# Patient Record
Sex: FEMALE | Race: BLACK OR AFRICAN AMERICAN | Employment: OTHER | URBAN - METROPOLITAN AREA
[De-identification: names, ages, dates, MRNs, and addresses within clinical notes are randomized per-mention and may not be internally consistent; named-entity substitution may affect disease eponyms.]

---

## 2017-10-19 ENCOUNTER — HOSPITAL ENCOUNTER (OUTPATIENT)
Dept: GENERAL RADIOLOGY | Age: 72
Discharge: HOME | End: 2017-10-19
Attending: FAMILY MEDICINE
Payer: COMMERCIAL

## 2017-10-19 DIAGNOSIS — J18.9 PNEUMONIA, ORGANISM UNSPECIFIED(486): ICD-10-CM

## 2017-10-19 PROCEDURE — 71020 XR CHEST AP LAT: CPT

## 2018-05-12 ENCOUNTER — HOSPITAL ENCOUNTER (INPATIENT)
Age: 73
LOS: 7 days | Discharge: HOME HEALTH | DRG: 190 | End: 2018-05-19
Attending: EMERGENCY MEDICINE | Admitting: FAMILY MEDICINE
Payer: MEDICARE

## 2018-05-12 ENCOUNTER — APPOINTMENT (OUTPATIENT)
Dept: GENERAL RADIOLOGY | Age: 73
DRG: 190 | End: 2018-05-12
Attending: EMERGENCY MEDICINE
Payer: MEDICARE

## 2018-05-12 DIAGNOSIS — J44.1 COPD EXACERBATION (HCC): Primary | ICD-10-CM

## 2018-05-12 DIAGNOSIS — R07.89 CHEST PAIN, ATYPICAL: ICD-10-CM

## 2018-05-12 PROBLEM — F41.9 ANXIETY: Status: ACTIVE | Noted: 2018-05-12

## 2018-05-12 PROBLEM — B20 HIV (HUMAN IMMUNODEFICIENCY VIRUS INFECTION) (HCC): Chronic | Status: ACTIVE | Noted: 2018-05-12

## 2018-05-12 LAB
ALBUMIN SERPL-MCNC: 2.8 G/DL (ref 3.4–5)
ALBUMIN/GLOB SERPL: 0.7 {RATIO} (ref 0.8–1.7)
ALP SERPL-CCNC: 108 U/L (ref 45–117)
ALT SERPL-CCNC: 33 U/L (ref 13–56)
ANION GAP SERPL CALC-SCNC: 3 MMOL/L (ref 3–18)
APPEARANCE UR: CLEAR
ARTERIAL PATENCY WRIST A: YES
AST SERPL-CCNC: 21 U/L (ref 15–37)
ATRIAL RATE: 96 BPM
BACTERIA URNS QL MICRO: ABNORMAL /HPF
BASE EXCESS BLD CALC-SCNC: 6 MMOL/L
BASOPHILS # BLD: 0 K/UL (ref 0–0.06)
BASOPHILS NFR BLD: 0 % (ref 0–2)
BDY SITE: ABNORMAL
BILIRUB SERPL-MCNC: 0.3 MG/DL (ref 0.2–1)
BILIRUB UR QL: NEGATIVE
BNP SERPL-MCNC: 663 PG/ML (ref 0–900)
BODY TEMPERATURE: 98.7
BUN SERPL-MCNC: 26 MG/DL (ref 7–18)
BUN/CREAT SERPL: 17 (ref 12–20)
CALCIUM SERPL-MCNC: 8.5 MG/DL (ref 8.5–10.1)
CALCULATED P AXIS, ECG09: 58 DEGREES
CALCULATED R AXIS, ECG10: 10 DEGREES
CALCULATED T AXIS, ECG11: 53 DEGREES
CHLORIDE SERPL-SCNC: 108 MMOL/L (ref 100–108)
CK MB CFR SERPL CALC: 2.6 % (ref 0–4)
CK MB CFR SERPL CALC: 2.8 % (ref 0–4)
CK MB CFR SERPL CALC: 2.9 % (ref 0–4)
CK MB SERPL-MCNC: 4 NG/ML (ref 5–25)
CK MB SERPL-MCNC: 4.3 NG/ML (ref 5–25)
CK MB SERPL-MCNC: 4.6 NG/ML (ref 5–25)
CK SERPL-CCNC: 140 U/L (ref 26–192)
CK SERPL-CCNC: 154 U/L (ref 26–192)
CK SERPL-CCNC: 179 U/L (ref 26–192)
CO2 SERPL-SCNC: 30 MMOL/L (ref 21–32)
COLOR UR: YELLOW
CREAT SERPL-MCNC: 1.51 MG/DL (ref 0.6–1.3)
DIAGNOSIS, 93000: NORMAL
DIFFERENTIAL METHOD BLD: ABNORMAL
EOSINOPHIL # BLD: 0 K/UL (ref 0–0.4)
EOSINOPHIL NFR BLD: 0 % (ref 0–5)
EPITH CASTS URNS QL MICRO: ABNORMAL /LPF (ref 0–5)
ERYTHROCYTE [DISTWIDTH] IN BLOOD BY AUTOMATED COUNT: 13.9 % (ref 11.6–14.5)
GAS FLOW.O2 O2 DELIVERY SYS: ABNORMAL L/MIN
GLOBULIN SER CALC-MCNC: 4.2 G/DL (ref 2–4)
GLUCOSE SERPL-MCNC: 96 MG/DL (ref 74–99)
GLUCOSE UR STRIP.AUTO-MCNC: 250 MG/DL
HCO3 BLD-SCNC: 30.3 MMOL/L (ref 22–26)
HCT VFR BLD AUTO: 39.9 % (ref 35–45)
HGB BLD-MCNC: 12.7 G/DL (ref 12–16)
HGB UR QL STRIP: NEGATIVE
KETONES UR QL STRIP.AUTO: NEGATIVE MG/DL
LEUKOCYTE ESTERASE UR QL STRIP.AUTO: NEGATIVE
LYMPHOCYTES # BLD: 1.9 K/UL (ref 0.9–3.6)
LYMPHOCYTES NFR BLD: 20 % (ref 21–52)
MAGNESIUM SERPL-MCNC: 2.4 MG/DL (ref 1.6–2.6)
MCH RBC QN AUTO: 34.1 PG (ref 24–34)
MCHC RBC AUTO-ENTMCNC: 31.8 G/DL (ref 31–37)
MCV RBC AUTO: 107.3 FL (ref 74–97)
MONOCYTES # BLD: 1 K/UL (ref 0.05–1.2)
MONOCYTES NFR BLD: 10 % (ref 3–10)
MUCOUS THREADS URNS QL MICRO: ABNORMAL /LPF
NEUTS SEG # BLD: 6.5 K/UL (ref 1.8–8)
NEUTS SEG NFR BLD: 70 % (ref 40–73)
NITRITE UR QL STRIP.AUTO: NEGATIVE
O2/TOTAL GAS SETTING VFR VENT: 94 %
P-R INTERVAL, ECG05: 122 MS
PCO2 BLD: 42.4 MMHG (ref 35–45)
PH BLD: 7.46 [PH] (ref 7.35–7.45)
PH UR STRIP: 6 [PH] (ref 5–8)
PLATELET # BLD AUTO: 316 K/UL (ref 135–420)
PMV BLD AUTO: 8.6 FL (ref 9.2–11.8)
PO2 BLD: 53 MMHG (ref 80–100)
POTASSIUM SERPL-SCNC: 3.5 MMOL/L (ref 3.5–5.5)
PROT SERPL-MCNC: 7 G/DL (ref 6.4–8.2)
PROT UR STRIP-MCNC: 30 MG/DL
Q-T INTERVAL, ECG07: 374 MS
QRS DURATION, ECG06: 78 MS
QTC CALCULATION (BEZET), ECG08: 472 MS
RBC # BLD AUTO: 3.72 M/UL (ref 4.2–5.3)
RBC #/AREA URNS HPF: ABNORMAL /HPF (ref 0–5)
SAO2 % BLD: 89 % (ref 92–97)
SERVICE CMNT-IMP: ABNORMAL
SODIUM SERPL-SCNC: 141 MMOL/L (ref 136–145)
SP GR UR REFRACTOMETRY: 1.02 (ref 1–1.03)
SPECIMEN TYPE: ABNORMAL
TOTAL RESP. RATE, ITRR: 22
TROPONIN I SERPL-MCNC: <0.02 NG/ML (ref 0–0.04)
TROPONIN I SERPL-MCNC: <0.02 NG/ML (ref 0–0.04)
TROPONIN I SERPL-MCNC: <0.02 NG/ML (ref 0–0.06)
UROBILINOGEN UR QL STRIP.AUTO: 0.2 EU/DL (ref 0.2–1)
VENTRICULAR RATE, ECG03: 96 BPM
WBC # BLD AUTO: 9.4 K/UL (ref 4.6–13.2)
WBC URNS QL MICRO: ABNORMAL /HPF (ref 0–4)
YEAST URNS QL MICRO: ABNORMAL

## 2018-05-12 PROCEDURE — 74011250636 HC RX REV CODE- 250/636: Performed by: FAMILY MEDICINE

## 2018-05-12 PROCEDURE — 85025 COMPLETE CBC W/AUTO DIFF WBC: CPT | Performed by: EMERGENCY MEDICINE

## 2018-05-12 PROCEDURE — 81001 URINALYSIS AUTO W/SCOPE: CPT | Performed by: FAMILY MEDICINE

## 2018-05-12 PROCEDURE — 93005 ELECTROCARDIOGRAM TRACING: CPT

## 2018-05-12 PROCEDURE — 94640 AIRWAY INHALATION TREATMENT: CPT

## 2018-05-12 PROCEDURE — 74011250637 HC RX REV CODE- 250/637: Performed by: INTERNAL MEDICINE

## 2018-05-12 PROCEDURE — 96375 TX/PRO/DX INJ NEW DRUG ADDON: CPT

## 2018-05-12 PROCEDURE — 86361 T CELL ABSOLUTE COUNT: CPT | Performed by: FAMILY MEDICINE

## 2018-05-12 PROCEDURE — 93970 EXTREMITY STUDY: CPT

## 2018-05-12 PROCEDURE — 83880 ASSAY OF NATRIURETIC PEPTIDE: CPT | Performed by: EMERGENCY MEDICINE

## 2018-05-12 PROCEDURE — 74011000250 HC RX REV CODE- 250: Performed by: FAMILY MEDICINE

## 2018-05-12 PROCEDURE — 71045 X-RAY EXAM CHEST 1 VIEW: CPT

## 2018-05-12 PROCEDURE — 36415 COLL VENOUS BLD VENIPUNCTURE: CPT | Performed by: FAMILY MEDICINE

## 2018-05-12 PROCEDURE — 87086 URINE CULTURE/COLONY COUNT: CPT | Performed by: FAMILY MEDICINE

## 2018-05-12 PROCEDURE — 83735 ASSAY OF MAGNESIUM: CPT | Performed by: EMERGENCY MEDICINE

## 2018-05-12 PROCEDURE — 74011250637 HC RX REV CODE- 250/637: Performed by: FAMILY MEDICINE

## 2018-05-12 PROCEDURE — 74011000250 HC RX REV CODE- 250

## 2018-05-12 PROCEDURE — 99285 EMERGENCY DEPT VISIT HI MDM: CPT

## 2018-05-12 PROCEDURE — 74011000250 HC RX REV CODE- 250: Performed by: EMERGENCY MEDICINE

## 2018-05-12 PROCEDURE — 87040 BLOOD CULTURE FOR BACTERIA: CPT | Performed by: FAMILY MEDICINE

## 2018-05-12 PROCEDURE — 82803 BLOOD GASES ANY COMBINATION: CPT

## 2018-05-12 PROCEDURE — 96365 THER/PROPH/DIAG IV INF INIT: CPT

## 2018-05-12 PROCEDURE — 65660000000 HC RM CCU STEPDOWN

## 2018-05-12 PROCEDURE — 87536 HIV-1 QUANT&REVRSE TRNSCRPJ: CPT | Performed by: FAMILY MEDICINE

## 2018-05-12 PROCEDURE — 80053 COMPREHEN METABOLIC PANEL: CPT | Performed by: EMERGENCY MEDICINE

## 2018-05-12 PROCEDURE — 96367 TX/PROPH/DG ADDL SEQ IV INF: CPT

## 2018-05-12 PROCEDURE — 74011250636 HC RX REV CODE- 250/636: Performed by: EMERGENCY MEDICINE

## 2018-05-12 PROCEDURE — 74011250636 HC RX REV CODE- 250/636: Performed by: INTERNAL MEDICINE

## 2018-05-12 PROCEDURE — 82550 ASSAY OF CK (CPK): CPT | Performed by: EMERGENCY MEDICINE

## 2018-05-12 PROCEDURE — 36600 WITHDRAWAL OF ARTERIAL BLOOD: CPT

## 2018-05-12 RX ORDER — SODIUM CHLORIDE 9 MG/ML
50 INJECTION, SOLUTION INTRAVENOUS CONTINUOUS
Status: DISCONTINUED | OUTPATIENT
Start: 2018-05-12 | End: 2018-05-12

## 2018-05-12 RX ORDER — SERTRALINE HYDROCHLORIDE 25 MG/1
TABLET, FILM COATED ORAL DAILY
COMMUNITY

## 2018-05-12 RX ORDER — DILTIAZEM HYDROCHLORIDE 30 MG/1
30 TABLET, FILM COATED ORAL
Status: DISCONTINUED | OUTPATIENT
Start: 2018-05-12 | End: 2018-05-15

## 2018-05-12 RX ORDER — ALBUTEROL SULFATE 2.5 MG/.5ML
5 SOLUTION RESPIRATORY (INHALATION)
Status: DISCONTINUED | OUTPATIENT
Start: 2018-05-12 | End: 2018-05-12

## 2018-05-12 RX ORDER — FLUTICASONE PROPIONATE 50 MCG
2 SPRAY, SUSPENSION (ML) NASAL DAILY
COMMUNITY
End: 2019-05-21

## 2018-05-12 RX ORDER — IPRATROPIUM BROMIDE AND ALBUTEROL SULFATE 2.5; .5 MG/3ML; MG/3ML
9 SOLUTION RESPIRATORY (INHALATION)
Status: COMPLETED | OUTPATIENT
Start: 2018-05-12 | End: 2018-05-12

## 2018-05-12 RX ORDER — FLUTICASONE PROPIONATE 50 MCG
2 SPRAY, SUSPENSION (ML) NASAL DAILY
Status: DISCONTINUED | OUTPATIENT
Start: 2018-05-12 | End: 2018-05-19 | Stop reason: HOSPADM

## 2018-05-12 RX ORDER — LEVOFLOXACIN 5 MG/ML
500 INJECTION, SOLUTION INTRAVENOUS EVERY 24 HOURS
Status: DISCONTINUED | OUTPATIENT
Start: 2018-05-12 | End: 2018-05-12 | Stop reason: DRUGHIGH

## 2018-05-12 RX ORDER — IPRATROPIUM BROMIDE 0.5 MG/2.5ML
0.5 SOLUTION RESPIRATORY (INHALATION)
Status: DISCONTINUED | OUTPATIENT
Start: 2018-05-12 | End: 2018-05-12

## 2018-05-12 RX ORDER — MAGNESIUM SULFATE HEPTAHYDRATE 40 MG/ML
2 INJECTION, SOLUTION INTRAVENOUS
Status: COMPLETED | OUTPATIENT
Start: 2018-05-12 | End: 2018-05-12

## 2018-05-12 RX ORDER — IPRATROPIUM BROMIDE AND ALBUTEROL SULFATE 2.5; .5 MG/3ML; MG/3ML
3 SOLUTION RESPIRATORY (INHALATION)
Status: COMPLETED | OUTPATIENT
Start: 2018-05-12 | End: 2018-05-12

## 2018-05-12 RX ORDER — FUROSEMIDE 10 MG/ML
40 INJECTION INTRAMUSCULAR; INTRAVENOUS 2 TIMES DAILY
Status: DISCONTINUED | OUTPATIENT
Start: 2018-05-12 | End: 2018-05-14

## 2018-05-12 RX ORDER — FAMOTIDINE 20 MG/1
20 TABLET, FILM COATED ORAL 2 TIMES DAILY
Status: DISCONTINUED | OUTPATIENT
Start: 2018-05-12 | End: 2018-05-13

## 2018-05-12 RX ORDER — MIRTAZAPINE 30 MG/1
TABLET, FILM COATED ORAL
COMMUNITY
End: 2018-05-19

## 2018-05-12 RX ORDER — HEPARIN SODIUM 5000 [USP'U]/ML
5000 INJECTION, SOLUTION INTRAVENOUS; SUBCUTANEOUS EVERY 8 HOURS
Status: DISCONTINUED | OUTPATIENT
Start: 2018-05-12 | End: 2018-05-19 | Stop reason: HOSPADM

## 2018-05-12 RX ORDER — IPRATROPIUM BROMIDE AND ALBUTEROL SULFATE 2.5; .5 MG/3ML; MG/3ML
SOLUTION RESPIRATORY (INHALATION)
Status: COMPLETED
Start: 2018-05-12 | End: 2018-05-12

## 2018-05-12 RX ORDER — SERTRALINE HYDROCHLORIDE 50 MG/1
25 TABLET, FILM COATED ORAL DAILY
Status: DISCONTINUED | OUTPATIENT
Start: 2018-05-12 | End: 2018-05-19 | Stop reason: HOSPADM

## 2018-05-12 RX ORDER — SODIUM CHLORIDE 9 MG/ML
75 INJECTION, SOLUTION INTRAVENOUS CONTINUOUS
Status: DISCONTINUED | OUTPATIENT
Start: 2018-05-12 | End: 2018-05-12

## 2018-05-12 RX ORDER — LEVOFLOXACIN 5 MG/ML
250 INJECTION, SOLUTION INTRAVENOUS EVERY 24 HOURS
Status: COMPLETED | OUTPATIENT
Start: 2018-05-13 | End: 2018-05-16

## 2018-05-12 RX ORDER — HYDRALAZINE HYDROCHLORIDE 20 MG/ML
20 INJECTION INTRAMUSCULAR; INTRAVENOUS
Status: DISCONTINUED | OUTPATIENT
Start: 2018-05-12 | End: 2018-05-19 | Stop reason: HOSPADM

## 2018-05-12 RX ORDER — LEVOFLOXACIN 5 MG/ML
750 INJECTION, SOLUTION INTRAVENOUS
Status: COMPLETED | OUTPATIENT
Start: 2018-05-12 | End: 2018-05-12

## 2018-05-12 RX ADMIN — IPRATROPIUM BROMIDE AND ALBUTEROL SULFATE 3 ML: .5; 3 SOLUTION RESPIRATORY (INHALATION) at 08:52

## 2018-05-12 RX ADMIN — DILTIAZEM HYDROCHLORIDE 30 MG: 30 TABLET, FILM COATED ORAL at 21:30

## 2018-05-12 RX ADMIN — FUROSEMIDE 40 MG: 10 INJECTION, SOLUTION INTRAMUSCULAR; INTRAVENOUS at 15:00

## 2018-05-12 RX ADMIN — DILTIAZEM HYDROCHLORIDE 30 MG: 30 TABLET, FILM COATED ORAL at 16:30

## 2018-05-12 RX ADMIN — IPRATROPIUM BROMIDE AND ALBUTEROL SULFATE 3 ML: .5; 3 SOLUTION RESPIRATORY (INHALATION) at 04:46

## 2018-05-12 RX ADMIN — METHYLPREDNISOLONE SODIUM SUCCINATE 40 MG: 40 INJECTION, POWDER, FOR SOLUTION INTRAMUSCULAR; INTRAVENOUS at 21:31

## 2018-05-12 RX ADMIN — METHYLPREDNISOLONE SODIUM SUCCINATE 40 MG: 40 INJECTION, POWDER, FOR SOLUTION INTRAMUSCULAR; INTRAVENOUS at 14:54

## 2018-05-12 RX ADMIN — HEPARIN SODIUM 5000 UNITS: 5000 INJECTION, SOLUTION INTRAVENOUS; SUBCUTANEOUS at 14:53

## 2018-05-12 RX ADMIN — FAMOTIDINE 20 MG: 20 TABLET ORAL at 17:18

## 2018-05-12 RX ADMIN — METHYLPREDNISOLONE SODIUM SUCCINATE 40 MG: 40 INJECTION, POWDER, FOR SOLUTION INTRAMUSCULAR; INTRAVENOUS at 09:12

## 2018-05-12 RX ADMIN — SODIUM CHLORIDE 50 ML/HR: 900 INJECTION, SOLUTION INTRAVENOUS at 13:17

## 2018-05-12 RX ADMIN — FAMOTIDINE 20 MG: 20 TABLET ORAL at 09:12

## 2018-05-12 RX ADMIN — MAGNESIUM SULFATE HEPTAHYDRATE 2 G: 40 INJECTION, SOLUTION INTRAVENOUS at 03:08

## 2018-05-12 RX ADMIN — LEVOFLOXACIN 750 MG: 5 INJECTION, SOLUTION INTRAVENOUS at 04:00

## 2018-05-12 RX ADMIN — IPRATROPIUM BROMIDE AND ALBUTEROL SULFATE 9 ML: .5; 3 SOLUTION RESPIRATORY (INHALATION) at 03:57

## 2018-05-12 RX ADMIN — METHYLPREDNISOLONE SODIUM SUCCINATE 125 MG: 125 INJECTION, POWDER, FOR SOLUTION INTRAMUSCULAR; INTRAVENOUS at 03:08

## 2018-05-12 RX ADMIN — SERTRALINE HYDROCHLORIDE 25 MG: 50 TABLET ORAL at 09:12

## 2018-05-12 RX ADMIN — IPRATROPIUM BROMIDE AND ALBUTEROL SULFATE 3 ML: .5; 3 SOLUTION RESPIRATORY (INHALATION) at 03:00

## 2018-05-12 RX ADMIN — FLUTICASONE PROPIONATE 2 SPRAY: 50 SPRAY, METERED NASAL at 09:00

## 2018-05-12 RX ADMIN — HEPARIN SODIUM 5000 UNITS: 5000 INJECTION, SOLUTION INTRAVENOUS; SUBCUTANEOUS at 21:31

## 2018-05-12 RX ADMIN — ABACAVIR SULFATE, DOLUTEGRAVIR SODIUM, LAMIVUDINE 1 TABLET: 600; 50; 300 TABLET, FILM COATED ORAL at 09:00

## 2018-05-12 RX ADMIN — SODIUM CHLORIDE 75 ML/HR: 900 INJECTION, SOLUTION INTRAVENOUS at 09:15

## 2018-05-12 NOTE — ED TRIAGE NOTES
Patient arrived via EMS. Patient states that she has been feeling SOB x 1 day with productive cough. Patient had 2 albuterol neb treatments in route by EMS.

## 2018-05-12 NOTE — H&P
History & Physical    Patient: Cheryl To MRN: [id number]  CSN: [id number]    YOB: 1945  Age: 71 y.o. Sex: female      DOA: 5/12/2018    Chief Complaint:   Chief Complaint   Patient presents with    Shortness of Breath          HPI:     Cheryl To is a 71 y.o. female with a history of HIV, who presents to the ED via EMS with complaint of shortness of breath and wheezing which began yesterday. Patient states that her shortness of breath has been gradually worsening. She notes that she was prescribed Advair, but has been unable to take it due to throat irritation . She reports associated productive cough, chest pain, and bilateral feet swelling over the past few days. BNP on admission within normal range Patient denies history of CHF. Per EMS, patient was given 2 albuterol treatments en route to the ED. She currently smokes 0.5ppd. Patient denies recent fever, chills, nausea, vomiting, abdominal pain, or back pain. She makes no further complaints. Pt has been f/u ID at [address]  Dr. Charly Augustine [phone] she has recent change in her med she has been on current med for 3 month due to f/u with her in july    Past Medical History:   Diagnosis Date    Coronary atherosclerosis of native coronary artery     HIV (human immunodeficiency virus infection) ([facility] [address]     Ill-defined condition     Pneumonia    Nonspecific abnormal electrocardiogram (ECG) (EKG)     Pre-operative cardiovascular examination        Past Surgical History:   Procedure Laterality Date    HX HIP REPLACEMENT      HX HYSTERECTOMY         History reviewed. No pertinent family history.     Social History     Social History    Marital status:      Spouse name: N/A    Number of children: N/A    Years of education: N/A     Social History Main Topics    Smoking status: Current Every Day Smoker     Packs/day: 0.50    Smokeless tobacco: None    Alcohol use No    Drug use: None    Sexual activity: Not Asked Other Topics Concern    None     Social History Narrative       Prior to Admission medications    Medication Sig Start Date End Date Taking? Authorizing Provider   fluticasone (FLONASE) 50 mcg/actuation nasal spray 2 Sprays by Both Nostrils route daily. Yes Zack Smith MD   darunavir-cobicistat (PREZCOBIX) 800-150 mg-mg per tablet Take 1 Tab by mouth daily. Yes Zack Smith MD   mirtazapine (REMERON) 30 mg tablet Take  by mouth nightly. Yes Zack Smith MD   abacavir-dolutegravir-lamiVUDine (TRIUMEQ) tablet Take  by mouth daily. Yes Zack Smith MD   sertraline (ZOLOFT) 25 mg tablet Take  by mouth daily. Yes Zack Smith MD       No Known Allergies    Review of Systems  GENERAL: Patient alert, awake and oriented times 3, able to communicate full sentences and not in distress. HEENT: No change in vision, no earache, tinnitus, sore throat or sinus congestion. NECK: No pain or stiffness. CARDIOVASCULAR: positive  chest pain and pressure. Positive  palpitations. PULMONARY: positive  shortness of breath,  Positive cough and  wheeze. GASTROINTESTINAL: No abdominal pain, nausea, vomiting or diarrhea, melena or       bright red blood per rectum. GENITOURINARY: No urinary frequency, urgency, hesitancy or dysuria. MUSCULOSKELETAL: No joint or muscle pain, no back pain, no recent trauma. Swelling lower extremities   DERMATOLOGIC: No rash, no itching, no lesions. ENDOCRINE: No polyuria, polydipsia, no heat . Positive intolerance to cold intolerance. No recent change in    weight. HEMATOLOGICAL: No anemia or easy bruising or bleeding. NEUROLOGIC: No headache, seizures, numbness  Feet and toes, generalized weakness    PSYCHIATRIC: H/O  Depression, and  anxiety,  No other mood disorder, no loss of interest in normal       activity or change in sleep pattern.         Physical Exam:     Physical Exam:  Visit Vitals    BP (!) 162/92    Pulse 90    Temp 97.6 °F (36.4 °C)    Resp 25    Wt 72.6 kg (160 lb)    SpO2 96%    BMI 28.34 kg/m2      O2 Device: Room air    Temp (24hrs), Av.6 °F (36.4 °C), Min:97.6 °F (36.4 °C), Max:97.6 °F (36.4 °C)             General:  Alert, cooperative, mild  Distress with SOB   Head:  Normocephalic, without obvious abnormality, atraumatic. Eyes:  Conjunctivae/corneas clear. PERRL, EOMs intact. Nose: Nares normal. No drainage or sinus tenderness. Throat: Lips, mucosa, and tongue dry   Neck: Supple, symmetrical, trachea midline, no adenopathy, thyroid: no enlargement/tenderness/nodules, no carotid bruit and no JVD. Back:   ROM normal. No CVA tenderness. Lungs:     Expiratory wheezing bilaterally. Chest wall:  Positive tenderness mid and lt chest wall   Heart:  Regular rate and rhythm, S1, S2 normal, no murmur, click, rub or gallop. Abdomen: Soft, non-tender. Bowel sounds normal. No masses,  No organomegaly. Extremities: Extremities normal, atraumatic, no cyanosis or edema. Pulses: 2+ and symmetric all extremities. Skin: Skin color, texture,  Dry skin   Neurologic: CNII-XII intact. No focal motor or sensory deficit. Labs Reviewed: All lab results for the last 24 hours reviewed.   CXR and EKG    Procedures/imaging: see electronic medical records for all procedures/Xrays and details which were not copied into this note but were reviewed prior to creation of Plan        Assessment/Plan     Principal Problem:    COPD exacerbation ([facility] (2018)    Active Problems:    HIV (human immunodeficiency virus infection) ([facility] (2018)      Anxiety (2018)         Plan    COPD exacerbation  Continue Levaquin  Solumedrol  40 mg q6h duo nebulizer treatment  Pulmonary consult called Dr Claire Potts  Iv hydration NS 75 cc/h  Recheck cardiac enzymes/ cardiology consult  Echo   Swallow evaluation   pt might need Ct scan chest will wait for pulmonary evaluation     Acute renal insufficiency  Iv hydration monitor kidney function  Monitor bp hydralizne prn SBp above 170    HIV infection  Continue current treatment  Check viral load  And CD4 count  F/u ID as outaptinet    Anxiety/ insomnia  Continue zoloft 25 mg daily    Seasonal allergies  flonase nasal spray      Atypical Chest pain/Swelling lower extremities   BNP normal  Venous duplex U/S B/L leg  Cardiology consult  Discussed with Dr Thomas Lundberg in Am check TSH  DVT/GI Prophylaxis:Heparin  H2B/PPI and pepcid      Cristofer Johnson MD  5/12/2018  6:56 AM

## 2018-05-12 NOTE — PROCEDURES
[facility]  *** FINAL REPORT ***    Name: Author Gerardo  MRN: [id number]    Inpatient  : 10 Nov 1945  HIS Order #: [id number]  [address] [facility] Visit #: [id number]  Date: 12 May 2018    TYPE OF TEST: Peripheral Venous Testing    REASON FOR TEST  Chest pain acute, pulmonary origin, Leg swelling, pain    Right Leg:-  Deep venous thrombosis:           No  Superficial venous thrombosis:    No  Deep venous insufficiency:        Not examined  Superficial venous insufficiency: Not examined    Left Leg:-  Deep venous thrombosis:           No  Superficial venous thrombosis:    No  Deep venous insufficiency:        Not examined  Superficial venous insufficiency: Not examined      INTERPRETATION/FINDINGS  Duplex images were obtained using 2-D gray scale, color flow, and  spectral Doppler analysis. Right leg :  1. No evidence of deep venous thrombosis detected in the veins  visualized. 2. Deep vein(s) visualized include the common femoral, femoral,  popliteal, posterior tibial, and peroneal veins. 3. No evidence of superficial thrombosis detected. 4. Superficial vein(s) visualized include the great saphenous vein at  the sapheno-femoral junction. Left leg :  1. No evidence of deep venous thrombosis detected in the veins  visualized. 2. Deep vein(s) visualized include the common femoral, femoral,  popliteal, posterior tibial, and peroneal veins. 3. No evidence of superficial thrombosis detected. 4. Superficial vein(s) visualized include the great saphenous vein at  the sapheno-femoral junction. ADDITIONAL COMMENTS    I have personally reviewed the data relevant to the interpretation of  this  study. TECHNOLOGIST: Andreia Duckworth RVCANDI  Signed: 2018 02:45 PM    PHYSICIAN: Mendoza Johansen MD  Signed: 2018 08:48 AM

## 2018-05-12 NOTE — CONSULTS
[facility] - Consult Note    Date of  Admission: 5/12/2018  2:44 AM   Primary Care Physician:  Jumana Singh,   Consult requested by : Dr Adriana Reyes:     Congestive heart failure, unknown ejection fraction: Recommend to stop IV fluids. Start IV Lasix 40 mg twice daily. Follow-up electrolytes. Check echo as already ordered. Further plans depending on the echo findings. Hypertension: Unknown in the past but high in the hospital.  May be due to steroids. Start low-dose p.o. Cardizem. Avoiding beta blockers due to wheezing. Check lipids. Wheezing shortness of breath fever and productive cough: Likely COPD exacerbation. Workup and treatment per medicine. Chest pain: Atypical, constant for 3-4 months. Unlikely to be ischemic. MI is already ruled out. Likely it is secondary to dyspnea and respiratory distress and is musculoskeletal in origin. We will follow-up from cardiac standpoint. Thank you for your kind referral.       Assessment:     Hospital Problems  Date Reviewed: 5/12/2018          Codes Class Noted POA    * (Principal)COPD exacerbation ([facility] ICD-10-CM: J44.1  ICD-9-CM: 491.21  5/12/2018 Yes        HIV (human immunodeficiency virus infection) ([facility] (Chronic) ICD-10-CM: B20  ICD-9-CM: V08  5/12/2018 Yes        Anxiety ICD-10-CM: F41.9  ICD-9-CM: 300.00  5/12/2018 Yes                   History of Present Illness: This is a 71 y.o. female admitted for COPD exacerbation ([facility] 80-year-old -American female who was admitted with acute worsening of chronic dyspnea along with edema. Patient has had shortness of breath for 3-4 months but is worse for a week or so. She thinks she had fever. She has cough which is productive. Edema has been noted for last 2-3 months intermittently but is persistent for last 1 week. She has chest discomfort which is also persistent for last 3-4 months.   It is present in the lower sternal area, worsens with movement and coughing. No radiation. No associated nausea vomiting diaphoresis noted. She felt palpitations since yesterday along with increasing shortness of breath. Had an episode of dizziness about a month ago which has not recurred. No history of loss of consciousness. Mention of CAD in the past history per chart but patient says that she is unaware of any definite CAD.      Past Medical History:     Past Medical History:   Diagnosis Date    Coronary atherosclerosis of native coronary artery     HIV (human immunodeficiency virus infection) ([facility] [address]     Ill-defined condition     Pneumonia    Nonspecific abnormal electrocardiogram (ECG) (EKG)     Pre-operative cardiovascular examination       Past Surgical History:   Procedure Laterality Date    HX HIP REPLACEMENT      HX HYSTERECTOMY          Social History:     Social History     Social History    Marital status:      Spouse name: N/A    Number of children: N/A    Years of education: N/A     Social History Main Topics    Smoking status: Current Every Day Smoker     Packs/day: 0.50    Smokeless tobacco: None    Alcohol use No    Drug use: None    Sexual activity: Not Asked     Other Topics Concern    None     Social History Narrative        Family History:     Family History   Problem Relation Age of Onset    Heart Attack Mother 70    Stroke Father 54    Heart Attack Sister 59        Medications:   No Known Allergies     Current Facility-Administered Medications   Medication Dose Route Frequency    methylPREDNISolone (PF) (SOLU-MEDROL) injection 40 mg  40 mg IntraVENous Q6H    sertraline (ZOLOFT) tablet 25 mg  25 mg Oral DAILY    fluticasone (FLONASE) 50 mcg/actuation nasal spray 2 Spray  2 Spray Both Nostrils DAILY    heparin (porcine) injection 5,000 Units  5,000 Units SubCUTAneous Q8H    famotidine (PEPCID) tablet 20 mg  20 mg Oral BID    abacavir-dolutegravir-lamiVUDine (TRIUMEQ) 600- mg tablet 1 Tab  1 Tab Oral DAILY  darunavir-cobicistat (PREZCOBIX) 800-150 mg-mg per tablet 1 Tab (Patient Supplied)  1 Tab Oral DAILY    hydrALAZINE (APRESOLINE) 20 mg/mL injection 20 mg  20 mg IntraVENous Q6H PRN    .enter correct height for dosing purposes  1 Each Other Rx Dosing/Monitoring    0.9% sodium chloride infusion  50 mL/hr IntraVENous CONTINUOUS    [START ON 5/13/2018] levoFLOXacin (LEVAQUIN) 250 mg in D5W IVPB  250 mg IntraVENous Q24H        Review Of Systems:   No history of stroke, seizures, bleeding disorders known to the patient. No history of previous hypertension diabetes or hyperlipidemia known. No recent vomiting diarrhea hematuria dysuria skin rashes or severe headaches. Thinks that she has chronic kidney issues but not clear what. Creatinine elevated at 1.5 and this admission. No chronic liver disease known. Physical Exam:     Visit Vitals    /84 (BP 1 Location: Right arm, BP Patient Position: At rest)    Pulse 99    Temp 98 °F (36.7 °C)    Resp 25    Ht 5' 2.99\" (1.6 m)    Wt 160 lb (72.6 kg)    SpO2 96%    BMI 28.35 kg/m2     BP Readings from Last 3 Encounters:   05/12/18 146/84   07/10/09 118/82     Pulse Readings from Last 3 Encounters:   05/12/18 99   07/10/09 74     Wt Readings from Last 3 Encounters:   05/12/18 160 lb (72.6 kg)   07/10/09 156 lb (70.8 kg)       TELE: Sinus rhythm    General: alert and in mild distress  HENT: Normocephalic, atraumatic. Normal external eye. Neck :  no bruit, no JVD  Cardiac:  regular rate and rhythm, S1, S2 normal, no murmur, click, rub or gallop  Chest/Lungs:moderate expiratory wheezing heard diffusely throughout both lungs  Abdomen: Soft, nontender, no masses  Extremities:  No c/c/e, peripheral pulses present    Neurological: grossly intact. No focal abnormalities, moves all extremities well. Phychiatric : The patient is awake, alert and oriented x3. Shakila Villalta is interactive and appropriate.    Data Review:     Recent Results (from the past 50 hour(s))   EKG, 12 LEAD, INITIAL    Collection Time: 05/12/18  2:49 AM   Result Value Ref Range    Ventricular Rate 96 BPM    Atrial Rate 96 BPM    P-R Interval 122 ms    QRS Duration 78 ms    Q-T Interval 374 ms    QTC Calculation (Bezet) 472 ms    Calculated P Axis 58 degrees    Calculated R Axis 10 degrees    Calculated T Axis 53 degrees    Diagnosis       Normal sinus rhythm  Normal ECG  No previous ECGs available  Confirmed by Desiree Gramajo ([id number]) on 5/12/2018 8:51:38 AM     CARDIAC PANEL,(CK, CKMB & TROPONIN)    Collection Time: 05/12/18  3:00 AM   Result Value Ref Range     26 - 192 U/L    CK - MB 4.6 (H) <3.6 ng/ml    CK-MB Index 2.6 0.0 - 4.0 %    Troponin-I, Qt. <0.02 0.00 - 0.06 NG/ML   CBC WITH AUTOMATED DIFF    Collection Time: 05/12/18  3:00 AM   Result Value Ref Range    WBC 9.4 4.6 - 13.2 K/uL    RBC 3.72 (L) 4.20 - 5.30 M/uL    HGB 12.7 12.0 - 16.0 g/dL    HCT 39.9 35.0 - 45.0 %    .3 (H) 74.0 - 97.0 FL    MCH 34.1 (H) 24.0 - 34.0 PG    MCHC 31.8 31.0 - 37.0 g/dL    RDW 13.9 11.6 - 14.5 %    PLATELET 199 516 - 388 K/uL    MPV 8.6 (L) 9.2 - 11.8 FL    NEUTROPHILS 70 40 - 73 %    LYMPHOCYTES 20 (L) 21 - 52 %    MONOCYTES 10 3 - 10 %    EOSINOPHILS 0 0 - 5 %    BASOPHILS 0 0 - 2 %    ABS. NEUTROPHILS 6.5 1.8 - 8.0 K/UL    ABS. LYMPHOCYTES 1.9 0.9 - 3.6 K/UL    ABS. MONOCYTES 1.0 0.05 - 1.2 K/UL    ABS. EOSINOPHILS 0.0 0.0 - 0.4 K/UL    ABS.  BASOPHILS 0.0 0.0 - 0.06 K/UL    DF AUTOMATED     MAGNESIUM    Collection Time: 05/12/18  3:00 AM   Result Value Ref Range    Magnesium 2.4 1.6 - 2.6 mg/dL   METABOLIC PANEL, COMPREHENSIVE    Collection Time: 05/12/18  3:00 AM   Result Value Ref Range    Sodium 141 136 - 145 mmol/L    Potassium 3.5 3.5 - 5.5 mmol/L    Chloride 108 100 - 108 mmol/L    CO2 30 21 - 32 mmol/L    Anion gap 3 3.0 - 18 mmol/L    Glucose 96 74 - 99 mg/dL    BUN 26 (H) 7.0 - 18 MG/DL    Creatinine 1.51 (H) 0.6 - 1.3 MG/DL    BUN/Creatinine ratio 17 12 - 20      GFR est AA 41 (L) >60 ml/min/1.73m2    GFR est non-AA 34 (L) >60 ml/min/1.73m2    Calcium 8.5 8.5 - 10.1 MG/DL    Bilirubin, total 0.3 0.2 - 1.0 MG/DL    ALT (SGPT) 33 13 - 56 U/L    AST (SGOT) 21 15 - 37 U/L    Alk. phosphatase 108 45 - 117 U/L    Protein, total 7.0 6.4 - 8.2 g/dL    Albumin 2.8 (L) 3.4 - 5.0 g/dL    Globulin 4.2 (H) 2.0 - 4.0 g/dL    A-G Ratio 0.7 (L) 0.8 - 1.7     NT-PRO BNP    Collection Time: 05/12/18  3:00 AM   Result Value Ref Range    NT pro- 0 - 900 PG/ML   POC G3    Collection Time: 05/12/18  5:02 AM   Result Value Ref Range    Device: ROOM AIR      FIO2 (POC) 94 %    pH (POC) 7.462 (H) 7.35 - 7.45      pCO2 (POC) 42.4 35.0 - 45.0 MMHG    pO2 (POC) 53 (L) 80 - 100 MMHG    HCO3 (POC) 30.3 (H) 22 - 26 MMOL/L    sO2 (POC) 89 (L) 92 - 97 %    Base excess (POC) 6 mmol/L    Allens test (POC) YES      Total resp. rate 22      Site RIGHT RADIAL      Patient temp. 98.7      Specimen type (POC) ARTERIAL      Performed by [facility] PANEL,(CK, CKMB & TROPONIN)    Collection Time: 05/12/18 10:02 AM   Result Value Ref Range     26 - 192 U/L    CK - MB 4.3 (H) <3.6 ng/ml    CK-MB Index 2.8 0.0 - 4.0 %    Troponin-I, Qt. <0.02 0.0 - 0.045 NG/ML   CARDIAC PANEL,(CK, CKMB & TROPONIN)    Collection Time: 05/12/18 12:29 PM   Result Value Ref Range     26 - 192 U/L    CK - MB 4.0 (H) <3.6 ng/ml    CK-MB Index 2.9 0.0 - 4.0 %    Troponin-I, Qt. <0.02 0.0 - 0.045 NG/ML       No intake or output data in the 24 hours ending 05/12/18 1422    Cardiographics:     ECG: normal EKG, normal sinus rhythm    No flowsheet data found.       Signed By: Kenan Rodriguez MD     May 12, 2018

## 2018-05-12 NOTE — IP AVS SNAPSHOT
[address] [facility] 
 
 
 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  
  
Follow-up Information Follow up With Details Comments Contact Info Daniel Campos MD In 1 week office closed,  will call monday AM with follow-up appt. [address] [facility] [address] 
[phone] Anderson Lee MD On 2018 at [facility] [address] 
[phone] Pat Morales MD In 2 weeks office closed,  will call monday AM with follow-up appt. [address] [facility] [address] [facility] [address] 
[phone] Your Scheduled Appointments 2018 10:00 AM EDT Office Visit with Anderson Lee MD  
[facility] [place] ([facility]) [facility] [address] [facility]  
[phone] Discharge Orders None A check brigette indicates which time of day the medication should be taken. My Medications START taking these medications Instructions Each Dose to Equal  
 Morning Noon Evening Bedtime  
 albuterol 2.5 mg /3 mL (0.083 %) nebulizer solution Commonly known as:  PROVENTIL VENTOLIN Your last dose was: Your next dose is:    
   
   
 3 mL by Nebulization route every four (4) hours as needed for Wheezing. 2.5 mg  
    
   
   
   
  
 aspirin 81 mg chewable tablet Your last dose was: Your next dose is: Take 1 Tab by mouth daily. 81 mg  
    
   
   
   
  
 budesonide-formoterol 160-4.5 mcg/actuation Hfaa Commonly known as:  SYMBICORT Your last dose was: Your next dose is: Take 2 Puffs by inhalation two (2) times a day. 2 Puff  
    
   
   
   
  
 dilTIAZem  mg ER capsule Commonly known as:  CARDIZEM CD Your last dose was: Your next dose is: Take 1 Cap by mouth daily. 180 mg  
    
   
   
   
  
 famotidine 20 mg tablet Commonly known as:  PEPCID Your last dose was: Your next dose is: Take 1 Tab by mouth daily. 20 mg  
    
   
   
   
  
 furosemide 40 mg tablet Commonly known as:  LASIX Your last dose was: Your next dose is: Take 1 Tab by mouth daily. 40 mg  
    
   
   
   
  
 hydrALAZINE 10 mg tablet Commonly known as:  APRESOLINE Your last dose was: Your next dose is: Take 1 Tab by mouth three (3) times daily. Hold for SBP less than 110  
 10 mg  
    
   
   
   
  
 levoFLOXacin 250 mg tablet Commonly known as:  Tiana Godinez Start taking on:  5/20/2018 Your last dose was: Your next dose is: Take 1 Tab by mouth every fourty-eight (48) hours for 1 day. 250 mg  
    
   
   
   
  
 predniSONE 10 mg tablet Commonly known as:  Familia Spearing Your last dose was: Your next dose is:    
   
   
 3 tab po daily for 3 days then 2 tab po daily for 3 days then  1 tab po daily for 3 days  
     
   
   
   
  
 tiotropium 18 mcg inhalation capsule Commonly known as:  Catherine Fowler Your last dose was: Your next dose is: Take 1 Cap by inhalation daily. 1 Cap  
    
   
   
   
  
 traMADol 50 mg tablet Commonly known as:  ULTRAM  
   
Your last dose was: Your next dose is: Take 1 Tab by mouth every six (6) hours as needed. Max Daily Amount: 200 mg.  
 50 mg CONTINUE taking these medications Instructions Each Dose to Equal  
 Morning Noon Evening Bedtime  
 fluticasone 50 mcg/actuation nasal spray Commonly known as:  Bambi Acevedo Your last dose was: Your next dose is: 2 Sprays by Both Nostrils route daily. 2 [place] PREZCOBIX 800-150 mg-mg per tablet Generic drug:  darunavir-cobicistat Your last dose was: Your next dose is: Take 1 Tab by mouth daily. 1 Tab  
    
   
   
   
  
 sertraline 25 mg tablet Commonly known as:  ZOLOFT Your last dose was: Your next dose is: Take  by mouth daily. TRIUMEQ tablet Generic drug:  abacavir-dolutegravir-lamiVUDine Your last dose was: Your next dose is: Take  by mouth daily. STOP taking these medications   
 mirtazapine 30 mg tablet Commonly known as:  Phyllistine Jacobo Where to Get Your Medications Information on where to get these meds will be given to you by the nurse or doctor. ! Ask your nurse or doctor about these medications  
  albuterol 2.5 mg /3 mL (0.083 %) nebulizer solution  
 aspirin 81 mg chewable tablet  
 budesonide-formoterol 160-4.5 mcg/actuation Hfaa  
 dilTIAZem  mg ER capsule  
 famotidine 20 mg tablet  
 furosemide 40 mg tablet  
 hydrALAZINE 10 mg tablet  
 levoFLOXacin 250 mg tablet  
 predniSONE 10 mg tablet  
 tiotropium 18 mcg inhalation capsule  
 traMADol 50 mg tablet Opioid Education Prescription Opioids: What You Need to Know: 
 
Prescription opioids can be used to help relieve moderate-to-severe pain and are often prescribed following a surgery or injury, or for certain health conditions.   These medications can be an important part of treatment but also come with serious risks. Opioids are strong pain medicines. Examples include hydrocodone, oxycodone, fentanyl, and morphine. Heroin is an example of an illegal opioid. It is important to work with your health care provider to make sure you are getting the safest, most effective care. WHAT ARE THE RISKS AND SIDE EFFECTS OF OPIOID USE? Prescription opioids carry serious risks of addiction and overdose, especially with prolonged use. An opioid overdose, often marked by slow breathing, can cause sudden death. The use of prescription opioids can have a number of side effects as well, even when taken as directed. · Tolerance-meaning you might need to take more of a medication for the same pain relief · Physical dependence-meaning you have symptoms of withdrawal when the medication is stopped. Withdrawal symptoms can include nausea, sweating, chills, diarrhea, stomach cramps, and muscle aches. Withdrawal can last up to several weeks, depending on which drug you took and how long you took it. · Increased sensitivity to pain · Constipation · Nausea, vomiting, and dry mouth · Sleepiness and dizziness · Confusion · Depression · Low levels of testosterone that can result in lower sex drive, energy, and strength · Itching and sweating RISKS ARE GREATER WITH:      
· History of drug misuse, substance use disorder, or overdose · Mental health conditions (such as depression or anxiety) · Sleep apnea · Older age (72 years or older) · Pregnancy Avoid alcohol while taking prescription opioids. Also, unless specifically advised by your health care provider, medications to avoid include: · Benzodiazepines (such as Xanax or Valium) · Muscle relaxants (such as Soma or Flexeril) · Hypnotics (such as Ambien or Lunesta) · Other prescription opioids KNOW YOUR OPTIONS Talk to your health care provider about ways to manage your pain that don't involve prescription opioids. Some of these options may actually work better and have fewer risks and side effects. Options may include: 
· Pain relievers such as acetaminophen, ibuprofen, and naproxen · Some medications that are also used for depression or seizures · Physical therapy and exercise · Counseling to help patients learn how to cope better with triggers of pain and stress. · Application of heat or cold compress · Massage therapy · Relaxation techniques Be Informed Make sure you know the name of your medication, how much and how often to take it, and its potential risks & side effects. IF YOU ARE PRESCRIBED OPIOIDS FOR PAIN: 
· Never take opioids in greater amounts or more often than prescribed. Remember the goal is not to be pain-free but to manage your pain at a tolerable level. · Follow up with your primary care provider to: · Work together to create a plan on how to manage your pain. · Talk about ways to help manage your pain that don't involve prescription opioids. · Talk about any and all concerns and side effects. · Help prevent misuse and abuse. · Never sell or share prescription opioids · Help prevent misuse and abuse. · Store prescription opioids in a secure place and out of reach of others (this may include visitors, children, friends, and family). · Safely dispose of unused/unwanted prescription opioids: Find your community drug take-back program or your pharmacy mail-back program, or flush them down the toilet, following guidance from the Food and Drug Administration (www.fda.gov/Drugs/ResourcesForYou). · Visit www.cdc.gov/drugoverdose to learn about the risks of opioid abuse and overdose. · If you believe you may be struggling with addiction, tell your health care provider and ask for guidance or call [address] [place], [place] [address]io at [phone]. Discharge Instructions Anxiety Disorder: Care Instructions Your Care Instructions Anxiety is a normal reaction to stress. Difficult situations can cause you to have symptoms such as sweaty palms and a nervous feeling. In an anxiety disorder, the symptoms are far more severe. Constant worry, muscle tension, trouble sleeping, nausea and diarrhea, and other symptoms can make normal daily activities difficult or impossible. These symptoms may occur for no reason, and they can affect your work, school, or social life. Medicines, counseling, and self-care can all help. Follow-up care is a key part of your treatment and safety. Be sure to make and go to all appointments, and call your doctor if you are having problems. It's also a good idea to know your test results and keep a list of the medicines you take. How can you care for yourself at home? · Take medicines exactly as directed. Call your doctor if you think you are having a problem with your medicine. · Go to your counseling sessions and follow-up appointments. · Recognize and accept your anxiety. Then, when you are in a situation that makes you anxious, say to yourself, \"This is not an emergency. I feel uncomfortable, but I am not in danger. I can keep going even if I feel anxious. \" · Be kind to your body: ¨ Relieve tension with exercise or a massage. ¨ Get enough rest. 
¨ Avoid alcohol, caffeine, nicotine, and illegal drugs. They can increase your anxiety level and cause sleep problems. ¨ Learn and do relaxation techniques. See below for more about these techniques. · Engage your mind. Get out and do something you enjoy. Go to a funny movie, or take a walk or hike. Plan your day. Having too much or too little to do can make you anxious. · Keep a record of your symptoms. Discuss your fears with a good friend or family member, or join a support group for people with similar problems. Talking to others sometimes relieves stress. · Get involved in social groups, or volunteer to help others. Being alone sometimes makes things seem worse than they are. · Get at least 30 minutes of exercise on most days of the week to relieve stress. Walking is a good choice. You also may want to do other activities, such as running, swimming, cycling, or playing tennis or team sports. Relaxation techniques Do relaxation exercises 10 to 20 minutes a day. You can play soothing, relaxing music while you do them, if you wish. · Tell others in your house that you are going to do your relaxation exercises. Ask them not to disturb you. · Find a comfortable place, away from all distractions and noise. · Lie down on your back, or sit with your back straight. · Focus on your breathing. Make it slow and steady. · Breathe in through your nose. Breathe out through either your nose or mouth. · Breathe deeply, filling up the area between your navel and your rib cage. Breathe so that your belly goes up and down. · Do not hold your breath. · Breathe like this for 5 to 10 minutes. Notice the feeling of calmness throughout your whole body. As you continue to breathe slowly and deeply, relax by doing the following for another 5 to 10 minutes: · Tighten and relax each muscle group in your body. You can begin at your toes and work your way up to your head. · Imagine your muscle groups relaxing and becoming heavy. · Empty your mind of all thoughts. · Let yourself relax more and more deeply. · Become aware of the state of calmness that surrounds you. · When your relaxation time is over, you can bring yourself back to alertness by moving your fingers and toes and then your hands and feet and then stretching and moving your entire body. Sometimes people fall asleep during relaxation, but they usually wake up shortly afterward.  
· Always give yourself time to return to full alertness before you drive a car or do anything that might cause an accident if you are not fully alert. Never play a relaxation tape while you drive a car. When should you call for help? Call 911 anytime you think you may need emergency care. For example, call if: 
? · You feel you cannot stop from hurting yourself or someone else. ? Keep the numbers for these national suicide hotlines: 1-459-531-TALK (0-628.195.8272) and 0-323-HXASTEF (1-941.108.7924). If you or someone you know talks about suicide or feeling hopeless, get help right away. ? Watch closely for changes in your health, and be sure to contact your doctor if: 
? · You have anxiety or fear that affects your life. ? · You have symptoms of anxiety that are new or different from those you had before. Where can you learn more? Go to http://svetlanaRaise Your Flagana maria.info/. Enter P754 in the search box to learn more about \"Anxiety Disorder: Care Instructions. \" Current as of: May 12, 2017 Content Version: 11.4 © 1989-9941 [employer]. Care instructions adapted under license by [employer] (which disclaims liability or warranty for this information). If you have questions about a medical condition or this instruction, always ask your healthcare professional. [address] any warranty or liability for your use of this information. Chest Pain: Care Instructions Your Care Instructions There are many things that can cause chest pain. Some are not serious and will get better on their own in a few days. But some kinds of chest pain need more testing and treatment. Your doctor may have recommended a follow-up visit in the next 8 to 12 hours. If you are not getting better, you may need more tests or treatment. Even though your doctor has released you, you still need to watch for any problems. The doctor carefully checked you, but sometimes problems can develop later.  If you have new symptoms or if your symptoms do not get better, get medical care right away. If you have worse or different chest pain or pressure that lasts more than 5 minutes or you passed out (lost consciousness), call 911 or seek other emergency help right away. A medical visit is only one step in your treatment. Even if you feel better, you still need to do what your doctor recommends, such as going to all suggested follow-up appointments and taking medicines exactly as directed. This will help you recover and help prevent future problems. How can you care for yourself at home? · Rest until you feel better. · Take your medicine exactly as prescribed. Call your doctor if you think you are having a problem with your medicine. · Do not drive after taking a prescription pain medicine. When should you call for help? Call 911 if: 
? · You passed out (lost consciousness). ? · You have severe difficulty breathing. ? · You have symptoms of a heart attack. These may include: ¨ Chest pain or pressure, or a strange feeling in your chest. 
¨ Sweating. ¨ Shortness of breath. ¨ Nausea or vomiting. ¨ Pain, pressure, or a strange feeling in your back, neck, jaw, or upper belly or in one or both shoulders or arms. ¨ Lightheadedness or sudden weakness. ¨ A fast or irregular heartbeat. After you call 911, the  may tell you to chew 1 adult-strength or 2 to 4 low-dose aspirin. Wait for an ambulance. Do not try to drive yourself. ?Call your doctor today if: 
? · You have any trouble breathing. ? · Your chest pain gets worse. ? · You are dizzy or lightheaded, or you feel like you may faint. ? · You are not getting better as expected. ? · You are having new or different chest pain. Where can you learn more? Go to http://svetlana-ana maria.info/. Enter A120 in the search box to learn more about \"Chest Pain: Care Instructions. \" Current as of: March 20, 2017 Content Version: 11.4 © 6313-7995 [employer] (Service[employer]). Care instructions adapted under license by [employer] (which disclaims liability or warranty for this information). If you have questions about a medical condition or this instruction, always ask your healthcare professional. [address] any warranty or liability for your use of this information. Chronic Obstructive Pulmonary Disease (COPD): Care Instructions Your Care Instructions Chronic obstructive pulmonary disease (COPD) is a general term for a group of lung diseases, including emphysema and chronic bronchitis. People with COPD have decreased airflow in and out of the lungs, which makes it hard to breathe. The airways also can get clogged with thick mucus. Cigarette smoking is a major cause of COPD. Although there is no cure for COPD, you can slow its progress. Following your treatment plan and taking care of yourself can help you feel better and live longer. Follow-up care is a key part of your treatment and safety. Be sure to make and go to all appointments, and call your doctor if you are having problems. It's also a good idea to know your test results and keep a list of the medicines you take. How can you care for yourself at home? ?Staying healthy ? · Do not smoke. This is the most important step you can take to prevent more damage to your lungs. If you need help quitting, talk to your doctor about stop-smoking programs and medicines. These can increase your chances of quitting for good. ? · Avoid colds and flu. Get a pneumococcal vaccine shot. If you have had one before, ask your doctor whether you need a second dose. Get the flu vaccine every fall. If you must be around people with colds or the flu, wash your hands often. ? · Avoid secondhand smoke, air pollution, and high altitudes. Also avoid cold, dry air and hot, humid air. Stay at home with your windows closed when air pollution is bad. ?Medicines and oxygen therapy ? · Take your medicines exactly as prescribed. Call your doctor if you think you are having a problem with your medicine. ? · You may be taking medicines such as: ¨ Bronchodilators. These help open your airways and make breathing easier. Bronchodilators are either short-acting (work for 6 to 9 hours) or long-acting (work for 24 hours). You inhale most bronchodilators, so they start to act quickly. Always carry your quick-relief inhaler with you in case you need it while you are away from home. ¨ Corticosteroids (prednisone, budesonide). These reduce airway inflammation. They come in pill or inhaled form. You must take these medicines every day for them to work well. ? · A spacer may help you get more inhaled medicine to your lungs. Ask your doctor or pharmacist if a spacer is right for you. If it is, ask how to use it properly. ? · Do not take any vitamins, over-the-counter medicine, or herbal products without talking to your doctor first.  
? · If your doctor prescribed antibiotics, take them as directed. Do not stop taking them just because you feel better. You need to take the full course of antibiotics. ? · Oxygen therapy boosts the amount of oxygen in your blood and helps you breathe easier. Use the flow rate your doctor has recommended, and do not change it without talking to your doctor first.  
Activity ? · Get regular exercise. Walking is an easy way to get exercise. Start out slowly, and walk a little more each day. ? · Pay attention to your breathing. You are exercising too hard if you cannot talk while you are exercising. ? · Take short rest breaks when doing household chores and other activities. ? · Learn breathing methods-such as breathing through pursed lips-to help you become less short of breath. ? · If your doctor has not set you up with a pulmonary rehabilitation program, talk to him or her about whether rehab is right for you.  Rehab includes exercise programs, education about your disease and how to manage it, help with diet and other changes, and emotional support. Diet ? · Eat regular, healthy meals. Use bronchodilators about 1 hour before you eat to make it easier to eat. Eat several small meals instead of three large ones. Drink beverages at the end of the meal. Avoid foods that are hard to chew. ? · Eat foods that contain protein so that you do not lose muscle mass. ? · Talk with your doctor if you gain too much weight or if you lose weight without trying. ?Mental health ? · Talk to your family, friends, or a therapist about your feelings. It is normal to feel frightened, angry, hopeless, helpless, and even guilty. Talking openly about bad feelings can help you cope. If these feelings last, talk to your doctor. When should you call for help? Call 911 anytime you think you may need emergency care. For example, call if: 
? · You have severe trouble breathing. ?Call your doctor now or seek immediate medical care if: 
? · You have new or worse trouble breathing. ? · You cough up blood. ? · You have a fever. ? Watch closely for changes in your health, and be sure to contact your doctor if: 
? · You cough more deeply or more often, especially if you notice more mucus or a change in the color of your mucus. ? · You have new or worse swelling in your legs or belly. ? · You are not getting better as expected. Where can you learn more? Go to http://svetlana-ana maria.info/. Deloris Ward in the search box to learn more about \"Chronic Obstructive Pulmonary Disease (COPD): Care Instructions. \" Current as of: May 12, 2017 Content Version: 11.4 © 2055-2332 [employer]. Care instructions adapted under license by [employer] (which disclaims liability or warranty for this information).  If you have questions about a medical condition or this instruction, always ask your healthcare professional. [place] [address] any warranty or liability for your use of this information. Heart Failure: Care Instructions Your Care Instructions Heart failure occurs when your heart does not pump as much blood as the body needs. Failure does not mean that the heart has stopped pumping but rather that it is not pumping as well as it should. Over time, this causes fluid buildup in your lungs and other parts of your body. Fluid buildup can cause shortness of breath, fatigue, swollen ankles, and other problems. By taking medicines regularly, reducing sodium (salt) in your diet, checking your weight every day, and making lifestyle changes, you can feel better and live longer. Follow-up care is a key part of your treatment and safety. Be sure to make and go to all appointments, and call your doctor if you are having problems. It's also a good idea to know your test results and keep a list of the medicines you take. How can you care for yourself at home? Medicines ? · Be safe with medicines. Take your medicines exactly as prescribed. Call your doctor if you think you are having a problem with your medicine. ? · Do not take any vitamins, over-the-counter medicine, or herbal products without talking to your doctor first. Joshua Lebron not take ibuprofen (Advil or Motrin) and naproxen (Aleve) without talking to your doctor first. They could make your heart failure worse. ? · You may be taking some of the following medicine. ¨ Beta-blockers can slow heart rate, decrease blood pressure, and improve your condition. Taking a beta-blocker may lower your chance of needing to be hospitalized. ¨ Angiotensin-converting enzyme inhibitors (ACEIs) reduce the heart's workload, lower blood pressure, and reduce swelling. Taking an ACEI may lower your chance of needing to be hospitalized again. ¨ Angiotensin II receptor blockers (ARBs) work like ACEIs. Your doctor may prescribe them instead of ACEIs. ¨ Diuretics, also called water pills, reduce swelling. ¨ Potassium supplements replace this important mineral, which is sometimes lost with diuretics. ¨ Aspirin and other blood thinners prevent blood clots, which can cause a stroke or heart attack. ? You will get more details on the specific medicines your doctor prescribes. Diet ? · Your doctor may suggest that you limit sodium to 2,000 milligrams (mg) a day or less. That is less than 1 teaspoon of salt a day, including all the salt you eat in cooking or in packaged foods. People get most of their sodium from processed foods. Fast food and restaurant meals also tend to be very high in sodium. ? · Ask your doctor how much liquid you can drink each day. You may have to limit liquids. ?Weight ? · Weigh yourself without clothing at the same time each day. Record your weight. Call your doctor if you have a sudden weight gain, such as more than 2 to 3 pounds in a day or 5 pounds in a week. (Your doctor may suggest a different range of weight gain.) A sudden weight gain may mean that your heart failure is getting worse. ? Activity level ? · Start light exercise (if your doctor says it is okay). Even if you can only do a small amount, exercise will help you get stronger, have more energy, and manage your weight and your stress. Walking is an easy way to get exercise. Start out by walking a little more than you did before. Bit by bit, increase the amount you walk. ? · When you exercise, watch for signs that your heart is working too hard. You are pushing yourself too hard if you cannot talk while you are exercising. If you become short of breath or dizzy or have chest pain, stop, sit down, and rest.  
? · If you feel \"wiped out\" the day after you exercise, walk slower or for a shorter distance until you can work up to a better pace. ? · Get enough rest at night. Sleeping with 1 or 2 pillows under your upper body and head may help you breathe easier. ? Lifestyle changes ? · Do not smoke. Smoking can make a heart condition worse. If you need help quitting, talk to your doctor about stop-smoking programs and medicines. These can increase your chances of quitting for good. Quitting smoking may be the most important step you can take to protect your heart. ? · Limit alcohol to 2 drinks a day for men and 1 drink a day for women. Too much alcohol can cause health problems. ? · Avoid getting sick from colds and the flu. Get a pneumococcal vaccine shot. If you have had one before, ask your doctor whether you need another dose. Get a flu shot each year. If you must be around people with colds or the flu, wash your hands often. When should you call for help? Call 911 if you have symptoms of sudden heart failure such as: 
? · You have severe trouble breathing. ? · You cough up pink, foamy mucus. ? · You have a new irregular or rapid heartbeat. ?Call your doctor now or seek immediate medical care if: 
? · You have new or increased shortness of breath. ? · You are dizzy or lightheaded, or you feel like you may faint. ? · You have sudden weight gain, such as more than 2 to 3 pounds in a day or 5 pounds in a week. (Your doctor may suggest a different range of weight gain.) ? · You have increased swelling in your legs, ankles, or feet. ? · You are suddenly so tired or weak that you cannot do your usual activities. ? Watch closely for changes in your health, and be sure to contact your doctor if you develop new symptoms. Where can you learn more? Go to http://svetlana-ana maria.info/. Enter N385 in the search box to learn more about \"Heart Failure: Care Instructions. \" Current as of: September 21, 2016 Content Version: 11.4 © 3941-7327 Healthwise, Incorporated.  Care instructions adapted under license by [facility] [address] (which disclaims liability or warranty for this information). If you have questions about a medical condition or this instruction, always ask your healthcare professional. [address] any warranty or liability for your use of this information. Low Sodium Diet (2,000 Milligram): Care Instructions Your Care Instructions Too much sodium causes your body to hold on to extra water. This can raise your blood pressure and force your heart and kidneys to work harder. In very serious cases, this could cause you to be put in the hospital. It might even be life-threatening. By limiting sodium, you will feel better and lower your risk of serious problems. The most common source of sodium is salt. People get most of the salt in their diet from canned, prepared, and packaged foods. Fast food and restaurant meals also are very high in sodium. Your doctor will probably limit your sodium to less than 2,000 milligrams (mg) a day. This limit counts all the sodium in prepared and packaged foods and any salt you add to your food. Follow-up care is a key part of your treatment and safety. Be sure to make and go to all appointments, and call your doctor if you are having problems. It's also a good idea to know your test results and keep a list of the medicines you take. How can you care for yourself at home? Read food labels · Read labels on cans and food packages. The labels tell you how much sodium is in each serving. Make sure that you look at the serving size. If you eat more than the serving size, you have eaten more sodium. · Food labels also tell you the Percent Daily Value for sodium. Choose products with low Percent Daily Values for sodium. · Be aware that sodium can come in forms other than salt, including monosodium glutamate (MSG), sodium citrate, and sodium bicarbonate (baking soda). MSG is often added to Asian food.  When you eat out, you can sometimes ask for food without MSG or added salt. Buy low-sodium foods · Buy foods that are labeled \"unsalted\" (no salt added), \"sodium-free\" (less than 5 mg of sodium per serving), or \"low-sodium\" (less than 140 mg of sodium per serving). Foods labeled \"reduced-sodium\" and \"light sodium\" may still have too much sodium. Be sure to read the label to see how much sodium you are getting. · Buy fresh vegetables, or frozen vegetables without added sauces. Buy low-sodium versions of canned vegetables, soups, and other canned goods. Prepare low-sodium meals · Cut back on the amount of salt you use in cooking. This will help you adjust to the taste. Do not add salt after cooking. One teaspoon of salt has about 2,300 mg of sodium. · Take the salt shaker off the table. · Flavor your food with garlic, lemon juice, onion, vinegar, herbs, and spices. Do not use soy sauce, lite soy sauce, steak sauce, onion salt, garlic salt, celery salt, mustard, or ketchup on your food. · Use low-sodium salad dressings, sauces, and ketchup. Or make your own salad dressings and sauces without adding salt. · Use less salt (or none) when recipes call for it. You can often use half the salt a recipe calls for without losing flavor. Other foods such as rice, pasta, and grains do not need added salt. · Rinse canned vegetables, and cook them in fresh water. This removes some-but not all-of the salt. · Avoid water that is naturally high in sodium or that has been treated with water softeners, which add sodium. Call your local water company to find out the sodium content of your water supply. If you buy bottled water, read the label and choose a sodium-free brand. Avoid high-sodium foods · Avoid eating: ¨ Smoked, cured, salted, and canned meat, fish, and poultry. ¨ Ham, bruce, hot dogs, and luncheon meats. ¨ Regular, hard, and processed cheese and regular peanut butter.  
¨ Crackers with salted tops, and other salted snack foods such as pretzels, chips, and salted popcorn. ¨ Frozen prepared meals, unless labeled low-sodium. ¨ Canned and dried soups, broths, and bouillon, unless labeled sodium-free or low-sodium. ¨ Canned vegetables, unless labeled sodium-free or low-sodium. ¨ Western [place] fries, pizza, tacos, and other fast foods. ¨ Pickles, olives, ketchup, and other condiments, especially soy sauce, unless labeled sodium-free or low-sodium. Where can you learn more? Go to http://[email or web address].comana maria.info/. Enter F405 in the search box to learn more about \"Low Sodium Diet (2,000 Milligram): Care Instructions. \" Current as of: May 12, 2017 Content Version: 11.4 © 6766-2543 CloudPhysics. Care instructions adapted under license by [employer] (which disclaims liability or warranty for this information). If you have questions about a medical condition or this instruction, always ask your healthcare professional. [place] [address] any warranty or liability for your use of this information. Learning About Fluid Overload What is fluid overload? Fluid overload means that your body has too much water. The extra fluid in your body can raise your blood pressure and force your heart to work harder. It can also make it hard for you to breathe. Most of your body is made up of water. The body uses minerals like sodium and potassium to help organs such as your heart, kidneys, and liver balance how much water you need. For example, the heart pumps blood to move water around the body. And the kidneys work to get rid of the water that the body doesn't need. Health conditions like kidney disease, heart failure, and cirrhosis can cause fluid overload. Other things can cause extra fluid to build up. IV fluids, some medicines, too much salt (sodium) from food, and certain medical treatments can sometimes cause this fluid increase. What are the symptoms? Some of the most common symptoms are: · Gaining weight over a short period of time. · Swelling in the ankles or legs. · Shortness of breath. How is it treated? The goal of treatment is to remove the extra fluid in your body. Your treatment will depend on the cause. Your doctor may: · Give you medicines, such as diuretics (also called \"water pills\"). They help your body get rid of the extra fluid. · Restrict your fluid or salt intake. Follow-up care is a key part of your treatment and safety. Be sure to make and go to all appointments, and call your doctor if you are having problems. It's also a good idea to know your test results and keep a list of the medicines you take. Where can you learn more? Go to http://svetlana-ana maria.info/. Enter O110 in the search box to learn more about \"Learning About Fluid Overload. \" Current as of: September 21, 2016 Content Version: 11.4 © 4134-5296 Semanticator. Care instructions adapted under license by [employer] (which disclaims liability or warranty for this information). If you have questions about a medical condition or this instruction, always ask your healthcare professional. [place] [address] any warranty or liability for your use of this information. Learning About Post-Diagnosis HIV Tests What are post-diagnosis HIV tests? Soon after you are diagnosed with the human immunodeficiency virus (HIV), you will have other blood tests. These tests help your doctor see how your body is responding to the virus. They also help show how well your treatment is working. These tests may be done every 3 to 6 months. The first set of tests serves as a baseline. Your doctor can compare the results of future tests with the first set. Your doctor will look at the results of several tests over time to see if the infection is getting better or worse. These tests include the viral load test and the CD4 count. Your doctor may order one or both tests. What is the viral load test? 
A viral load test measures how much HIV is in your blood. It measures the amount of the genetic material (RNA) of the virus. There are several methods that a lab can use to do this test. Each method reports the results in a different way. But all of the methods give you important information about how to treat your disease. What it's used for This test is done to: 
· See how well treatment is working. · Track changes in the HIV infection. · Guide treatment choices. How results are reported Viral load results are reported as the number of HIV copies in a milliliter (copies/mL) of blood. Each virus is called a \"copy\" because HIV increases by making copies of itself. What the results mean Your numbers may go up or down slightly from test to test. And the specific numbers depend on which method the lab used to measure your viral load. But in general: · If your viral load increases over time, it means the infection is getting worse. · If the viral load is smaller over time, it means that the infection is being reduced. You are less likely to develop AIDS (acquired immunodeficiency syndrome). · If no HIV copies are found, this does not mean that you don't have HIV anymore. It just means that the amount of HIV in your blood was too low for the test to detect. HIV still can be passed to another person even when the viral load can't be detected. What is the CD4 count? HIV destroys CD4 cells. CD4 cells are a type of white blood cell. White blood cells are important in fighting infections. A CD4 count is a blood test to find the number of CD4 cells. This number shows if your HIV has progressed to AIDS. It also helps find out if other infections may occur. These other infections are often called opportunistic infections. They occur in people with weak immune systems. They usually don't occur in people with healthy immune systems. What it's used for CD4 counts are done to: · Track how the HIV infection is affecting your immune system. · Help diagnose AIDS. · Check your risk of other infections. · Decide when to start treatment to prevent other infections, such as medicines to prevent pneumonia. How results are reported Results are reported as the total number of CD4 cells in a milliliter (cells/mL) of blood. You may also see a percent number. That number is the percentage of white blood cells that are CD4 cells. The total and the percent numbers go up and down together. What the results mean The pattern of CD4 counts over time is more important than any single count. As the count rises, the healthier your immune system is. As the count drops, it becomes more likely that AIDS will develop. The ranges listed here are just a guide. The ranges vary from lab to lab. Your lab may use a different range. A CD4 count range of: · 500 to 1,500 cells per microliter (mcL) is usually found in people who are not infected with HIV. But people who have HIV may have counts over 500 too. · More than 350 but less than 500 cells/mcL means that your immune system is starting to weaken. · Less than 350 cells/mcL shows a weak immune system and an increased risk for other infections. · Less than 200 cells/mcL means you have AIDS and a high risk for other infections. What is the next step? Your doctor will talk with you about the results of these tests and what they mean. He or she will answer any questions you have. Your doctor may also: · Talk about your current treatment for HIV and suggest any changes that might be needed. · Ask you about any trouble you might have with taking your medicines exactly as prescribed. · Discuss which of these tests you should have next and when you should have them. · Suggest more tests, if you need them. Follow-up care is a key part of your treatment and safety.  Be sure to make and go to all appointments, and call your doctor if you are having problems. It's also a good idea to know your test results and keep a list of the medicines you take. Where can you learn more? Go to http://[email or web address]-ana [email or web address].info/. Enter D194 in the search box to learn more about \"Learning About Post-Diagnosis HIV Tests. \" Current as of: March 3, 2017 Content Version: 11.4 © 5703-8171 OneClass. Care instructions adapted under license by [employer] (which disclaims liability or warranty for this information). If you have questions about a medical condition or this instruction, always ask your healthcare professional. [address] any warranty or liability for your use of this information. Patient armband removed and shredded MyChart Activation Thank you for requesting access to [facility]. Please follow the instructions below to securely access and download your online medical record. [email or web address] allows you to send messages to your doctor, view your test results, renew your prescriptions, schedule appointments, and more. How Do I Sign Up? 1. In your internet browser, go to [email or web address] 
2. Click on the First Time User? Click Here link in the Sign In box. You will be redirect to the New Member Sign Up page. 3. Enter your [facility] Access Code exactly as it appears below. You will not need to use this code after youve completed the sign-up process. If you do not sign up before the expiration date, you must request a new code. [facility] Access Code: [email or web address] Expires: 8/10/2018  2:44 AM (This is the date your [facility] access code will ) 4. Enter the last four digits of your Social Security Number (xxxx) and Date of Birth (mm/dd/yyyy) as indicated and click Submit. You will be taken to the next sign-up page. 5. Create a [facility] ID. This will be your [facility].com login ID and cannot be changed, so think of one that is secure and easy to remember. 6. Create a Xhale password. You can change your password at any time. 7. Enter your Password Reset Question and Answer. This can be used at a later time if you forget your password. 8. Enter your e-mail address. You will receive e-mail notification when new information is available in [address] 9. Click Sign Up. You can now view and download portions of your medical record. 10. Click the Download Summary menu link to download a portable copy of your medical information. Additional Information If you have questions, please visit the Frequently Asked Questions section of the Xhale website at [email or web address] Remember, Xhale is NOT to be used for urgent needs. For medical emergencies, dial 911. DISCHARGE SUMMARY from Nurse PATIENT INSTRUCTIONS: 
 
 
F-face looks uneven A-arms unable to move or move unevenly S-speech slurred or non-existent T-time-call 911 as soon as signs and symptoms begin-DO NOT go Back to bed or wait to see if you get better-TIME IS BRAIN. Warning Signs of HEART ATTACK Call 911 if you have these symptoms: 
? Chest discomfort. Most heart attacks involve discomfort in the center of the chest that lasts more than a few minutes, or that goes away and comes back. It can feel like uncomfortable pressure, squeezing, fullness, or pain. ? Discomfort in other areas of the upper body. Symptoms can include pain or discomfort in one or both arms, the back, neck, jaw, or stomach. ? Shortness of breath with or without chest discomfort. ? Other signs may include breaking out in a cold sweat, nausea, or lightheadedness. Don't wait more than five minutes to call [address]! Fast action can save your life. Calling 911 is almost always the fastest way to get lifesaving treatment. Emergency Medical Services staff can begin treatment when they arrive  up to an hour sooner than if someone gets to the hospital by car. The discharge information has been reviewed with the patient. The patient verbalized understanding. Discharge medications reviewed with the patient and appropriate educational materials and side effects teaching were provided. ___________________________________________________________________________________________________________________________________ Isis Biopolymerhart Announcement We are excited to announce that we are making your provider's discharge notes available to you in [employer]. You will see these notes when they are completed and signed by the physician that discharged you from your recent hospital stay. If you have any questions or concerns about any information you see in [employer], please call the Health Information Department where you were seen or reach out to your Primary Care Provider for more information about your plan of care. [facility] [place] [facility]! Danielito Mora introduces [employer] patient portal. Now you can access parts of your medical record, email your doctor's office, and request medication refills online. 1. In your internet browser, go to [email or web address] 2. Click on the First Time User? Click Here link in the Sign In box. You will see the New Member Sign Up page. 3. Enter your [employer] Access Code exactly as it appears below. You will not need to use this code after youve completed the sign-up process. If you do not sign up before the expiration date, you must request a new code. · [employer] Access Code: [email or web address] Expires: 8/10/2018  2:44 AM 
 
4.  Enter the last four digits of your Social Security Number (xxxx) and Date of Birth (mm/dd/yyyy) as indicated and click Submit. You will be taken to the next sign-up page. 5. Create a Roll20t ID. This will be your S3Bubble login ID and cannot be changed, so think of one that is secure and easy to remember. 6. Create a Roll20t password. You can change your password at any time. 7. Enter your Password Reset Question and Answer. This can be used at a later time if you forget your password. 8. Enter your e-mail address. You will receive e-mail notification when new information is available in [address] 9. Click Sign Up. You can now view and download portions of your medical record. 10. Click the Download Summary menu link to download a portable copy of your medical information. If you have questions, please visit the Frequently Asked Questions section of the S3Bubble website. Remember, S3Bubble is NOT to be used for urgent needs. For medical emergencies, dial 911. Now available from your iPhone and Android! Introducing Nicholas Whitley As a [facility] patient, I wanted to make you aware of our electronic visit tool called Nicholas Hellerrob. [facility] 24/7 allows you to connect within minutes with a medical provider 24 hours a day, seven days a week via a mobile device or tablet or logging into a secure website from your computer. You can access Nicholas Angi from anywhere in the [place]. A virtual visit might be right for you when you have a simple condition and feel like you just dont want to get out of bed, or cant get away from work for an appointment, when your regular [facility] provider is not available (evenings, weekends or holidays), or when youre out of town and need minor care. Electronic visits cost only $49 and if the [facility] 24/7 provider determines a prescription is needed to treat your condition, one can be electronically transmitted to a nearby pharmacy*. Please take a moment to enroll today if you have not already done so. The enrollment process is free and takes just a few minutes. To enroll, please download the Layar 24/7 azucena to your tablet or phone, or visit [email or web address] to enroll on your computer. And, as an [address] [place] [address] patient with a [employer] account, the results of your visits will be scanned into your electronic medical record and your primary care provider will be able to view the scanned results. We urge you to continue to see your regular [facility] provider for your ongoing medical care. And while your primary care provider may not be the one available when you seek a Pivotsharegatofin virtual visit, the peace of mind you get from getting a real diagnosis real time can be priceless. For more information on Pivotsharegatofin, view our Frequently Asked Questions (FAQs) at [email or web address]. Sincerely, 
 
Bettina Borrego MD 
Chief Medical Officer [address] [place] [address] *:  certain medications cannot be prescribed via Digital Folio Providers Seen During Your Hospitalization Provider Specialty Primary office phone Mikal Sloan MD Emergency Medicine [phone] Lore Willard MD Family Practice [phone] Your Primary Care Physician (PCP) Primary Care Physician Office Phone Office Fax Juan Carlos Flores [phone] You are allergic to the following No active allergies Recent Documentation Height Weight BMI Smoking Status 1.6 m 73.7 kg 28.78 kg/m2 Current Every Day Smoker Emergency Contacts Name Discharge Info Relation Home Work Mobile Nichelle Encinas (Wale) DISCHARGE CAREGIVER [3] Other Relative [6] [phone] Patient Belongings  The following personal items are in your possession at time of discharge: 
  Dental Appliances: None  Visual Aid: Glasses      Home Medications: Kept at bedside   Jewelry: None  Clothing: At bedside    Other Valuables: None Please provide this summary of care documentation to your next provider. Signatures-by signing, you are acknowledging that this After Visit Summary has been reviewed with you and you have received a copy. Patient Signature:  ____________________________________________________________ Date:  ____________________________________________________________  
  
Claudene Born Provider Signature:  ____________________________________________________________ Date:  ____________________________________________________________

## 2018-05-12 NOTE — PROGRESS NOTES
6:25AM: TRANSFER - IN REPORT:  Verbal report received from [facility]) on [facility]  being received from [facility] ED(unit) for routine progression of care. Report consisted of patients Situation, Background, Assessment and   Recommendations(SBAR). Information from the following report(s) SBAR, Kardex, [facility] and Recent Results was reviewed with the receiving nurse. Opportunity for questions and clarification was provided. Assessment completed upon patients arrival to unit and care assumed. 7:15AM: Patient arrived to the unit via transport team. Currently denies having any pain. A&Ox4. Bed in low position, wheels locked. Call button within patient's reach.    7:30AM: Admission report given to Patricia Mackay RN for progression of care during day-shift. Report included the information from the Doris Dougherty, I/O, and recent results.

## 2018-05-12 NOTE — ED NOTES
TRANSFER - OUT REPORT:    Verbal report given to Nicolasa Curran RN(name) on Reshma Barajas  being transferred to [facility] room 351(unit) for routine progression of care       Report consisted of patients Situation, Background, Assessment and   Recommendations(SBAR). Information from the following report(s) SBAR, ED Summary, MAR, Recent Results and Cardiac Rhythm NSR was reviewed with the receiving nurse. Lines:   Peripheral IV 05/12/18 Left Antecubital (Active)   Site Assessment Clean, dry, & intact 5/12/2018  3:00 AM   Dressing Type Transparent 5/12/2018  3:00 AM   Hub Color/Line Status Flushed 5/12/2018  3:00 AM        Opportunity for questions and clarification was provided.       Patient transported with:   Monitor

## 2018-05-12 NOTE — PROGRESS NOTES
7:04 PM Beside shift change report given by Bucky Berrios RN  to GAENSH Curran RN. Report included the information from the [address], Doris, I/O, and recent results. Denies pain or discomfort. Call light within reach. Bed in low position. 6:45 AM: patient will need to have sputum culture sent to lab for testing. Sample given was plain spit and not deep sputum. 7:56AM: Beside shift change report given by GANESH Curran RN to Bucky Berrios RN. Report included the information from the [address], Doris, I/O, and recent results.

## 2018-05-12 NOTE — ED PROVIDER NOTES
EMERGENCY DEPARTMENT HISTORY AND PHYSICAL EXAM    2:55 AM      Date: 5/12/2018  Patient Name: Sherryl Cogan    History of Presenting Illness     Chief Complaint   Patient presents with    Shortness of Breath         History Provided By: Patient    Chief Complaint: Shortness of breath, Wheezing  Duration:  Days  Timing:  Gradual and Worsening  Severity: Moderate  Modifying Factors: Unable to  prescription for Advair. Associated Symptoms: prodcutive cough, chest pain, bilateral feet swelling      Additional History (Context): Reshma Barajas is a 71 y.o. female with a history of CAD and HIV, who presents to the ED via EMS with complaint of shortness of breath and wheezing which began yesterday. Patient states that her shortness of breath has been gradually worsening. She notes that she was prescribed Advair, but has been unable to  her refill. She reports associated productive cough, chest pain, and bilateral feet swelling over the past few days. Patient denies history of CHF. Per EMS, patient was given 2 albuterol treatments en route to the ED. She currently smokes 0.5ppd. Patient denies recent fever, chills, nausea, vomiting, abdominal pain, or back pain. She makes no further complaints. PCP: Chidi Paulino, DO    Current Facility-Administered Medications   Medication Dose Route Frequency Provider Last Rate Last Dose    levoFLOXacin (LEVAQUIN) 750 mg in D5W IVPB  750 mg IntraVENous NOW Doroteo Stewart  mL/hr at 05/12/18 0400 750 mg at 05/12/18 0400     Current Outpatient Prescriptions   Medication Sig Dispense Refill    fluticasone (FLONASE) 50 mcg/actuation nasal spray 2 Sprays by Both Nostrils route daily.  darunavir-cobicistat (PREZCOBIX) 800-150 mg-mg per tablet Take 1 Tab by mouth daily.  mirtazapine (REMERON) 30 mg tablet Take  by mouth nightly.  abacavir-dolutegravir-lamiVUDine (TRIUMEQ) tablet Take  by mouth daily.       sertraline (ZOLOFT) 25 mg tablet Take  by mouth daily. Past History     Past Medical History:  Past Medical History:   Diagnosis Date    Coronary atherosclerosis of native coronary artery     HIV (human immunodeficiency virus infection) [address] [address]     Nonspecific abnormal electrocardiogram (ECG) (EKG)     Pre-operative cardiovascular examination        Past Surgical History:  Past Surgical History:   Procedure Laterality Date    HX HIP REPLACEMENT      HX HYSTERECTOMY         Family History:  History reviewed. No pertinent family history. Social History:  Social History   Substance Use Topics    Smoking status: Current Every Day Smoker     Packs/day: 0.50    Smokeless tobacco: None    Alcohol use No       Allergies:  No Known Allergies      Review of Systems       Review of Systems   Constitutional: Negative. Negative for chills and fever. HENT: Negative. Eyes: Negative. Respiratory: Positive for cough, shortness of breath and wheezing. Cardiovascular: Positive for chest pain and leg swelling (bilateral feet). Gastrointestinal: Negative. Negative for abdominal pain, constipation, diarrhea, nausea and vomiting. Endocrine: Negative. Genitourinary: Negative. Negative for dysuria. Musculoskeletal: Negative. Negative for back pain and neck pain. Skin: Negative. Allergic/Immunologic: Negative. Neurological: Negative. Hematological: Negative. Psychiatric/Behavioral: Negative. All other systems reviewed and are negative. Physical Exam     Patient Vitals for the past 12 hrs:   Temp Pulse Resp BP SpO2   05/12/18 0400 - 91 15 159/89 91 %   05/12/18 0330 - 89 17 161/87 93 %   05/12/18 0312 - 97 21 146/86 94 %   05/12/18 0250 97.6 °F (36.4 °C) - - - -   05/12/18 0246 - 90 26 (!) 170/91 98 %   05/12/18 0245 - - - (!) 170/99 97 %           Physical Exam   Constitutional: She is oriented to person, place, and time. She appears well-developed. HENT:   Head: Normocephalic and atraumatic.    Eyes: Conjunctivae and EOM are normal.   Neck: Normal range of motion. Cardiovascular: Normal heart sounds. Exam reveals no gallop and no friction rub. No murmur heard. Pulmonary/Chest: No stridor. She has wheezes (severe, throughout). Speaking in complete sentences. Minimal retractions. Abdominal: Soft. There is no tenderness. Musculoskeletal: Normal range of motion. She exhibits no tenderness. Neurological: She is alert and oriented to person, place, and time. Skin: Skin is warm and dry. She is not diaphoretic. Psychiatric: She has a normal mood and affect. Her behavior is normal.   Nursing note and vitals reviewed. Diagnostic Study Results     Labs -  Recent Results (from the past 12 hour(s))   EKG, 12 LEAD, INITIAL    Collection Time: 05/12/18  2:49 AM   Result Value Ref Range    Ventricular Rate 96 BPM    Atrial Rate 96 BPM    P-R Interval 122 ms    QRS Duration 78 ms    Q-T Interval 374 ms    QTC Calculation (Bezet) 472 ms    Calculated P Axis 58 degrees    Calculated R Axis 10 degrees    Calculated T Axis 53 degrees    Diagnosis       Normal sinus rhythm  Normal ECG  No previous ECGs available     CARDIAC PANEL,(CK, CKMB & TROPONIN)    Collection Time: 05/12/18  3:00 AM   Result Value Ref Range     26 - 192 U/L    CK - MB 4.6 (H) <3.6 ng/ml    CK-MB Index 2.6 0.0 - 4.0 %    Troponin-I, Qt. <0.02 0.00 - 0.06 NG/ML   CBC WITH AUTOMATED DIFF    Collection Time: 05/12/18  3:00 AM   Result Value Ref Range    WBC 9.4 4.6 - 13.2 K/uL    RBC 3.72 (L) 4.20 - 5.30 M/uL    HGB 12.7 12.0 - 16.0 g/dL    HCT 39.9 35.0 - 45.0 %    .3 (H) 74.0 - 97.0 FL    MCH 34.1 (H) 24.0 - 34.0 PG    MCHC 31.8 31.0 - 37.0 g/dL    RDW 13.9 11.6 - 14.5 %    PLATELET 724 137 - 173 K/uL    MPV 8.6 (L) 9.2 - 11.8 FL    NEUTROPHILS 70 40 - 73 %    LYMPHOCYTES 20 (L) 21 - 52 %    MONOCYTES 10 3 - 10 %    EOSINOPHILS 0 0 - 5 %    BASOPHILS 0 0 - 2 %    ABS. NEUTROPHILS 6.5 1.8 - 8.0 K/UL    ABS.  LYMPHOCYTES 1.9 0.9 - 3.6 K/UL    ABS. MONOCYTES 1.0 0.05 - 1.2 K/UL    ABS. EOSINOPHILS 0.0 0.0 - 0.4 K/UL    ABS. BASOPHILS 0.0 0.0 - 0.06 K/UL    DF AUTOMATED     MAGNESIUM    Collection Time: 05/12/18  3:00 AM   Result Value Ref Range    Magnesium 2.4 1.6 - 2.6 mg/dL   METABOLIC PANEL, COMPREHENSIVE    Collection Time: 05/12/18  3:00 AM   Result Value Ref Range    Sodium 141 136 - 145 mmol/L    Potassium 3.5 3.5 - 5.5 mmol/L    Chloride 108 100 - 108 mmol/L    CO2 30 21 - 32 mmol/L    Anion gap 3 3.0 - 18 mmol/L    Glucose 96 74 - 99 mg/dL    BUN 26 (H) 7.0 - 18 MG/DL    Creatinine 1.51 (H) 0.6 - 1.3 MG/DL    BUN/Creatinine ratio 17 12 - 20      GFR est AA 41 (L) >60 ml/min/1.73m2    GFR est non-AA 34 (L) >60 ml/min/1.73m2    Calcium 8.5 8.5 - 10.1 MG/DL    Bilirubin, total 0.3 0.2 - 1.0 MG/DL    ALT (SGPT) 33 13 - 56 U/L    AST (SGOT) 21 15 - 37 U/L    Alk. phosphatase 108 45 - 117 U/L    Protein, total 7.0 6.4 - 8.2 g/dL    Albumin 2.8 (L) 3.4 - 5.0 g/dL    Globulin 4.2 (H) 2.0 - 4.0 g/dL    A-G Ratio 0.7 (L) 0.8 - 1.7     NT-PRO BNP    Collection Time: 05/12/18  3:00 AM   Result Value Ref Range    NT pro- 0 - 900 PG/ML   POC G3    Collection Time: 05/12/18  5:02 AM   Result Value Ref Range    Device: ROOM AIR      FIO2 (POC) 94 %    pH (POC) 7.462 (H) 7.35 - 7.45      pCO2 (POC) 42.4 35.0 - 45.0 MMHG    pO2 (POC) 53 (L) 80 - 100 MMHG    HCO3 (POC) 30.3 (H) 22 - 26 MMOL/L    sO2 (POC) 89 (L) 92 - 97 %    Base excess (POC) 6 mmol/L    Allens test (POC) YES      Total resp. rate 22      Site RIGHT RADIAL      Patient temp. 98.7      Specimen type (POC) ARTERIAL      Performed by Lea Chacon        Radiologic Studies -   XR CHEST PORT    (Results Pending)         Medical Decision Making     Provider Notes (Medical Decision Making): Based on my history, physical exam, and diagnostic evaluation, the pt appears to have symptoms consistent with an acute asthma/COPD exacerbation.  Pt received nebulized bronchodialators and systemic steroid therapy- 2x rounds with EMS and 4x rounds here in the ED. On re-exam the pt has an improved respiratory pattern, and objective data was non diagnostic. She is satting well on room air and does not need Bipap or step down admission. Chest x-ray showed no infiltrates on my read, and she does not appear septic at the moment. Again, currently no fever, tachycardia or other clinical criteria to suggest infectious process. Levaquin was started for anti inflammatory effect on her COPD. EKG was non diagnostic. They will be admitted for further inhaled therapy and IV steroids. The case was discussed with the admitting attending. 6:22 AM      I am the first provider for this patient. I reviewed the vital signs, available nursing notes, past medical history, past surgical history, family history and social history. Vital Signs-Reviewed the patient's vital signs. Pulse Oximetry Analysis -  93% on room air     Cardiac Monitor:  Rate: 87 bpm  Rhythm:  Normal Sinus Rhythm     EKG: Interpreted by the EP. Time Interpreted: 2:49 AM   Rate: 96 bpm   Rhythm: Normal Sinus Rhythm    Interpretation: Normal ST, normal intervals. No STEMI. Records Reviewed: Nursing Notes (Time of Review: 2:55 AM)    ED Course: Progress Notes, Reevaluation, and Consults:  Consult:  Discussed care with Dr. Nguyen Barragan (on-call for Dr. Norris Favre). Standard discussion; including history of patients chief complaint, available diagnostic results, and treatment course. Accepts patient for admission. 5:25 AM, 5/12/2018      For Hospitalized Patients:    Hospitalization Decision Time:  The decision to hospitalize the patient was made by Dr. Adrián Sanders at 4:30 AM on 5/12/2018      Diagnosis     Clinical Impression:   1. COPD exacerbation ([address]        Disposition: Transfer to [facility] for admission.  Accepting physician: Dr. Nguyen Barragan    _______________________________    Scribe Attestation:     Leobardo Gonzalez, acting as a scribe for and in the presence of Bernice Aguirre MD      May 12, 2018 at 2:55 AM       Provider Attestation:      I personally performed the services described in the documentation, reviewed the documentation, as recorded by the scribe in my presence, and it accurately and completely records my words and actions.  May 12, 2018 at 2:55 AM - Bernice Aguirre MD      _______________________________

## 2018-05-13 ENCOUNTER — APPOINTMENT (OUTPATIENT)
Dept: CT IMAGING | Age: 73
DRG: 190 | End: 2018-05-13
Attending: INTERNAL MEDICINE
Payer: MEDICARE

## 2018-05-13 LAB
ANION GAP SERPL CALC-SCNC: 10 MMOL/L (ref 3–18)
BACTERIA SPEC CULT: NORMAL
BASOPHILS # BLD: 0 K/UL (ref 0–0.06)
BASOPHILS NFR BLD: 0 % (ref 0–2)
BUN SERPL-MCNC: 31 MG/DL (ref 7–18)
BUN/CREAT SERPL: 18 (ref 12–20)
CALCIUM SERPL-MCNC: 8.6 MG/DL (ref 8.5–10.1)
CHLORIDE SERPL-SCNC: 105 MMOL/L (ref 100–108)
CHOLEST SERPL-MCNC: 204 MG/DL
CO2 SERPL-SCNC: 29 MMOL/L (ref 21–32)
CREAT SERPL-MCNC: 1.73 MG/DL (ref 0.6–1.3)
DIFFERENTIAL METHOD BLD: ABNORMAL
EOSINOPHIL # BLD: 0 K/UL (ref 0–0.4)
EOSINOPHIL NFR BLD: 0 % (ref 0–5)
ERYTHROCYTE [DISTWIDTH] IN BLOOD BY AUTOMATED COUNT: 13.7 % (ref 11.6–14.5)
GLUCOSE SERPL-MCNC: 150 MG/DL (ref 74–99)
HCT VFR BLD AUTO: 41.3 % (ref 35–45)
HDLC SERPL-MCNC: 105 MG/DL (ref 40–60)
HDLC SERPL: 1.9 {RATIO} (ref 0–5)
HGB BLD-MCNC: 14 G/DL (ref 12–16)
LDLC SERPL CALC-MCNC: 86.4 MG/DL (ref 0–100)
LIPID PROFILE,FLP: ABNORMAL
LYMPHOCYTES # BLD: 0.8 K/UL (ref 0.9–3.6)
LYMPHOCYTES NFR BLD: 9 % (ref 21–52)
MCH RBC QN AUTO: 34.4 PG (ref 24–34)
MCHC RBC AUTO-ENTMCNC: 33.9 G/DL (ref 31–37)
MCV RBC AUTO: 101.5 FL (ref 74–97)
MONOCYTES # BLD: 0.6 K/UL (ref 0.05–1.2)
MONOCYTES NFR BLD: 8 % (ref 3–10)
NEUTS SEG # BLD: 6.8 K/UL (ref 1.8–8)
NEUTS SEG NFR BLD: 83 % (ref 40–73)
PLATELET # BLD AUTO: 322 K/UL (ref 135–420)
PMV BLD AUTO: 9.1 FL (ref 9.2–11.8)
POTASSIUM SERPL-SCNC: 3.4 MMOL/L (ref 3.5–5.5)
RBC # BLD AUTO: 4.07 M/UL (ref 4.2–5.3)
SERVICE CMNT-IMP: NORMAL
SODIUM SERPL-SCNC: 144 MMOL/L (ref 136–145)
TRIGL SERPL-MCNC: 63 MG/DL (ref ?–150)
TSH SERPL DL<=0.05 MIU/L-ACNC: 0.13 UIU/ML (ref 0.36–3.74)
VLDLC SERPL CALC-MCNC: 12.6 MG/DL
WBC # BLD AUTO: 8.2 K/UL (ref 4.6–13.2)

## 2018-05-13 PROCEDURE — 74011250636 HC RX REV CODE- 250/636: Performed by: INTERNAL MEDICINE

## 2018-05-13 PROCEDURE — 80048 BASIC METABOLIC PNL TOTAL CA: CPT | Performed by: FAMILY MEDICINE

## 2018-05-13 PROCEDURE — 74011000250 HC RX REV CODE- 250: Performed by: FAMILY MEDICINE

## 2018-05-13 PROCEDURE — 36415 COLL VENOUS BLD VENIPUNCTURE: CPT | Performed by: FAMILY MEDICINE

## 2018-05-13 PROCEDURE — 74011000250 HC RX REV CODE- 250: Performed by: INTERNAL MEDICINE

## 2018-05-13 PROCEDURE — 93306 TTE W/DOPPLER COMPLETE: CPT

## 2018-05-13 PROCEDURE — 74011250637 HC RX REV CODE- 250/637: Performed by: FAMILY MEDICINE

## 2018-05-13 PROCEDURE — 84443 ASSAY THYROID STIM HORMONE: CPT | Performed by: FAMILY MEDICINE

## 2018-05-13 PROCEDURE — 65660000000 HC RM CCU STEPDOWN

## 2018-05-13 PROCEDURE — 74011250636 HC RX REV CODE- 250/636: Performed by: FAMILY MEDICINE

## 2018-05-13 PROCEDURE — 74011250637 HC RX REV CODE- 250/637: Performed by: INTERNAL MEDICINE

## 2018-05-13 PROCEDURE — 71250 CT THORAX DX C-: CPT

## 2018-05-13 PROCEDURE — 85025 COMPLETE CBC W/AUTO DIFF WBC: CPT | Performed by: FAMILY MEDICINE

## 2018-05-13 PROCEDURE — 80061 LIPID PANEL: CPT | Performed by: FAMILY MEDICINE

## 2018-05-13 PROCEDURE — 94640 AIRWAY INHALATION TREATMENT: CPT

## 2018-05-13 RX ORDER — FAMOTIDINE 20 MG/1
20 TABLET, FILM COATED ORAL DAILY
Status: DISCONTINUED | OUTPATIENT
Start: 2018-05-14 | End: 2018-05-19 | Stop reason: HOSPADM

## 2018-05-13 RX ORDER — HYDROCODONE BITARTRATE AND ACETAMINOPHEN 5; 325 MG/1; MG/1
1 TABLET ORAL
Status: DISCONTINUED | OUTPATIENT
Start: 2018-05-13 | End: 2018-05-15 | Stop reason: SDUPTHER

## 2018-05-13 RX ORDER — IPRATROPIUM BROMIDE AND ALBUTEROL SULFATE 2.5; .5 MG/3ML; MG/3ML
3 SOLUTION RESPIRATORY (INHALATION)
Status: DISCONTINUED | OUTPATIENT
Start: 2018-05-13 | End: 2018-05-17

## 2018-05-13 RX ORDER — FLUCONAZOLE 2 MG/ML
200 INJECTION, SOLUTION INTRAVENOUS DAILY
Status: COMPLETED | OUTPATIENT
Start: 2018-05-13 | End: 2018-05-17

## 2018-05-13 RX ORDER — GUAIFENESIN 100 MG/5ML
81 LIQUID (ML) ORAL DAILY
Status: DISCONTINUED | OUTPATIENT
Start: 2018-05-14 | End: 2018-05-19 | Stop reason: HOSPADM

## 2018-05-13 RX ORDER — IPRATROPIUM BROMIDE AND ALBUTEROL SULFATE 2.5; .5 MG/3ML; MG/3ML
3 SOLUTION RESPIRATORY (INHALATION)
Status: DISCONTINUED | OUTPATIENT
Start: 2018-05-13 | End: 2018-05-13

## 2018-05-13 RX ORDER — ARFORMOTEROL TARTRATE 15 UG/2ML
15 SOLUTION RESPIRATORY (INHALATION)
Status: DISCONTINUED | OUTPATIENT
Start: 2018-05-13 | End: 2018-05-17

## 2018-05-13 RX ADMIN — DILTIAZEM HYDROCHLORIDE 30 MG: 30 TABLET, FILM COATED ORAL at 22:04

## 2018-05-13 RX ADMIN — HEPARIN SODIUM 5000 UNITS: 5000 INJECTION, SOLUTION INTRAVENOUS; SUBCUTANEOUS at 13:35

## 2018-05-13 RX ADMIN — HYDROCODONE BITARTRATE AND ACETAMINOPHEN 1 TABLET: 5; 325 TABLET ORAL at 22:27

## 2018-05-13 RX ADMIN — METHYLPREDNISOLONE SODIUM SUCCINATE 40 MG: 40 INJECTION, POWDER, FOR SOLUTION INTRAMUSCULAR; INTRAVENOUS at 15:33

## 2018-05-13 RX ADMIN — FUROSEMIDE 40 MG: 10 INJECTION, SOLUTION INTRAMUSCULAR; INTRAVENOUS at 08:27

## 2018-05-13 RX ADMIN — METHYLPREDNISOLONE SODIUM SUCCINATE 40 MG: 40 INJECTION, POWDER, FOR SOLUTION INTRAMUSCULAR; INTRAVENOUS at 22:04

## 2018-05-13 RX ADMIN — HEPARIN SODIUM 5000 UNITS: 5000 INJECTION, SOLUTION INTRAVENOUS; SUBCUTANEOUS at 06:18

## 2018-05-13 RX ADMIN — SERTRALINE HYDROCHLORIDE 25 MG: 50 TABLET ORAL at 08:28

## 2018-05-13 RX ADMIN — IPRATROPIUM BROMIDE AND ALBUTEROL SULFATE 3 ML: .5; 3 SOLUTION RESPIRATORY (INHALATION) at 10:05

## 2018-05-13 RX ADMIN — FUROSEMIDE 40 MG: 10 INJECTION, SOLUTION INTRAMUSCULAR; INTRAVENOUS at 17:23

## 2018-05-13 RX ADMIN — LEVOFLOXACIN 250 MG: 5 INJECTION, SOLUTION INTRAVENOUS at 07:52

## 2018-05-13 RX ADMIN — METHYLPREDNISOLONE SODIUM SUCCINATE 40 MG: 40 INJECTION, POWDER, FOR SOLUTION INTRAMUSCULAR; INTRAVENOUS at 08:28

## 2018-05-13 RX ADMIN — DILTIAZEM HYDROCHLORIDE 30 MG: 30 TABLET, FILM COATED ORAL at 08:28

## 2018-05-13 RX ADMIN — METHYLPREDNISOLONE SODIUM SUCCINATE 40 MG: 40 INJECTION, POWDER, FOR SOLUTION INTRAMUSCULAR; INTRAVENOUS at 04:43

## 2018-05-13 RX ADMIN — ARFORMOTEROL TARTRATE 15 MCG: 15 SOLUTION RESPIRATORY (INHALATION) at 19:50

## 2018-05-13 RX ADMIN — IPRATROPIUM BROMIDE AND ALBUTEROL SULFATE 3 ML: .5; 3 SOLUTION RESPIRATORY (INHALATION) at 12:17

## 2018-05-13 RX ADMIN — DILTIAZEM HYDROCHLORIDE 30 MG: 30 TABLET, FILM COATED ORAL at 11:12

## 2018-05-13 RX ADMIN — FAMOTIDINE 20 MG: 20 TABLET ORAL at 08:28

## 2018-05-13 RX ADMIN — ABACAVIR SULFATE, DOLUTEGRAVIR SODIUM, LAMIVUDINE 1 TABLET: 600; 50; 300 TABLET, FILM COATED ORAL at 08:54

## 2018-05-13 RX ADMIN — DILTIAZEM HYDROCHLORIDE 30 MG: 30 TABLET, FILM COATED ORAL at 15:34

## 2018-05-13 RX ADMIN — HEPARIN SODIUM 5000 UNITS: 5000 INJECTION, SOLUTION INTRAVENOUS; SUBCUTANEOUS at 22:04

## 2018-05-13 RX ADMIN — FLUCONAZOLE 200 MG: 2 INJECTION INTRAVENOUS at 15:29

## 2018-05-13 NOTE — CONSULTS
[facility]  Pulmonary, Critical Care, and Sleep Medicine    Name: Jordan Maldonado MRN: [id number]   : 1945 Hospital: [address] [place] [address]   Date: 2018        Critical Care Initial Patient Consult    Requesting MD:                                                  Reason for CC Consult:  IMPRESSION:   · Acute CHF, systolic and possibly diastolic  · Acute COPD exacerbation  · Hypoxia due to above vs possible pneumocystis? · HIV  · Esophageal/throat irritation, possible candidiasis? · Hx of htn  · Hx of anxiety d/o  · Hx of marijuana abuse      RECOMMENDATIONS:   · CT of chest without contrast  · Respiratory culture  · Respiratory culutre  · Diflucan emperically for 5 days. · Continue levaquin  · Will add pulmicort neb   · Convert duoneb to prn  · Start brovana  · Continue HAART  · Follow up on echo  · Follow up on viral load and cd4 result     Subjective/History: This patient has been seen and evaluated at the request of Dr. Pam Jiang  for COPD exacerbation. Patient is a 71 y.o. female with hx of HIV ( acquired during blood transfusion) on HAART. Being followed by ID in [place],  Unclear last viral load and CD4, count. Over 46yr smoking hx,  Presented to ED with increase in sob and wheezing. No associated with fever or chills. Unable to use advair due to throat irritations. ? Pain. NO n/v/d/   No hx of headache. No visual disturbances. No changes in weight  No night sweats. Since admission pt has been diuresed. Started on emperically on steroid. Echo remain pending. Pt is also placed on abx for UTI with levaquin. Since admission pt does reports that her breathing has improved significantly now where she is able to carry conversation.          Past Medical History:   Diagnosis Date    Coronary atherosclerosis of native coronary artery     HIV (human [facility] virus infection) ([facility] [address]     Ill-defined condition     Pneumonia    Nonspecific abnormal electrocardiogram (ECG) (EKG)     Pre-operative cardiovascular examination       Past Surgical History:   Procedure Laterality Date    HX HIP REPLACEMENT      HX HYSTERECTOMY        Prior to Admission medications    Medication Sig Start Date End Date Taking? Authorizing Provider   fluticasone (FLONASE) 50 mcg/actuation nasal spray 2 Sprays by Both Nostrils route daily. Yes Zack Smith, MD   darunavir-cobicistat (PREZCOBIX) 800-150 mg-mg per tablet Take 1 Tab by mouth daily. Yes Zack Smith MD   mirtazapine (REMERON) 30 mg tablet Take  by mouth nightly. Yes Zack Smith MD   abacavir-dolutegravir-lamiVUDine (TRIUMEQ) tablet Take  by mouth daily. Yes Zack Smith MD   sertraline (ZOLOFT) 25 mg tablet Take  by mouth daily.    Yes Zack Smith MD     Current Facility-Administered Medications   Medication Dose Route Frequency    albuterol-ipratropium (DUO-NEB) 2.5 MG-0.5 MG/3 ML  3 mL Nebulization Q4H RT    [START ON 5/14/2018] famotidine (PEPCID) tablet 20 mg  20 mg Oral DAILY    [START ON 5/14/2018] aspirin chewable tablet 81 mg  81 mg Oral DAILY    methylPREDNISolone (PF) (SOLU-MEDROL) injection 40 mg  40 mg IntraVENous Q6H    sertraline (ZOLOFT) tablet 25 mg  25 mg Oral DAILY    fluticasone (FLONASE) 50 mcg/actuation nasal spray 2 Spray  2 Spray Both Nostrils DAILY    heparin (porcine) injection 5,000 Units  5,000 Units SubCUTAneous Q8H    abacavir-dolutegravir-lamiVUDine (TRIUMEQ) 600- mg tablet 1 Tab  1 Tab Oral DAILY    darunavir-cobicistat (PREZCOBIX) 800-150 mg-mg per tablet 1 Tab (Patient Supplied)  1 Tab Oral DAILY    levoFLOXacin (LEVAQUIN) 250 mg in D5W IVPB  250 mg IntraVENous Q24H    dilTIAZem (CARDIZEM) IR tablet 30 mg  30 mg Oral AC&HS    furosemide (LASIX) injection 40 mg  40 mg IntraVENous BID     No Known Allergies   Social History   Substance Use Topics    Smoking status: Current Every Day Smoker     Packs/day: 0.50    Smokeless tobacco: Not on file    Alcohol use No      Family History   Problem Relation Age of Onset    Heart Attack Mother 70    Stroke Father 54    Heart Attack Sister 59        Review of Systems:  A comprehensive review of systems was negative except for that written in the HPI. Objective:   Vital Signs:    Visit Vitals    /79 (BP 1 Location: Right arm, BP Patient Position: At rest)    Pulse 91    Temp 97.9 °F (36.6 °C)    Resp 20    Ht 5' 2.99\" (1.6 m)  Comment: bmi    Wt 76.7 kg (169 lb 3.2 oz)    SpO2 98%    BMI 29.98 kg/m2       O2 Device: Nasal cannula   O2 Flow Rate (L/min): 2 l/min   Temp (24hrs), Av.2 °F (36.8 °C), Min:97.9 °F (36.6 °C), Max:98.5 °F (36.9 °C)       Intake/Output:   Last shift:         Last 3 shifts:  1901 -  0700  In: 52 [I.V.:52]  Out: 1750 [Urine:1750]    Intake/Output Summary (Last 24 hours) at 18 1255  Last data filed at 18 0555   Gross per 24 hour   Intake               52 ml   Output             1750 ml   Net            -1698 ml        Physical Exam:    General:  Alert, cooperative, no distress, appears stated age. Head:  Normocephalic, without obvious abnormality, atraumatic. Eyes:  Conjunctivae/corneas clear. PERRL, EOMs intact. Nose: Nares normal. Septum midline. Mucosa normal. No drainage or sinus tenderness. Throat: Lips, mucosa, and tongue normal. Teeth and gums normal.   Neck: Supple, symmetrical, trachea midline, no adenopathy, thyroid: no enlargment/tenderness/nodules, no carotid bruit and no JVD. Back:   Symmetric, no curvature. ROM normal.   Lungs:   Diffuse exp wheeze   Chest wall:  No tenderness or deformity. Heart:  Regular rate and rhythm, S1, S2 normal, no murmur, click, rub or gallop. Abdomen:   Soft, non-tender. Bowel sounds normal. No masses,  No organomegaly. Extremities: Extremities normal, atraumatic, no cyanosis or edema. Pulses: 2+ and symmetric all extremities.    Skin: Skin color, texture, turgor normal. No rashes or lesions   Lymph nodes: Cervical, supraclavicular, and axillary nodes normal.   Neurologic: Grossly nonfocal       Data:     Recent Results (from the past 24 hour(s))   URINALYSIS W/ RFLX MICROSCOPIC    Collection Time: 05/12/18  3:53 PM   Result Value Ref Range    Color YELLOW      Appearance CLEAR      Specific gravity 1.023 1.005 - 1.030      pH (UA) 6.0 5.0 - 8.0      Protein 30 (A) NEG mg/dL    Glucose 250 (A) NEG mg/dL    Ketone NEGATIVE  NEG mg/dL    Bilirubin NEGATIVE  NEG      Blood NEGATIVE  NEG      Urobilinogen 0.2 0.2 - 1.0 EU/dL    Nitrites NEGATIVE  NEG      Leukocyte Esterase NEGATIVE  NEG     URINE MICROSCOPIC ONLY    Collection Time: 05/12/18  3:53 PM   Result Value Ref Range    WBC 4 to 10 0 - 4 /hpf    RBC 0 to 3 0 - 5 /hpf    Epithelial cells 3+ 0 - 5 /lpf    Bacteria 2+ (A) NEG /hpf    Mucus 1+ (A) NEG /lpf    Yeast 2+ (A) NEG   CULTURE, URINE    Collection Time: 05/12/18  3:54 PM   Result Value Ref Range    Special Requests: NO SPECIAL REQUESTS      Culture result:        <10,000  COLONIES/mL  MIXED GRAM POSITIVE ELENA, PROBABLE SKIN/GENITAL CONTAMINATION. TSH 3RD GENERATION    Collection Time: 05/13/18  2:00 AM   Result Value Ref Range    TSH 0.13 (L) 0.36 - 3.74 uIU/mL   LIPID PANEL    Collection Time: 05/13/18  2:00 AM   Result Value Ref Range    LIPID PROFILE          Cholesterol, total 204 (H) <200 MG/DL    Triglyceride 63 <150 MG/DL    HDL Cholesterol 105 (H) 40 - 60 MG/DL    LDL, calculated 86.4 0 - 100 MG/DL    VLDL, calculated 12.6 MG/DL    CHOL/HDL Ratio 1.9 0 - 5.0               Telemetry:normal sinus rhythm    Imaging:  I have personally reviewed the patients radiographs and have reviewed the reports:     Results for Sadiq Jarquin (MRN [id number]) as of 5/13/2018 12:53   Ref.  Range 5/12/2018 05:02   pH (POC) Latest Ref Range: 7.35 - 7.45   7.462 (H)   pCO2 (POC) Latest Ref Range: 35.0 - 45.0 MMHG 42.4   pO2 (POC) Latest Ref Range: 80 - 100 MMHG 53 (L)   HCO3 (POC) Latest Ref Range: 22 - 26 MMOL/L 30.3 (H)   sO2 (POC) Latest Ref Range: 92 - 97 % 89 (L)   Base excess (POC) Latest Units: mmol/L 6   FIO2 (POC) Latest Units: % 94   Patient temp. Latest Units:   98.7   Specimen type (POC) Latest Units:   ARTERIAL   Site Latest Units:   RIGHT RADIAL   Device: Latest Units:   ROOM AIR   Total resp. rate Latest Units:   22   Allens test (POC) Latest Units:   YES       Final result (Exam End: 5/12/2018  3:00 AM) Open        Study Result      Chest     Indication: SOB      Comparison: October 19, 2017     Findings: Single view. Instrumentation stable. No pneumothorax. No pleural  effusion. Bilateral lower lung zone atelectasis again with chronic  pleural-parenchymal changes at the middle and lower lung zones. Cardiomediastinal silhouette and osseous structures grossly unchanged.     IMPRESSION  Impression: No significant change             Right leg :  1. No evidence of deep venous thrombosis detected in the veins  visualized. 2. Deep vein(s) visualized include the common femoral, femoral,  popliteal, posterior tibial, and peroneal veins. 3. No evidence of superficial thrombosis detected. 4. Superficial vein(s) visualized include the great saphenous vein at  the sapheno-femoral junction. Left leg :  1. No evidence of deep venous thrombosis detected in the veins  visualized. 2. Deep vein(s) visualized include the common femoral, femoral,  popliteal, posterior tibial, and peroneal veins. 3. No evidence of superficial thrombosis detected.   4. Superficial vein(s) visualized include the great saphenous vein at  the sapheno-femoral junction.     ADDITIONAL COMMENTS  Norberto Michael MD  5/13/2018, 12:55 PM

## 2018-05-13 NOTE — PROGRESS NOTES
Progress Note    Patient: Alfonzo Eubanks MRN: [id number]  CSN: [id number]    YOB: 1945  Age: 71 y.o. Sex: female    DOA: 5/12/2018 LOS:  LOS: 1 day                    Subjective:     Hospital course  Reshma Barajas is a 71 y. o. female with a history of HIV, who presents to the ED via EMS with complaint of shortness of breath and wheezing which began yesterday. Patient stated that her shortness of breath has been gradually worsening.      She notes that she was prescribed Advair, but has been unable to take it due to throat irritation .      She reports associated productive cough, chest pain, and bilateral feet swelling over the past few days. BNP on admission within normal range Patient denies history of CHF.      Per EMS, patient was given 2 albuterol treatments en route to the ED. She currently smokes 0.5ppd. Patient denies recent fever, chills, nausea, vomiting, abdominal pain, or back pain. She makes no further complaints.     Pt has been f/u ID at [facility]  Dr. Irwin [facility] [id number] she has recent change in her med she has been on current med for 3 month due to f/u with her in July  Pt seen by cardiology started on Cardizem 30 mg qid troponin I is negative . Pt still c/o chest pain she has localized tenderness  Pulmonary consult is pending discussed with Dr Daphne Morocho yesterday    Chief Complaint:   Chief Complaint   Patient presents with    Shortness of Breath       Review of systems  GENERAL: Patient alert, awake and oriented times 3, able to communicate full sentences and not in distress. SOB   HEENT: No change in vision, Lt earache, tinnitus, sore throat or sinus congestion. NECK: No pain or stiffness. CARDIOVASCULAR: positive  chest pain and pressure. Positive  palpitations. PULMONARY: positive  shortness of breath,  Positive cough and  wheeze. GASTROINTESTINAL: No abdominal pain, nausea, vomiting or diarrhea, melena or       bright red blood per rectum.    GENITOURINARY: No urinary frequency, urgency, hesitancy or dysuria. MUSCULOSKELETAL: No joint or muscle pain, no back pain, no recent trauma. Swelling lower extremities improved   DERMATOLOGIC: No rash, no itching, no lesions. ENDOCRINE: No polyuria, polydipsia, no heat . Positive intolerance to cold intolerance. No recent change in    weight. HEMATOLOGICAL: No anemia or easy bruising or bleeding. NEUROLOGIC: No headache, seizures, numbness  Feet and toes, generalized weakness    PSYCHIATRIC: H/O  Depression, and  anxiety,  No other mood disorder, no loss of interest in normal       activity or change in sleep pattern.       Objective:     Physical Exam:  Visit Vitals    BP (!) 168/97    Pulse 99    Temp 98.2 °F (36.8 °C)    Resp 19    Ht 5' 2.99\" (1.6 m)    Wt 76.7 kg (169 lb 3.2 oz)    SpO2 95%    BMI 29.98 kg/m2      General:  Alert, cooperative, mild  Distress with SOB   Head:  Normocephalic, without obvious abnormality, atraumatic. Eyes:  Conjunctivae/corneas clear. PERRL, EOMs intact. Nose: Nares normal. No drainage or sinus tenderness. Throat: Lips, mucosa, and tongue dry positive yeast   Neck: Supple, symmetrical, trachea midline, no adenopathy, thyroid: no enlargement/tenderness/nodules, no carotid bruit and no JVD. Back:   ROM normal. No CVA tenderness. Lungs:     Expiratory wheezing bilaterally more in the Lt side   Chest wall:  Positive tenderness mid and Lt chest wall   Heart:  Regular rate and rhythm, S1, S2 normal, no murmur, click, rub or gallop. Abdomen: Soft, non-tender. Bowel sounds normal. No masses,  No organomegaly. Extremities: Extremities normal, atraumatic, edema lower extremities improved   Pulses: 2+ and symmetric all extremities. Skin: Skin color, texture,  Dry skin   Neurologic: CNII-XII intact.  No focal motor or sensory deficit.             Intake and Output:  Current Shift:     Last three shifts:  05/11 1901 - 05/13 0700  In: 52 [I.V.:52]  Out: 1750 [Urine:1750]    Labs: Results:       Chemistry Recent Labs      05/12/18   0300   GLU  96   NA  141   K  3.5   CL  108   CO2  30   BUN  26*   CREA  1.51*   CA  8.5   AGAP  3   BUCR  17   AP  108   TP  7.0   ALB  2.8*   GLOB  4.2*   AGRAT  0.7*      CBC w/Diff Recent Labs      05/12/18   0300   WBC  9.4   RBC  3.72*   HGB  12.7   HCT  39.9   PLT  316   GRANS  70   LYMPH  20*   EOS  0      Cardiac Enzymes Recent Labs      05/12/18   1229  05/12/18   1002   CPK  140  154   CKND1  2.9  2.8      Coagulation No results for input(s): PTP, INR, APTT in the last 72 hours. No lab exists for component: INREXT    Lipid Panel Lab Results   Component Value Date/Time    Cholesterol, total 204 (H) 05/13/2018 02:00 AM    HDL Cholesterol 105 (H) 05/13/2018 02:00 AM    LDL, calculated 86.4 05/13/2018 02:00 AM    VLDL, calculated 12.6 05/13/2018 02:00 AM    Triglyceride 63 05/13/2018 02:00 AM    CHOL/HDL Ratio 1.9 05/13/2018 02:00 AM      BNP No results for input(s): BNPP in the last 72 hours.    Liver Enzymes Recent Labs      05/12/18   0300   TP  7.0   ALB  2.8*   AP  108   SGOT  21      Thyroid Studies Lab Results   Component Value Date/Time    TSH 0.13 (L) 05/13/2018 02:00 AM          Procedures/imaging: see electronic medical records for all procedures/Xrays and details which were not copied into this note but were reviewed prior to creation of Plan    Medications:   Current Facility-Administered Medications   Medication Dose Route Frequency    albuterol-ipratropium (DUO-NEB) 2.5 MG-0.5 MG/3 ML  3 mL Nebulization Q4H RT    methylPREDNISolone (PF) (SOLU-MEDROL) injection 40 mg  40 mg IntraVENous Q6H    sertraline (ZOLOFT) tablet 25 mg  25 mg Oral DAILY    fluticasone (FLONASE) 50 mcg/actuation nasal spray 2 Spray  2 Spray Both Nostrils DAILY    heparin (porcine) injection 5,000 Units  5,000 Units SubCUTAneous Q8H    famotidine (PEPCID) tablet 20 mg  20 mg Oral BID    abacavir-dolutegravir-lamiVUDine (TRIUMEQ) 600- mg tablet 1 Tab  1 Tab Oral DAILY    darunavir-cobicistat (PREZCOBIX) 800-150 mg-mg per tablet 1 Tab (Patient Supplied)  1 Tab Oral DAILY    hydrALAZINE (APRESOLINE) 20 mg/mL injection 20 mg  20 mg IntraVENous Q6H PRN    .enter correct height for dosing purposes  1 Each Other Rx Dosing/Monitoring    levoFLOXacin (LEVAQUIN) 250 mg in D5W IVPB  250 mg IntraVENous Q24H    dilTIAZem (CARDIZEM) IR tablet 30 mg  30 mg Oral AC&HS    furosemide (LASIX) injection 40 mg  40 mg IntraVENous BID       Assessment/Plan     Principal Problem:    COPD exacerbation (HCC) (5/12/2018)    Active Problems:    HIV (human immunodeficiency virus infection) ([address] (5/12/2018)      Anxiety (5/12/2018)      COPD exacerbation  Continue Levaquin  Solumedrol  40 mg q6h duo nebulizer treatment  Pulmonary consult called Dr Rei Espino  Iv hydration NS 75 cc/h  Recheck cardiac enzymes negative    f/u cardiology consult  f/u  Swallow evaluation   pt might need Ct scan chest will wait for pulmonary evaluation      Hypertension  Cardizem 30 mg q6h   Hydralzine prn SBP above    Acute renal insufficiency  Off Iv hydration monitor kidney function  Started on Lasix 40 mg IV q12 h per cardiology  Stat BMP  Monitor bp hydralizne prn SBp above 170     HIV infection  Continue current treatment  Check viral load And CD4 count pedning  F/u ID as outaptinet     Anxiety/ insomnia  Continue zoloft 25 mg daily     Seasonal allergies  flonase nasal spray       Atypical Chest pain/Swelling lower extremities   BNP normal  Venous duplex U/S B/L leg negative   Norco 5/325 mg q 6h prn for pain    Cbc, bmp now cbc, cmp, mag in AM  Recheck tsh and free t4    DVT/GI Prophylaxis:Heparin  H2B/PPI and pepcid        Meka Cannon MD  5/13/2018 9:55 AM

## 2018-05-13 NOTE — PROGRESS NOTES
Problem: Falls - Risk of  Goal: *Absence of Falls  Document Abdias Fall Risk and appropriate interventions in the flowsheet.    Outcome: Progressing Towards Goal  Fall Risk Interventions:            Medication Interventions: Bed/chair exit alarm, Patient to call before getting OOB, Teach patient to arise slowly    Elimination Interventions: Bed/chair exit alarm, Call light in reach, Patient to call for help with toileting needs, Toilet paper/wipes in reach

## 2018-05-13 NOTE — PROGRESS NOTES
[facility]      CARDIOLOGY PROGRESS NOTE  RECS:      1. Congestive heart failure, unknown ejection fraction- slowly improving. Added asa. Continue with  IV Lasix 40 mg twice daily. Good  diuresis Follow-up electrolytes. Check echo  Further plans depending on the echo findings. 2. Hypertension- controlled  continue Cardizem. Avoiding beta blockers due to wheezing. 3. Wheezing shortness of breath fever and productive cough: Likely COPD exacerbation. Workup and treatment per medicine. 4. Chest pain: Atypical, constant for 3-4 months. Unlikely to be ischemic. MI is already ruled out. Likely it is secondary to dyspnea and respiratory distress and is musculoskeletal in origin. 5. Tobacco abuse-Patient advised to stop smoking to reduce future cardiovascular events. ASSESSMENT:  Hospital Problems  Date Reviewed: 5/12/2018          Codes Class Noted POA    * (Principal)COPD exacerbation ([facility] ICD-10-CM: J44.1  ICD-9-CM: 491.21  5/12/2018 Yes        HIV (human immunodeficiency virus infection) ([facility] (Chronic) ICD-10-CM: B20  ICD-9-CM: V08  5/12/2018 Yes        Anxiety ICD-10-CM: F41.9  ICD-9-CM: 300.00  5/12/2018 Yes                SUBJECTIVE:  No CP  C/o SOB    OBJECTIVE:    VS:   Visit Vitals    /79 (BP 1 Location: Right arm, BP Patient Position: At rest)    Pulse 91    Temp 97.9 °F (36.6 °C)    Resp 20    Ht 5' 2.99\" (1.6 m)    Wt 76.7 kg (169 lb 3.2 oz)    SpO2 97%    BMI 29.98 kg/m2         Intake/Output Summary (Last 24 hours) at 05/13/18 1132  Last data filed at 05/13/18 0555   Gross per 24 hour   Intake               52 ml   Output             1750 ml   Net            -1698 ml     TELE: normal sinus rhythm    General: alert, well developed, pleasant and in mild distress  HENT: Normocephalic, atraumatic. Normal external eye.   Neck :  no bruit, no JVD  Cardiac:  regular rate and rhythm, S1, S2 normal, no S3 or S4, no click, no rub  Lungs: diminished breath sounds very poor air entry. Moderate diffuse expiratory wheezing both lung fields   Abdomen: Soft, nontender, no masses  Extremities:  Trace edema , peripheral pulses present      Labs: Results:       Chemistry Recent Labs      05/12/18   0300   GLU  96   NA  141   K  3.5   CL  108   CO2  30   BUN  26*   CREA  1.51*   CA  8.5   AGAP  3   BUCR  17   AP  108   TP  7.0   ALB  2.8*   GLOB  4.2*   AGRAT  0.7*      CBC w/Diff Recent Labs      05/12/18   0300   WBC  9.4   RBC  3.72*   HGB  12.7   HCT  39.9   PLT  316   GRANS  70   LYMPH  20*   EOS  0      Cardiac Enzymes Recent Labs      05/12/18   1229  05/12/18   1002   CPK  140  154   CKND1  2.9  2.8      Coagulation No results for input(s): PTP, INR, APTT in the last 72 hours. No lab exists for component: INREXT    Lipid Panel Lab Results   Component Value Date/Time    Cholesterol, total 204 (H) 05/13/2018 02:00 AM    HDL Cholesterol 105 (H) 05/13/2018 02:00 AM    LDL, calculated 86.4 05/13/2018 02:00 AM    VLDL, calculated 12.6 05/13/2018 02:00 AM    Triglyceride 63 05/13/2018 02:00 AM    CHOL/HDL Ratio 1.9 05/13/2018 02:00 AM      BNP No results for input(s): BNPP in the last 72 hours. Liver Enzymes Recent Labs      05/12/18   0300   TP  7.0   ALB  2.8*   AP  108   SGOT  21      Thyroid Studies Lab Results   Component Value Date/Time    TSH 0.13 (L) 05/13/2018 02:00 AM              Frieda Lin Savita:[phone]  Patient seen independently  Discussed the details with NP and patient.  Please see orders & recommendations  Osmany Rooney MD

## 2018-05-14 ENCOUNTER — APPOINTMENT (OUTPATIENT)
Dept: ULTRASOUND IMAGING | Age: 73
DRG: 190 | End: 2018-05-14
Attending: FAMILY MEDICINE
Payer: MEDICARE

## 2018-05-14 PROBLEM — Z95.828 S/P INSERTION OF IVC (INFERIOR VENA CAVAL) FILTER: Chronic | Status: ACTIVE | Noted: 2018-05-14

## 2018-05-14 LAB
ALBUMIN SERPL-MCNC: 2.6 G/DL (ref 3.4–5)
ALBUMIN/GLOB SERPL: 0.7 {RATIO} (ref 0.8–1.7)
ALP SERPL-CCNC: 100 U/L (ref 45–117)
ALT SERPL-CCNC: 24 U/L (ref 13–56)
ANION GAP SERPL CALC-SCNC: 7 MMOL/L (ref 3–18)
AST SERPL-CCNC: 10 U/L (ref 15–37)
BASOPHILS # BLD: 0 K/UL (ref 0–0.06)
BASOPHILS NFR BLD: 0 % (ref 0–2)
BILIRUB SERPL-MCNC: 0.3 MG/DL (ref 0.2–1)
BUN SERPL-MCNC: 35 MG/DL (ref 7–18)
BUN/CREAT SERPL: 21 (ref 12–20)
CALCIUM SERPL-MCNC: 8.2 MG/DL (ref 8.5–10.1)
CHLORIDE SERPL-SCNC: 102 MMOL/L (ref 100–108)
CO2 SERPL-SCNC: 32 MMOL/L (ref 21–32)
CREAT SERPL-MCNC: 1.63 MG/DL (ref 0.6–1.3)
DIFFERENTIAL METHOD BLD: ABNORMAL
EOSINOPHIL # BLD: 0 K/UL (ref 0–0.4)
EOSINOPHIL NFR BLD: 0 % (ref 0–5)
ERYTHROCYTE [DISTWIDTH] IN BLOOD BY AUTOMATED COUNT: 13.7 % (ref 11.6–14.5)
GLOBULIN SER CALC-MCNC: 3.9 G/DL (ref 2–4)
GLUCOSE SERPL-MCNC: 139 MG/DL (ref 74–99)
HCT VFR BLD AUTO: 38.8 % (ref 35–45)
HGB BLD-MCNC: 13 G/DL (ref 12–16)
LYMPHOCYTES # BLD: 0.8 K/UL (ref 0.9–3.6)
LYMPHOCYTES NFR BLD: 10 % (ref 21–52)
MAGNESIUM SERPL-MCNC: 2.5 MG/DL (ref 1.6–2.6)
MCH RBC QN AUTO: 33.9 PG (ref 24–34)
MCHC RBC AUTO-ENTMCNC: 33.5 G/DL (ref 31–37)
MCV RBC AUTO: 101.3 FL (ref 74–97)
MONOCYTES # BLD: 0.4 K/UL (ref 0.05–1.2)
MONOCYTES NFR BLD: 5 % (ref 3–10)
NEUTS SEG # BLD: 6.4 K/UL (ref 1.8–8)
NEUTS SEG NFR BLD: 85 % (ref 40–73)
PLATELET # BLD AUTO: 311 K/UL (ref 135–420)
PMV BLD AUTO: 9.1 FL (ref 9.2–11.8)
POTASSIUM SERPL-SCNC: 3.4 MMOL/L (ref 3.5–5.5)
PROT SERPL-MCNC: 6.5 G/DL (ref 6.4–8.2)
RBC # BLD AUTO: 3.83 M/UL (ref 4.2–5.3)
SODIUM SERPL-SCNC: 141 MMOL/L (ref 136–145)
T4 FREE SERPL-MCNC: 1.2 NG/DL (ref 0.7–1.5)
TSH SERPL DL<=0.05 MIU/L-ACNC: 0.1 UIU/ML (ref 0.36–3.74)
WBC # BLD AUTO: 7.6 K/UL (ref 4.6–13.2)

## 2018-05-14 PROCEDURE — 85025 COMPLETE CBC W/AUTO DIFF WBC: CPT | Performed by: FAMILY MEDICINE

## 2018-05-14 PROCEDURE — 74011250637 HC RX REV CODE- 250/637: Performed by: INTERNAL MEDICINE

## 2018-05-14 PROCEDURE — 84439 ASSAY OF FREE THYROXINE: CPT | Performed by: FAMILY MEDICINE

## 2018-05-14 PROCEDURE — 74011000250 HC RX REV CODE- 250: Performed by: INTERNAL MEDICINE

## 2018-05-14 PROCEDURE — 84443 ASSAY THYROID STIM HORMONE: CPT | Performed by: FAMILY MEDICINE

## 2018-05-14 PROCEDURE — 74011250637 HC RX REV CODE- 250/637: Performed by: NURSE PRACTITIONER

## 2018-05-14 PROCEDURE — 74011250637 HC RX REV CODE- 250/637: Performed by: FAMILY MEDICINE

## 2018-05-14 PROCEDURE — 36415 COLL VENOUS BLD VENIPUNCTURE: CPT | Performed by: FAMILY MEDICINE

## 2018-05-14 PROCEDURE — 76536 US EXAM OF HEAD AND NECK: CPT

## 2018-05-14 PROCEDURE — 74011250636 HC RX REV CODE- 250/636: Performed by: INTERNAL MEDICINE

## 2018-05-14 PROCEDURE — 83735 ASSAY OF MAGNESIUM: CPT | Performed by: FAMILY MEDICINE

## 2018-05-14 PROCEDURE — 65660000000 HC RM CCU STEPDOWN

## 2018-05-14 PROCEDURE — 77010033678 HC OXYGEN DAILY

## 2018-05-14 PROCEDURE — 74011250636 HC RX REV CODE- 250/636: Performed by: FAMILY MEDICINE

## 2018-05-14 PROCEDURE — 80053 COMPREHEN METABOLIC PANEL: CPT | Performed by: FAMILY MEDICINE

## 2018-05-14 PROCEDURE — 94640 AIRWAY INHALATION TREATMENT: CPT

## 2018-05-14 RX ORDER — FUROSEMIDE 40 MG/1
40 TABLET ORAL DAILY
Status: DISCONTINUED | OUTPATIENT
Start: 2018-05-15 | End: 2018-05-19 | Stop reason: HOSPADM

## 2018-05-14 RX ORDER — BUDESONIDE 0.5 MG/2ML
500 INHALANT ORAL
Status: DISCONTINUED | OUTPATIENT
Start: 2018-05-14 | End: 2018-05-17

## 2018-05-14 RX ORDER — POTASSIUM CHLORIDE 1.5 G/1.77G
20 POWDER, FOR SOLUTION ORAL
Status: COMPLETED | OUTPATIENT
Start: 2018-05-14 | End: 2018-05-14

## 2018-05-14 RX ORDER — FUROSEMIDE 10 MG/ML
40 INJECTION INTRAMUSCULAR; INTRAVENOUS ONCE
Status: DISPENSED | OUTPATIENT
Start: 2018-05-14 | End: 2018-05-14

## 2018-05-14 RX ADMIN — METHYLPREDNISOLONE SODIUM SUCCINATE 40 MG: 40 INJECTION, POWDER, FOR SOLUTION INTRAMUSCULAR; INTRAVENOUS at 08:04

## 2018-05-14 RX ADMIN — IPRATROPIUM BROMIDE AND ALBUTEROL SULFATE 3 ML: .5; 3 SOLUTION RESPIRATORY (INHALATION) at 15:23

## 2018-05-14 RX ADMIN — HYDROCODONE BITARTRATE AND ACETAMINOPHEN 1 TABLET: 5; 325 TABLET ORAL at 23:08

## 2018-05-14 RX ADMIN — FAMOTIDINE 20 MG: 20 TABLET ORAL at 08:21

## 2018-05-14 RX ADMIN — BUDESONIDE 500 MCG: 0.5 INHALANT RESPIRATORY (INHALATION) at 11:15

## 2018-05-14 RX ADMIN — HEPARIN SODIUM 5000 UNITS: 5000 INJECTION, SOLUTION INTRAVENOUS; SUBCUTANEOUS at 05:35

## 2018-05-14 RX ADMIN — ARFORMOTEROL TARTRATE 15 MCG: 15 SOLUTION RESPIRATORY (INHALATION) at 07:22

## 2018-05-14 RX ADMIN — METHYLPREDNISOLONE SODIUM SUCCINATE 40 MG: 40 INJECTION, POWDER, FOR SOLUTION INTRAMUSCULAR; INTRAVENOUS at 23:07

## 2018-05-14 RX ADMIN — DILTIAZEM HYDROCHLORIDE 30 MG: 30 TABLET, FILM COATED ORAL at 23:07

## 2018-05-14 RX ADMIN — LEVOFLOXACIN 250 MG: 5 INJECTION, SOLUTION INTRAVENOUS at 08:01

## 2018-05-14 RX ADMIN — FLUTICASONE PROPIONATE 2 SPRAY: 50 SPRAY, METERED NASAL at 10:52

## 2018-05-14 RX ADMIN — HEPARIN SODIUM 5000 UNITS: 5000 INJECTION, SOLUTION INTRAVENOUS; SUBCUTANEOUS at 23:07

## 2018-05-14 RX ADMIN — FUROSEMIDE 40 MG: 10 INJECTION, SOLUTION INTRAMUSCULAR; INTRAVENOUS at 08:04

## 2018-05-14 RX ADMIN — ABACAVIR SULFATE, DOLUTEGRAVIR SODIUM, LAMIVUDINE 1 TABLET: 600; 50; 300 TABLET, FILM COATED ORAL at 09:02

## 2018-05-14 RX ADMIN — ASPIRIN 81 MG 81 MG: 81 TABLET ORAL at 08:21

## 2018-05-14 RX ADMIN — FLUCONAZOLE 200 MG: 2 INJECTION INTRAVENOUS at 08:01

## 2018-05-14 RX ADMIN — HEPARIN SODIUM 5000 UNITS: 5000 INJECTION, SOLUTION INTRAVENOUS; SUBCUTANEOUS at 13:55

## 2018-05-14 RX ADMIN — DILTIAZEM HYDROCHLORIDE 30 MG: 30 TABLET, FILM COATED ORAL at 08:03

## 2018-05-14 RX ADMIN — DILTIAZEM HYDROCHLORIDE 30 MG: 30 TABLET, FILM COATED ORAL at 10:57

## 2018-05-14 RX ADMIN — SERTRALINE HYDROCHLORIDE 25 MG: 50 TABLET ORAL at 08:04

## 2018-05-14 RX ADMIN — BUDESONIDE 500 MCG: 0.5 INHALANT RESPIRATORY (INHALATION) at 20:24

## 2018-05-14 RX ADMIN — POTASSIUM CHLORIDE 20 MEQ: 1.5 POWDER, FOR SOLUTION ORAL at 10:51

## 2018-05-14 RX ADMIN — ARFORMOTEROL TARTRATE 15 MCG: 15 SOLUTION RESPIRATORY (INHALATION) at 20:24

## 2018-05-14 RX ADMIN — DILTIAZEM HYDROCHLORIDE 30 MG: 30 TABLET, FILM COATED ORAL at 16:37

## 2018-05-14 RX ADMIN — METHYLPREDNISOLONE SODIUM SUCCINATE 40 MG: 40 INJECTION, POWDER, FOR SOLUTION INTRAMUSCULAR; INTRAVENOUS at 05:35

## 2018-05-14 RX ADMIN — METHYLPREDNISOLONE SODIUM SUCCINATE 40 MG: 40 INJECTION, POWDER, FOR SOLUTION INTRAMUSCULAR; INTRAVENOUS at 13:55

## 2018-05-14 RX ADMIN — IPRATROPIUM BROMIDE AND ALBUTEROL SULFATE 3 ML: .5; 3 SOLUTION RESPIRATORY (INHALATION) at 11:26

## 2018-05-14 NOTE — PROGRESS NOTES
Progress Note    Patient: John Muñoz MRN: [id number]  CSN: [id number]    YOB: 1945  Age: 71 y.o. Sex: female    DOA: 5/12/2018 LOS:  LOS: 2 days                    Subjective:     Pt reports dyspnea is improved. Started on 2 L/min NC overnight. Brovana 15 mcg neb BID started yesterday by pulmonary. CT Chest w/o contrast 5/13/18  IMPRESSION: Chronic obstructive pulmonary disease with features primarily of emphysema. Bronchial thickening with bronchial luminal filling in the basal zones. Bronchitis with mucous plugging versus aspiration. Findings suggesting acute congestive heart failure or pneumocystis pneumonia are not evident. Atypical chest pain resolved per patient. BLE peripheral edema improved with Lasix. Echo 5/12/18 showed EF 55-60% with mild regurgitation of tricuspid valve. Hospital course  \"Reshma Barajas is a 71 y. o. female with a history of HIV, who presents to the ED via EMS with complaint of shortness of breath and wheezing which began yesterday. Patient stated that her shortness of breath has been gradually worsening.      She notes that she was prescribed Advair, but has been unable to take it due to throat irritation .      She reports associated productive cough, chest pain, and bilateral feet swelling over the past few days. BNP on admission within normal range Patient denies history of CHF.      Per EMS, patient was given 2 albuterol treatments en route to the ED. She currently smokes 0.5ppd. Patient denies recent fever, chills, nausea, vomiting, abdominal pain, or back pain. She makes no further complaints.     Pt has been f/u ID at [facility]  Dr. Simons Holes [phone] she has recent change in her med she has been on current med for 3 month due to f/u with her in July  Pt seen by cardiology started on Cardizem 30 mg qid troponin I is negative .  Pt still c/o chest pain she has localized tenderness  Pulmonary consult is pending discussed with Dr Isabella Warren yesterday\"    Chief Complaint:   Chief Complaint   Patient presents with    Shortness of Breath       Review of systems  GENERAL: Patient alert, awake and oriented times 3, able to communicate full sentences and not in distress. SOB   HEENT: No change in vision, earache, tinnitus, sore throat or sinus congestion. NECK: No pain or stiffness. CARDIOVASCULAR: No current pain or palpations. PULMONARY: Shortness of breath improved,  Positive cough and expiratory wheeze. GASTROINTESTINAL: No abdominal pain, nausea, vomiting or diarrhea, melena or bright red blood per rectum. GENITOURINARY: No urinary frequency, urgency, hesitancy or dysuria. MUSCULOSKELETAL: No joint or muscle pain, no back pain, no recent trauma. Swelling lower extremities improved   DERMATOLOGIC: No rash, no itching, no lesions. ENDOCRINE: No polyuria, polydipsia, no heat . Positive intolerance to cold intolerance. No recent change in    weight. HEMATOLOGICAL: No anemia or easy bruising or bleeding. NEUROLOGIC: No headache, seizures, numbness  Feet and toes, generalized weakness    PSYCHIATRIC: H/O  Depression, and  anxiety,  No other mood disorder, no loss of interest in normal       activity or change in sleep pattern.       Objective:     Physical Exam:  Visit Vitals    /89 (BP Patient Position: At rest)    Pulse 75    Temp 98.6 °F (37 °C)    Resp 19    Ht 5' 2.99\" (1.6 m)    Wt 75 kg (165 lb 6.4 oz)    SpO2 100%    BMI 29.31 kg/m2      General:  Alert, cooperative, mild  Distress with SOB   Head:  Normocephalic, without obvious abnormality, atraumatic. Eyes:  Conjunctivae/corneas clear. PERRL, EOMs intact. Nose: Nares normal. No drainage or sinus tenderness. Throat: Lips, mucosa, and tongue dry positive yeast   Neck: Supple, symmetrical, trachea midline, no adenopathy, thyroid: no enlargement/tenderness/nodules, no carotid bruit and no JVD. Back:   ROM normal. No CVA tenderness.    Lungs:     Expiratory wheezing bilaterally more in the Lt side   Chest wall:  Positive tenderness mid and Lt chest wall improved   Heart:  Regular rate and rhythm, S1, S2 normal, no murmur, click, rub or gallop. Abdomen: Soft, non-tender. Bowel sounds normal. No masses,  No organomegaly. Extremities: Extremities normal, atraumatic, 1+ edema lower extremities improved   Pulses: 2+ and symmetric all extremities. Skin: Skin color, texture,  Dry skin   Neurologic: CNII-XII intact. No focal motor or sensory deficit.             Intake and Output:  Current Shift:     Last three shifts:  05/12 1901 - 05/14 0700  In: 1122 [P.O.:1070; I.V.:52]  Out: 2600 [Urine:2600]    Labs: Results:       Chemistry Recent Labs      05/14/18   0339 05/13/18   1159  05/12/18   0300   GLU  139*  150*  96   NA  141  144  141   K  3.4*  3.4*  3.5   CL  102  105  108   CO2  32  29  30   BUN  35*  31*  26*   CREA  1.63*  1.73*  1.51*   CA  8.2*  8.6  8.5   AGAP  7  10  3   BUCR  21*  18  17   AP  100   --   108   TP  6.5   --   7.0   ALB  2.6*   --   2.8*   GLOB  3.9   --   4.2*   AGRAT  0.7*   --   0.7*      CBC w/Diff Recent Labs      05/14/18   0339 05/13/18   1159  05/12/18   0300   WBC  7.6  8.2  9.4   RBC  3.83*  4.07*  3.72*   HGB  13.0  14.0  12.7   HCT  38.8  41.3  39.9   PLT  311  322  316   GRANS  85*  83*  70   LYMPH  10*  9*  20*   EOS  0  0  0      Cardiac Enzymes Recent Labs      05/12/18   1229  05/12/18   1002   CPK  140  154   CKND1  2.9  2.8      Coagulation No results for input(s): PTP, INR, APTT in the last 72 hours.     No lab exists for component: INREXT, INREXT    Lipid Panel Lab Results   Component Value Date/Time    Cholesterol, total 204 (H) 05/13/2018 02:00 AM    HDL Cholesterol 105 (H) 05/13/2018 02:00 AM    LDL, calculated 86.4 05/13/2018 02:00 AM    VLDL, calculated 12.6 05/13/2018 02:00 AM    Triglyceride 63 05/13/2018 02:00 AM    CHOL/HDL Ratio 1.9 05/13/2018 02:00 AM      BNP No results for input(s): BNPP in the last 72 hours.   Liver Enzymes Recent Labs      05/14/18   0339   TP  6.5   ALB  2.6*   AP  100   SGOT  10*      Thyroid Studies Lab Results   Component Value Date/Time    TSH 0.10 (L) 05/14/2018 03:39 AM          Procedures/imaging: see electronic medical records for all procedures/Xrays and details which were not copied into this note but were reviewed prior to creation of Plan    Medications:   Current Facility-Administered Medications   Medication Dose Route Frequency    HYDROcodone-acetaminophen (NORCO) 5-325 mg per tablet 1 Tab  1 Tab Oral Q6H PRN    famotidine (PEPCID) tablet 20 mg  20 mg Oral DAILY    aspirin chewable tablet 81 mg  81 mg Oral DAILY    albuterol-ipratropium (DUO-NEB) 2.5 MG-0.5 MG/3 ML  3 mL Nebulization Q4H PRN    arformoterol (BROVANA) neb solution 15 mcg  15 mcg Nebulization BID RT    fluconazole (DIFLUCAN) 200mg/100 mL IVPB (premix)  200 mg IntraVENous DAILY    methylPREDNISolone (PF) (SOLU-MEDROL) injection 40 mg  40 mg IntraVENous Q6H    sertraline (ZOLOFT) tablet 25 mg  25 mg Oral DAILY    fluticasone (FLONASE) 50 mcg/actuation nasal spray 2 Spray  2 Spray Both Nostrils DAILY    heparin (porcine) injection 5,000 Units  5,000 Units SubCUTAneous Q8H    abacavir-dolutegravir-lamiVUDine (TRIUMEQ) 600- mg tablet 1 Tab  1 Tab Oral DAILY    darunavir-cobicistat (PREZCOBIX) 800-150 mg-mg per tablet 1 Tab (Patient Supplied)  1 Tab Oral DAILY    hydrALAZINE (APRESOLINE) 20 mg/mL injection 20 mg  20 mg IntraVENous Q6H PRN    levoFLOXacin (LEVAQUIN) 250 mg in D5W IVPB  250 mg IntraVENous Q24H    dilTIAZem (CARDIZEM) IR tablet 30 mg  30 mg Oral AC&HS    furosemide (LASIX) injection 40 mg  40 mg IntraVENous BID       Assessment/Plan     Principal Problem:    COPD exacerbation (HCC) (5/12/2018)    Active Problems:    HIV (human immunodeficiency virus infection) ([address] (5/12/2018)      Anxiety (5/12/2018)      COPD exacerbation  Continue Levaquin  Solumedrol 40 mg q6h duo nebulizer treatment  Dilfucan 200 mg IV daily empirically for 5 days per pulmonary recommendations  Brovana 15 mcg neb BID and Duoneb q4hr PRN started 5/13/18   Pulmonary consult, Dr Belia Aranda following   IVF d/ulices  Awaiting respiratory sputum culture collection   Recheck cardiac enzymes negative   Cardiology consult, Dr. Mehrdad Nava following   f/u  Swallow evaluation  Ct of chest w/o contrast 5/13/18 showed evidence of Chronic obstructive pulmonary disease with features primarily of emphysema.      Hypertension  Cardizem 30 mg q6h   Hydralzine prn SBP above    Acute renal insufficiency  Off Iv hydration monitor kidney function  Started on Lasix 40 mg IV q12 h per cardiology  Stat BMP  Monitor bp hydralizne prn SBp above 170     HIV infection  Continue current treatment  Check viral load And CD4 count pedning  F/u ID as outpatient with Dr. Calles Gertrude     Anxiety/ insomnia  Continue zoloft 25 mg daily     Seasonal allergies  flonase nasal spray      Atypical Chest pain/Swelling lower extremities   BNP normal  Venous duplex U/S B/L leg negative   Norco 5/325 mg q 6h prn for pain  ASA 81 mg PO daily   ECHO 5/12/18- EF 55-60%, mild regurgitation of tricuspid valve noted.      Cbc, bmp now cbc, cmp, mag in AM  TSH 0.10 and free t4 1.2- continue to monitor for possible signs of hyperthyroidism   DVT/GI Prophylaxis:Heparin  H2B/PPI and pepcid        MICHAEL Eugene  5/14/2018 9:55 AM

## 2018-05-14 NOTE — PROGRESS NOTES
Bedside shift change report given to Me (oncoming nurse) by Joo Guillaume (offgoing nurse). Report included the following information SBAR, Kardex, MAR, Recent Results and Cardiac Rhythm NSR. Patient is lying supine in bed. Educated patient regarding ambulating. Removed purewic, and oxygen extension tubing added so that patient can ambulate to the bathroom. Writer observed patient ambulating to the toilet, patient tolerated it well. Encouraged patient to ring call bell for assistance regarding ambulation. Patient verbalized understanding. [id number] Patient complained of upper abdominal pain, scale is a 9. Hydrocodone administered per MAR. Patient also requested for purewic to be applied. Encouraged patient to get out of bed, to reduce muscle weakness. 5524  Bedside shift change report given to Joo Guillaume (oncoming nurse) by Me (offgoing nurse). Report included the following information SBAR, Kardex, Intake/Output, Recent Results and Cardiac Rhythm NSR.

## 2018-05-14 NOTE — PROGRESS NOTES
conducted an initial consultation and Spiritual Assessment for Allison Kohli, who is a 71 y.o.,female. Patients Primary Language is: [place]. According to the patients EMR Nondenominational Affiliation is: Jehovah witness. 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 Provided information about Spiritual Care Services. Chart reviewed. The following outcomes where achieved:  Patient expressed gratitude for 's visit. Assessment:  There are no spiritual or Pentecostalism issues which require intervention at this time. Plan:  Chaplains will continue to follow and will provide pastoral care on an as needed/requested basis.  recommends bedside caregivers page  on duty if patient shows signs of acute spiritual or emotional distress.     [address]  [phone])

## 2018-05-14 NOTE — PROGRESS NOTES
Reason for Admission:   COPD exacerbation                  RRAT Score:    20              Do you (patient/family) have any concerns for transition/discharge? Possible need for home oxygen               Plan for utilizing home health:         Likelihood of readmission? Yellow/Moderate            Transition of Care Plan:     Pt is alert and oriented in room with her great niece Ramesh Pruitt ([phone]) at her bedside. Pt resides alone and her plan is to return home at discharge. Pt's great niece will assist her with transportation home. Pt mentions that she has access to a cane, walker and inhaler. Pt indicates being independent with ADls.

## 2018-05-14 NOTE — PROGRESS NOTES
Respiratory Care Assessment for Bronchial hygiene or Lung Expansion Therapy  Patient  Malen Yuriy     71 y.o.   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 Instrumentation stable. No pneumothorax. No pleural  effusion. Bilateral lower lung zone atelectasis again with chronic  pleural-parenchymal changes at the middle and lower lung zones. Cardiomediastinal silhouette and osseous structures grossly unchanged.          Impression Impression: No significant change          Lab Test:  Date:5/14/2018  WBC:   Lab Results   Component Value Date/Time    WBC 7.6 05/14/2018 03:39 AM   HGB: Lab Results   Component Value Date/Time    HGB 13.0 05/14/2018 03:39 AM    PLTS: Lab Results   Component Value Date/Time    PLATELET 661 86/11/2537 03:39 AM       SaO2%/flow:   SpO2 Readings from Last 1 Encounters:   05/14/18 98%       Vital Signs:   Patient Vitals for the past 8 hrs:   Temp Pulse Resp BP SpO2   05/14/18 1138 97.7 °F (36.5 °C) 85 20 132/85 98 %   05/14/18 0748 98.6 °F (37 °C) 77 19 136/85 97 %         RA/O2 flow/device: 2 LNC        First Inital Assesment:     [x]Yes []No   Reevaluation/Reassessment:    []Yes []No     CHART REVIEW   Points 0 X 1 X 2 X 3 X 4 X Points   Pulmonary History Smoking History (1) none  Recent Smoking History <1 PPD  Recent Smoking History >1 PPD  Pulmonary Disease or Impairment  Severe Pulmonary Disease  2   Surgical History No Surgery  General Surgery  Lower Abdominal  Thoracic or Upper Abdominal  Thoracic & Pulmonary Disease  0   CXR Clear or not indicated  Chronic changes or CXR Pending  Infiltrate, atelectasis or pleural effusions  Infiltrates in more than 1lobe  Infiltrates +atelectasis  +/or pleural effusions  2     PATIENT ASSESSMENT    0 X 1 X 2 X 3 X 4 X Points   Respiratory Pattern Regular pattern RR 12-20  Increased RR 21-27   Mild Dyspnea at rest, irregular pattern RR 28-32  Moderate Dyspnea at rest, Use of accessory muscles, RR 33-36  Severe Dyspnea, Use of accessory muscles RR >36  0   Mental Status Alert Oriented cooperative  Confused, Follows commands  Lethargic, Does not follow commands  Obtunded  Unresponsive  0   Breath Sounds Clear  Decreased Unilaterally  Decreased Bilaterally  Mild Scattered wheezing or Crackles in bases  Severe Wheezing or rhonchi  3   Cough Strong dry NPC  Strong Productive  Weak NPC  Weak productive or weak with rhonchi  No cough or may require suctioning  0   Level of Activity Ambulatory  Ambulatory with assistance  Temporarily Non-ambulatory  Non-Ambulatory, able to position self  Unable to position self, confined to bed  0   Total Points/Score:   7     Specific Intervention Chart    Bronchial Hygiene/Secretion Clearance:    []EZPAP []Rotation bed with vibration    []CPT with percussor []CPT via vest   [x]Oscillastiang positive pressure expiratory device      Lung Expansion:    [x]Incentive Spirometer w/RT visits []Incentive Spirometer w/nursing    []EZPAP      *Suctioning:    []Nasal Tracheal []Tracheal     *suctioning will be ordered and done PRN with an associated frequency such as QID/PRN based on score       Other:    Care Plan   Level # Score Modality Frequency Comment   Level 1 >17      Level 2 14-17      Level 3 10-13      Level 4 1-9        BRONCHIAL HYGIENE SCORING AND FREQUENCY GUIDELINES   Frequency Indications/Findings Level #   Q4 ATC Copious secretions, SOB, unable to sleep 1   QID & PRN at night Moderate amounts of secretions 2   TID or Q6 wa Small amounts of secretions and poor cough: recent history of secretions 3   BID or Q8 wa Unable to deep breathe and cough effectively 4        Comments:  Patient CXR shows bilateral atelectasis. Breat sounds are course expiratory wheezes bilaterally. Will initiate PEP therapy to help clear secretions and incentive spirometry to help with the bibasilar atelectasis.     Respiratory Therapist: Xavier Martin, RT

## 2018-05-14 NOTE — PROGRESS NOTES
[facility]      CARDIOLOGY PROGRESS NOTE  RECS:      1. Congestive heart failure- acute on chronic diastolic CHF Added asa. Lasix 40mg ivp today and switch to po tomorrow. 2. Hypertension- controlled  continue Cardizem. Avoiding beta blockers due to wheezing. 3. Wheezing shortness of breath fever and productive cough: Likely COPD exacerbation. Workup and treatment per medicine. 4. Chest pain: Atypical, constant for 3-4 months. Unlikely to be ischemic. MI is already ruled out. Likely it is secondary to dyspnea and respiratory distress and is musculoskeletal in origin. 5. Tobacco abuse-Patient advised to stop smoking to reduce future cardiovascular events. ASSESSMENT:  Hospital Problems  Date Reviewed: 5/12/2018          Codes Class Noted POA    * (Principal)COPD exacerbation ([facility] ICD-10-CM: J44.1  ICD-9-CM: 491.21  5/12/2018 Yes        HIV (human immunodeficiency virus infection) ([facility] (Chronic) ICD-10-CM: B20  ICD-9-CM: V08  5/12/2018 Yes        Anxiety ICD-10-CM: F41.9  ICD-9-CM: 300.00  5/12/2018 Yes                SUBJECTIVE:  No CP  C/o SOB    OBJECTIVE:    VS:   Visit Vitals    /85 (BP 1 Location: Right arm, BP Patient Position: At rest)    Pulse 77    Temp 98.6 °F (37 °C)    Resp 19    Ht 5' 2.99\" (1.6 m)    Wt 75 kg (165 lb 6.4 oz)    SpO2 97%    BMI 29.31 kg/m2         Intake/Output Summary (Last 24 hours) at 05/14/18 0838  Last data filed at 05/14/18 0636   Gross per 24 hour   Intake             1070 ml   Output             1200 ml   Net             -130 ml     TELE: normal sinus rhythm    General: alert, well developed, pleasant and in mild distress  HENT: Normocephalic, atraumatic. Normal external eye. Neck :  no bruit, no JVD  Cardiac:  regular rate and rhythm, S1, S2 normal, no S3 or S4, no click, no rub  Lungs: diminished breath sounds very poor air entry.  Moderate diffuse expiratory wheezing both lung fields   Abdomen: Soft, nontender, no masses  Extremities:  Trace edema , peripheral pulses present      Labs: Results:       Chemistry Recent Labs      05/14/18   0339  05/13/18   1159  05/12/18   0300   GLU  139*  150*  96   NA  141  144  141   K  3.4*  3.4*  3.5   CL  102  105  108   CO2  32  29  30   BUN  35*  31*  26*   CREA  1.63*  1.73*  1.51*   CA  8.2*  8.6  8.5   AGAP  7  10  3   BUCR  21*  18  17   AP  100   --   108   TP  6.5   --   7.0   ALB  2.6*   --   2.8*   GLOB  3.9   --   4.2*   AGRAT  0.7*   --   0.7*      CBC w/Diff Recent Labs      05/14/18   0339 05/13/18   1159  05/12/18   0300   WBC  7.6  8.2  9.4   RBC  3.83*  4.07*  3.72*   HGB  13.0  14.0  12.7   HCT  38.8  41.3  39.9   PLT  311  322  316   GRANS  85*  83*  70   LYMPH  10*  9*  20*   EOS  0  0  0      Cardiac Enzymes Recent Labs      05/12/18   1229  05/12/18   1002   CPK  140  154   CKND1  2.9  2.8      Coagulation No results for input(s): PTP, INR, APTT in the last 72 hours. No lab exists for component: INREXT, INREXT    Lipid Panel Lab Results   Component Value Date/Time    Cholesterol, total 204 (H) 05/13/2018 02:00 AM    HDL Cholesterol 105 (H) 05/13/2018 02:00 AM    LDL, calculated 86.4 05/13/2018 02:00 AM    VLDL, calculated 12.6 05/13/2018 02:00 AM    Triglyceride 63 05/13/2018 02:00 AM    CHOL/HDL Ratio 1.9 05/13/2018 02:00 AM      BNP No results for input(s): BNPP in the last 72 hours.    Liver Enzymes Recent Labs      05/14/18   0339   TP  6.5   ALB  2.6*   AP  100   SGOT  10*      Thyroid Studies Lab Results   Component Value Date/Time    TSH 0.10 (L) 05/14/2018 03:39 AM                Aroldo Schaefer MD

## 2018-05-14 NOTE — PROGRESS NOTES
[facility]  Pulmonary, Critical Care, and Sleep Medicine    Name: Indio Lundy MRN: [id number]   : 1945 Hospital: [address] [place] [address]   Date: 2018        Pulmonary Follow up    IMPRESSION:   · Acute COPD exacerbation with predominant emphysema and basal atelectasis. Hypoxia due to mucus plugging and emphysema. CT- no evidence of ILD to suggest PCP  · HIV on HAART  · Acute CHF, systolic and possibly diastolic  · Pulmonary HTN- cardiac and Pulmonary etiology  · Esophageal/throat irritation, possible candidiasis? · Hx of htn  · Hx of anxiety d/o  · Hx of marijuana abuse      RECOMMENDATIONS:   · Will intensify bronchial hygiene- needs PEP device  · Continue current bronchodilators brovana , Pulmicort and duonebs. · Continue Solumedrol  · Will check Respiratory culture  · Diflucan empirically for 5 days. · Continue Levaquin pending cultures and adjust as needed  · Continue HAART  · Assess Home o2 needs  · PFT and 6 min walk as outpatient. Pulmonary rehab  · Preventive vaccinations. · Will follow. Subjective/History:   18   Still with cough- thick mucus- difficult to expectorate but able to productively expectorate after nebulized treatments  +SOB- some better  Denies chest pain  Denies nausea  CT scan and echo completed. HPI:  This patient has been seen and evaluated at the request of Dr. Angela iVllar  for COPD exacerbation. Patient is a 71 y.o. female with hx of HIV ( acquired during blood transfusion) on HAART. Being followed by ID in [place],  Unclear last viral load and CD4, count. Over 46yr smoking hx,  Presented to ED with increase in sob and wheezing. No associated with fever or chills. Unable to use advair due to throat irritations. ? Pain. NO n/v/d/   No hx of headache. No visual disturbances. No changes in weight  No night sweats. Since admission pt has been diuresed. Started on emperically on steroid. Echo remain pending.   Pt is also placed on abx for UTI with levaquin. Since admission pt does reports that her breathing has improved significantly now where she is able to carry conversation. Past Medical History:   Diagnosis Date    Coronary atherosclerosis of native coronary artery     HIV (human immunodeficiency virus infection) ([facility]     Ill-defined condition     Pneumonia    Nonspecific abnormal electrocardiogram (ECG) (EKG)     Pre-operative cardiovascular examination       Past Surgical History:   Procedure Laterality Date    HX HIP REPLACEMENT      HX HYSTERECTOMY        Current Facility-Administered Medications   Medication Dose Route Frequency    furosemide (LASIX) injection 40 mg  40 mg IntraVENous ONCE    [START ON 5/15/2018] furosemide (LASIX) tablet 40 mg  40 mg Oral DAILY    famotidine (PEPCID) tablet 20 mg  20 mg Oral DAILY    aspirin chewable tablet 81 mg  81 mg Oral DAILY    arformoterol (BROVANA) neb solution 15 mcg  15 mcg Nebulization BID RT    fluconazole (DIFLUCAN) 200mg/100 mL IVPB (premix)  200 mg IntraVENous DAILY    methylPREDNISolone (PF) (SOLU-MEDROL) injection 40 mg  40 mg IntraVENous Q6H    sertraline (ZOLOFT) tablet 25 mg  25 mg Oral DAILY    fluticasone (FLONASE) 50 mcg/actuation nasal spray 2 Spray  2 Spray Both Nostrils DAILY    heparin (porcine) injection 5,000 Units  5,000 Units SubCUTAneous Q8H    abacavir-dolutegravir-lamiVUDine (TRIUMEQ) 600- mg tablet 1 Tab  1 Tab Oral DAILY    darunavir-cobicistat (PREZCOBIX) 800-150 mg-mg per tablet 1 Tab (Patient Supplied)  1 Tab Oral DAILY    levoFLOXacin (LEVAQUIN) 250 mg in D5W IVPB  250 mg IntraVENous Q24H    dilTIAZem (CARDIZEM) IR tablet 30 mg  30 mg Oral AC&HS     No Known Allergies     Review of Systems:  A comprehensive review of systems was negative except for that written in the HPI.     Objective:   Vital Signs:    Visit Vitals    /85 (BP 1 Location: Right arm, BP Patient Position: At rest)    Pulse 77    Temp 98.6 °F (37 °C)    Resp 19    Ht 5' 2.99\" (1.6 m)  Comment: bmi    Wt 75 kg (165 lb 6.4 oz)    SpO2 97%    BMI 29.31 kg/m2       O2 Device: Nasal cannula   O2 Flow Rate (L/min): 2 l/min   Temp (24hrs), Av.1 °F (36.7 °C), Min:97.4 °F (36.3 °C), Max:98.6 °F (37 °C)       Intake/Output:   Last shift:         Last 3 shifts:  1901 -  0700  In: 8773 [P.O.:1070; I.V.:52]  Out: 2600 [Urine:2600]    Intake/Output Summary (Last 24 hours) at 18 0854  Last data filed at 18 0636   Gross per 24 hour   Intake             1070 ml   Output             1200 ml   Net             -130 ml        Physical Exam:    General:  Alert, cooperative, no distress, appears stated age. Head:  Normocephalic, without obvious abnormality, atraumatic. Eyes:  Conjunctivae/corneas clear. PERRL, EOMs intact. Nose: Nares normal. Septum midline. Mucosa normal. No drainage or sinus tenderness. Throat: Lips, mucosa, and tongue normal. Teeth and gums normal.   Neck: Supple, symmetrical, trachea midline, no adenopathy, thyroid: no enlargment/tenderness/nodules, no carotid bruit and no JVD. Back:   Symmetric, no curvature. ROM normal.   Lungs:   Diffuse exp wheeze and ronchi   Chest wall:  No tenderness or deformity. Heart:  Regular rate and rhythm, S1, S2 normal, no murmur, click, rub or gallop. Abdomen:   Soft, non-tender. Bowel sounds normal. No masses,  No organomegaly. Extremities: Extremities normal, atraumatic, no cyanosis or edema. Pulses: 2+ and symmetric all extremities.    Skin: Skin color, texture, turgor normal. No rashes or lesions   Lymph nodes: Cervical, supraclavicular, and axillary nodes normal.   Neurologic: Grossly nonfocal       Data:     Recent Results (from the past 24 hour(s))   METABOLIC PANEL, BASIC    Collection Time: 18 11:59 AM   Result Value Ref Range    Sodium 144 136 - 145 mmol/L    Potassium 3.4 (L) 3.5 - 5.5 mmol/L    Chloride 105 100 - 108 mmol/L    CO2 29 21 - 32 mmol/L Anion gap 10 3.0 - 18 mmol/L    Glucose 150 (H) 74 - 99 mg/dL    BUN 31 (H) 7.0 - 18 MG/DL    Creatinine 1.73 (H) 0.6 - 1.3 MG/DL    BUN/Creatinine ratio 18 12 - 20      GFR est AA 35 (L) >60 ml/min/1.73m2    GFR est non-AA 29 (L) >60 ml/min/1.73m2    Calcium 8.6 8.5 - 10.1 MG/DL   CBC WITH AUTOMATED DIFF    Collection Time: 05/13/18 11:59 AM   Result Value Ref Range    WBC 8.2 4.6 - 13.2 K/uL    RBC 4.07 (L) 4.20 - 5.30 M/uL    HGB 14.0 12.0 - 16.0 g/dL    HCT 41.3 35.0 - 45.0 %    .5 (H) 74.0 - 97.0 FL    MCH 34.4 (H) 24.0 - 34.0 PG    MCHC 33.9 31.0 - 37.0 g/dL    RDW 13.7 11.6 - 14.5 %    PLATELET 862 965 - 327 K/uL    MPV 9.1 (L) 9.2 - 11.8 FL    NEUTROPHILS 83 (H) 40 - 73 %    LYMPHOCYTES 9 (L) 21 - 52 %    MONOCYTES 8 3 - 10 %    EOSINOPHILS 0 0 - 5 %    BASOPHILS 0 0 - 2 %    ABS. NEUTROPHILS 6.8 1.8 - 8.0 K/UL    ABS. LYMPHOCYTES 0.8 (L) 0.9 - 3.6 K/UL    ABS. MONOCYTES 0.6 0.05 - 1.2 K/UL    ABS. EOSINOPHILS 0.0 0.0 - 0.4 K/UL    ABS. BASOPHILS 0.0 0.0 - 0.06 K/UL    DF AUTOMATED     CBC WITH AUTOMATED DIFF    Collection Time: 05/14/18  3:39 AM   Result Value Ref Range    WBC 7.6 4.6 - 13.2 K/uL    RBC 3.83 (L) 4.20 - 5.30 M/uL    HGB 13.0 12.0 - 16.0 g/dL    HCT 38.8 35.0 - 45.0 %    .3 (H) 74.0 - 97.0 FL    MCH 33.9 24.0 - 34.0 PG    MCHC 33.5 31.0 - 37.0 g/dL    RDW 13.7 11.6 - 14.5 %    PLATELET 857 950 - 008 K/uL    MPV 9.1 (L) 9.2 - 11.8 FL    NEUTROPHILS 85 (H) 40 - 73 %    LYMPHOCYTES 10 (L) 21 - 52 %    MONOCYTES 5 3 - 10 %    EOSINOPHILS 0 0 - 5 %    BASOPHILS 0 0 - 2 %    ABS. NEUTROPHILS 6.4 1.8 - 8.0 K/UL    ABS. LYMPHOCYTES 0.8 (L) 0.9 - 3.6 K/UL    ABS. MONOCYTES 0.4 0.05 - 1.2 K/UL    ABS. EOSINOPHILS 0.0 0.0 - 0.4 K/UL    ABS.  BASOPHILS 0.0 0.0 - 0.06 K/UL    DF AUTOMATED     METABOLIC PANEL, COMPREHENSIVE    Collection Time: 05/14/18  3:39 AM   Result Value Ref Range    Sodium 141 136 - 145 mmol/L    Potassium 3.4 (L) 3.5 - 5.5 mmol/L    Chloride 102 100 - 108 mmol/L    CO2 32 21 - 32 mmol/L    Anion gap 7 3.0 - 18 mmol/L    Glucose 139 (H) 74 - 99 mg/dL    BUN 35 (H) 7.0 - 18 MG/DL    Creatinine 1.63 (H) 0.6 - 1.3 MG/DL    BUN/Creatinine ratio 21 (H) 12 - 20      GFR est AA 38 (L) >60 ml/min/1.73m2    GFR est non-AA 31 (L) >60 ml/min/1.73m2    Calcium 8.2 (L) 8.5 - 10.1 MG/DL    Bilirubin, total 0.3 0.2 - 1.0 MG/DL    ALT (SGPT) 24 13 - 56 U/L    AST (SGOT) 10 (L) 15 - 37 U/L    Alk. phosphatase 100 45 - 117 U/L    Protein, total 6.5 6.4 - 8.2 g/dL    Albumin 2.6 (L) 3.4 - 5.0 g/dL    Globulin 3.9 2.0 - 4.0 g/dL    A-G Ratio 0.7 (L) 0.8 - 1.7     MAGNESIUM    Collection Time: 05/14/18  3:39 AM   Result Value Ref Range    Magnesium 2.5 1.6 - 2.6 mg/dL   T4, FREE    Collection Time: 05/14/18  3:39 AM   Result Value Ref Range    T4, Free 1.2 0.7 - 1.5 NG/DL   TSH 3RD GENERATION    Collection Time: 05/14/18  3:39 AM   Result Value Ref Range    TSH 0.10 (L) 0.36 - 3.74 uIU/mL           Echo 5/2018:    Left ventricle: Systolic function was normal by visual assessment. Ejection   fraction was estimated in the range of 55 %  to 60 %. No obvious wall motion abnormalities identified in the views   obtained. Right ventricle: Systolic pressure was mildly increased. Estimated peak   pressure was at least 45 mmHg. Tricuspid valve: There was mild regurgitation. Telemetry:normal sinus rhythm    Imaging:  I have personally reviewed the patients radiographs and have reviewed the reports:     CT Results  (Last 48 hours)               05/13/18 1439  CT CHEST WO CONT Final result    Impression:  IMPRESSION:        Chronic obstructive pulmonary disease with features primarily of emphysema. .       Bronchial thickening with bronchial luminal filling in the basal zones. Bronchitis with mucous plugging versus aspiration. Findings suggesting acute congestive heart failure or pneumocystis pneumonia are   not evident   .        All CT scans at this facility are performed using dose optimization technique as   appropriate to the performed exam, to include automated exposure control,   adjustment of the mA and/or kV according to patient's size (Including   appropriate matching for site-specific examinations), or use of iterative   reconstruction technique. Narrative:  CT CHEST WITHOUT IV CONTRAST           COMPARISON: No priors. INDICATIONS: COPD, hypoxia, HIV, congestive heart failure. TECHNIQUE: Volumetric data acquisition was performed through the chest with a   multislice scanner. Reconstructions were created in the axial, coronal, and   sagittal planes. FINDINGS:        Thyroid/Base Of Neck:  Unremarkable  . Lungs: The lungs are emphysematous. There are segmental and subsegmental areas of   atelectasis in the lower lobes associated with bronchial thickening. Some of the   bronchi show luminal filling. .    No groundglass infiltrate is evident. .       Pleural Spaces: There is no pneumothorax or pleural fluid evident. No pleural plaques are seen       Lymph Nodes:    Axillae: Normal in size and number. Mediastinum / Alida: Normal in size and number. Mediastinum, Great Vessels And Heart: There is no mediastinal mass. The heart and great vessels appear unremarkable. Abdomen Structures Included:   Epigastric abdominal structures show a little persists but otherwise are   unremarkable. Osseous Structures: Senescent and demineralized findings. Mild compression of an   upper partial vertebral body. .               Results for Jeffery Gann (MRN [id number]) as of 5/13/2018 12:53   Ref.  Range 5/12/2018 05:02   pH (POC) Latest Ref Range: 7.35 - 7.45   7.462 (H)   pCO2 (POC) Latest Ref Range: 35.0 - 45.0 MMHG 42.4   pO2 (POC) Latest Ref Range: 80 - 100 MMHG 53 (L)   HCO3 (POC) Latest Ref Range: 22 - 26 MMOL/L 30.3 (H)   sO2 (POC) Latest Ref Range: 92 - 97 % 89 (L)   Base excess (POC) Latest Units: mmol/L 6   FIO2 (POC) Latest Units: % 94   Patient temp. Latest Units:   98.7   Specimen type (POC) Latest Units:   ARTERIAL   Site Latest Units:   RIGHT RADIAL   Device: Latest Units:   ROOM AIR   Total resp. rate Latest Units:   22   Allens test (POC) Latest Units:   YES       Final result (Exam End: 5/12/2018  3:00 AM) Open        Study Result      Chest     Indication: SOB      Comparison: October 19, 2017     Findings: Single view. Instrumentation stable. No pneumothorax. No pleural  effusion. Bilateral lower lung zone atelectasis again with chronic  pleural-parenchymal changes at the middle and lower lung zones. Cardiomediastinal silhouette and osseous structures grossly unchanged.     IMPRESSION  Impression: No significant change             Right leg :  1. No evidence of deep venous thrombosis detected in the veins  visualized. 2. Deep vein(s) visualized include the common femoral, femoral,  popliteal, posterior tibial, and peroneal veins. 3. No evidence of superficial thrombosis detected. 4. Superficial vein(s) visualized include the great saphenous vein at  the sapheno-femoral junction. Left leg :  1. No evidence of deep venous thrombosis detected in the veins  visualized. 2. Deep vein(s) visualized include the common femoral, femoral,  popliteal, posterior tibial, and peroneal veins. 3. No evidence of superficial thrombosis detected. 4. Superficial vein(s) visualized include the great saphenous vein at  the sapheno-femoral junction.     High complexity decision making was performed during the evaluation of this patient at high risk for decompensation with multiple organ involvement     Above mentioned total time spent on reviewing the case/medical record/data/notes/EMR/patient examination/documentation/coordinating care with nurse/consultants, exclusive of procedures with complex decision making performed and > 50% time spent in face to face evaluation.   Dedrick Yan MD  5/14/2018

## 2018-05-14 NOTE — PROGRESS NOTES
Progress Note    Patient: John Muñoz MRN: [id number]  CSN: [id number]    YOB: 1945  Age: 71 y.o. Sex: female    DOA: 5/12/2018 LOS:  LOS: 2 days                    Subjective:     Pt reports dyspnea is improved. Started on 2 L/min NC overnight. Brovana 15 mcg neb BID  And Pulmicort started yesterday by pulmonary. Lasix was switched  to oral 40 mg daily Cr slight improvement    CT Chest w/o contrast 5/13/18  IMPRESSION: Chronic obstructive pulmonary disease with features primarily of emphysema. Bronchial thickening with bronchial luminal filling in the basal zones. Bronchitis with mucous plugging versus aspiration. Findings suggesting acute congestive heart failure or pneumocystis pneumonia are not evident. Atypical chest pain resolved per patient. BLE peripheral edema improved with Lasix. Echo 5/12/18 showed EF 55-60% with mild regurgitation of tricuspid valve. Hospital course  \"Reshma Barajas is a 71 y. o. female with a history of HIV, who presents to the ED via EMS with complaint of shortness of breath and wheezing which began yesterday. Patient stated that her shortness of breath has been gradually worsening.      She notes that she was prescribed Advair, but has been unable to take it due to throat irritation .      She reports associated productive cough, chest pain, and bilateral feet swelling over the past few days. BNP on admission within normal range Patient denies history of CHF.      Per EMS, patient was given 2 albuterol treatments en route to the ED. She currently smokes 0.5ppd. Patient denies recent fever, chills, nausea, vomiting, abdominal pain, or back pain. She makes no further complaints.     Pt has been f/u ID at [facility]  Dr. Simons Holes [phone] she has recent change in her med she has been on current med for 3 month due to f/u with her in July  Pt seen by cardiology started on Cardizem 30 mg qid troponin I is negative .  Pt still c/o chest pain she has localized tenderness  Pulmonary consult is pending discussed with Dr Diego Shields yesterday\"    Chief Complaint:   Chief Complaint   Patient presents with    Shortness of Breath       Review of systems  GENERAL: Patient alert, awake and oriented times 3, able to communicate full sentences and not in distress. SOB   HEENT: No change in vision, earache, tinnitus, sore throat or sinus congestion. NECK: No pain or stiffness. CARDIOVASCULAR: No current pain or palpations. PULMONARY: Shortness of breath improved,  Positive cough and  Wheeze resolved. GASTROINTESTINAL: No abdominal pain, nausea, vomiting or diarrhea, melena or bright red blood per rectum. GENITOURINARY: No urinary frequency, urgency, hesitancy or dysuria. MUSCULOSKELETAL: No joint or muscle pain, no back pain, no recent trauma. Swelling lower extremities resoved   DERMATOLOGIC: No rash, no itching, no lesions. ENDOCRINE: No polyuria, polydipsia, no heat . Positive intolerance to cold intolerance. No recent change in    weight. HEMATOLOGICAL: No anemia or easy bruising or bleeding. NEUROLOGIC: No headache, seizures, numbness  Feet and toes, generalized weakness    PSYCHIATRIC: H/O  Depression, and  anxiety,  No other mood disorder, no loss of interest in normal       activity or change in sleep pattern.       Objective:     Physical Exam:  Visit Vitals    /85 (BP 1 Location: Right arm, BP Patient Position: At rest)    Pulse 77    Temp 98.6 °F (37 °C)    Resp 19    Ht 5' 2.99\" (1.6 m)    Wt 75 kg (165 lb 6.4 oz)    SpO2 97%    BMI 29.31 kg/m2      General:  Alert, cooperative, mild  Distress with SOB   Head:  Normocephalic, without obvious abnormality, atraumatic. Eyes:  Conjunctivae/corneas clear. PERRL, EOMs intact. Nose: Nares normal. No drainage or sinus tenderness.    Throat: Lips, mucosa, and tongue dry positive yeast   Neck: Supple, symmetrical, trachea midline, no adenopathy, thyroid: no enlargement/tenderness/nodules, no carotid bruit and no JVD. Back:   ROM normal. No CVA tenderness. Lungs:     CTA decrease air enetry   Chest wall:  Positive tenderness mid and Lt chest wall improved   Heart:  Regular rate and rhythm, S1, S2 normal, no murmur, click, rub or gallop. Abdomen: Soft, non-tender. Bowel sounds normal. No masses,  No organomegaly. Extremities: Extremities normal, atraumatic, 1+ edema lower extremities improved   Pulses: 2+ and symmetric all extremities. Skin: Skin color, texture,  Dry skin   Neurologic: CNII-XII intact. No focal motor or sensory deficit.             Intake and Output:  Current Shift:     Last three shifts:  05/12 1901 - 05/14 0700  In: 1122 [P.O.:1070; I.V.:52]  Out: 2600 [Urine:2600]    Labs: Results:       Chemistry Recent Labs      05/14/18   0339 05/13/18   1159  05/12/18   0300   GLU  139*  150*  96   NA  141  144  141   K  3.4*  3.4*  3.5   CL  102  105  108   CO2  32  29  30   BUN  35*  31*  26*   CREA  1.63*  1.73*  1.51*   CA  8.2*  8.6  8.5   AGAP  7  10  3   BUCR  21*  18  17   AP  100   --   108   TP  6.5   --   7.0   ALB  2.6*   --   2.8*   GLOB  3.9   --   4.2*   AGRAT  0.7*   --   0.7*      CBC w/Diff Recent Labs      05/14/18   0339  05/13/18   1159  05/12/18   0300   WBC  7.6  8.2  9.4   RBC  3.83*  4.07*  3.72*   HGB  13.0  14.0  12.7   HCT  38.8  41.3  39.9   PLT  311  322  316   GRANS  85*  83*  70   LYMPH  10*  9*  20*   EOS  0  0  0      Cardiac Enzymes Recent Labs      05/12/18   1229  05/12/18   1002   CPK  140  154   CKND1  2.9  2.8      Coagulation No results for input(s): PTP, INR, APTT in the last 72 hours.     No lab exists for component: INREXT, INREXT    Lipid Panel Lab Results   Component Value Date/Time    Cholesterol, total 204 (H) 05/13/2018 02:00 AM    HDL Cholesterol 105 (H) 05/13/2018 02:00 AM    LDL, calculated 86.4 05/13/2018 02:00 AM    VLDL, calculated 12.6 05/13/2018 02:00 AM    Triglyceride 63 05/13/2018 02:00 AM    CHOL/HDL Ratio 1.9 05/13/2018 02:00 AM      BNP No results for input(s): BNPP in the last 72 hours.    Liver Enzymes Recent Labs      05/14/18   0339   TP  6.5   ALB  2.6*   AP  100   SGOT  10*      Thyroid Studies Lab Results   Component Value Date/Time    TSH 0.10 (L) 05/14/2018 03:39 AM          Procedures/imaging: see electronic medical records for all procedures/Xrays and details which were not copied into this note but were reviewed prior to creation of Plan    Medications:   Current Facility-Administered Medications   Medication Dose Route Frequency    furosemide (LASIX) injection 40 mg  40 mg IntraVENous ONCE    [START ON 5/15/2018] furosemide (LASIX) tablet 40 mg  40 mg Oral DAILY    budesonide (PULMICORT) 500 mcg/2 ml nebulizer suspension  500 mcg Nebulization BID RT    methylPREDNISolone (PF) (SOLU-MEDROL) injection 40 mg  40 mg IntraVENous Q8H    HYDROcodone-acetaminophen (NORCO) 5-325 mg per tablet 1 Tab  1 Tab Oral Q6H PRN    famotidine (PEPCID) tablet 20 mg  20 mg Oral DAILY    aspirin chewable tablet 81 mg  81 mg Oral DAILY    albuterol-ipratropium (DUO-NEB) 2.5 MG-0.5 MG/3 ML  3 mL Nebulization Q4H PRN    arformoterol (BROVANA) neb solution 15 mcg  15 mcg Nebulization BID RT    fluconazole (DIFLUCAN) 200mg/100 mL IVPB (premix)  200 mg IntraVENous DAILY    sertraline (ZOLOFT) tablet 25 mg  25 mg Oral DAILY    fluticasone (FLONASE) 50 mcg/actuation nasal spray 2 Spray  2 Spray Both Nostrils DAILY    heparin (porcine) injection 5,000 Units  5,000 Units SubCUTAneous Q8H    abacavir-dolutegravir-lamiVUDine (TRIUMEQ) 600- mg tablet 1 Tab  1 Tab Oral DAILY    darunavir-cobicistat (PREZCOBIX) 800-150 mg-mg per tablet 1 Tab (Patient Supplied)  1 Tab Oral DAILY    hydrALAZINE (APRESOLINE) 20 mg/mL injection 20 mg  20 mg IntraVENous Q6H PRN    levoFLOXacin (LEVAQUIN) 250 mg in D5W IVPB  250 mg IntraVENous Q24H    dilTIAZem (CARDIZEM) IR tablet 30 mg  30 mg Oral AC&HS       Assessment/Plan     Principal Problem:    COPD exacerbation ([facility] [address] (5/12/2018)    Active Problems:    HIV (human immunodeficiency virus infection) ([facility] [address] (5/12/2018)      Anxiety (5/12/2018)      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 Maeve Nieves following   f/u  Swallow evaluation  Ct of chest w/o contrast 5/13/18 showed evidence of Chronic obstructive pulmonary disease with features primarily of emphysema. Pt and ot evaluation and treatment  OOB to chair with assistant   Wean off O2 as tolerated      Chronic diastolic heart failure/ Hypokalemia   Lasix 40 mg daily  Potassium 20 jaylan x1 today. She might need to be daily potassium if continue on lasix   Monitor kidney function     Hypertension  Cardizem 30 mg q6h  Will discuss with cardiology to switch to Cadizem  daily   Hydralzine prn SBP above    Acute renal insufficiency  Off Iv hydration monitor kidney function  Started on Lasix 40 mg IV q12 h per cardiology now on lasix 40 mg daily  Monitor kidney function  Monitor bp hydralizne prn SBp above 170     HIV infection  Continue current treatment  Check viral load And CD4 count pedning  F/u ID as outpatient with Dr. Amari Valentin     Anxiety/ insomnia  Continue zoloft 25 mg daily     Seasonal allergies  flonase nasal spray      Atypical Chest pain/Swelling lower extremities   BNP normal  Venous duplex U/S B/L leg negative   Norco 5/325 mg q 6h prn for pain  ASA 81 mg PO daily   ECHO 5/12/18- EF 55-60%, mild regurgitation of tricuspid valve noted.      Cbc, bmp now cbc, 1.2- continue to monito cmp, mag in AM  TSH 0.10 and free t4r  Normal  Check ultrasound     DVT/GI Prophylaxis:Heparin  H2B/PPI and pepcid        Dorothy Burgos MD,   5/14/2018 9:37 AM

## 2018-05-14 NOTE — ROUTINE PROCESS
Bedside shift change report given to Me (oncoming nurse) by Heather Magaña (offgoing nurse). Report included the following information SBAR, Kardex, MAR, Recent Results and Cardiac Rhythm NSR. Patient is lying supine in bed, visitors are present. Call bell remains within reach. 0720  Bedside shift change report given to Gareth Rodrigues (oncoming nurse) by Me (offgoing nurse). Report included the following information SBAR, Kardex, MAR, Recent Results and Cardiac Rhythm NSR.

## 2018-05-15 LAB
ALBUMIN SERPL-MCNC: 2.7 G/DL (ref 3.4–5)
ALBUMIN/GLOB SERPL: 0.6 {RATIO} (ref 0.8–1.7)
ALP SERPL-CCNC: 98 U/L (ref 45–117)
ALT SERPL-CCNC: 23 U/L (ref 13–56)
ANION GAP SERPL CALC-SCNC: 8 MMOL/L (ref 3–18)
AST SERPL-CCNC: 11 U/L (ref 15–37)
ATRIAL RATE: 78 BPM
BASOPHILS # BLD AUTO: 0 X10E3/UL (ref 0–0.2)
BASOPHILS # BLD: 0 K/UL (ref 0–0.1)
BASOPHILS NFR BLD AUTO: 0 %
BASOPHILS NFR BLD: 0 % (ref 0–2)
BILIRUB SERPL-MCNC: 0.4 MG/DL (ref 0.2–1)
BUN SERPL-MCNC: 39 MG/DL (ref 7–18)
BUN/CREAT SERPL: 26 (ref 12–20)
CALCIUM SERPL-MCNC: 8.9 MG/DL (ref 8.5–10.1)
CALCULATED P AXIS, ECG09: 50 DEGREES
CALCULATED R AXIS, ECG10: -5 DEGREES
CALCULATED T AXIS, ECG11: 56 DEGREES
CD3+CD4+ CELLS # BLD: 213 /UL (ref 359–1519)
CD3+CD4+ CELLS NFR BLD: 35.5 % (ref 30.8–58.5)
CHLORIDE SERPL-SCNC: 102 MMOL/L (ref 100–108)
CO2 SERPL-SCNC: 30 MMOL/L (ref 21–32)
CREAT SERPL-MCNC: 1.52 MG/DL (ref 0.6–1.3)
DIAGNOSIS, 93000: NORMAL
DIFFERENTIAL METHOD BLD: ABNORMAL
EOSINOPHIL # BLD AUTO: 0 X10E3/UL (ref 0–0.4)
EOSINOPHIL # BLD: 0 K/UL (ref 0–0.4)
EOSINOPHIL NFR BLD AUTO: 0 %
EOSINOPHIL NFR BLD: 0 % (ref 0–5)
ERYTHROCYTE [DISTWIDTH] IN BLOOD BY AUTOMATED COUNT: 13.3 % (ref 11.6–14.5)
ERYTHROCYTE [DISTWIDTH] IN BLOOD BY AUTOMATED COUNT: 14.4 % (ref 12.3–15.4)
GLOBULIN SER CALC-MCNC: 4.3 G/DL (ref 2–4)
GLUCOSE SERPL-MCNC: 144 MG/DL (ref 74–99)
HCT VFR BLD AUTO: 39.1 % (ref 34–46.6)
HCT VFR BLD AUTO: 40.2 % (ref 35–45)
HGB BLD-MCNC: 12.2 G/DL (ref 11.1–15.9)
HGB BLD-MCNC: 13.5 G/DL (ref 12–16)
IMM GRANULOCYTES # BLD: 0 X10E3/UL (ref 0–0.1)
IMM GRANULOCYTES NFR BLD: 0 %
LYMPHOCYTES # BLD AUTO: 0.6 X10E3/UL (ref 0.7–3.1)
LYMPHOCYTES # BLD: 0.5 K/UL (ref 0.9–3.6)
LYMPHOCYTES NFR BLD AUTO: 9 %
LYMPHOCYTES NFR BLD: 7 % (ref 21–52)
MAGNESIUM SERPL-MCNC: 2.8 MG/DL (ref 1.6–2.6)
MCH RBC QN AUTO: 33.2 PG (ref 26.6–33)
MCH RBC QN AUTO: 33.9 PG (ref 24–34)
MCHC RBC AUTO-ENTMCNC: 31.2 G/DL (ref 31.5–35.7)
MCHC RBC AUTO-ENTMCNC: 33.6 G/DL (ref 31–37)
MCV RBC AUTO: 101 FL (ref 74–97)
MCV RBC AUTO: 107 FL (ref 79–97)
MONOCYTES # BLD AUTO: 0.1 X10E3/UL (ref 0.1–0.9)
MONOCYTES # BLD: 0.4 K/UL (ref 0.05–1.2)
MONOCYTES NFR BLD AUTO: 1 %
MONOCYTES NFR BLD: 5 % (ref 3–10)
NEUTROPHILS # BLD AUTO: 6.7 X10E3/UL (ref 1.4–7)
NEUTROPHILS NFR BLD AUTO: 90 %
NEUTS SEG # BLD: 6.8 K/UL (ref 1.8–8)
NEUTS SEG NFR BLD: 88 % (ref 40–73)
P-R INTERVAL, ECG05: 140 MS
PLATELET # BLD AUTO: 296 K/UL (ref 135–420)
PLATELET # BLD AUTO: 344 X10E3/UL (ref 150–379)
PMV BLD AUTO: 8.9 FL (ref 9.2–11.8)
POTASSIUM SERPL-SCNC: 3.9 MMOL/L (ref 3.5–5.5)
PROT SERPL-MCNC: 7 G/DL (ref 6.4–8.2)
Q-T INTERVAL, ECG07: 404 MS
QRS DURATION, ECG06: 78 MS
QTC CALCULATION (BEZET), ECG08: 460 MS
RBC # BLD AUTO: 3.67 X10E6/UL (ref 3.77–5.28)
RBC # BLD AUTO: 3.98 M/UL (ref 4.2–5.3)
SODIUM SERPL-SCNC: 140 MMOL/L (ref 136–145)
VENTRICULAR RATE, ECG03: 78 BPM
WBC # BLD AUTO: 7.4 X10E3/UL (ref 3.4–10.8)
WBC # BLD AUTO: 7.7 K/UL (ref 4.6–13.2)

## 2018-05-15 PROCEDURE — 92526 ORAL FUNCTION THERAPY: CPT

## 2018-05-15 PROCEDURE — 83735 ASSAY OF MAGNESIUM: CPT | Performed by: FAMILY MEDICINE

## 2018-05-15 PROCEDURE — 74011250637 HC RX REV CODE- 250/637: Performed by: FAMILY MEDICINE

## 2018-05-15 PROCEDURE — 74011000250 HC RX REV CODE- 250: Performed by: INTERNAL MEDICINE

## 2018-05-15 PROCEDURE — 65660000000 HC RM CCU STEPDOWN

## 2018-05-15 PROCEDURE — 74011250636 HC RX REV CODE- 250/636: Performed by: FAMILY MEDICINE

## 2018-05-15 PROCEDURE — 92610 EVALUATE SWALLOWING FUNCTION: CPT

## 2018-05-15 PROCEDURE — 74011250637 HC RX REV CODE- 250/637: Performed by: INTERNAL MEDICINE

## 2018-05-15 PROCEDURE — 93005 ELECTROCARDIOGRAM TRACING: CPT

## 2018-05-15 PROCEDURE — 80053 COMPREHEN METABOLIC PANEL: CPT | Performed by: FAMILY MEDICINE

## 2018-05-15 PROCEDURE — 74011250636 HC RX REV CODE- 250/636: Performed by: INTERNAL MEDICINE

## 2018-05-15 PROCEDURE — 74011250637 HC RX REV CODE- 250/637: Performed by: NURSE PRACTITIONER

## 2018-05-15 PROCEDURE — 97161 PT EVAL LOW COMPLEX 20 MIN: CPT

## 2018-05-15 PROCEDURE — 94640 AIRWAY INHALATION TREATMENT: CPT

## 2018-05-15 PROCEDURE — 85025 COMPLETE CBC W/AUTO DIFF WBC: CPT | Performed by: FAMILY MEDICINE

## 2018-05-15 PROCEDURE — 36415 COLL VENOUS BLD VENIPUNCTURE: CPT | Performed by: FAMILY MEDICINE

## 2018-05-15 PROCEDURE — 97116 GAIT TRAINING THERAPY: CPT

## 2018-05-15 RX ORDER — PRAVASTATIN SODIUM 20 MG/1
10 TABLET ORAL
Status: DISCONTINUED | OUTPATIENT
Start: 2018-05-15 | End: 2018-05-19 | Stop reason: HOSPADM

## 2018-05-15 RX ORDER — DILTIAZEM HYDROCHLORIDE 120 MG/1
120 CAPSULE, COATED, EXTENDED RELEASE ORAL DAILY
Status: DISCONTINUED | OUTPATIENT
Start: 2018-05-15 | End: 2018-05-18

## 2018-05-15 RX ORDER — DILTIAZEM HYDROCHLORIDE 120 MG/1
120 CAPSULE, COATED, EXTENDED RELEASE ORAL DAILY
Status: DISCONTINUED | OUTPATIENT
Start: 2018-05-16 | End: 2018-05-15

## 2018-05-15 RX ORDER — DILTIAZEM HYDROCHLORIDE 30 MG/1
30 TABLET, FILM COATED ORAL
Status: DISCONTINUED | OUTPATIENT
Start: 2018-05-15 | End: 2018-05-15

## 2018-05-15 RX ORDER — TRAMADOL HYDROCHLORIDE 50 MG/1
50 TABLET ORAL
Status: DISCONTINUED | OUTPATIENT
Start: 2018-05-15 | End: 2018-05-19 | Stop reason: HOSPADM

## 2018-05-15 RX ADMIN — FAMOTIDINE 20 MG: 20 TABLET ORAL at 09:10

## 2018-05-15 RX ADMIN — METHYLPREDNISOLONE SODIUM SUCCINATE 40 MG: 40 INJECTION, POWDER, FOR SOLUTION INTRAMUSCULAR; INTRAVENOUS at 11:14

## 2018-05-15 RX ADMIN — FLUCONAZOLE 200 MG: 2 INJECTION INTRAVENOUS at 09:14

## 2018-05-15 RX ADMIN — TRAMADOL HYDROCHLORIDE 50 MG: 50 TABLET, FILM COATED ORAL at 22:41

## 2018-05-15 RX ADMIN — ABACAVIR SULFATE, DOLUTEGRAVIR SODIUM, LAMIVUDINE 1 TABLET: 600; 50; 300 TABLET, FILM COATED ORAL at 09:10

## 2018-05-15 RX ADMIN — LEVOFLOXACIN 250 MG: 5 INJECTION, SOLUTION INTRAVENOUS at 06:50

## 2018-05-15 RX ADMIN — HEPARIN SODIUM 5000 UNITS: 5000 INJECTION, SOLUTION INTRAVENOUS; SUBCUTANEOUS at 22:41

## 2018-05-15 RX ADMIN — DILTIAZEM HYDROCHLORIDE 30 MG: 30 TABLET, FILM COATED ORAL at 11:14

## 2018-05-15 RX ADMIN — HYDROCODONE BITARTRATE AND ACETAMINOPHEN 1 TABLET: 5; 325 TABLET ORAL at 13:31

## 2018-05-15 RX ADMIN — ARFORMOTEROL TARTRATE 15 MCG: 15 SOLUTION RESPIRATORY (INHALATION) at 20:40

## 2018-05-15 RX ADMIN — DILTIAZEM HYDROCHLORIDE 120 MG: 120 CAPSULE, COATED, EXTENDED RELEASE ORAL at 14:17

## 2018-05-15 RX ADMIN — ARFORMOTEROL TARTRATE 15 MCG: 15 SOLUTION RESPIRATORY (INHALATION) at 08:00

## 2018-05-15 RX ADMIN — FLUTICASONE PROPIONATE 2 SPRAY: 50 SPRAY, METERED NASAL at 09:14

## 2018-05-15 RX ADMIN — BUDESONIDE 500 MCG: 0.5 INHALANT RESPIRATORY (INHALATION) at 20:40

## 2018-05-15 RX ADMIN — SERTRALINE HYDROCHLORIDE 25 MG: 50 TABLET ORAL at 09:10

## 2018-05-15 RX ADMIN — METHYLPREDNISOLONE SODIUM SUCCINATE 40 MG: 40 INJECTION, POWDER, FOR SOLUTION INTRAMUSCULAR; INTRAVENOUS at 06:50

## 2018-05-15 RX ADMIN — PRAVASTATIN SODIUM 10 MG: 20 TABLET ORAL at 22:41

## 2018-05-15 RX ADMIN — HEPARIN SODIUM 5000 UNITS: 5000 INJECTION, SOLUTION INTRAVENOUS; SUBCUTANEOUS at 12:53

## 2018-05-15 RX ADMIN — FUROSEMIDE 40 MG: 40 TABLET ORAL at 09:11

## 2018-05-15 RX ADMIN — METHYLPREDNISOLONE SODIUM SUCCINATE 40 MG: 40 INJECTION, POWDER, FOR SOLUTION INTRAMUSCULAR; INTRAVENOUS at 17:10

## 2018-05-15 RX ADMIN — HEPARIN SODIUM 5000 UNITS: 5000 INJECTION, SOLUTION INTRAVENOUS; SUBCUTANEOUS at 06:50

## 2018-05-15 RX ADMIN — DILTIAZEM HYDROCHLORIDE 30 MG: 30 TABLET, FILM COATED ORAL at 09:11

## 2018-05-15 RX ADMIN — ASPIRIN 81 MG 81 MG: 81 TABLET ORAL at 09:11

## 2018-05-15 RX ADMIN — HYDROCODONE BITARTRATE AND ACETAMINOPHEN 1 TABLET: 5; 325 TABLET ORAL at 06:50

## 2018-05-15 NOTE — PROGRESS NOTES
[facility]      CARDIOLOGY PROGRESS NOTE  RECS:      1. Congestive heart failure- acute on chronic diastolic CHF- improving well. on asa, not on bb due to severe COPD. Lasix 40 mg daily   2. Hypertension- controlled  continue Cardizem 30 mg q 6 hours changed to daily 120 mg . Avoiding beta blockers due to wheezing. 3. Wheezing shortness of breath fever and productive cough: Likely COPD exacerbation. Workup and treatment per medicine also Pulmonary following   4. Chest pain: Atypical, constant for 3-4 months. Unlikely to be ischemic. MI is already ruled out. Likely it is secondary to dyspnea and respiratory distress and is musculoskeletal in origin. Try tramadol  5. Tobacco abuse-Patient advised to stop smoking to reduce future cardiovascular events.       Priscella Retort for discharge from cardiac view if chest pain becomes less intolerable  Follow up in office in 1-2 wks      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) ICD-10-CM: V53.861  ICD-9-CM: V45.89  5/14/2018 Yes        * (Principal)COPD exacerbation ([facility] ICD-10-CM: J44.1  ICD-9-CM: 491.21  5/12/2018 Yes        HIV (human immunodeficiency virus infection) ([facility] (Chronic) ICD-10-CM: B20  ICD-9-CM: V08  5/12/2018 Yes        Anxiety ICD-10-CM: F41.9  ICD-9-CM: 300.00  5/12/2018 Yes                SUBJECTIVE:    c/o CP- left parasternal, positional, present since admission  C/o CUEVAS and cough     OBJECTIVE:    VS:   Visit Vitals    /80 (BP 1 Location: Right arm, BP Patient Position: At rest)    Pulse 82    Temp 97.9 °F (36.6 °C)    Resp 18    Ht 5' 2.99\" (1.6 m)    Wt 75 kg (165 lb 6.4 oz)    SpO2 100%    BMI 29.31 kg/m2         Intake/Output Summary (Last 24 hours) at 05/15/18 1115  Last data filed at 05/15/18 0600   Gross per 24 hour   Intake              220 ml   Output                0 ml   Net              220 ml TELE: normal sinus rhythm    General: alert, well developed, pleasant and in mild distress  HENT: Normocephalic, atraumatic. Normal external eye. Neck :  no bruit, no JVD  Cardiac:  regular rate and rhythm, S1, S2 normal, no S3 or S4, no click, no rub  Lungs: diminished breath sounds b/l.  expiratory wheezing both lung fields   Abdomen: Soft, nontender, no masses  Extremities:  No edema , peripheral pulses present      Labs: Results:       Chemistry Recent Labs      05/15/18   0425  05/14/18   0339  05/13/18   1159   GLU  144*  139*  150*   NA  140  141  144   K  3.9  3.4*  3.4*   CL  102  102  105   CO2  30  32  29   BUN  39*  35*  31*   CREA  1.52*  1.63*  1.73*   CA  8.9  8.2*  8.6   AGAP  8  7  10   BUCR  26*  21*  18   AP  98  100   --    TP  7.0  6.5   --    ALB  2.7*  2.6*   --    GLOB  4.3*  3.9   --    AGRAT  0.6*  0.7*   --       CBC w/Diff Recent Labs      05/15/18   0425  05/14/18   0339  05/13/18   1159   WBC  7.7  7.6  8.2   RBC  3.98*  3.83*  4.07*   HGB  13.5  13.0  14.0   HCT  40.2  38.8  41.3   PLT  296  311  322   GRANS  88*  85*  83*   LYMPH  7*  10*  9*   EOS  0  0  0      Cardiac Enzymes Recent Labs      05/12/18   1229   CPK  140   CKND1  2.9      Coagulation No results for input(s): PTP, INR, APTT in the last 72 hours. No lab exists for component: INREXT, INREXT    Lipid Panel Lab Results   Component Value Date/Time    Cholesterol, total 204 (H) 05/13/2018 02:00 AM    HDL Cholesterol 105 (H) 05/13/2018 02:00 AM    LDL, calculated 86.4 05/13/2018 02:00 AM    VLDL, calculated 12.6 05/13/2018 02:00 AM    Triglyceride 63 05/13/2018 02:00 AM    CHOL/HDL Ratio 1.9 05/13/2018 02:00 AM      BNP No results for input(s): BNPP in the last 72 hours.    Liver Enzymes Recent Labs      05/15/18   0425   TP  7.0   ALB  2.7*   AP  98   SGOT  11*      Thyroid Studies Lab Results   Component Value Date/Time    TSH 0.10 (L) 05/14/2018 03:39 AM                Frieda Lin, NP-C  Patient seen independently  Discussed the details with NP and patient.  Please see orders & recommendations  Papa Solano MD

## 2018-05-15 NOTE — PROGRESS NOTES
Care Management Interventions  PCP Verified by CM: Yes  Physical Therapy Consult: Yes  Occupational Therapy Consult: Yes  Current Support Network: Lives Alone, Family Lives Nearby  Confirm Follow Up Transport: Family  Plan discussed with Pt/Family/Caregiver: Yes       Message left from pt's  Chrisapurva Jaydon ([phone]) from [facility].

## 2018-05-15 NOTE — PROGRESS NOTES
Progress Note    Patient: Ann Justin MRN: [id number]  CSN: [id number]    YOB: 1945  Age: 71 y.o. Sex: female    DOA: 5/12/2018 LOS:  LOS: 3 days                    Subjective:     Pt reports dyspnea is improved. Continues on 2L NC. Brovana 15 mcg neb BID  And Pulmicort. Lasix was switched  to oral 40 mg daily first dose today. Cr continues to demonstrate slight improvement. CT Chest w/o contrast 5/13/18  IMPRESSION: Chronic obstructive pulmonary disease with features primarily of emphysema. Bronchial thickening with bronchial luminal filling in the basal zones. Bronchitis with mucous plugging versus aspiration. Findings suggesting acute congestive heart failure or pneumocystis pneumonia are not evident. Atypical chest pain resolved per patient. BLE peripheral edema improved with Lasix. Echo 5/12/18 showed EF 55-60% with mild regurgitation of tricuspid valve. Hospital course  \"Reshma Barajas is a 71 y. o. female with a history of HIV, who presents to the ED via EMS with complaint of shortness of breath and wheezing which began yesterday. Patient stated that her shortness of breath has been gradually worsening.      She notes that she was prescribed Advair, but has been unable to take it due to throat irritation .      She reports associated productive cough, chest pain, and bilateral feet swelling over the past few days. BNP on admission within normal range Patient denies history of CHF.      Per EMS, patient was given 2 albuterol treatments en route to the ED. She currently smokes 0.5ppd. Patient denies recent fever, chills, nausea, vomiting, abdominal pain, or back pain. She makes no further complaints.     Pt has been f/u ID at [facility]  Dr. Aleja Portillo [phone] she has recent change in her med she has been on current med for 3 month due to f/u with her in July  Pt seen by cardiology started on Cardizem 30 mg qid troponin I is negative .  Pt still c/o chest pain she has localized tenderness  Pulmonary consult is pending discussed with Dr Nichol Uribe yesterday\"    Chief Complaint:   Chief Complaint   Patient presents with    Shortness of Breath       Review of systems  GENERAL: Patient alert, awake and oriented times 3, able to communicate full sentences and not in distress. HEENT: No change in vision, earache, tinnitus, sore throat or sinus congestion. NECK: No pain or stiffness. CARDIOVASCULAR: No current pain or palpations. PULMONARY: Shortness of breath improved,  Positive cough and  Wheeze resolved. GASTROINTESTINAL: No abdominal pain, nausea, vomiting or diarrhea, melena or bright red blood per rectum. GENITOURINARY: No urinary frequency, urgency, hesitancy or dysuria. MUSCULOSKELETAL: No joint or muscle pain, no back pain, no recent trauma. Swelling lower extremities resoved   DERMATOLOGIC: No rash, no itching, no lesions. ENDOCRINE: No polyuria, polydipsia, no heat . Positive intolerance to cold intolerance. No recent change in    weight. HEMATOLOGICAL: No anemia or easy bruising or bleeding. NEUROLOGIC: No headache, seizures, numbness  Feet and toes, generalized weakness    PSYCHIATRIC: H/O  Depression, and  anxiety,  No other mood disorder, no loss of interest in normal       activity or change in sleep pattern.       Objective:     Physical Exam:  Visit Vitals    /90 (BP 1 Location: Right arm, BP Patient Position: At rest)    Pulse 73    Temp 98.1 °F (36.7 °C)    Resp 20    Ht 5' 2.99\" (1.6 m)    Wt 75 kg (165 lb 6.4 oz)    SpO2 96%    BMI 29.31 kg/m2      General:  Alert, cooperative, no acute distress, talking comfortably in full sentences   Head:  Normocephalic, without obvious abnormality, atraumatic. Eyes:  Conjunctivae/corneas clear. PERRL, EOMs intact. Nose: Nares normal. No drainage or sinus tenderness.    Throat: Lips, mucosa, and tongue moist, no signs of yeast today   Neck: Supple, symmetrical, trachea midline, no adenopathy, thyroid: no enlargement/tenderness/nodules, no carotid bruit and no JVD. Lungs:    Fine end expiratory wheezes bilateral bases   Chest wall:  Positive tenderness mid and Lt chest wall improved   Heart:  Regular rate and rhythm, S1, S2 normal, no murmur, click, rub or gallop. Abdomen: Soft, non-tender. Bowel sounds normal. No masses,  No organomegaly. Extremities: Extremities normal, atraumatic, trace edema lower extremities improved   Pulses: 2+ and symmetric all extremities. Skin: Skin color, texture,  Dry skin   Neurologic: CNII-XII intact. No focal motor or sensory deficit.             Intake and Output:  Current Shift:     Last three shifts:  05/13 1901 - 05/15 0700  In: 340 [P.O.:240; I.V.:100]  Out: 100 [Urine:100]    Labs: Results:       Chemistry Recent Labs      05/15/18   0425  05/14/18   0339 05/13/18   1159   GLU  144*  139*  150*   NA  140  141  144   K  3.9  3.4*  3.4*   CL  102  102  105   CO2  30  32  29   BUN  39*  35*  31*   CREA  1.52*  1.63*  1.73*   CA  8.9  8.2*  8.6   AGAP  8  7  10   BUCR  26*  21*  18   AP  98  100   --    TP  7.0  6.5   --    ALB  2.7*  2.6*   --    GLOB  4.3*  3.9   --    AGRAT  0.6*  0.7*   --       CBC w/Diff Recent Labs      05/15/18   0425 05/14/18   0339  05/13/18   1159   WBC  7.7  7.6  8.2   RBC  3.98*  3.83*  4.07*   HGB  13.5  13.0  14.0   HCT  40.2  38.8  41.3   PLT  296  311  322   GRANS  88*  85*  83*   LYMPH  7*  10*  9*   EOS  0  0  0      Cardiac Enzymes Recent Labs      05/12/18   1229  05/12/18   1002   CPK  140  154   CKND1  2.9  2.8      Coagulation No results for input(s): PTP, INR, APTT in the last 72 hours.     No lab exists for component: INREXT, INREXT    Lipid Panel Lab Results   Component Value Date/Time    Cholesterol, total 204 (H) 05/13/2018 02:00 AM    HDL Cholesterol 105 (H) 05/13/2018 02:00 AM    LDL, calculated 86.4 05/13/2018 02:00 AM    VLDL, calculated 12.6 05/13/2018 02:00 AM    Triglyceride 63 05/13/2018 02:00 AM    CHOL/HDL Ratio 1.9 05/13/2018 02:00 AM      BNP No results for input(s): BNPP in the last 72 hours.    Liver Enzymes Recent Labs      05/15/18   0425   TP  7.0   ALB  2.7*   AP  98   SGOT  11*      Thyroid Studies Lab Results   Component Value Date/Time    TSH 0.10 (L) 05/14/2018 03:39 AM          Procedures/imaging: see electronic medical records for all procedures/Xrays and details which were not copied into this note but were reviewed prior to creation of Plan    Medications:   Current Facility-Administered Medications   Medication Dose Route Frequency    furosemide (LASIX) tablet 40 mg  40 mg Oral DAILY    budesonide (PULMICORT) 500 mcg/2 ml nebulizer suspension  500 mcg Nebulization BID RT    methylPREDNISolone (PF) (SOLU-MEDROL) injection 40 mg  40 mg IntraVENous Q8H    HYDROcodone-acetaminophen (NORCO) 5-325 mg per tablet 1 Tab  1 Tab Oral Q6H PRN    famotidine (PEPCID) tablet 20 mg  20 mg Oral DAILY    aspirin chewable tablet 81 mg  81 mg Oral DAILY    albuterol-ipratropium (DUO-NEB) 2.5 MG-0.5 MG/3 ML  3 mL Nebulization Q4H PRN    arformoterol (BROVANA) neb solution 15 mcg  15 mcg Nebulization BID RT    fluconazole (DIFLUCAN) 200mg/100 mL IVPB (premix)  200 mg IntraVENous DAILY    sertraline (ZOLOFT) tablet 25 mg  25 mg Oral DAILY    fluticasone (FLONASE) 50 mcg/actuation nasal spray 2 Spray  2 Spray Both Nostrils DAILY    heparin (porcine) injection 5,000 Units  5,000 Units SubCUTAneous Q8H    abacavir-dolutegravir-lamiVUDine (TRIUMEQ) 600- mg tablet 1 Tab  1 Tab Oral DAILY    darunavir-cobicistat (PREZCOBIX) 800-150 mg-mg per tablet 1 Tab (Patient Supplied)  1 Tab Oral DAILY    hydrALAZINE (APRESOLINE) 20 mg/mL injection 20 mg  20 mg IntraVENous Q6H PRN    levoFLOXacin (LEVAQUIN) 250 mg in D5W IVPB  250 mg IntraVENous Q24H    dilTIAZem (CARDIZEM) IR tablet 30 mg  30 mg Oral AC&HS       Assessment/Plan     Principal Problem:    COPD exacerbation (HCC) (5/12/2018)    Active Problems:    HIV (human immunodeficiency virus infection) ([facility] [address] (5/12/2018)      Anxiety (5/12/2018)      S/P insertion of IVC (inferior vena caval) filter (5/14/2018)      COPD exacerbation  Continue Levaquin  Solumedrol 40 mg q8h   duo nebulizer treatment q4 prn  Dilfucan 200 mg IV daily empirically for 5 days per pulmonary recommendations for thrush on day 3/5  Brovana 15 mcg neb BID and Pulimocort started 5/13/18   Pulmonary consult, Dr Flores/KATLYN Miles [place] following   IVF d/ulices  Awaiting respiratory sputum culture collection - patient provided sample this am, has not had large amount of sputum production  Recheck cardiac enzymes negative   Cardiology consult, Dr. Destiny Curry following   f/u  Swallow evaluation  Ct of chest w/o contrast 5/13/18 showed evidence of Chronic obstructive pulmonary disease with features primarily of emphysema.    Pt and ot evaluation and treatment ordered yesterday, will follow up recs  OOB to chair with assistant   Charlee Cast off O2 as tolerated      Chronic diastolic heart failure/ Hypokalemia  Lasix 40 mg PO daily  Monitor K+, normal 3.9 today  Monitor kidney function     Hypertension  Cardizem 30 mg q6h  Will discuss with cardiology to switch to Cadizem  daily   Hydralzine prn SBP above    Acute renal insufficiency improving; suspected baseline CKD stage 3, Cr 1.3 as outpatient (only 1 value available to review)  Off Iv hydration monitor kidney function  Started on Lasix 40 mg IV q12 h per cardiology now on lasix 40 mg PO daily  Monitor kidney function  Monitor bp hydralizne prn SBp above 170     HIV infection  Continue current treatment  Check viral load And CD4 count pedning  F/u ID as outpatient with Dr. Dc Alberto     Anxiety/ insomnia  Continue zoloft 25 mg daily     Seasonal allergies  flonase nasal spray      Atypical Chest pain/Swelling lower extremities   BNP normal  Venous duplex U/S B/L leg negative   Norco 5/325 mg q 6h prn for pain  ASA 81 mg PO daily   ECHO 5/12/18- EF 55-60%, mild regurgitation of tricuspid valve noted. Subclinical Hyperthyroidism - TSH 0.10 and FT4 normal  Thyroid US 5/14/18:  1. Probable tiny right thyroid cysts measuring up to 2 mm.  Otherwise,  unremarkable exam    ASCVD risk 23.37% 10yr and 50.2% lifetime  Add low dose statin therapy, pravachol 10mg qhs as prezcobix may increase statin levels  monitor LFTs      DVT/GI Prophylaxis:Heparin  H2B/PPI and pepcid        Erika Reed MD,   5/15/2018 0804 AM

## 2018-05-15 NOTE — PROGRESS NOTES
Problem: Mobility Impaired (Adult and Pediatric)  Goal: *Acute Goals and Plan of Care (Insert Text)  Physical Therapy Goals  Initiated 5/15/2018 and to be accomplished within 7 day(s)  1. Patient will move from supine to sit and sit to supine , scoot up and down and roll side to side in bed with supervision/set-up. 2.  Patient will transfer from bed to chair and chair to bed with supervision/set-up using the least restrictive device. 3.  Patient will perform sit to stand with supervision/set-up. 4.  Patient will ambulate with supervision/set-up for >100 feet with the least restrictive device. physical Therapy EVALUATION    Patient: Jordan Maldonado (76 y.o. female)  Date: 5/15/2018  Primary Diagnosis: COPD exacerbation ([facility] [address]        Precautions: Fall        ASSESSMENT :  Patient is 80yo F admitted to hospital for COPD exacerbation and presents today alert and agreeable to therapy with NC O2 donned with 2L. Patient performed seated objective assessment then stood and ambulated 50ft and required seated rest break due to SOB. After several mins and instruction on deep breathing, patient stood to ambulated additional 25ft, took standing rest break, and continued final 25ft ambulation. Patient transferred to supine in bed with rest breaks between transitions and was left resting with call bell by her side and instructions not to get up without assist. Patient demos decreased strength, mobility, and endurance and will benefit from skilled intervention to address the above impairments.   Patients rehabilitation potential is considered to be Good  Factors which may influence rehabilitation potential include:   []         None noted  []         Mental ability/status  [x]         Medical condition  [x]         Home/family situation and support systems  [x]         Safety awareness  [x]         Pain tolerance/management  []         Other:      PLAN :  Recommendations and Planned Interventions:  [x]           Bed Mobility Training             [x]    Neuromuscular Re-Education  [x]           Transfer Training                   []    Orthotic/Prosthetic Training  [x]           Gait Training                          []    Modalities  [x]           Therapeutic Exercises          []    Edema Management/Control  [x]           Therapeutic Activities            [x]    Patient and Family Training/Education  []           Other (comment):    Frequency/Duration: Patient will be followed by physical therapy 1-2 times per day/4-7 days per week to address goals. Discharge Recommendations: Home Health  Further Equipment Recommendations for Discharge: [address] [facility]     G-CODES     Mobility  Current  CI= 1-19%   Goal  CI= 1-19%. The severity rating is based on the Level of Assistance required for Functional Mobility and ADLs.        G-CODES     Eval Complexity: History: MEDIUM  Complexity : 1-2 comorbidities / personal factors will impact the outcome/ POC Exam:LOW Complexity : 1-2 Standardized tests and measures addressing body structure, function, activity limitation and / or participation in recreation  Presentation: LOW Complexity : Stable, uncomplicated  Clinical Decision Making:Low Complexity   Overall Complexity:LOW     SUBJECTIVE:   Patient stated I'm short of breath.  during ambulation; education on energy conservation with good carryover.      OBJECTIVE DATA SUMMARY:     Past Medical History:   Diagnosis Date    Coronary atherosclerosis of native coronary artery     HIV (human immunodeficiency virus infection) ([facility] [address]     Ill-defined condition     Pneumonia    Nonspecific abnormal electrocardiogram (ECG) (EKG)     Pre-operative cardiovascular examination      Past Surgical History:   Procedure Laterality Date    HX HIP REPLACEMENT      HX HYSTERECTOMY       Barriers to Learning/Limitations: None  Compensate with: N/A  Prior Level of Function/Home Situation: Patient lives in [address] [facility] with [facility] and [facility] and  occasionally and was independent with I/ADL's. Patient lives in senior apartment with her great niece intending to be around to assist during the day. Patient has no home 02 and has SC and step over tub/shower. Home Situation  Home Environment: Private residence  # Steps to Enter: 0  One/Two Story Residence: One story  Living Alone: No  Support Systems: Family member(s)  Patient Expects to be Discharged to[de-identified] Private residence  Current DME Used/Available at Home: None  Critical Behavior:  Neurologic State: Alert  Orientation Level: Oriented X4  Cognition: Follows commands   Strength:    Strength: Generally decreased, functional (BLE)   Tone & Sensation:   Tone: Normal (BLE)   Sensation: Intact (BLE)    Range Of Motion:  AROM: Within functional limits (BLE)   Functional Mobility:  Bed Mobility:   Sit to Supine: Minimum assistance  Scooting: Stand-by assistance  Transfers:   Balance:   Sitting: Intact  Standing: Impaired; With support  Standing - Static: Fair  Standing - Dynamic : Fair  Ambulation/Gait Training:  Distance (ft): 100 Feet (ft) (50ft, seated break, 25ft, standing break, 25ft)  Assistive Device: Walker, rolling  Ambulation - Level of Assistance: Supervision;Stand-by assistance   Speed/Maira: Slow  Interventions: Verbal cues; Visual/Demos  Pain:  Pt reports 0/10 pain or discomfort prior to treatment.    Pt reports 0/10 pain or discomfort post treatment. Activity Tolerance:   Patient tolerated activity fair with request for seated rest break after 50ft and standing rest break after 25ft and final 25ft. Please refer to the flowsheet for vital signs taken during this treatment.   After treatment:   []         Patient left in no apparent distress sitting up in chair  [x]         Patient left in no apparent distress in bed  [x]         Call bell left within reach  [x]         Nursing notified  [x]         Caregiver present  []         Bed alarm activated  []         SCDs in place to SHARON GILMORE COMMUNICATION/EDUCATION:   [x]         Fall prevention education was provided and the patient/caregiver indicated understanding. [x]         Patient/family have participated as able in goal setting and plan of care. [x]         Patient/family agree to work toward stated goals and plan of care. []         Patient understands intent and goals of therapy, but is neutral about his/her participation. []         Patient is unable to participate in goal setting and plan of care.     Thank you for this referral.  Jenna Moreno, PT   Time Calculation: 23 mins

## 2018-05-15 NOTE — PROGRESS NOTES
Dysphagia eval/tx completed with recs of reg solid diet and thin liquids, meds as tolerated. SLP available for MBS if silent aspiration is a concern (per MD discretion). Full report to follow.      Thank you for this referral.    Shonna Mccallum M.S. CCC-SLP/L  Speech-Language Pathologist

## 2018-05-15 NOTE — PROGRESS NOTES
[facility]  Pulmonary, Critical Care, and Sleep Medicine    Name: Raul Viera MRN: [id number]   : 1945 Hospital: [address] [place] [address]   Date: 5/15/2018        Pulmonary Follow up    IMPRESSION:   · Acute COPD exacerbation with predominant emphysema and basal atelectasis. Hypoxia due to mucus plugging and emphysema. CT- no evidence of ILD to suggest PCP  · HIV on HAART  · Acute CHF, systolic and possibly diastolic  · Pulmonary HTN- cardiac and Pulmonary etiology  · Esophageal/throat irritation, possible candidiasis? · Hx of htn  · Hx of anxiety d/o  · Hx of marijuana abuse      RECOMMENDATIONS:   · Continue to intensify bronchial hygiene- needs PEP device  · Continue current bronchodilators Brovana , Pulmicort and duonebs. · Continue Solumedrol- will increase to 40 mg q 6 hrs  · Will check Respiratory culture- still pending  · Diflucan empirically for 5 days. · Continue Levaquin pending cultures and adjust as needed  · Continue HAART  · Assess Home O2 needs  · PFT and 6 min walk as outpatient. Pulmonary rehab  · Preventive vaccinations. · Will follow. Subjective/History:   05/15/18     Still with cough- thick mucus- difficult to expectorate but able to productively expectorate after nebulized treatments  +SOB- some better  Denies chest pain  Denies nausea  Sitting in chair    HPI:  This patient has been seen and evaluated at the request of Dr. Des Zaman  for COPD exacerbation. Patient is a 71 y.o. female with hx of HIV ( acquired during blood transfusion) on HAART. Being followed by ID in [place],  Unclear last viral load and CD4, count. Over 46yr smoking hx,  Presented to ED with increase in sob and wheezing. No associated with fever or chills. Unable to use advair due to throat irritations. ? Pain. NO n/v/d/   No hx of headache. No visual disturbances. No changes in weight  No night sweats. Since admission pt has been diuresed.   Started on emperically on steroid. Echo remain pending. Pt is also placed on abx for UTI with levaquin. Since admission pt does reports that her breathing has improved significantly now where she is able to carry conversation. Past Medical History:   Diagnosis Date    Coronary atherosclerosis of native coronary artery     HIV (human immunodeficiency virus infection) ([facility] [address]     Ill-defined condition     Pneumonia    Nonspecific abnormal electrocardiogram (ECG) (EKG)     Pre-operative cardiovascular examination       Past Surgical History:   Procedure Laterality Date    HX HIP REPLACEMENT      HX HYSTERECTOMY        Current Facility-Administered Medications   Medication Dose Route Frequency    pravastatin (PRAVACHOL) tablet 10 mg  10 mg Oral QHS    methylPREDNISolone (PF) (SOLU-MEDROL) injection 40 mg  40 mg IntraVENous Q6H    furosemide (LASIX) tablet 40 mg  40 mg Oral DAILY    budesonide (PULMICORT) 500 mcg/2 ml nebulizer suspension  500 mcg Nebulization BID RT    famotidine (PEPCID) tablet 20 mg  20 mg Oral DAILY    aspirin chewable tablet 81 mg  81 mg Oral DAILY    arformoterol (BROVANA) neb solution 15 mcg  15 mcg Nebulization BID RT    fluconazole (DIFLUCAN) 200mg/100 mL IVPB (premix)  200 mg IntraVENous DAILY    sertraline (ZOLOFT) tablet 25 mg  25 mg Oral DAILY    fluticasone (FLONASE) 50 mcg/actuation nasal spray 2 Spray  2 Spray Both Nostrils DAILY    heparin (porcine) injection 5,000 Units  5,000 Units SubCUTAneous Q8H    abacavir-dolutegravir-lamiVUDine (TRIUMEQ) 600- mg tablet 1 Tab  1 Tab Oral DAILY    darunavir-cobicistat (PREZCOBIX) 800-150 mg-mg per tablet 1 Tab (Patient Supplied)  1 Tab Oral DAILY    levoFLOXacin (LEVAQUIN) 250 mg in D5W IVPB  250 mg IntraVENous Q24H    dilTIAZem (CARDIZEM) IR tablet 30 mg  30 mg Oral AC&HS     No Known Allergies     Review of Systems:  A comprehensive review of systems was negative except for that written in the HPI.     Objective:   Vital Signs: Visit Vitals    /90 (BP 1 Location: Right arm, BP Patient Position: At rest)    Pulse 73    Temp 98.1 °F (36.7 °C)    Resp 20    Ht 5' 2.99\" (1.6 m)  Comment: bmi    Wt 75 kg (165 lb 6.4 oz)    SpO2 96%    BMI 29.31 kg/m2       O2 Device: Nasal cannula   O2 Flow Rate (L/min): 2 l/min   Temp (24hrs), Av °F (36.7 °C), Min:97.7 °F (36.5 °C), Max:98.4 °F (36.9 °C)       Intake/Output:   Last shift:         Last 3 shifts:  1901 - 05/15 0700  In: 340 [P.O.:240; I.V.:100]  Out: 100 [Urine:100]    Intake/Output Summary (Last 24 hours) at 05/15/18 0948  Last data filed at 05/15/18 0600   Gross per 24 hour   Intake              220 ml   Output                0 ml   Net              220 ml        Physical Exam:    General:  Alert, cooperative, no distress, appears stated age. Head:  Normocephalic, without obvious abnormality, atraumatic. Eyes:  Conjunctivae/corneas clear. PERRL, EOMs intact. Nose: Nares normal. Septum midline. Mucosa normal. No drainage or sinus tenderness. Throat: Lips, mucosa, and tongue normal. Teeth and gums normal.   Neck: Supple, symmetrical, trachea midline, no adenopathy, thyroid: no enlargment/tenderness/nodules, no carotid bruit and no JVD. Back:   Symmetric, no curvature. ROM normal.   Lungs:   Diffuse exp wheeze and ronchi   Chest wall:  No tenderness or deformity. Heart:  Regular rate and rhythm, S1, S2 normal, no murmur, click, rub or gallop. Abdomen:   Soft, non-tender. Bowel sounds normal. No masses,  No organomegaly. Extremities: Extremities normal, atraumatic, no cyanosis or edema. Pulses: 2+ and symmetric all extremities.    Skin: Skin color, texture, turgor normal. No rashes or lesions   Lymph nodes: Cervical, supraclavicular, and axillary nodes normal.   Neurologic: Grossly nonfocal       Data:     Recent Results (from the past 24 hour(s))   CBC WITH AUTOMATED DIFF    Collection Time: 05/15/18  4:25 AM   Result Value Ref Range    WBC 7.7 4.6 - 13.2 K/uL    RBC 3.98 (L) 4.20 - 5.30 M/uL    HGB 13.5 12.0 - 16.0 g/dL    HCT 40.2 35.0 - 45.0 %    .0 (H) 74.0 - 97.0 FL    MCH 33.9 24.0 - 34.0 PG    MCHC 33.6 31.0 - 37.0 g/dL    RDW 13.3 11.6 - 14.5 %    PLATELET 579 486 - 887 K/uL    MPV 8.9 (L) 9.2 - 11.8 FL    NEUTROPHILS 88 (H) 40 - 73 %    LYMPHOCYTES 7 (L) 21 - 52 %    MONOCYTES 5 3 - 10 %    EOSINOPHILS 0 0 - 5 %    BASOPHILS 0 0 - 2 %    ABS. NEUTROPHILS 6.8 1.8 - 8.0 K/UL    ABS. LYMPHOCYTES 0.5 (L) 0.9 - 3.6 K/UL    ABS. MONOCYTES 0.4 0.05 - 1.2 K/UL    ABS. EOSINOPHILS 0.0 0.0 - 0.4 K/UL    ABS. BASOPHILS 0.0 0.0 - 0.1 K/UL    DF AUTOMATED     MAGNESIUM    Collection Time: 05/15/18  4:25 AM   Result Value Ref Range    Magnesium 2.8 (H) 1.6 - 2.6 mg/dL   METABOLIC PANEL, COMPREHENSIVE    Collection Time: 05/15/18  4:25 AM   Result Value Ref Range    Sodium 140 136 - 145 mmol/L    Potassium 3.9 3.5 - 5.5 mmol/L    Chloride 102 100 - 108 mmol/L    CO2 30 21 - 32 mmol/L    Anion gap 8 3.0 - 18 mmol/L    Glucose 144 (H) 74 - 99 mg/dL    BUN 39 (H) 7.0 - 18 MG/DL    Creatinine 1.52 (H) 0.6 - 1.3 MG/DL    BUN/Creatinine ratio 26 (H) 12 - 20      GFR est AA 41 (L) >60 ml/min/1.73m2    GFR est non-AA 34 (L) >60 ml/min/1.73m2    Calcium 8.9 8.5 - 10.1 MG/DL    Bilirubin, total 0.4 0.2 - 1.0 MG/DL    ALT (SGPT) 23 13 - 56 U/L    AST (SGOT) 11 (L) 15 - 37 U/L    Alk. phosphatase 98 45 - 117 U/L    Protein, total 7.0 6.4 - 8.2 g/dL    Albumin 2.7 (L) 3.4 - 5.0 g/dL    Globulin 4.3 (H) 2.0 - 4.0 g/dL    A-G Ratio 0.6 (L) 0.8 - 1.7             Echo 5/2018:    Left ventricle: Systolic function was normal by visual assessment. Ejection   fraction was estimated in the range of 55 %  to 60 %. No obvious wall motion abnormalities identified in the views   obtained. Right ventricle: Systolic pressure was mildly increased. Estimated peak   pressure was at least 45 mmHg. Tricuspid valve: There was mild regurgitation.     Telemetry:normal sinus rhythm    Imaging:  I have personally reviewed the patients radiographs and have reviewed the reports:     CT Results  (Last 48 hours)               05/13/18 1439  CT CHEST WO CONT Final result    Impression:  IMPRESSION:        Chronic obstructive pulmonary disease with features primarily of emphysema. .       Bronchial thickening with bronchial luminal filling in the basal zones. Bronchitis with mucous plugging versus aspiration. Findings suggesting acute congestive heart failure or pneumocystis pneumonia are   not evident   . All CT scans at this facility are performed using dose optimization technique as   appropriate to the performed exam, to include automated exposure control,   adjustment of the mA and/or kV according to patient's size (Including   appropriate matching for site-specific examinations), or use of iterative   reconstruction technique. Narrative:  CT CHEST WITHOUT IV CONTRAST           COMPARISON: No priors. INDICATIONS: COPD, hypoxia, HIV, congestive heart failure. TECHNIQUE: Volumetric data acquisition was performed through the chest with a   multislice scanner. Reconstructions were created in the axial, coronal, and   sagittal planes. FINDINGS:        Thyroid/Base Of Neck:  Unremarkable  . Lungs: The lungs are emphysematous. There are segmental and subsegmental areas of   atelectasis in the lower lobes associated with bronchial thickening. Some of the   bronchi show luminal filling. .    No groundglass infiltrate is evident. .       Pleural Spaces: There is no pneumothorax or pleural fluid evident. No pleural plaques are seen       Lymph Nodes:    Axillae: Normal in size and number. Mediastinum / Alida: Normal in size and number. Mediastinum, Great Vessels And Heart: There is no mediastinal mass. The heart and great vessels appear unremarkable.        Abdomen Structures Included:   Epigastric abdominal structures show a little persists but otherwise are unremarkable. Osseous Structures: Senescent and demineralized findings. Mild compression of an   upper partial vertebral body. .               Results for Corinne Luria (MRN [id number]) as of 5/13/2018 12:53   Ref. Range 5/12/2018 05:02   pH (POC) Latest Ref Range: 7.35 - 7.45   7.462 (H)   pCO2 (POC) Latest Ref Range: 35.0 - 45.0 MMHG 42.4   pO2 (POC) Latest Ref Range: 80 - 100 MMHG 53 (L)   HCO3 (POC) Latest Ref Range: 22 - 26 MMOL/L 30.3 (H)   sO2 (POC) Latest Ref Range: 92 - 97 % 89 (L)   Base excess (POC) Latest Units: mmol/L 6   FIO2 (POC) Latest Units: % 94   Patient temp. Latest Units:   98.7   Specimen type (POC) Latest Units:   ARTERIAL   Site Latest Units:   RIGHT RADIAL   Device: Latest Units:   ROOM AIR   Total resp. rate Latest Units:   22   Allens test (POC) Latest Units:   YES       Final result (Exam End: 5/12/2018  3:00 AM) Open        Study Result      Chest     Indication: SOB      Comparison: October 19, 2017     Findings: Single view. Instrumentation stable. No pneumothorax. No pleural  effusion. Bilateral lower lung zone atelectasis again with chronic  pleural-parenchymal changes at the middle and lower lung zones. Cardiomediastinal silhouette and osseous structures grossly unchanged.     IMPRESSION  Impression: No significant change             Right leg :  1. No evidence of deep venous thrombosis detected in the veins  visualized. 2. Deep vein(s) visualized include the common femoral, femoral,  popliteal, posterior tibial, and peroneal veins. 3. No evidence of superficial thrombosis detected. 4. Superficial vein(s) visualized include the great saphenous vein at  the sapheno-femoral junction. Left leg :  1. No evidence of deep venous thrombosis detected in the veins  visualized. 2. Deep vein(s) visualized include the common femoral, femoral,  popliteal, posterior tibial, and peroneal veins. 3. No evidence of superficial thrombosis detected.   4. Superficial vein(s) visualized include the great saphenous vein at  the sapheno-femoral junction.     High complexity decision making was performed during the evaluation of this patient at high risk for decompensation with multiple organ involvement     Above mentioned total time spent on reviewing the case/medical record/data/notes/EMR/patient examination/documentation/coordinating care with nurse/consultants, exclusive of procedures with complex decision making performed and > 50% time spent in face to face evaluation.     Dedrick Yan MD  5/15/2018

## 2018-05-15 NOTE — PROGRESS NOTES
5/15/18, [id number], CMS went to speak with the pt in regards to the \"Important Message from Medicare About Your Rights. \"  Pt was able to review, unable to sign the documents provided. Family (niece) were present at bedside. Pt felt that it was best for her niece to review and sign the documents. Signed documents can be found in the chart for review; additional copies were left with pt and family for review.

## 2018-05-15 NOTE — PROGRESS NOTES
Problem: Dysphagia (Adult)  Goal: *Acute Goals and Plan of Care (Insert Text)  Patient will:  1. Tolerate PO trials with 0 s/s overt distress in 4/5 trials  2. Utilize compensatory swallow strategies/maneuvers (decrease bite/sip, size/rate, alt. liq/sol) with min cues in 4/5 trials  3. Complete an objective swallow study (i.e., MBSS) to assess swallow integrity, r/o aspiration, and determine of safest LRD, min A per MD discretion    Rec:     Reg solid with thin liquids  Aspiration precautions  HOB >45 during po intake, remain >30 for 30-45 minutes after po   Small bites/sips; alternate liquid/solid with slow feeding rate   Oral care TID  Meds per pt preference          Speech LAnguage Pathology bedside swallow evaluation/treatment  Patient: Kelly Lebron (76 y.o. female)  Date: 5/15/2018  Primary Diagnosis: COPD exacerbation ([facility] [address]        Precautions: aspiration       ASSESSMENT :  Based on the objective data described below, the patient presents with oropharyngeal swallow fxn essentially WFL. Strength, ROM, and coordination of the orofacial musculature were all found to be [facility]. Pt with upper/lower dentures worn during evaluation. Min phonation breaks noted with verbalizations. The pt presented with adequate oral transit times on all consistencies. Mastication time was appropriate. No s/sx of aspiration noted; hyolaryngeal elevation and excursion appeared adequate on all consistencies. No oral stasis noted post swallow. The pt denied globus sensation post swallow. Rec reg solid diet with thin liquids, aspiration precautions, oral care TID, and meds as tolerated. CXR reviewed. Pt may benefit from [facility], per MD discretion, if silent aspiration is a concern. Otherwise, ST will continue to follow x 1-2 visits to ensure safety of diet tolerance. Patient will benefit from skilled intervention to address the above impairments.   Patients rehabilitation potential is considered to be Fair  Factors which may influence rehabilitation potential include:   [x]            Medical condition     PLAN :  Recommendations and Planned Interventions: See above  Frequency/Duration: Patient will be followed by speech-language pathology x 1-2 more visits to address goals. Discharge Recommendations: Home Health, Outpatient and To Be Determined     SUBJECTIVE:   Patient stated I'm doing alright eating/drinking. OBJECTIVE:     Past Medical History:   Diagnosis Date    Coronary atherosclerosis of native coronary artery     HIV (human immunodeficiency virus infection) ([facility] [address]     Ill-defined condition     Pneumonia    Nonspecific abnormal electrocardiogram (ECG) (EKG)     Pre-operative cardiovascular examination      Past Surgical History:   Procedure Laterality Date    HX HIP REPLACEMENT      HX HYSTERECTOMY       Prior Level of Function/Home Situation: private residence  Home Situation  Home Environment: Private residence  # Steps to Enter: 0  One/Two Story Residence: One story  Living Alone: No  Support Systems: Family member(s)  Patient Expects to be Discharged to[de-identified] Private residence  Current DME Used/Available at Home: None  Diet prior to admission: reg solid with thin  Current Diet:  Reg solid with thin   Cognitive and Communication Status:  Neurologic State: Alert  Orientation Level: Oriented X4  Cognition: Follows commands   Oral Assessment:  Oral Assessment  Labial: No impairment  Dentition: Upper & lower dentures  Oral Hygiene: adequate  Lingual: No impairment  Velum: No impairment  Mandible: No impairment  P.O. Trials:  Patient Position: 90 in chair  Vocal quality prior to P.O.: Phonation breaks  Consistency Presented: Thin liquid; Solid;Puree  How Presented: Self-fed/presented;Cup/sip;Spoon;Straw  Bolus Acceptance: No impairment  Bolus Formation/Control: No impairment  Propulsion: No impairment  Oral Residue: None  Initiation of Swallow: No impairment  Laryngeal Elevation: Functional  Aspiration Signs/Symptoms: None  Pharyngeal Phase Characteristics: No impairment, issues, or problems   Effective Modifications: Small sips and bites; Alternate liquids/solids  Cues for Modifications: Minimal     Oral Phase Severity: No impairment  Pharyngeal Phase Severity : No impairment     TREATMENT PERFORMED FOLLOWING EVALUATION:  Training performed following examination with regards to results of bedside swallow evaluation, oropharyngeal anatomy/physiology, diet recs, aspiration precautions, and safe swallowing strategies. Pt able to return demo understanding. GCODESwallowing:  Swallow Current Status CI= 1-19%   Swallow Goal Status CH= 0%    The severity rating is based on the following outcomes:    Clinical Judgment    Pain:  Pt c/o 0/10 pain prior to evaluation. Pt c/o 0/10 pain post evaluation. After treatment:   []            Patient left in no apparent distress sitting up in chair  [x]            Patient left in no apparent distress in bed  [x]            Call bell left within reach  [x]            Nursing notified  []            Caregiver present  []            Bed alarm activated    COMMUNICATION/EDUCATION:   [x]            SLP educated pt with regard to compensatory swallow strategies and       aspiration/reflux precautions including: small bites/sips,       alternate liquids/solids, decrease feeding rate, HOB > 45 with all po, and        upright in bed at 30 degrees after po for at least 45 minutes. [x]            Patient/family have participated as able in goal setting and plan of care.     Thank you for this referral.    Robert Dye M.S. CCC-SLP/L  Speech-Language Pathologist

## 2018-05-16 LAB
ALBUMIN SERPL-MCNC: 2.4 G/DL (ref 3.4–5)
ALBUMIN/GLOB SERPL: 0.6 {RATIO} (ref 0.8–1.7)
ALP SERPL-CCNC: 88 U/L (ref 45–117)
ALT SERPL-CCNC: 28 U/L (ref 13–56)
ANION GAP SERPL CALC-SCNC: 8 MMOL/L (ref 3–18)
AST SERPL-CCNC: 13 U/L (ref 15–37)
BASOPHILS # BLD: 0 K/UL (ref 0–0.06)
BASOPHILS NFR BLD: 0 % (ref 0–2)
BILIRUB SERPL-MCNC: 0.4 MG/DL (ref 0.2–1)
BUN SERPL-MCNC: 33 MG/DL (ref 7–18)
BUN/CREAT SERPL: 25 (ref 12–20)
CALCIUM SERPL-MCNC: 8.3 MG/DL (ref 8.5–10.1)
CHLORIDE SERPL-SCNC: 102 MMOL/L (ref 100–108)
CO2 SERPL-SCNC: 27 MMOL/L (ref 21–32)
CREAT SERPL-MCNC: 1.31 MG/DL (ref 0.6–1.3)
DIFFERENTIAL METHOD BLD: ABNORMAL
EOSINOPHIL # BLD: 0 K/UL (ref 0–0.4)
EOSINOPHIL NFR BLD: 0 % (ref 0–5)
ERYTHROCYTE [DISTWIDTH] IN BLOOD BY AUTOMATED COUNT: 13.5 % (ref 11.6–14.5)
GLOBULIN SER CALC-MCNC: 4.1 G/DL (ref 2–4)
GLUCOSE SERPL-MCNC: 133 MG/DL (ref 74–99)
HCT VFR BLD AUTO: 40.6 % (ref 35–45)
HGB BLD-MCNC: 13.8 G/DL (ref 12–16)
HIV1 RNA # SERPL NAA+PROBE: <20 COPIES/ML
HIV1 RNA SERPL NAA+PROBE-LOG#: NORMAL LOG10COPY/ML
LYMPHOCYTES # BLD: 0.5 K/UL (ref 0.9–3.6)
LYMPHOCYTES NFR BLD: 7 % (ref 21–52)
MAGNESIUM SERPL-MCNC: 2.8 MG/DL (ref 1.6–2.6)
MCH RBC QN AUTO: 34.8 PG (ref 24–34)
MCHC RBC AUTO-ENTMCNC: 34 G/DL (ref 31–37)
MCV RBC AUTO: 102.3 FL (ref 74–97)
MONOCYTES # BLD: 0.4 K/UL (ref 0.05–1.2)
MONOCYTES NFR BLD: 6 % (ref 3–10)
NEUTS SEG # BLD: 6 K/UL (ref 1.8–8)
NEUTS SEG NFR BLD: 87 % (ref 40–73)
PLATELET # BLD AUTO: 257 K/UL (ref 135–420)
PMV BLD AUTO: 9.5 FL (ref 9.2–11.8)
POTASSIUM SERPL-SCNC: 4.4 MMOL/L (ref 3.5–5.5)
PROT SERPL-MCNC: 6.5 G/DL (ref 6.4–8.2)
RBC # BLD AUTO: 3.97 M/UL (ref 4.2–5.3)
SODIUM SERPL-SCNC: 137 MMOL/L (ref 136–145)
WBC # BLD AUTO: 6.9 K/UL (ref 4.6–13.2)

## 2018-05-16 PROCEDURE — 92526 ORAL FUNCTION THERAPY: CPT

## 2018-05-16 PROCEDURE — 93005 ELECTROCARDIOGRAM TRACING: CPT

## 2018-05-16 PROCEDURE — 77010033678 HC OXYGEN DAILY

## 2018-05-16 PROCEDURE — 80053 COMPREHEN METABOLIC PANEL: CPT | Performed by: FAMILY MEDICINE

## 2018-05-16 PROCEDURE — 74011250637 HC RX REV CODE- 250/637: Performed by: INTERNAL MEDICINE

## 2018-05-16 PROCEDURE — 74011250636 HC RX REV CODE- 250/636: Performed by: FAMILY MEDICINE

## 2018-05-16 PROCEDURE — 85025 COMPLETE CBC W/AUTO DIFF WBC: CPT | Performed by: FAMILY MEDICINE

## 2018-05-16 PROCEDURE — 36415 COLL VENOUS BLD VENIPUNCTURE: CPT | Performed by: FAMILY MEDICINE

## 2018-05-16 PROCEDURE — 74011250637 HC RX REV CODE- 250/637: Performed by: FAMILY MEDICINE

## 2018-05-16 PROCEDURE — 74011250637 HC RX REV CODE- 250/637: Performed by: NURSE PRACTITIONER

## 2018-05-16 PROCEDURE — 74011250636 HC RX REV CODE- 250/636: Performed by: INTERNAL MEDICINE

## 2018-05-16 PROCEDURE — 74011000250 HC RX REV CODE- 250: Performed by: INTERNAL MEDICINE

## 2018-05-16 PROCEDURE — 97165 OT EVAL LOW COMPLEX 30 MIN: CPT

## 2018-05-16 PROCEDURE — 83735 ASSAY OF MAGNESIUM: CPT | Performed by: FAMILY MEDICINE

## 2018-05-16 PROCEDURE — 94640 AIRWAY INHALATION TREATMENT: CPT

## 2018-05-16 PROCEDURE — 65660000000 HC RM CCU STEPDOWN

## 2018-05-16 RX ADMIN — FLUTICASONE PROPIONATE 2 SPRAY: 50 SPRAY, METERED NASAL at 10:33

## 2018-05-16 RX ADMIN — ABACAVIR SULFATE, DOLUTEGRAVIR SODIUM, LAMIVUDINE 1 TABLET: 600; 50; 300 TABLET, FILM COATED ORAL at 10:32

## 2018-05-16 RX ADMIN — HEPARIN SODIUM 5000 UNITS: 5000 INJECTION, SOLUTION INTRAVENOUS; SUBCUTANEOUS at 13:00

## 2018-05-16 RX ADMIN — DILTIAZEM HYDROCHLORIDE 120 MG: 120 CAPSULE, COATED, EXTENDED RELEASE ORAL at 10:24

## 2018-05-16 RX ADMIN — FAMOTIDINE 20 MG: 20 TABLET ORAL at 10:24

## 2018-05-16 RX ADMIN — SERTRALINE HYDROCHLORIDE 25 MG: 50 TABLET ORAL at 10:24

## 2018-05-16 RX ADMIN — HEPARIN SODIUM 5000 UNITS: 5000 INJECTION, SOLUTION INTRAVENOUS; SUBCUTANEOUS at 21:06

## 2018-05-16 RX ADMIN — ASPIRIN 81 MG 81 MG: 81 TABLET ORAL at 10:25

## 2018-05-16 RX ADMIN — METHYLPREDNISOLONE SODIUM SUCCINATE 40 MG: 40 INJECTION, POWDER, FOR SOLUTION INTRAMUSCULAR; INTRAVENOUS at 05:42

## 2018-05-16 RX ADMIN — PRAVASTATIN SODIUM 10 MG: 20 TABLET ORAL at 21:06

## 2018-05-16 RX ADMIN — ARFORMOTEROL TARTRATE 15 MCG: 15 SOLUTION RESPIRATORY (INHALATION) at 12:01

## 2018-05-16 RX ADMIN — METHYLPREDNISOLONE SODIUM SUCCINATE 40 MG: 40 INJECTION, POWDER, FOR SOLUTION INTRAMUSCULAR; INTRAVENOUS at 17:01

## 2018-05-16 RX ADMIN — BUDESONIDE 500 MCG: 0.5 INHALANT RESPIRATORY (INHALATION) at 12:01

## 2018-05-16 RX ADMIN — LEVOFLOXACIN 250 MG: 5 INJECTION, SOLUTION INTRAVENOUS at 12:39

## 2018-05-16 RX ADMIN — METHYLPREDNISOLONE SODIUM SUCCINATE 40 MG: 40 INJECTION, POWDER, FOR SOLUTION INTRAMUSCULAR; INTRAVENOUS at 00:04

## 2018-05-16 RX ADMIN — METHYLPREDNISOLONE SODIUM SUCCINATE 40 MG: 40 INJECTION, POWDER, FOR SOLUTION INTRAMUSCULAR; INTRAVENOUS at 12:37

## 2018-05-16 RX ADMIN — FUROSEMIDE 40 MG: 40 TABLET ORAL at 10:24

## 2018-05-16 RX ADMIN — HEPARIN SODIUM 5000 UNITS: 5000 INJECTION, SOLUTION INTRAVENOUS; SUBCUTANEOUS at 05:42

## 2018-05-16 RX ADMIN — FLUCONAZOLE 200 MG: 2 INJECTION INTRAVENOUS at 10:27

## 2018-05-16 RX ADMIN — TRAMADOL HYDROCHLORIDE 50 MG: 50 TABLET, FILM COATED ORAL at 17:01

## 2018-05-16 NOTE — PROGRESS NOTES
[facility]  Pulmonary, Critical Care, and Sleep Medicine    Name: Jelly Sharp MRN: [id number]   : 1945 Hospital: [address] [place] [address]   Date: 2018        Pulmonary Follow up    IMPRESSION:   · Acute COPD exacerbation with predominant emphysema and basal atelectasis. Hypoxia due to mucus plugging and emphysema. CT- no evidence of ILD to suggest PCP  · HIV on HAART  · Acute CHF, systolic and possibly diastolic  · Pulmonary HTN- cardiac and Pulmonary etiology  · Esophageal/throat irritation, possible candidiasis? · Hx of htn  · Hx of anxiety d/o  · Hx of marijuana abuse      RECOMMENDATIONS:   · Continue to intensify bronchial hygiene- needs PEP device  · Continue current bronchodilators Brovana , Pulmicort and duonebs. · Continue Solumedrol- will increase to 40 mg q 6 hrs  · Will check Respiratory culture- still pending  · Diflucan empirically for 5 days. · Continue Levaquin pending cultures and adjust as needed  · Continue HAART  · Assess Home O2 needs  · PFT and 6 min walk as outpatient. Pulmonary rehab  · Preventive vaccinations. · Will follow. Subjective/History:   18     Still with cough- thick mucus- difficult to expectorate but able to productively expectorate after nebulized treatments  +SOB- some better and intermittently worse  Remains on O2 at 3L  Denies chest pain  Denies nausea    HPI:  This patient has been seen and evaluated at the request of Dr. Natalie Ba  for COPD exacerbation. Patient is a 71 y.o. female with hx of HIV ( acquired during blood transfusion) on HAART. Being followed by ID in [facility],  Unclear last viral load and CD4, count. Over 46yr smoking hx,  Presented to ED with increase in sob and wheezing. No associated with fever or chills. Unable to use advair due to throat irritations. ? Pain. NO n/v/d/   No hx of headache. No visual disturbances. No changes in weight  No night sweats.     Since admission pt has been diuresed. Started on emperically on steroid. Echo remain pending. Pt is also placed on abx for UTI with levaquin. Since admission pt does reports that her breathing has improved significantly now where she is able to carry conversation. Current Facility-Administered Medications   Medication Dose Route Frequency    pravastatin (PRAVACHOL) tablet 10 mg  10 mg Oral QHS    methylPREDNISolone (PF) (SOLU-MEDROL) injection 40 mg  40 mg IntraVENous Q6H    dilTIAZem CD (CARDIZEM CD) capsule 120 mg  120 mg Oral DAILY    furosemide (LASIX) tablet 40 mg  40 mg Oral DAILY    budesonide (PULMICORT) 500 mcg/2 ml nebulizer suspension  500 mcg Nebulization BID RT    famotidine (PEPCID) tablet 20 mg  20 mg Oral DAILY    aspirin chewable tablet 81 mg  81 mg Oral DAILY    arformoterol (BROVANA) neb solution 15 mcg  15 mcg Nebulization BID RT    fluconazole (DIFLUCAN) 200mg/100 mL IVPB (premix)  200 mg IntraVENous DAILY    sertraline (ZOLOFT) tablet 25 mg  25 mg Oral DAILY    fluticasone (FLONASE) 50 mcg/actuation nasal spray 2 Spray  2 Spray Both Nostrils DAILY    heparin (porcine) injection 5,000 Units  5,000 Units SubCUTAneous Q8H    abacavir-dolutegravir-lamiVUDine (TRIUMEQ) 600- mg tablet 1 Tab  1 Tab Oral DAILY    darunavir-cobicistat (PREZCOBIX) 800-150 mg-mg per tablet 1 Tab (Patient Supplied)  1 Tab Oral DAILY    levoFLOXacin (LEVAQUIN) 250 mg in D5W IVPB  250 mg IntraVENous Q24H     No Known Allergies     Review of Systems:  A comprehensive review of systems was negative except for that written in the HPI.     Objective:   Vital Signs:    Visit Vitals    BP (!) 161/91 (BP 1 Location: Right arm, BP Patient Position: At rest)    Pulse 70    Temp 98.4 °F (36.9 °C)    Resp 19    Ht 5' 2.99\" (1.6 m)  Comment: bmi    Wt 75 kg (165 lb 6.4 oz)    SpO2 98%    BMI 29.31 kg/m2       O2 Device: Nasal cannula   O2 Flow Rate (L/min): 2 l/min   Temp (24hrs), Av.8 °F (36.6 °C), Min:97 °F (36.1 °C), Max:98.4 °F (36.9 °C)       Intake/Output:   Last shift:         Last 3 shifts: 05/14 1901 - 05/16 0700  In: 340 [P.O.:240; I.V.:100]  Out: 300 [Urine:300]    Intake/Output Summary (Last 24 hours) at 05/16/18 1020  Last data filed at 05/16/18 0553   Gross per 24 hour   Intake              120 ml   Output              300 ml   Net             -180 ml        Physical Exam:    General:  Alert, cooperative, no distress, appears stated age. Head:  Normocephalic, without obvious abnormality, atraumatic. Eyes:  Conjunctivae/corneas clear. PERRL, EOMs intact. Nose: Nares normal. Septum midline. Mucosa normal. No drainage or sinus tenderness. Throat: Lips, mucosa, and tongue normal. Teeth and gums normal.   Neck: Supple, symmetrical, trachea midline, no adenopathy, thyroid: no enlargment/tenderness/nodules, no carotid bruit and no JVD. Back:   Symmetric, no curvature. ROM normal.   Lungs:   Diffuse faint exp wheeze and ronchi   Chest wall:  No tenderness or deformity. Heart:  Regular rate and rhythm, S1, S2 normal, no murmur, click, rub or gallop. Abdomen:   Soft, non-tender. Bowel sounds normal. No masses,  No organomegaly. Extremities: Extremities normal, atraumatic, no cyanosis or edema. Pulses: 2+ and symmetric all extremities.    Skin: Skin color, texture, turgor normal. No rashes or lesions   Lymph nodes: Cervical, supraclavicular, and axillary nodes normal.   Neurologic: Grossly nonfocal       Data:     Recent Results (from the past 24 hour(s))   EKG, 12 LEAD, SUBSEQUENT    Collection Time: 05/15/18  1:42 PM   Result Value Ref Range    Ventricular Rate 78 BPM    Atrial Rate 78 BPM    P-R Interval 140 ms    QRS Duration 78 ms    Q-T Interval 404 ms    QTC Calculation (Bezet) 460 ms    Calculated P Axis 50 degrees    Calculated R Axis -5 degrees    Calculated T Axis 56 degrees    Diagnosis       Sinus rhythm with occasional premature ventricular complexes  Otherwise normal ECG  When compared with ECG of 12-MAY-2018 02:49,  premature ventricular complexes are now present  Confirmed by Heather Magaña ([id number]) on 5/15/2018 4:53:26 PM     CBC WITH AUTOMATED DIFF    Collection Time: 05/16/18  4:40 AM   Result Value Ref Range    WBC 6.9 4.6 - 13.2 K/uL    RBC 3.97 (L) 4.20 - 5.30 M/uL    HGB 13.8 12.0 - 16.0 g/dL    HCT 40.6 35.0 - 45.0 %    .3 (H) 74.0 - 97.0 FL    MCH 34.8 (H) 24.0 - 34.0 PG    MCHC 34.0 31.0 - 37.0 g/dL    RDW 13.5 11.6 - 14.5 %    PLATELET 949 141 - 302 K/uL    MPV 9.5 9.2 - 11.8 FL    NEUTROPHILS 87 (H) 40 - 73 %    LYMPHOCYTES 7 (L) 21 - 52 %    MONOCYTES 6 3 - 10 %    EOSINOPHILS 0 0 - 5 %    BASOPHILS 0 0 - 2 %    ABS. NEUTROPHILS 6.0 1.8 - 8.0 K/UL    ABS. LYMPHOCYTES 0.5 (L) 0.9 - 3.6 K/UL    ABS. MONOCYTES 0.4 0.05 - 1.2 K/UL    ABS. EOSINOPHILS 0.0 0.0 - 0.4 K/UL    ABS. BASOPHILS 0.0 0.0 - 0.06 K/UL    DF AUTOMATED     METABOLIC PANEL, COMPREHENSIVE    Collection Time: 05/16/18  4:40 AM   Result Value Ref Range    Sodium 137 136 - 145 mmol/L    Potassium 4.4 3.5 - 5.5 mmol/L    Chloride 102 100 - 108 mmol/L    CO2 27 21 - 32 mmol/L    Anion gap 8 3.0 - 18 mmol/L    Glucose 133 (H) 74 - 99 mg/dL    BUN 33 (H) 7.0 - 18 MG/DL    Creatinine 1.31 (H) 0.6 - 1.3 MG/DL    BUN/Creatinine ratio 25 (H) 12 - 20      GFR est AA 49 (L) >60 ml/min/1.73m2    GFR est non-AA 40 (L) >60 ml/min/1.73m2    Calcium 8.3 (L) 8.5 - 10.1 MG/DL    Bilirubin, total 0.4 0.2 - 1.0 MG/DL    ALT (SGPT) 28 13 - 56 U/L    AST (SGOT) 13 (L) 15 - 37 U/L    Alk. phosphatase 88 45 - 117 U/L    Protein, total 6.5 6.4 - 8.2 g/dL    Albumin 2.4 (L) 3.4 - 5.0 g/dL    Globulin 4.1 (H) 2.0 - 4.0 g/dL    A-G Ratio 0.6 (L) 0.8 - 1.7     MAGNESIUM    Collection Time: 05/16/18  4:40 AM   Result Value Ref Range    Magnesium 2.8 (H) 1.6 - 2.6 mg/dL           Echo 5/2018:    Left ventricle: Systolic function was normal by visual assessment. Ejection   fraction was estimated in the range of 55 %  to 60 %.  No obvious wall motion abnormalities identified in the views   obtained. Right ventricle: Systolic pressure was mildly increased. Estimated peak   pressure was at least 45 mmHg. Tricuspid valve: There was mild regurgitation. Telemetry:normal sinus rhythm    Imaging:  I have personally reviewed the patients radiographs and have reviewed the reports:     CT Results  (Last 48 hours)    None        Results for Matt Amato (MRN [id number]) as of 5/13/2018 12:53   Ref. Range 5/12/2018 05:02   pH (POC) Latest Ref Range: 7.35 - 7.45   7.462 (H)   pCO2 (POC) Latest Ref Range: 35.0 - 45.0 MMHG 42.4   pO2 (POC) Latest Ref Range: 80 - 100 MMHG 53 (L)   HCO3 (POC) Latest Ref Range: 22 - 26 MMOL/L 30.3 (H)   sO2 (POC) Latest Ref Range: 92 - 97 % 89 (L)   Base excess (POC) Latest Units: mmol/L 6   FIO2 (POC) Latest Units: % 94   Patient temp. Latest Units:   98.7   Specimen type (POC) Latest Units:   ARTERIAL   Site Latest Units:   RIGHT RADIAL   Device: Latest Units:   ROOM AIR   Total resp. rate Latest Units:   22   Allens test (POC) Latest Units:   YES       Final result (Exam End: 5/12/2018  3:00 AM) Open        Study Result      Chest     Indication: SOB      Comparison: October 19, 2017     Findings: Single view. Instrumentation stable. No pneumothorax. No pleural  effusion. Bilateral lower lung zone atelectasis again with chronic  pleural-parenchymal changes at the middle and lower lung zones. Cardiomediastinal silhouette and osseous structures grossly unchanged.     IMPRESSION  Impression: No significant change             Right leg :  1. No evidence of deep venous thrombosis detected in the veins  visualized. 2. Deep vein(s) visualized include the common femoral, femoral,  popliteal, posterior tibial, and peroneal veins. 3. No evidence of superficial thrombosis detected. 4. Superficial vein(s) visualized include the great saphenous vein at  the sapheno-femoral junction. Left leg :  1.  No evidence of deep venous thrombosis detected in the veins  visualized. 2. Deep vein(s) visualized include the common femoral, femoral,  popliteal, posterior tibial, and peroneal veins. 3. No evidence of superficial thrombosis detected. 4. Superficial vein(s) visualized include the great saphenous vein at  the sapheno-femoral junction.     High complexity decision making was performed during the evaluation of this patient at high risk for decompensation with multiple organ involvement     Above mentioned total time spent on reviewing the case/medical record/data/notes/EMR/patient examination/documentation/coordinating care with nurse/consultants, exclusive of procedures with complex decision making performed and > 50% time spent in face to face evaluation.     Lan Brady MD  5/16/2018

## 2018-05-16 NOTE — ROUTINE PROCESS
Bedside shift change report given to Me (oncoming nurse) by Joo Guillaume (offgoing nurse). Report included the following information SBAR, Kardex, MAR, Recent Results and Cardiac Rhythm NSR. Patient is lying supine in bed, watching TV. Call bell remains within reach. 0720  Bedside shift change report given to Janey MELENDEZ (oncoming nurse) by Me (offgoing nurse). Report included the following information SBAR, Kardex, Intake/Output and Recent Results.

## 2018-05-16 NOTE — PROGRESS NOTES
[facility]      CARDIOLOGY PROGRESS NOTE  RECS:      1. Congestive heart failure- acute on chronic diastolic CHF- improving well. on asa, not on bb due to severe COPD. Lasix 40 mg daily   2. Hypertension- controlled  continue Cardizem 30 mg q 6 hours changed to daily 120 mg . Avoiding beta blockers due to wheezing. 3. Wheezing shortness of breath fever and productive cough: Likely COPD exacerbation. Workup and treatment per medicine also Pulmonary following   4. Chest pain: Atypical, constant for 3-4 months. Unlikely to be ischemic. MI is already ruled out. Likely it is secondary to dyspnea and respiratory distress and is musculoskeletal in origin. Try tramadol  5. Tobacco abuse-Patient advised to stop smoking to reduce future cardiovascular events. Valeri Hernandez for discharge from cardiac view if chest pain becomes less intolerable  Follow up in office in 1-2 wks    Little more to add from the [facility] . We will sign off. Should the clinical stituation change or there be any need for further cardiac input please do not hesitate to contact us.         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) ICD-10-CM: M42.637  ICD-9-CM: V45.89  5/14/2018 Yes        * (Principal)COPD exacerbation ([facility] ICD-10-CM: J44.1  ICD-9-CM: 491.21  5/12/2018 Yes        HIV (human immunodeficiency virus infection) ([facility].) (Chronic) ICD-10-CM: B20  ICD-9-CM: V08  5/12/2018 Yes        Anxiety ICD-10-CM: F41.9  ICD-9-CM: 300.00  5/12/2018 Yes                SUBJECTIVE:    CP-improved and occasional now  C/o CUEVAS and cough     OBJECTIVE:    VS:   Visit Vitals    /84 (BP 1 Location: Right arm, BP Patient Position: At rest)    Pulse 77    Temp 97.9 °F (36.6 °C)    Resp 18    Ht 5' 2.99\" (1.6 m)    Wt 165 lb 6.4 oz (75 kg)    SpO2 98%    BMI 29.31 kg/m2         Intake/Output Summary (Last 24 hours) at 05/16/18 1109  Last data filed at 05/16/18 0553   Gross per 24 hour   Intake              120 ml   Output              300 ml   Net             -180 ml     TELE: normal sinus rhythm    General: alert, well developed, pleasant and in mild distress  HENT: Normocephalic, atraumatic. Normal external eye. Neck :  no bruit, no JVD  Cardiac:  regular rate and rhythm, S1, S2 normal, no S3 or S4, no click, no rub  Lungs: diminished breath sounds b/l.  expiratory wheezing both lung fields -much improved but still minimally present  Abdomen: Soft, nontender, no masses  Extremities:  No edema , peripheral pulses present      Labs: Results:       Chemistry Recent Labs      05/16/18   0440  05/15/18   0425  05/14/18   0339   GLU  133*  144*  139*   NA  137  140  141   K  4.4  3.9  3.4*   CL  102  102  102   CO2  27  30  32   BUN  33*  39*  35*   CREA  1.31*  1.52*  1.63*   CA  8.3*  8.9  8.2*   AGAP  8  8  7   BUCR  25*  26*  21*   AP  88  98  100   TP  6.5  7.0  6.5   ALB  2.4*  2.7*  2.6*   GLOB  4.1*  4.3*  3.9   AGRAT  0.6*  0.6*  0.7*      CBC w/Diff Recent Labs      05/16/18   0440  05/15/18   0425  05/14/18   0339   WBC  6.9  7.7  7.6   RBC  3.97*  3.98*  3.83*   HGB  13.8  13.5  13.0   HCT  40.6  40.2  38.8   PLT  257  296  311   GRANS  87*  88*  85*   LYMPH  7*  7*  10*   EOS  0  0  0      Cardiac Enzymes No results for input(s): CPK, CKND1, MARY in the last 72 hours. No lab exists for component: CKRMB, TROIP   Coagulation No results for input(s): PTP, INR, APTT in the last 72 hours. No lab exists for component: INREXT, INREXT    Lipid Panel Lab Results   Component Value Date/Time    Cholesterol, total 204 (H) 05/13/2018 02:00 AM    HDL Cholesterol 105 (H) 05/13/2018 02:00 AM    LDL, calculated 86.4 05/13/2018 02:00 AM    VLDL, calculated 12.6 05/13/2018 02:00 AM    Triglyceride 63 05/13/2018 02:00 AM    CHOL/HDL Ratio 1.9 05/13/2018 02:00 AM      BNP No results for input(s): BNPP in the last 72 hours.    Liver Enzymes Recent Labs      05/16/18   0440   TP  6.5   ALB  2.4*   AP  88   SGOT  13*      Thyroid Studies Lab Results   Component Value Date/Time    TSH 0.10 (L) 05/14/2018 03:39 AM            Laura Jenkins MD

## 2018-05-16 NOTE — PROGRESS NOTES
Progress Note    Patient: Deborah Alcantar MRN: [id number]  CSN: [id number]    YOB: 1945  Age: 71 y.o. Sex: female    DOA: 5/12/2018 LOS:  LOS: 4 days                    Subjective:     Pt reports dyspnea is improved. Reports improved mild left upper chest musculoskeletal tenderness. Continues on 2L NC, will attempt to wean off today. Tolerating Brovana 15 mcg neb BID and Pulmicort per pulmonary recommendations. Denies headache, abdominal pain, N/V/D, chills, night sweats, or dysphagia. Speech evaluation yesterday cleared pt for regular solid diet with thin liquids but recommends aspiration precautions. BLE peripheral edema and dyspnea improved with Lasix 40 mg PO daily. Cr continues to demonstrate slight improvement. Per cardiology Dr. Chaitanya Hanna pt will eventually need a stress test once breathing improves. PVC's noted yesterday on EKG 5/15/18 resolved. CT Chest w/o contrast 5/13/18  IMPRESSION: Chronic obstructive pulmonary disease with features primarily of emphysema. Bronchial thickening with bronchial luminal filling in the basal zones. Bronchitis with mucous plugging versus aspiration. Findings suggesting acute congestive heart failure or pneumocystis pneumonia are not evident. Atypical chest pain resolved per patient. BLE peripheral edema improved with Lasix. Echo 5/12/18 showed EF 55-60% with mild regurgitation of tricuspid valve. Hospital course  \"Reshma Barajas is a 71 y. o. female with a history of HIV, who presents to the ED via EMS with complaint of shortness of breath and wheezing which began yesterday. Patient stated that her shortness of breath has been gradually worsening.      She notes that she was prescribed Advair, but has been unable to take it due to throat irritation .      She reports associated productive cough, chest pain, and bilateral feet swelling over the past few days.   BNP on admission within normal range Patient denies history of CHF.      Per EMS, patient was given 2 albuterol treatments en route to the ED. She currently smokes 0.5ppd. Patient denies recent fever, chills, nausea, vomiting, abdominal pain, or back pain. She makes no further complaints.     Pt has been f/u ID at [facility]  Dr. Melissa Flores [phone] she has recent change in her med she has been on current med for 3 month due to f/u with her in July  Pt seen by cardiology started on Cardizem 30 mg qid troponin I is negative . Pt still c/o chest pain she has localized tenderness  Pulmonary consult is pending discussed with Dr Patrick Joseph yesterday\"    Chief Complaint:   Chief Complaint   Patient presents with    Shortness of Breath       Review of systems  GENERAL: Patient alert, awake and oriented times 3, able to communicate full sentences and not in distress. HEENT: No change in vision, earache, + tinnitus in left ear, sore throat or sinus congestion. NECK: No pain or stiffness. CARDIOVASCULAR: No current pain or palpations. PULMONARY: Shortness of breath improved, + productive cough. GASTROINTESTINAL: No abdominal pain, nausea, vomiting or diarrhea, melena or bright red blood per rectum. GENITOURINARY: No urinary frequency, urgency, hesitancy or dysuria. MUSCULOSKELETAL: + mild left upper chest MSK pain, no back pain, no recent trauma. Swelling lower extremities resoved   DERMATOLOGIC: No rash, no itching, no lesions. ENDOCRINE: No polyuria, polydipsia, no heat . Positive intolerance to cold intolerance. No recent change in weight. HEMATOLOGICAL: No anemia or easy bruising or bleeding.    NEUROLOGIC: No headache, seizures, numbness  Feet and toes, generalized weakness    PSYCHIATRIC: H/O  Depression, and  anxiety,  No other mood disorder, no loss of interest in normal activity or change in sleep pattern.       Objective:     Physical Exam:  Visit Vitals    /84 (BP 1 Location: Right arm, BP Patient Position: At rest)    Pulse 77    Temp 97.9 °F (36.6 °C)    Resp 18    Ht 5' 2.99\" (1.6 m)    Wt 75 kg (165 lb 6.4 oz)    SpO2 98%    BMI 29.31 kg/m2      General:  Alert, cooperative, no acute distress, talking comfortably in full sentences   Head:  Normocephalic, without obvious abnormality, atraumatic. Eyes:  Conjunctivae/corneas clear. PERRL, EOMs intact. Nose: Nares normal. No drainage or sinus tenderness. Throat: Lips, mucosa, and tongue moist, no signs of yeast today   Neck: Supple, symmetrical, trachea midline, no adenopathy, thyroid: no enlargement/tenderness/nodules, no carotid bruit and no JVD. Lungs:   Fine end expiratory wheezes bilaterally    Chest wall:  Positive tenderness mid and Lt chest wall improved   Heart:  Regular rate and rhythm, S1, S2 normal, no murmur, click, rub or gallop. Abdomen: Soft, non-tender. Bowel sounds normal. No masses,  No organomegaly. Extremities: Extremities normal, atraumatic, trace edema lower extremities improved   Pulses: 2+ and symmetric all extremities. Skin: Skin color, texture,  Dry skin   Neurologic: CNII-XII intact. No focal motor or sensory deficit.             Intake and Output:  Current Shift:     Last three shifts:  05/14 1901 - 05/16 0700  In: 340 [P.O.:240;  I.V.:100]  Out: 300 [Urine:300]    Labs: Results:       Chemistry Recent Labs      05/16/18   0440  05/15/18   0425  05/14/18   0339   GLU  133*  144*  139*   NA  137  140  141   K  4.4  3.9  3.4*   CL  102  102  102   CO2  27  30  32   BUN  33*  39*  35*   CREA  1.31*  1.52*  1.63*   CA  8.3*  8.9  8.2*   AGAP  8  8  7   BUCR  25*  26*  21*   AP  88  98  100   TP  6.5  7.0  6.5   ALB  2.4*  2.7*  2.6*   GLOB  4.1*  4.3*  3.9   AGRAT  0.6*  0.6*  0.7*      CBC w/Diff Recent Labs      05/16/18   0440  05/15/18   0425  05/14/18   0339   WBC  6.9  7.7  7.6   RBC  3.97*  3.98*  3.83*   HGB  13.8  13.5  13.0   HCT  40.6  40.2  38.8   PLT  257  296  311   GRANS  87*  88*  85*   LYMPH  7*  7*  10*   EOS  0  0  0      Cardiac Enzymes No results for input(s): CPK, CKND1, MARY in the last 72 hours. No lab exists for component: CKRMB, TROIP   Coagulation No results for input(s): PTP, INR, APTT in the last 72 hours. No lab exists for component: INREXT, INREXT    Lipid Panel Lab Results   Component Value Date/Time    Cholesterol, total 204 (H) 05/13/2018 02:00 AM    HDL Cholesterol 105 (H) 05/13/2018 02:00 AM    LDL, calculated 86.4 05/13/2018 02:00 AM    VLDL, calculated 12.6 05/13/2018 02:00 AM    Triglyceride 63 05/13/2018 02:00 AM    CHOL/HDL Ratio 1.9 05/13/2018 02:00 AM      BNP No results for input(s): BNPP in the last 72 hours.    Liver Enzymes Recent Labs      05/16/18   0440   TP  6.5   ALB  2.4*   AP  88   SGOT  13*      Thyroid Studies Lab Results   Component Value Date/Time    TSH 0.10 (L) 05/14/2018 03:39 AM          Procedures/imaging: see electronic medical records for all procedures/Xrays and details which were not copied into this note but were reviewed prior to creation of Plan    Medications:   Current Facility-Administered Medications   Medication Dose Route Frequency    pravastatin (PRAVACHOL) tablet 10 mg  10 mg Oral QHS    methylPREDNISolone (PF) (SOLU-MEDROL) injection 40 mg  40 mg IntraVENous Q6H    dilTIAZem CD (CARDIZEM CD) capsule 120 mg  120 mg Oral DAILY    traMADol (ULTRAM) tablet 50 mg  50 mg Oral Q6H PRN    furosemide (LASIX) tablet 40 mg  40 mg Oral DAILY    budesonide (PULMICORT) 500 mcg/2 ml nebulizer suspension  500 mcg Nebulization BID RT    famotidine (PEPCID) tablet 20 mg  20 mg Oral DAILY    aspirin chewable tablet 81 mg  81 mg Oral DAILY    albuterol-ipratropium (DUO-NEB) 2.5 MG-0.5 MG/3 ML  3 mL Nebulization Q4H PRN    arformoterol (BROVANA) neb solution 15 mcg  15 mcg Nebulization BID RT    fluconazole (DIFLUCAN) 200mg/100 mL IVPB (premix)  200 mg IntraVENous DAILY    sertraline (ZOLOFT) tablet 25 mg  25 mg Oral DAILY    fluticasone (FLONASE) 50 mcg/actuation nasal spray 2 Spray  2 Spray Both Nostrils DAILY    heparin (porcine) injection 5,000 Units  5,000 Units SubCUTAneous Q8H    abacavir-dolutegravir-lamiVUDine (TRIUMEQ) 600- mg tablet 1 Tab  1 Tab Oral DAILY    darunavir-cobicistat (PREZCOBIX) 800-150 mg-mg per tablet 1 Tab (Patient Supplied)  1 Tab Oral DAILY    hydrALAZINE (APRESOLINE) 20 mg/mL injection 20 mg  20 mg IntraVENous Q6H PRN    levoFLOXacin (LEVAQUIN) 250 mg in D5W IVPB  250 mg IntraVENous Q24H       Assessment/Plan     Principal Problem:    COPD exacerbation (HCC) (5/12/2018)    Active Problems:    HIV (human immunodeficiency virus infection) ([address] (5/12/2018)      Anxiety (5/12/2018)      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 Scant sputum production reported. Recheck cardiac enzymes negative   Cardiology consult, Dr. Maya Burns following   Swallow evaluation 5/15/18- recommended regular solid diet with thin liquids and aspiration precautions  Ct of chest w/o contrast 5/13/18 showed evidence of Chronic obstructive pulmonary disease with features primarily of emphysema. Continue PT/OT- maintain fall precautions   Tolerating OOB to chair with assistance   Wean off O2 as tolerated.  If unable to be weaned, may assess home O2 need per pulmonary recommendations.      Chronic diastolic heart failure/ Hypokalemia  Lasix 40 mg PO daily  Monitor K+, normal 4.4 today  Monitor kidney function  Cardiology consult, Dr. Maya Burns following- recommends f/u in office 1-2 weeks after discharge with eventual stress test.     Hypertension  Cardiology switched Cardizem CD dose to 120 mg daily on 5/15/18  Hydralzine prn SBP above 170    Acute renal insufficiency improving; suspected baseline CKD stage 3, Cr 1.3 as outpatient (only 1 value available to review)  Off Iv hydration monitor kidney function  Started on Lasix 40 mg IV q12 h per cardiology now on lasix 40 mg PO daily  Monitor kidney function  Monitor bp hydralizne prn SBp above 170     HIV infection  Continue current treatment with Prezcobix and Triumeq  Viral load undetectable and CD4 count 213  F/u ID as outpatient with Dr. Donaldo Wiseman     Anxiety/ insomnia  Continue zoloft 25 mg daily     Seasonal allergies  flonase nasal spray      Atypical Chest pain/Swelling lower extremities   BNP normal  Venous duplex U/S B/L leg negative   Tramadol 50 mg PO q6hr PRN pain started 5/15/18 by cardiology, Norco d/ulices. ASA 81 mg PO daily   ECHO 5/12/18- EF 55-60%, mild regurgitation of tricuspid valve noted. Subclinical Hyperthyroidism - TSH 0.10 and FT4 normal  Thyroid US 5/14/18:  1. Probable tiny right thyroid cysts measuring up to 2 mm.  Otherwise, unremarkable exam    ASCVD risk 23.37% 10yr and 50.2% lifetime  Add low dose statin therapy, pravachol 10mg qhs as prezcobix may increase statin levels  monitor LFTs      DVT/GI Prophylaxis:Heparin  H2B/PPI and pepcid        Lore Willard MD,  5/16/2018 11:45

## 2018-05-16 NOTE — PROGRESS NOTES
Problem: Dysphagia (Adult)  Goal: *Acute Goals and Plan of Care (Insert Text)  Patient will:  1. Tolerate PO trials with 0 s/s overt distress in 4/5 trials-met   2. Utilize compensatory swallow strategies/maneuvers (decrease bite/sip, size/rate, alt. liq/sol) with min cues in 4/5 trials-met  3. Complete an objective swallow study (i.e., MBSS) to assess swallow integrity, r/o aspiration, and determine of safest LRD, min A per MD discretion-n/a     Rec:     Reg solid with thin liquids  Aspiration precautions  HOB >45 during po intake, remain >30 for 30-45 minutes after po   Small bites/sips; alternate liquid/solid with slow feeding rate   Oral care TID  Meds per pt preference      Outcome: Resolved/Met Date Met: 05/16/18  Speech language pathology dysphagia treatment/discharge    Patient: Kamla Hodge (76 y.o. female)  Date: 5/16/2018  Diagnosis: COPD exacerbation (HCC) COPD exacerbation (HCC)  Precautions: Aspiration      ASSESSMENT:  Pt seen for follow up dysphagia tx following breakfast meal, reporting \"I feel so much better today\". Pt denying dysphagia, observed tolerating thin liquids + straw with timely swallow initiation and no overt s/sx aspiration. Laryngeal elevation adequate to palpation. Pt reporting tolerating regular solids with no distress, denying trials related to recent consumption of meal.  Recommend continue current diet with use of the above mentioned compensatory strategies/aspiration precautions. Pt educated on s/sx aspiration and to alert MD/RN if symptoms arise. Pt able to verbalize understanding. Will sign off. Please re-consult as indicated. Progression toward goals:  Goals met      PLAN:  Recommendations and Planned Interventions:  No further skilled intervention warranted at this time. Will sign off. Discharge Recommendations:  Do not anticipate further SLP needs upon discharge      SUBJECTIVE:   Patient stated I feel so much better today.     OBJECTIVE:   Cognitive and Communication Status:  Neurologic State: Alert  Orientation Level: Oriented X4  Cognition: Appropriate decision making, Follows commands  Dysphagia Treatment:  Oral Assessment:  Oral Assessment  Labial: No impairment  Dentition: Upper & lower dentures  Oral Hygiene: adequate  Lingual: No impairment  Velum: No impairment  Mandible: No impairment  P.O. Trials:   Patient Position: 90 in chair   Vocal quality prior to po: No impairment    Consistency Presented: Thin     How Presented: Self-fed/presented, Cup/sip, Spoon, Straw   Bolus Acceptance: No impairment   Bolus Formation/Control: No impairment   Propulsion: No impairment   Oral Residue: None   Initiation of Swallow: No impairment   Laryngeal Elevation: Functional   Aspiration Signs/Symptoms: None   Pharyngeal Phase Characteristics: No impairment, issues, or problems    Effective Modifications: Small sips and bites, Alternate liquids/solids   Cues for Modifications: Minimal   Oral Phase Severity: No impairment   Pharyngeal Phase Severity : No impairment     GCODESwallowing:  Swallow D/C Status CH= 0%    PAIN:  Pt reports 0/10 pain or discomfort prior to tx. Pt reports 0/10 pain or discomfort post tx. After treatment:   []              Patient left in no apparent distress sitting up in chair  [x]              Patient left in no apparent distress in bed  [x]              Call bell left within reach  [x]              Nursing notified  []              Caregiver present  []              Bed alarm activated      COMMUNICATION/EDUCATION:   [x]              SLP educated pt with regard to compensatory swallow strategies and        aspiration/reflux precautions including: small bites/sips,        alternate liquids/solids, decrease feeding rate, HOB > 45 with all po, and                   upright in bed at 30 degrees after po for at least 45        minutes.      Delilah Beckwith M.S., [id number] [facility]  Speech-Language Pathologist

## 2018-05-16 NOTE — PROGRESS NOTES
Patient working with PT and started having chest pain in left arm and radiating to her left hand. Visit Vitals    /86 (BP 1 Location: Right arm, BP Patient Position: At rest)    Pulse 81    Temp 97.6 °F (36.4 °C)    Resp 20    Ht 5' 2.99\" (1.6 m)    Wt 75 kg (165 lb 6.4 oz)    SpO2 99%    BMI 29.31 kg/m2     Patient states that the same symptoms happened the previous day while working with PT. 12 lead EKG completed. Patient states that she has been having chest pain with exertion at home as well.  Will notify MD.

## 2018-05-16 NOTE — PROGRESS NOTES
Problem: Self Care Deficits Care Plan (Adult)  Goal: *Acute Goals and Plan of Care (Insert Text)  Occupational Therapy Goals  Initiated 5/16/2018 within 7 day(s). 1.  Patient will perform upper body dressing with modified independence   2. Patient will perform lower body dressing with modified independence. 3.  Patient will perform grooming, standing at sink with modified independence. 4.  Patient will perform toilet transfers with modified independence. 5.  Patient will perform all aspects of toileting with modified independence. 6.  Patient will participate in upper extremity therapeutic exercise/activities with modified independence for 8 minutes. 7.  Patient will utilize energy conservation techniques during functional activities with verbal cues. Outcome: Progressing Towards Goal  Occupational Therapy EVALUATION    Patient: Nakita Sommer (76 y.o. female)  Date: 5/16/2018  Primary Diagnosis: COPD exacerbation ([facility] [address]        Precautions:   Fall    ASSESSMENT :  Pt was agreeable to therapy and cleared by RN. She presented oriented, with decreased bilat UB strength, and generalized deconditioning. Pt able to complete seated ADL with supervision and demonstrated poor dynamic balance in standing ADL. Pt clutched chest, stated her chest felt tight in left side radiating down arm. RN expressly notified. Pt had completed approx 1 min OOB activity prior to onset of pain. RN present and attending pt at OT exit. Patient will benefit from skilled intervention to address the above impairments.   Patients rehabilitation potential is considered to be Good  Factors which may influence rehabilitation potential include:   [x]             None noted  []             Mental ability/status  []             Medical condition  []             Home/family situation and support systems  []             Safety awareness  []             Pain tolerance/management  []             Other:      PLAN :  Recommendations and Planned Interventions:  [x]               Self Care Training                  [x]        Therapeutic Activities  [x]               Functional Mobility Training    []        Cognitive Retraining  [x]               Therapeutic Exercises           [x]        Endurance Activities  [x]               Balance Training                   []        Neuromuscular Re-Education  []               Visual/Perceptual Training     [x]   Home Safety Training  [x]               Patient Education                 [x]        Family Training/Education  []               Other (comment):    Frequency/Duration: Patient will be followed by occupational therapy 1-2 times per day/4-7 days per week to address goals. Discharge Recommendations: Home Health  Further Equipment Recommendations for Discharge: shower chair and N/A     Barriers to Learning/Limitations: None  Compensate with: visual, verbal, tactile, kinesthetic cues/model     PATIENT COMPLEXITY      Eval Complexity: History: LOW Complexity : Brief history review ; Examination: LOW Complexity : 1-3 performance deficits relating to physical, cognitive , or psychosocial skils that result in activity limitations and / or participation restrictions ; Decision Making:LOW Complexity : No comorbidities that affect functional and no verbal or physical assistance needed to complete eval tasks  Assessment: low Complexity     G-CODES:     Self Care  Current  CJ= 20-39%   Goal  CI= 1-19%. The severity rating is based on the Level of Assistance required for Functional Mobility and ADLs. SUBJECTIVE:   Patient stated my chest hurts.     OBJECTIVE DATA SUMMARY:     Past Medical History:   Diagnosis Date    Coronary atherosclerosis of native coronary artery     HIV (human immunodeficiency virus infection) ([facility] [address]     Ill-defined condition     Pneumonia    Nonspecific abnormal electrocardiogram (ECG) (EKG)     Pre-operative cardiovascular examination      Past Surgical History:   Procedure Laterality Date    HX HIP REPLACEMENT      HX HYSTERECTOMY       Prior Level of Function/Home Situation: supervision, niece is her CG  Home Situation  Home Environment: Private residence  # Steps to Enter: 0  One/Two Story Residence: One story  Living Alone: No  Support Systems: Family member(s)  Patient Expects to be Discharged to[de-identified] Private residence  Current DME Used/Available at Home: None  []  Right hand dominant   []  Left hand dominant  Cognitive/Behavioral Status:  Neurologic State: Alert  Orientation Level: Oriented X4          Skin: no noted concern    Edema: no noted edema    Vision/Perceptual:    Tracking:  (no noted concern)                                Coordination:  Coordination: Within functional limits (BUE)            Balance:   poor dynamic balance with no AD    Strength:    Strength: Generally decreased, functional (BUE)                Tone & Sensation:    Tone: Normal (BUE)  Sensation: Intact (BUE)                      Range of Motion:    AROM: Within functional limits (BUE)                         Functional Mobility and Transfers for ADLs:  Bed Mobility:      SBA        Transfers:  Sit to Stand: Contact guard assistance                Toilet Transfer : Contact guard assistance                ADL Assessment:  Feeding: Setup    Oral Facial Hygiene/Grooming: Setup (seated)    Bathing: Contact guard assistance    Upper Body Dressing: Contact guard assistance    Lower Body Dressing: Contact guard assistance    Toileting: Contact guard assistance             Pain:  Pt reports 0/10 pain or discomfort prior to treatment.    Pt reports 8/10 pain or discomfort post treatment. Activity Tolerance:   fair    Please refer to the flowsheet for vital signs taken during this treatment.   After treatment:   [x] Patient left in no apparent distress sitting up in chair  [] Patient left in no apparent distress in bed  [x] Call bell left within reach  [x] Nursing notified  [x] Caregiver present  [] Bed alarm activated    COMMUNICATION/EDUCATION:   [] Home safety education was provided and the patient/caregiver indicated understanding. [x] Patient/family have participated as able in goal setting and plan of care. [] Patient/family agree to work toward stated goals and plan of care. [] Patient understands intent and goals of therapy, but is neutral about his/her participation. [] Patient is unable to participate in goal setting and plan of care.     Thank you for this referral.  Joe Landon, OT  Time Calculation: 18 mins

## 2018-05-16 NOTE — PROGRESS NOTES
Progress Note    Patient: Ja Hses MRN: [id number]  CSN: [id number]    YOB: 1945  Age: 71 y.o. Sex: female    DOA: 5/12/2018 LOS:  LOS: 4 days                    Subjective:     Pt reports dyspnea is improved. Reports improved mild left upper chest musculoskeletal tenderness. Continues on 2L NC, will attempt to wean off today. Tolerating Brovana 15 mcg neb BID and Pulmicort per pulmonary recommendations. Denies headache, abdominal pain, N/V/D, chills, night sweats, or dysphagia. Speech evaluation yesterday cleared pt for regular solid diet with thin liquids but recommends aspiration precautions. BLE peripheral edema and dyspnea improved with Lasix 40 mg PO daily. Cr continues to demonstrate slight improvement. Per cardiology Dr. Hurley Cheadle pt will eventually need a stress test once breathing improves. PVC's noted yesterday on EKG 5/15/18 resolved. CT Chest w/o contrast 5/13/18  IMPRESSION: Chronic obstructive pulmonary disease with features primarily of emphysema. Bronchial thickening with bronchial luminal filling in the basal zones. Bronchitis with mucous plugging versus aspiration. Findings suggesting acute congestive heart failure or pneumocystis pneumonia are not evident. Atypical chest pain resolved per patient. BLE peripheral edema improved with Lasix. Echo 5/12/18 showed EF 55-60% with mild regurgitation of tricuspid valve. Hospital course  \"Reshma Barajas is a 71 y. o. female with a history of HIV, who presents to the ED via EMS with complaint of shortness of breath and wheezing which began yesterday. Patient stated that her shortness of breath has been gradually worsening.      She notes that she was prescribed Advair, but has been unable to take it due to throat irritation .      She reports associated productive cough, chest pain, and bilateral feet swelling over the past few days.   BNP on admission within normal range Patient denies history of CHF.      Per EMS, patient was given 2 albuterol treatments en route to the ED. She currently smokes 0.5ppd. Patient denies recent fever, chills, nausea, vomiting, abdominal pain, or back pain. She makes no further complaints.     Pt has been f/u ID at [facility]  Dr. Cherri Acevedo [phone] she has recent change in her med she has been on current med for 3 month due to f/u with her in July  Pt seen by cardiology started on Cardizem 30 mg qid troponin I is negative . Pt still c/o chest pain she has localized tenderness  Pulmonary consult is pending discussed with Dr Crista Sousa yesterday\"    Chief Complaint:   Chief Complaint   Patient presents with    Shortness of Breath       Review of systems  GENERAL: Patient alert, awake and oriented times 3, able to communicate full sentences and not in distress. HEENT: No change in vision, earache, + tinnitus in left ear, sore throat or sinus congestion. NECK: No pain or stiffness. CARDIOVASCULAR: No current pain or palpations. PULMONARY: Shortness of breath improved, + productive cough. GASTROINTESTINAL: No abdominal pain, nausea, vomiting or diarrhea, melena or bright red blood per rectum. GENITOURINARY: No urinary frequency, urgency, hesitancy or dysuria. MUSCULOSKELETAL: + mild left upper chest MSK pain, no back pain, no recent trauma. Swelling lower extremities resoved   DERMATOLOGIC: No rash, no itching, no lesions. ENDOCRINE: No polyuria, polydipsia, no heat . Positive intolerance to cold intolerance. No recent change in weight. HEMATOLOGICAL: No anemia or easy bruising or bleeding.    NEUROLOGIC: No headache, seizures, numbness  Feet and toes, generalized weakness    PSYCHIATRIC: H/O  Depression, and  anxiety,  No other mood disorder, no loss of interest in normal activity or change in sleep pattern.       Objective:     Physical Exam:  Visit Vitals    BP (!) 161/91 (BP 1 Location: Right arm, BP Patient Position: At rest)    Pulse 70    Temp 98.4 °F (36.9 °C)    Resp 19    Ht 5' 2.99\" (1.6 m)    Wt 75 kg (165 lb 6.4 oz)    SpO2 98%    BMI 29.31 kg/m2      General:  Alert, cooperative, no acute distress, talking comfortably in full sentences   Head:  Normocephalic, without obvious abnormality, atraumatic. Eyes:  Conjunctivae/corneas clear. PERRL, EOMs intact. Nose: Nares normal. No drainage or sinus tenderness. Throat: Lips, mucosa, and tongue moist, no signs of yeast today   Neck: Supple, symmetrical, trachea midline, no adenopathy, thyroid: no enlargement/tenderness/nodules, no carotid bruit and no JVD. Lungs:   Fine end expiratory wheezes bilaterally    Chest wall:  Positive tenderness mid and Lt chest wall improved   Heart:  Regular rate and rhythm, S1, S2 normal, no murmur, click, rub or gallop. Abdomen: Soft, non-tender. Bowel sounds normal. No masses,  No organomegaly. Extremities: Extremities normal, atraumatic, trace edema lower extremities improved   Pulses: 2+ and symmetric all extremities. Skin: Skin color, texture,  Dry skin   Neurologic: CNII-XII intact. No focal motor or sensory deficit.             Intake and Output:  Current Shift:     Last three shifts:  05/14 1901 - 05/16 0700  In: 340 [P.O.:240;  I.V.:100]  Out: 300 [Urine:300]    Labs: Results:       Chemistry Recent Labs      05/16/18   0440  05/15/18   0425  05/14/18   0339   GLU  133*  144*  139*   NA  137  140  141   K  4.4  3.9  3.4*   CL  102  102  102   CO2  27  30  32   BUN  33*  39*  35*   CREA  1.31*  1.52*  1.63*   CA  8.3*  8.9  8.2*   AGAP  8  8  7   BUCR  25*  26*  21*   AP  88  98  100   TP  6.5  7.0  6.5   ALB  2.4*  2.7*  2.6*   GLOB  4.1*  4.3*  3.9   AGRAT  0.6*  0.6*  0.7*      CBC w/Diff Recent Labs      05/16/18   0440  05/15/18   0425  05/14/18   0339   WBC  6.9  7.7  7.6   RBC  3.97*  3.98*  3.83*   HGB  13.8  13.5  13.0   HCT  40.6  40.2  38.8   PLT  257  296  311   GRANS  87*  88*  85*   LYMPH  7*  7*  10*   EOS  0  0  0      Cardiac Enzymes No results for input(s): CPK, CKND1, MARY in the last 72 hours. No lab exists for component: CKRMB, TROIP   Coagulation No results for input(s): PTP, INR, APTT in the last 72 hours. No lab exists for component: INREXT, INREXT    Lipid Panel Lab Results   Component Value Date/Time    Cholesterol, total 204 (H) 05/13/2018 02:00 AM    HDL Cholesterol 105 (H) 05/13/2018 02:00 AM    LDL, calculated 86.4 05/13/2018 02:00 AM    VLDL, calculated 12.6 05/13/2018 02:00 AM    Triglyceride 63 05/13/2018 02:00 AM    CHOL/HDL Ratio 1.9 05/13/2018 02:00 AM      BNP No results for input(s): BNPP in the last 72 hours.    Liver Enzymes Recent Labs      05/16/18   0440   TP  6.5   ALB  2.4*   AP  88   SGOT  13*      Thyroid Studies Lab Results   Component Value Date/Time    TSH 0.10 (L) 05/14/2018 03:39 AM          Procedures/imaging: see electronic medical records for all procedures/Xrays and details which were not copied into this note but were reviewed prior to creation of Plan    Medications:   Current Facility-Administered Medications   Medication Dose Route Frequency    pravastatin (PRAVACHOL) tablet 10 mg  10 mg Oral QHS    methylPREDNISolone (PF) (SOLU-MEDROL) injection 40 mg  40 mg IntraVENous Q6H    dilTIAZem CD (CARDIZEM CD) capsule 120 mg  120 mg Oral DAILY    traMADol (ULTRAM) tablet 50 mg  50 mg Oral Q6H PRN    furosemide (LASIX) tablet 40 mg  40 mg Oral DAILY    budesonide (PULMICORT) 500 mcg/2 ml nebulizer suspension  500 mcg Nebulization BID RT    famotidine (PEPCID) tablet 20 mg  20 mg Oral DAILY    aspirin chewable tablet 81 mg  81 mg Oral DAILY    albuterol-ipratropium (DUO-NEB) 2.5 MG-0.5 MG/3 ML  3 mL Nebulization Q4H PRN    arformoterol (BROVANA) neb solution 15 mcg  15 mcg Nebulization BID RT    fluconazole (DIFLUCAN) 200mg/100 mL IVPB (premix)  200 mg IntraVENous DAILY    sertraline (ZOLOFT) tablet 25 mg  25 mg Oral DAILY    fluticasone (FLONASE) 50 mcg/actuation nasal spray 2 Spray  2 Spray Both Nostrils DAILY    heparin (porcine) injection 5,000 Units  5,000 Units SubCUTAneous Q8H    abacavir-dolutegravir-lamiVUDine (TRIUMEQ) 600- mg tablet 1 Tab  1 Tab Oral DAILY    darunavir-cobicistat (PREZCOBIX) 800-150 mg-mg per tablet 1 Tab (Patient Supplied)  1 Tab Oral DAILY    hydrALAZINE (APRESOLINE) 20 mg/mL injection 20 mg  20 mg IntraVENous Q6H PRN    levoFLOXacin (LEVAQUIN) 250 mg in D5W IVPB  250 mg IntraVENous Q24H       Assessment/Plan     Principal Problem:    COPD exacerbation (HCC) (5/12/2018)    Active Problems:    HIV (human immunodeficiency virus infection) ([address] (5/12/2018)      Anxiety (5/12/2018)      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 Scant sputum production reported. Recheck cardiac enzymes negative   Cardiology consult, Dr. Oneal Rodriguez following   Swallow evaluation 5/15/18- recommended regular solid diet with thin liquids and aspiration precautions  Ct of chest w/o contrast 5/13/18 showed evidence of Chronic obstructive pulmonary disease with features primarily of emphysema. Continue PT/OT- maintain fall precautions   Tolerating OOB to chair with assistance   Wean off O2 as tolerated.  If unable to be weaned, may assess home O2 need per pulmonary recommendations.      Chronic diastolic heart failure/ Hypokalemia  Lasix 40 mg PO daily  Monitor K+, normal 4.4 today  Monitor kidney function  Cardiology consult, Dr. Oneal Rodriguez following- recommends f/u in office 1-2 weeks after discharge with eventual stress test.     Hypertension  Cardiology switched Cardizem CD dose to 120 mg daily on 5/15/18  Hydralzine prn SBP above 170    Acute renal insufficiency improving; suspected baseline CKD stage 3, Cr 1.3 as outpatient (only 1 value available to review)  Off Iv hydration monitor kidney function  Started on Lasix 40 mg IV q12 h per cardiology now on lasix 40 mg PO daily  Monitor kidney function  Monitor bp hydralizne prn SBp above 170     HIV infection  Continue current treatment with Prezcobix and Triumeq  Viral load undetectable and CD4 count 213  F/u ID as outpatient with Dr. Arceo November     Anxiety/ insomnia  Continue zoloft 25 mg daily     Seasonal allergies  flonase nasal spray      Atypical Chest pain/Swelling lower extremities   BNP normal  Venous duplex U/S B/L leg negative   Tramadol 50 mg PO q6hr PRN pain started 5/15/18 by cardiology, Norco d/ulices. ASA 81 mg PO daily   ECHO 5/12/18- EF 55-60%, mild regurgitation of tricuspid valve noted. Subclinical Hyperthyroidism - TSH 0.10 and FT4 normal  Thyroid US 5/14/18:  1. Probable tiny right thyroid cysts measuring up to 2 mm.  Otherwise, unremarkable exam    ASCVD risk 23.37% 10yr and 50.2% lifetime  Add low dose statin therapy, pravachol 10mg qhs as prezcobix may increase statin levels  monitor LFTs      DVT/GI Prophylaxis:Heparin  H2B/PPI and pepcid        NASRA Seo-S  5/16/2018 1016 AM

## 2018-05-17 LAB
ATRIAL RATE: 77 BPM
CALCULATED P AXIS, ECG09: 66 DEGREES
CALCULATED R AXIS, ECG10: -2 DEGREES
CALCULATED T AXIS, ECG11: 59 DEGREES
DIAGNOSIS, 93000: NORMAL
P-R INTERVAL, ECG05: 136 MS
Q-T INTERVAL, ECG07: 408 MS
QRS DURATION, ECG06: 78 MS
QTC CALCULATION (BEZET), ECG08: 461 MS
VENTRICULAR RATE, ECG03: 77 BPM

## 2018-05-17 PROCEDURE — 87070 CULTURE OTHR SPECIMN AEROBIC: CPT | Performed by: FAMILY MEDICINE

## 2018-05-17 PROCEDURE — 97535 SELF CARE MNGMENT TRAINING: CPT

## 2018-05-17 PROCEDURE — 74011250636 HC RX REV CODE- 250/636: Performed by: INTERNAL MEDICINE

## 2018-05-17 PROCEDURE — 77010033678 HC OXYGEN DAILY

## 2018-05-17 PROCEDURE — 97116 GAIT TRAINING THERAPY: CPT

## 2018-05-17 PROCEDURE — 94640 AIRWAY INHALATION TREATMENT: CPT

## 2018-05-17 PROCEDURE — 74011250637 HC RX REV CODE- 250/637: Performed by: INTERNAL MEDICINE

## 2018-05-17 PROCEDURE — 74011250636 HC RX REV CODE- 250/636: Performed by: FAMILY MEDICINE

## 2018-05-17 PROCEDURE — 74011000250 HC RX REV CODE- 250: Performed by: INTERNAL MEDICINE

## 2018-05-17 PROCEDURE — 74011250637 HC RX REV CODE- 250/637: Performed by: FAMILY MEDICINE

## 2018-05-17 PROCEDURE — 65660000000 HC RM CCU STEPDOWN

## 2018-05-17 PROCEDURE — 74011250637 HC RX REV CODE- 250/637: Performed by: NURSE PRACTITIONER

## 2018-05-17 PROCEDURE — 74011636637 HC RX REV CODE- 636/637: Performed by: FAMILY MEDICINE

## 2018-05-17 PROCEDURE — 97530 THERAPEUTIC ACTIVITIES: CPT

## 2018-05-17 RX ORDER — BUDESONIDE AND FORMOTEROL FUMARATE DIHYDRATE 160; 4.5 UG/1; UG/1
2 AEROSOL RESPIRATORY (INHALATION)
Status: DISCONTINUED | OUTPATIENT
Start: 2018-05-17 | End: 2018-05-19 | Stop reason: HOSPADM

## 2018-05-17 RX ORDER — HYDRALAZINE HYDROCHLORIDE 10 MG/1
10 TABLET, FILM COATED ORAL 3 TIMES DAILY
Status: DISCONTINUED | OUTPATIENT
Start: 2018-05-17 | End: 2018-05-18

## 2018-05-17 RX ORDER — PREDNISONE 20 MG/1
40 TABLET ORAL
Status: DISCONTINUED | OUTPATIENT
Start: 2018-05-17 | End: 2018-05-19 | Stop reason: HOSPADM

## 2018-05-17 RX ORDER — ALBUTEROL SULFATE 0.83 MG/ML
2.5 SOLUTION RESPIRATORY (INHALATION)
Status: DISCONTINUED | OUTPATIENT
Start: 2018-05-17 | End: 2018-05-19 | Stop reason: HOSPADM

## 2018-05-17 RX ADMIN — FAMOTIDINE 20 MG: 20 TABLET ORAL at 08:29

## 2018-05-17 RX ADMIN — HYDRALAZINE HYDROCHLORIDE 10 MG: 10 TABLET, FILM COATED ORAL at 21:00

## 2018-05-17 RX ADMIN — DILTIAZEM HYDROCHLORIDE 120 MG: 120 CAPSULE, COATED, EXTENDED RELEASE ORAL at 08:29

## 2018-05-17 RX ADMIN — FUROSEMIDE 40 MG: 40 TABLET ORAL at 08:29

## 2018-05-17 RX ADMIN — FLUCONAZOLE 200 MG: 2 INJECTION INTRAVENOUS at 08:29

## 2018-05-17 RX ADMIN — HYDRALAZINE HYDROCHLORIDE 10 MG: 10 TABLET, FILM COATED ORAL at 10:35

## 2018-05-17 RX ADMIN — BUDESONIDE AND FORMOTEROL FUMARATE DIHYDRATE 2 PUFF: 160; 4.5 AEROSOL RESPIRATORY (INHALATION) at 20:16

## 2018-05-17 RX ADMIN — PRAVASTATIN SODIUM 10 MG: 20 TABLET ORAL at 21:01

## 2018-05-17 RX ADMIN — ASPIRIN 81 MG 81 MG: 81 TABLET ORAL at 08:29

## 2018-05-17 RX ADMIN — ABACAVIR SULFATE, DOLUTEGRAVIR SODIUM, LAMIVUDINE 1 TABLET: 600; 50; 300 TABLET, FILM COATED ORAL at 08:28

## 2018-05-17 RX ADMIN — METHYLPREDNISOLONE SODIUM SUCCINATE 40 MG: 40 INJECTION, POWDER, FOR SOLUTION INTRAMUSCULAR; INTRAVENOUS at 06:10

## 2018-05-17 RX ADMIN — HEPARIN SODIUM 5000 UNITS: 5000 INJECTION, SOLUTION INTRAVENOUS; SUBCUTANEOUS at 06:10

## 2018-05-17 RX ADMIN — ARFORMOTEROL TARTRATE 15 MCG: 15 SOLUTION RESPIRATORY (INHALATION) at 08:00

## 2018-05-17 RX ADMIN — BUDESONIDE 500 MCG: 0.5 INHALANT RESPIRATORY (INHALATION) at 08:00

## 2018-05-17 RX ADMIN — TRAMADOL HYDROCHLORIDE 50 MG: 50 TABLET, FILM COATED ORAL at 18:26

## 2018-05-17 RX ADMIN — HYDRALAZINE HYDROCHLORIDE 10 MG: 10 TABLET, FILM COATED ORAL at 16:14

## 2018-05-17 RX ADMIN — METHYLPREDNISOLONE SODIUM SUCCINATE 40 MG: 40 INJECTION, POWDER, FOR SOLUTION INTRAMUSCULAR; INTRAVENOUS at 00:15

## 2018-05-17 RX ADMIN — SERTRALINE HYDROCHLORIDE 25 MG: 50 TABLET ORAL at 08:29

## 2018-05-17 RX ADMIN — PREDNISONE 40 MG: 20 TABLET ORAL at 10:35

## 2018-05-17 NOTE — ROUTINE PROCESS
Bedside and Verbal shift change report given to Brandon Bradford RN   (oncoming nurse) by Hedda Dancer, RN (offgoing nurse). Report included the following information SBAR, Kardex, Intake/Output, MAR and Recent Results. 0715 Bedside and Verbal shift change report given to Ari Hughes [facility] (oncoming nurse) by Brandon Bradford RN   (offgoing nurse). Report included the following information SBAR, Kardex, Intake/Output, MAR and Recent Results.

## 2018-05-17 NOTE — PROGRESS NOTES
Problem: Self Care Deficits Care Plan (Adult)  Goal: *Acute Goals and Plan of Care (Insert Text)  Occupational Therapy Goals  Initiated 5/16/2018 within 7 day(s). 1.  Patient will perform upper body dressing with modified independence   2. Patient will perform lower body dressing with modified independence. 3.  Patient will perform grooming, standing at sink with modified independence. 4.  Patient will perform toilet transfers with modified independence. 5.  Patient will perform all aspects of toileting with modified independence. 6.  Patient will participate in upper extremity therapeutic exercise/activities with modified independence for 8 minutes. 7.  Patient will utilize energy conservation techniques during functional activities with verbal cues. Outcome: Progressing Towards Goal  Occupational Therapy TREATMENT    Patient: Brian Carlos (76 y.o. female)  Date: 5/17/2018  Diagnosis: COPD exacerbation (HCC) COPD exacerbation ([facility] [address]       Precautions: Fall  Chart, occupational therapy assessment, plan of care, and goals were reviewed. ASSESSMENT:  Pt required increased encouragement to participate in OT. Pt is seen with PT to increase safety of the pt and staff during functional mobility and ADLs. Pt maneuvered to EOB w/Supervision and Min VCs for pacing. Pt performed functional txfr to the BR w/FWW w/Supervision requiring VCs for EC techniques and safety w/maneuvering in small spaces. Pt reports she needs help for LB ADLs, but stating she would like to be independent. Pt educated on AE for LB ADLs, issued reacher, sock aid. Following education pt was able to don socks w/Supervision, and Min VCs for proper technique. Pt educated on EC techniques and how they relate to ADLs, pt verbalized understanding. Pt's family is present in room and very supportive, report they will assist pt w/carryover of the education at home.   O2 sats remained >90% on 1L O2  Progression toward goals:  [x] Improving appropriately and progressing toward goals  []          Improving slowly and progressing toward goals  []          Not making progress toward goals and plan of care will be adjusted     PLAN:  Patient continues to benefit from skilled intervention to address the above impairments. Continue treatment per established plan of care. Discharge Recommendations:  Home Health  Further Equipment Recommendations for Discharge:  N/A      G-CODES:     Self Care  Current  CI= 1-19%   Goal  CI= 1-19%. The severity rating is based on the Level of Assistance required for Functional Mobility and ADLs. SUBJECTIVE:   Patient stated I am tired.     OBJECTIVE DATA SUMMARY:   Cognitive/Behavioral Status:  Neurologic State: Alert  Orientation Level: Oriented X4  Cognition: Follows commands       Functional Mobility and Transfers for ADLs:   Bed Mobility:     Supine to Sit: Stand-by assistance  Sit to Supine: Supervision  Scooting: Stand-by assistance; Additional time   Transfers:  Sit to Stand: Stand-by assistance    Toilet Transfer : Stand-by assistance (simulated w/FWW)    Balance:  Sitting: Intact  Standing: Impaired; With support  Standing - Static: Good  Standing - Dynamic : Fair    ADL Intervention:  Basic ADL  Type of Bath: Bath Pack;Full;Basin/Soap/Water    Lower Body Dressing Assistance  Underpants: Contact guard assistance (simulated)  Socks: Supervision/set-up  Pain:  Pt reports 1/10 pain or discomfort prior to treatment.    Pt reports 1/10 pain or discomfort post treatment. Activity Tolerance:    Fair    Please refer to the flowsheet for vital signs taken during this treatment.   After treatment:   []  Patient left in no apparent distress sitting up in chair  [x]  Patient left in no apparent distress in bed  [x]  Call bell left within reach  [x]  Nursing notified  [x]  Caregiver present  []  Bed alarm activated    ESTEPHANIA Castillo  Time Calculation: 25 mins

## 2018-05-17 NOTE — PROGRESS NOTES
Problem: Falls - Risk of  Goal: *Absence of Falls  Document Abdias Fall Risk and appropriate interventions in the flowsheet.    Outcome: Progressing Towards Goal  Fall Risk Interventions:            Medication Interventions: Evaluate medications/consider consulting pharmacy, Patient to call before getting OOB, Teach patient to arise slowly    Elimination Interventions: Call light in reach, Patient to call for help with toileting needs

## 2018-05-17 NOTE — CDMP QUERY
Please clarify due to conflicting documentation. It is documented by Pulmonary and cardiology  throughout the chart Acute on chronic diastolic CHF. Patient is on lasix iv/po. Attending is documenting Chronic diastolic chf. If agree with Cardiology please document acute chf in chart.      Thank-you   Asuncion Kyle RN [facility] [phone]

## 2018-05-17 NOTE — PROGRESS NOTES
[facility]  Pulmonary, Critical Care, and Sleep Medicine    Name: Kelly Lebron MRN: [id number]   : 1945 Hospital: [facility]   Date: 2018        Pulmonary Follow up    IMPRESSION:   · Acute COPD exacerbation with predominant emphysema and basal atelectasis. Hypoxia due to mucus plugging and emphysema. CT- no evidence of ILD to suggest PCP. Slow improvement. · HIV on HAART  · Acute CHF, systolic and possibly diastolic  · Pulmonary HTN- cardiac and Pulmonary etiology  · Esophageal/throat irritation, possible candidiasis? · Hx of htn  · Hx of anxiety d/o  · Hx of marijuana abuse      RECOMMENDATIONS:   · Continue to intensify bronchial hygiene- needs PEP device ( lung flute)  · Will start inhaled agents in lieu of current bronchodilators Brovana , Pulmicort and duonebs. · Symbicort, Spiriva and albuterol for home use  · Prednisone taper- starting at 40 mg and tapered off 10 mg every 3 days  · Completing Diflucan empirically for 5 days. · Continue Levaquin - total 7 days  · Continue HAART  · Assess Home O2 needs- at rest and ambulation  · PFT and 6 min walk as outpatient. Pulmonary rehab  · Preventive vaccinations. · Will follow in [facility] clinic     Subjective/History:   18   Feels much better  On 1 L O2 now  Ambulating   Still with cough- thick mucus- difficult to expectorate but able to productively expectorate after nebulized treatments  +SOB- with exertion  Denies chest pain  Denies nausea    HPI:  This patient has been seen and evaluated at the request of Dr. Raymundo Pena  for COPD exacerbation. Patient is a 71 y.o. female with hx of HIV ( acquired during blood transfusion) on HAART. Being followed by ID in [place],  Unclear last viral load and CD4, count. Over 46yr smoking hx,  Presented to ED with increase in sob and wheezing. No associated with fever or chills. Unable to use advair due to throat irritations. ? Pain. NO n/v/d/   No hx of headache. No visual disturbances. No changes in weight  No night sweats. Since admission pt has been diuresed. Started on emperically on steroid. Echo remain pending. Pt is also placed on abx for UTI with levaquin. Since admission pt does reports that her breathing has improved significantly now where she is able to carry conversation. Current Facility-Administered Medications   Medication Dose Route Frequency    lidocaine (SALONPAS/ASPERCREME) 4 % patch 1 Patch  1 Patch TransDERmal Q24H    predniSONE (DELTASONE) tablet 40 mg  40 mg Oral DAILY WITH BREAKFAST    hydrALAZINE (APRESOLINE) tablet 10 mg  10 mg Oral TID    budesonide-formoterol (SYMBICORT) 160-4.5 mcg/actuation HFA inhaler 2 Puff  2 Puff Inhalation BID    tiotropium (SPIRIVA) inhalation capsule 18 mcg  1 Cap Inhalation DAILY    pravastatin (PRAVACHOL) tablet 10 mg  10 mg Oral QHS    dilTIAZem CD (CARDIZEM CD) capsule 120 mg  120 mg Oral DAILY    furosemide (LASIX) tablet 40 mg  40 mg Oral DAILY    famotidine (PEPCID) tablet 20 mg  20 mg Oral DAILY    aspirin chewable tablet 81 mg  81 mg Oral DAILY    sertraline (ZOLOFT) tablet 25 mg  25 mg Oral DAILY    fluticasone (FLONASE) 50 mcg/actuation nasal spray 2 Spray  2 Spray Both Nostrils DAILY    heparin (porcine) injection 5,000 Units  5,000 Units SubCUTAneous Q8H    abacavir-dolutegravir-lamiVUDine (TRIUMEQ) 600- mg tablet 1 Tab  1 Tab Oral DAILY    darunavir-cobicistat (PREZCOBIX) 800-150 mg-mg per tablet 1 Tab (Patient Supplied)  1 Tab Oral DAILY     No Known Allergies     Review of Systems:  A comprehensive review of systems was negative except for that written in the HPI.     Objective:   Vital Signs:    Visit Vitals    /83 (BP 1 Location: Left arm, BP Patient Position: At rest)    Pulse 80    Temp 98.5 °F (36.9 °C)    Resp 20    Ht 5' 2.99\" (1.6 m)    Wt 77.1 kg (170 lb)    SpO2 96%    BMI 30.12 kg/m2       O2 Device: Nasal cannula   O2 Flow Rate (L/min): 1 l/min   Temp (24hrs), Av.8 °F (36.6 °C), Min:97 °F (36.1 °C), Max:98.5 °F (36.9 °C)       Intake/Output:   Last shift:         Last 3 shifts: 05/15 1901 -  0700  In: 120 [P.O.:120]  Out: 300 [Urine:300]  No intake or output data in the 24 hours ending 18 1015     Physical Exam:    General:  Alert, cooperative, no distress, appears stated age. Head:  Normocephalic, without obvious abnormality, atraumatic. Eyes:  Conjunctivae/corneas clear. PERRL, EOMs intact. Nose: Nares normal. Septum midline. Mucosa normal. No drainage or sinus tenderness. Throat: Lips, mucosa, and tongue normal. Teeth and gums normal.   Neck: Supple, symmetrical, trachea midline, no adenopathy, thyroid: no enlargment/tenderness/nodules, no carotid bruit and no JVD. Back:   Symmetric, no curvature. ROM normal.   Lungs:   Diffuse faint exp wheeze and ronchi   Chest wall:  No tenderness or deformity. Heart:  Regular rate and rhythm, S1, S2 normal, no murmur, click, rub or gallop. Abdomen:   Soft, non-tender. Bowel sounds normal. No masses,  No organomegaly. Extremities: Extremities normal, atraumatic, no cyanosis or edema. Pulses: 2+ and symmetric all extremities.    Skin: Skin color, texture, turgor normal. No rashes or lesions   Lymph nodes: Cervical, supraclavicular, and axillary nodes normal.   Neurologic: Grossly nonfocal       Data:     Recent Results (from the past 24 hour(s))   EKG, 12 LEAD, SUBSEQUENT    Collection Time: 18  1:21 PM   Result Value Ref Range    Ventricular Rate 77 BPM    Atrial Rate 77 BPM    P-R Interval 136 ms    QRS Duration 78 ms    Q-T Interval 408 ms    QTC Calculation (Bezet) 461 ms    Calculated P Axis 66 degrees    Calculated R Axis -2 degrees    Calculated T Axis 59 degrees    Diagnosis       Normal sinus rhythm  Normal ECG  When compared with ECG of 15-MAY-2018 13:42,  premature ventricular complexes are no longer present  Confirmed by Vernon Nicholson ([id number]) on 2018 8:39:46 AM             Echo 5/2018:    Left ventricle: Systolic function was normal by visual assessment. Ejection   fraction was estimated in the range of 55 %  to 60 %. No obvious wall motion abnormalities identified in the views   obtained. Right ventricle: Systolic pressure was mildly increased. Estimated peak   pressure was at least 45 mmHg. Tricuspid valve: There was mild regurgitation. Telemetry:normal sinus rhythm    Imaging:  I have personally reviewed the patients radiographs and have reviewed the reports:     CT Results  (Last 48 hours)    None        Results for Yissel Sheldon (MRN [id number]) as of 5/13/2018 12:53   Ref. Range 5/12/2018 05:02   pH (POC) Latest Ref Range: 7.35 - 7.45   7.462 (H)   pCO2 (POC) Latest Ref Range: 35.0 - 45.0 MMHG 42.4   pO2 (POC) Latest Ref Range: 80 - 100 MMHG 53 (L)   HCO3 (POC) Latest Ref Range: 22 - 26 MMOL/L 30.3 (H)   sO2 (POC) Latest Ref Range: 92 - 97 % 89 (L)   Base excess (POC) Latest Units: mmol/L 6   FIO2 (POC) Latest Units: % 94   Patient temp. Latest Units:   98.7   Specimen type (POC) Latest Units:   ARTERIAL   Site Latest Units:   RIGHT RADIAL   Device: Latest Units:   ROOM AIR   Total resp. rate Latest Units:   22   Allens test (POC) Latest Units:   YES       Final result (Exam End: 5/12/2018  3:00 AM) Open        Study Result      Chest     Indication: SOB      Comparison: October 19, 2017     Findings: Single view. Instrumentation stable. No pneumothorax. No pleural  effusion. Bilateral lower lung zone atelectasis again with chronic  pleural-parenchymal changes at the middle and lower lung zones. Cardiomediastinal silhouette and osseous structures grossly unchanged.     IMPRESSION  Impression: No significant change             Right leg :  1. No evidence of deep venous thrombosis detected in the veins  visualized. 2. Deep vein(s) visualized include the common femoral, femoral,  popliteal, posterior tibial, and peroneal veins.   3. No evidence of superficial thrombosis detected. 4. Superficial vein(s) visualized include the great saphenous vein at  the sapheno-femoral junction. Left leg :  1. No evidence of deep venous thrombosis detected in the veins  visualized. 2. Deep vein(s) visualized include the common femoral, femoral,  popliteal, posterior tibial, and peroneal veins. 3. No evidence of superficial thrombosis detected. 4. Superficial vein(s) visualized include the great saphenous vein at  the sapheno-femoral junction.     High complexity decision making was performed during the evaluation of this patient at high risk for decompensation with multiple organ involvement     Above mentioned total time spent on reviewing the case/medical record/data/notes/EMR/patient examination/documentation/coordinating care with nurse/consultants, exclusive of procedures with complex decision making performed and > 50% time spent in face to face evaluation.     Gold Luna MD  5/17/2018

## 2018-05-17 NOTE — ROUTINE PROCESS
TRANSFER - IN REPORT:    Verbal report received from [facility], [address] [place] [address] on [facility]  being received from [employer] for routine progression of care      Report consisted of patients Situation, Background, Assessment and   Recommendations(SBAR). Information from the following report(s) SBAR, Kardex and MAR was reviewed with the receiving nurse. Opportunity for questions and clarification was provided. Assessment completed upon patients arrival to unit and care assumed.

## 2018-05-17 NOTE — PROGRESS NOTES
Progress Note    Patient: America Hernandez MRN: [id number]  CSN: [id number]    YOB: 1945  Age: 71 y.o. Sex: female    DOA: 5/12/2018 LOS:  LOS: 5 days                    Subjective:     Pt reports dyspnea continues to improve. Reports an increase in productive cough- awaiting respiratory sputum culture recollection. Reports slight improvement in mild left upper chest musculoskeletal tenderness, transdermal Lidocaine patch ordered today for further pain control. Pt had an episode of chest pain with radiation to left arm and hand yesterday while working with physical therapy which resolved with rest. EKG taken shortly after chest pain showed NSR. Continues on 2L NC- discussed with the primary nurse today about attempting to wean off oxygen. Tolerating Brovana 15 mcg neb BID and Pulmicort per pulmonary recommendations. Pt transitioned from IV steroids to PO prednisone 40 mg daily after discussion with Dr. Adam Pandey with potential discharge tomorrow. Denies headache, abdominal pain, N/V/D, chills, night sweats, or dysphagia. BLE peripheral edema and dyspnea improved with Lasix 40 mg PO daily. Cr continues to demonstrate slight improvement. Per cardiology Dr. Pallavi Jones pt will eventually need a stress test once breathing improves. CT Chest w/o contrast 5/13/18  IMPRESSION: Chronic obstructive pulmonary disease with features primarily of emphysema. Bronchial thickening with bronchial luminal filling in the basal zones. Bronchitis with mucous plugging versus aspiration. Findings suggesting acute congestive heart failure or pneumocystis pneumonia are not evident. Hospital course  \"Reshma Barajas is a 71 y. o. female with a history of HIV, who presents to the ED via EMS with complaint of shortness of breath and wheezing which began yesterday.  Patient stated that her shortness of breath has been gradually worsening.      She notes that she was prescribed Advair, but has been unable to take it due to throat irritation .      She reports associated productive cough, chest pain, and bilateral feet swelling over the past few days. BNP on admission within normal range Patient denies history of CHF.      Per EMS, patient was given 2 albuterol treatments en route to the ED. She currently smokes 0.5ppd. Patient denies recent fever, chills, nausea, vomiting, abdominal pain, or back pain. She makes no further complaints.     Pt has been f/u ID at [facility]  Dr. Toyin Flowers [phone] she has recent change in her med she has been on current med for 3 month due to f/u with her in July  Pt seen by cardiology started on Cardizem 30 mg qid troponin I is negative . Pt still c/o chest pain she has localized tenderness  Pulmonary consult is pending discussed with Dr Carloz Patel yesterday\"    Chief Complaint:   Chief Complaint   Patient presents with    Shortness of Breath       Review of systems  GENERAL: Patient alert, awake and oriented times 3, able to communicate full sentences and not in distress. HEENT: No change in vision, earache, + tinnitus in left ear, sore throat or sinus congestion. NECK: No pain or stiffness. CARDIOVASCULAR: No current pain or palpations. PULMONARY: Shortness of breath improved, + productive cough. GASTROINTESTINAL: No abdominal pain, nausea, vomiting or diarrhea, melena or bright red blood per rectum. GENITOURINARY: No urinary frequency, urgency, hesitancy or dysuria. MUSCULOSKELETAL: + mild left upper chest MSK pain, no back pain, no recent trauma. Swelling lower extremities resoved   DERMATOLOGIC: No rash, no itching, no lesions. ENDOCRINE: No polyuria, polydipsia, no heat . Positive intolerance to cold intolerance. No recent change in weight. HEMATOLOGICAL: No anemia or easy bruising or bleeding.    NEUROLOGIC: No headache, seizures, numbness  Feet and toes, generalized weakness    PSYCHIATRIC: H/O  Depression, and  anxiety,  No other mood disorder, no loss of interest in normal activity or change in sleep pattern.       Objective:     Physical Exam:  Visit Vitals    /83 (BP 1 Location: Left arm, BP Patient Position: At rest)    Pulse 80    Temp 98.5 °F (36.9 °C)    Resp 20    Ht 5' 2.99\" (1.6 m)    Wt 77.1 kg (170 lb)    SpO2 96%    BMI 30.12 kg/m2      General:  Alert, cooperative, no acute distress, talking comfortably in full sentences   Head:  Normocephalic, without obvious abnormality, atraumatic. Eyes:  Conjunctivae/corneas clear. PERRL, EOMs intact. Nose: Nares normal. No drainage or sinus tenderness. Throat: Lips, mucosa, and tongue moist, no signs of yeast today   Neck: Supple, symmetrical, trachea midline, no adenopathy, thyroid: no enlargement/tenderness/nodules, no carotid bruit and no JVD. Lungs:   Fine end expiratory wheezes bilaterally    Chest wall:  Positive tenderness midaxillary and Lt chest wall improved   Heart:  Regular rate and rhythm, S1, S2 normal, no murmur, click, rub or gallop. Abdomen: Soft, non-tender. Bowel sounds normal. No masses,  No organomegaly. Extremities: Extremities normal, atraumatic, trace edema lower extremities improved   Pulses: 2+ and symmetric all extremities. Skin: Skin color, texture,  Dry skin   Neurologic: CNII-XII intact.  No focal motor or sensory deficit.             Intake and Output:  Current Shift:     Last three shifts:  05/15 1901 - 05/17 0700  In: 120 [P.O.:120]  Out: 300 [Urine:300]    Labs: Results:       Chemistry Recent Labs      05/16/18   0440  05/15/18   0425   GLU  133*  144*   NA  137  140   K  4.4  3.9   CL  102  102   CO2  27  30   BUN  33*  39*   CREA  1.31*  1.52*   CA  8.3*  8.9   AGAP  8  8   BUCR  25*  26*   AP  88  98   TP  6.5  7.0   ALB  2.4*  2.7*   GLOB  4.1*  4.3*   AGRAT  0.6*  0.6*      CBC w/Diff Recent Labs      05/16/18   0440  05/15/18   0425   WBC  6.9  7.7   RBC  3.97*  3.98*   HGB  13.8  13.5   HCT  40.6  40.2   PLT  257  296   GRANS  87*  88*   LYMPH  7*  7*   EOS  0  0 Cardiac Enzymes No results for input(s): CPK, CKND1, MARY in the last 72 hours. No lab exists for component: CKRMB, TROIP   Coagulation No results for input(s): PTP, INR, APTT in the last 72 hours. No lab exists for component: INREXT, INREXT    Lipid Panel Lab Results   Component Value Date/Time    Cholesterol, total 204 (H) 05/13/2018 02:00 AM    HDL Cholesterol 105 (H) 05/13/2018 02:00 AM    LDL, calculated 86.4 05/13/2018 02:00 AM    VLDL, calculated 12.6 05/13/2018 02:00 AM    Triglyceride 63 05/13/2018 02:00 AM    CHOL/HDL Ratio 1.9 05/13/2018 02:00 AM      BNP No results for input(s): BNPP in the last 72 hours.    Liver Enzymes Recent Labs      05/16/18   0440   TP  6.5   ALB  2.4*   AP  88   SGOT  13*      Thyroid Studies Lab Results   Component Value Date/Time    TSH 0.10 (L) 05/14/2018 03:39 AM          Procedures/imaging: see electronic medical records for all procedures/Xrays and details which were not copied into this note but were reviewed prior to creation of Plan    Medications:   Current Facility-Administered Medications   Medication Dose Route Frequency    lidocaine (SALONPAS/ASPERCREME) 4 % patch 1 Patch  1 Patch TransDERmal Q24H    predniSONE (DELTASONE) tablet 40 mg  40 mg Oral DAILY WITH BREAKFAST    hydrALAZINE (APRESOLINE) tablet 10 mg  10 mg Oral TID    pravastatin (PRAVACHOL) tablet 10 mg  10 mg Oral QHS    dilTIAZem CD (CARDIZEM CD) capsule 120 mg  120 mg Oral DAILY    traMADol (ULTRAM) tablet 50 mg  50 mg Oral Q6H PRN    furosemide (LASIX) tablet 40 mg  40 mg Oral DAILY    budesonide (PULMICORT) 500 mcg/2 ml nebulizer suspension  500 mcg Nebulization BID RT    famotidine (PEPCID) tablet 20 mg  20 mg Oral DAILY    aspirin chewable tablet 81 mg  81 mg Oral DAILY    albuterol-ipratropium (DUO-NEB) 2.5 MG-0.5 MG/3 ML  3 mL Nebulization Q4H PRN    arformoterol (BROVANA) neb solution 15 mcg  15 mcg Nebulization BID RT    fluconazole (DIFLUCAN) 200mg/100 mL IVPB (premix) 200 mg IntraVENous DAILY    sertraline (ZOLOFT) tablet 25 mg  25 mg Oral DAILY    fluticasone (FLONASE) 50 mcg/actuation nasal spray 2 Spray  2 Spray Both Nostrils DAILY    heparin (porcine) injection 5,000 Units  5,000 Units SubCUTAneous Q8H    abacavir-dolutegravir-lamiVUDine (TRIUMEQ) 600- mg tablet 1 Tab  1 Tab Oral DAILY    darunavir-cobicistat (PREZCOBIX) 800-150 mg-mg per tablet 1 Tab (Patient Supplied)  1 Tab Oral DAILY    hydrALAZINE (APRESOLINE) 20 mg/mL injection 20 mg  20 mg IntraVENous Q6H PRN       Assessment/Plan     Principal Problem:    COPD exacerbation (HCC) (5/12/2018)    Active Problems:    HIV (human immunodeficiency virus infection) ([facility] [address] (5/12/2018)      Anxiety (5/12/2018)      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 Moderate sputum production reported. Will send today. Recheck cardiac enzymes negative   Cardiology consult, Dr. Arcos December following   Swallow evaluation 5/15/18- recommended regular solid diet with thin liquids and aspiration precautions  Ct of chest w/o contrast 5/13/18 showed evidence of Chronic obstructive pulmonary disease with features primarily of emphysema. Continue PT/OT- maintain fall precautions   Tolerating OOB to chair with assistance   Wean off O2 as tolerated.  If unable to be weaned, may assess home O2 need per pulmonary recommendations.      Chronic diastolic heart failure/ Hypokalemia  Lasix 40 mg PO daily  Monitor K+, normal 4.4 on 5/16/18, recheck CMP tomorrow   Monitor kidney function- BUN/Creatinine improving   Cardiology consult, Dr. Cason Pill following- recommends f/u in office 1-2 weeks after discharge with eventual stress test.     Hypertension  BP elevated - started on scheduled hydralazine 10 mg TID PO daily 5/17/18  Cardizem  mg daily per cardiology   Hydralazine 20 mg IV q6hr PRN SBP above 170    Acute renal insufficiency improving; suspected baseline CKD stage 3, Cr 1.3 as outpatient (only 1 value available to review)  Lasix 40 mg PO daily per cardiology   Monitor kidney function, repeat CMP tomorrow   Monitor BP- hydralazine PRN SBP above 170     HIV infection  Continue current treatment with Prezcobix and Triumeq  Viral load undetectable and CD4 count 213  F/u ID as outpatient with Dr. Charly Augustine     Anxiety/ insomnia  Continue Zoloft 25 mg daily     Seasonal allergies  Flonase nasal spray      Atypical Chest pain/Swelling lower extremities   Lidocaine transdermal patch started 5/17/18  BNP normal  Venous duplex U/S B/L leg negative   Tramadol 50 mg PO q6hr PRN pain started 5/15/18 by cardiology, Norco d/ulices. ASA 81 mg PO daily   ECHO 5/12/18- EF 55-60%, mild regurgitation of tricuspid valve noted. Subclinical Hyperthyroidism - TSH 0.10 and FT4 normal  Thyroid US 5/14/18:  1. Probable tiny right thyroid cysts measuring up to 2 mm. Otherwise, unremarkable exam  Recheck thyroid function in 3-4 weeks. ASCVD risk 23.37% 10yr and 50.2% lifetime  Added low dose statin therapy, pravachol 10mg qhs as prezcobix may increase statin levels  Monitor LFTs    F/U CBC, CMP tomorrow    DVT/GI Prophylaxis:Heparin  H2B/PPI and pepcid        MICHAEL Isaac  5/17/2018 9:06 AM      I performed an independent history and physical exam on patient. I have reviewed the students note above and made changes where appropriate. Agree with assessment and plan.       Regan Cabrera MD  5/17/18 1342pm

## 2018-05-17 NOTE — PROGRESS NOTES
Problem: Mobility Impaired (Adult and Pediatric)  Goal: *Acute Goals and Plan of Care (Insert Text)  Physical Therapy Goals  Initiated 5/15/2018 and to be accomplished within 7 day(s)  1. Patient will move from supine to sit and sit to supine , scoot up and down and roll side to side in bed with supervision/set-up. 2.  Patient will transfer from bed to chair and chair to bed with supervision/set-up using the least restrictive device. 3.  Patient will perform sit to stand with supervision/set-up. 4.  Patient will ambulate with supervision/set-up for >100 feet with the least restrictive device. Outcome: Progressing Towards Goal  physical Therapy TREATMENT    Patient: Kyle Khan (76 y.o. female)  Date: 5/17/2018  Diagnosis: COPD exacerbation (HCC) COPD exacerbation (HCC)  Precautions: Fall   Chart, physical therapy assessment, plan of care and goals were reviewed. OBJECTIVE/ ASSESSMENT:  Pt found supine in bed willing to work with PT. Pt seen with OT to maximize safety of pt and staff. Pt requires encouragement to participate but ultimately agrees to ambulate. Pt sat at EOB then stood to RW. Pt requires VCs for safety as she pulls up on RW with both UEs. Pt then ambulated to bathroom and declines washing hands at sink. Pt returned to EOB despite encouragement. O2 sats were taken after about 1 minute of rest at EOB and found to be 100%. Pt returned to supine despite instructions to attempt standing marches. Pt agrees to attempt dressing at EOB with FITZPATRICK and returns supine immediately after. O2 sats are 96% at this time. Pt left supine in bed with needs in reach. Pt was on 1LO2 via nasal cannula throughout tx. Education: transfers, gait.   Progression toward goals:  []      Improving appropriately and progressing toward goals  [x]      Improving slowly and progressing toward goals  []      Not making progress toward goals and plan of care will be adjusted     PLAN:  Patient continues to benefit from skilled intervention to address the above impairments. Continue treatment per established plan of care. Attempt SPC for ambulation. Discharge Recommendations:  Home Health  Further Equipment Recommendations for Discharge:  Pt has walker and cane. SUBJECTIVE:   Patient stated It hurts right in the middle.     OBJECTIVE DATA SUMMARY:   Critical Behavior:  Neurologic State: Alert  Orientation Level: Oriented X4  Cognition: Follows commands  Functional Mobility Training:  Bed Mobility:  Supine to Sit: Stand-by assistance  Sit to Supine: Supervision  Scooting: Stand-by assistance; Additional time  Transfers:  Sit to Stand: Stand-by assistance  Stand to Sit: Stand-by assistance  Balance:  Sitting: Intact  Standing: Impaired; With support  Standing - Static: Good  Standing - Dynamic : Fair  Ambulation/Gait Training:  Distance (ft): 20 Feet (ft)  Assistive Device: Walker, rolling  Ambulation - Level of Assistance: Stand-by assistance;Supervision  Gait Abnormalities: Decreased step clearance  Base of Support: Widened;Center of gravity altered  Speed/Maira: Slow  Step Length: Left shortened;Right shortened  Interventions: Verbal cues    Pain:  Pre tx pain: not rated  Post tx pain: 9  Pain Scale 1: Numeric (0 - 10)  Pain Intensity 1: 9  Pain Location 1: Chest  Pain Orientation 1: Mid  Activity Tolerance:   Fair  Please refer to the flowsheet for vital signs taken during this treatment. After treatment:   [] Patient left in no apparent distress sitting up in chair  [x] Patient left in no apparent distress in bed  [x] Call bell left within reach  [] Nursing notified  [x] Family present  [] Bed alarm activated  [] SCDs applied  [] Ice applied      Justen Snider PTA   Time Calculation: 27 mins    Mobility [id number] Current  CI= 1-19%. The severity rating is based on the Level of Assistance required for Functional Mobility and ADLs. Mobility   Goal  CI= 1-19%.   The severity rating is based on the Level of Assistance required for Functional Mobility and ADLs.

## 2018-05-18 PROBLEM — R07.89 CHEST PAIN, ATYPICAL: Status: ACTIVE | Noted: 2018-05-18

## 2018-05-18 PROBLEM — I50.33 DIASTOLIC CHF, ACUTE ON CHRONIC (HCC): Status: ACTIVE | Noted: 2018-05-18

## 2018-05-18 LAB
ALBUMIN SERPL-MCNC: 2.5 G/DL (ref 3.4–5)
ALBUMIN/GLOB SERPL: 0.7 {RATIO} (ref 0.8–1.7)
ALP SERPL-CCNC: 89 U/L (ref 45–117)
ALT SERPL-CCNC: 65 U/L (ref 13–56)
ANION GAP SERPL CALC-SCNC: 3 MMOL/L (ref 3–18)
AST SERPL-CCNC: 21 U/L (ref 15–37)
BACTERIA SPEC CULT: NORMAL
BACTERIA SPEC CULT: NORMAL
BASOPHILS # BLD: 0 K/UL (ref 0–0.1)
BASOPHILS NFR BLD: 0 % (ref 0–2)
BILIRUB SERPL-MCNC: 0.6 MG/DL (ref 0.2–1)
BUN SERPL-MCNC: 32 MG/DL (ref 7–18)
BUN/CREAT SERPL: 24 (ref 12–20)
CALCIUM SERPL-MCNC: 8.4 MG/DL (ref 8.5–10.1)
CHLORIDE SERPL-SCNC: 105 MMOL/L (ref 100–108)
CO2 SERPL-SCNC: 32 MMOL/L (ref 21–32)
CREAT SERPL-MCNC: 1.33 MG/DL (ref 0.6–1.3)
DIFFERENTIAL METHOD BLD: ABNORMAL
EOSINOPHIL # BLD: 0 K/UL (ref 0–0.4)
EOSINOPHIL NFR BLD: 0 % (ref 0–5)
ERYTHROCYTE [DISTWIDTH] IN BLOOD BY AUTOMATED COUNT: 13.1 % (ref 11.6–14.5)
GLOBULIN SER CALC-MCNC: 3.6 G/DL (ref 2–4)
GLUCOSE SERPL-MCNC: 141 MG/DL (ref 74–99)
HCT VFR BLD AUTO: 39.6 % (ref 35–45)
HGB BLD-MCNC: 13.3 G/DL (ref 12–16)
LYMPHOCYTES # BLD: 0.4 K/UL (ref 0.9–3.6)
LYMPHOCYTES NFR BLD: 5 % (ref 21–52)
MCH RBC QN AUTO: 33.9 PG (ref 24–34)
MCHC RBC AUTO-ENTMCNC: 33.6 G/DL (ref 31–37)
MCV RBC AUTO: 101 FL (ref 74–97)
MONOCYTES # BLD: 0.5 K/UL (ref 0.05–1.2)
MONOCYTES NFR BLD: 7 % (ref 3–10)
NEUTS SEG # BLD: 6 K/UL (ref 1.8–8)
NEUTS SEG NFR BLD: 88 % (ref 40–73)
PLATELET # BLD AUTO: 250 K/UL (ref 135–420)
PMV BLD AUTO: 9.6 FL (ref 9.2–11.8)
POTASSIUM SERPL-SCNC: 4.2 MMOL/L (ref 3.5–5.5)
PROT SERPL-MCNC: 6.1 G/DL (ref 6.4–8.2)
RBC # BLD AUTO: 3.92 M/UL (ref 4.2–5.3)
SERVICE CMNT-IMP: NORMAL
SERVICE CMNT-IMP: NORMAL
SODIUM SERPL-SCNC: 140 MMOL/L (ref 136–145)
WBC # BLD AUTO: 6.8 K/UL (ref 4.6–13.2)

## 2018-05-18 PROCEDURE — 74011250637 HC RX REV CODE- 250/637: Performed by: INTERNAL MEDICINE

## 2018-05-18 PROCEDURE — 94640 AIRWAY INHALATION TREATMENT: CPT

## 2018-05-18 PROCEDURE — 74011250637 HC RX REV CODE- 250/637: Performed by: FAMILY MEDICINE

## 2018-05-18 PROCEDURE — 85025 COMPLETE CBC W/AUTO DIFF WBC: CPT | Performed by: FAMILY MEDICINE

## 2018-05-18 PROCEDURE — 80053 COMPREHEN METABOLIC PANEL: CPT | Performed by: FAMILY MEDICINE

## 2018-05-18 PROCEDURE — 36415 COLL VENOUS BLD VENIPUNCTURE: CPT | Performed by: FAMILY MEDICINE

## 2018-05-18 PROCEDURE — 74011250637 HC RX REV CODE- 250/637: Performed by: NURSE PRACTITIONER

## 2018-05-18 PROCEDURE — 65660000000 HC RM CCU STEPDOWN

## 2018-05-18 PROCEDURE — 74011636637 HC RX REV CODE- 636/637: Performed by: FAMILY MEDICINE

## 2018-05-18 RX ORDER — DILTIAZEM HYDROCHLORIDE 120 MG/1
120 CAPSULE, COATED, EXTENDED RELEASE ORAL DAILY
Qty: 30 CAP | Refills: 1 | Status: SHIPPED | OUTPATIENT
Start: 2018-05-18 | End: 2018-05-19

## 2018-05-18 RX ORDER — FUROSEMIDE 40 MG/1
40 TABLET ORAL DAILY
Qty: 30 TAB | Refills: 0 | Status: ON HOLD | OUTPATIENT
Start: 2018-05-18 | End: 2018-06-01

## 2018-05-18 RX ORDER — HYDRALAZINE HYDROCHLORIDE 10 MG/1
10 TABLET, FILM COATED ORAL 3 TIMES DAILY
Qty: 90 TAB | Refills: 0 | Status: SHIPPED | OUTPATIENT
Start: 2018-05-18 | End: 2018-06-01

## 2018-05-18 RX ORDER — BUDESONIDE AND FORMOTEROL FUMARATE DIHYDRATE 160; 4.5 UG/1; UG/1
2 AEROSOL RESPIRATORY (INHALATION) 2 TIMES DAILY
Qty: 1 INHALER | Refills: 0 | Status: SHIPPED | OUTPATIENT
Start: 2018-05-18 | End: 2018-06-04 | Stop reason: ALTCHOICE

## 2018-05-18 RX ORDER — ALBUTEROL SULFATE 0.83 MG/ML
2.5 SOLUTION RESPIRATORY (INHALATION)
Qty: 24 EACH | Refills: 2 | Status: SHIPPED | OUTPATIENT
Start: 2018-05-18 | End: 2019-09-19 | Stop reason: SDUPTHER

## 2018-05-18 RX ORDER — DILTIAZEM HYDROCHLORIDE 180 MG/1
180 CAPSULE, COATED, EXTENDED RELEASE ORAL DAILY
Status: DISCONTINUED | OUTPATIENT
Start: 2018-05-18 | End: 2018-05-19 | Stop reason: HOSPADM

## 2018-05-18 RX ORDER — GUAIFENESIN 100 MG/5ML
81 LIQUID (ML) ORAL DAILY
Qty: 30 TAB | Refills: 0 | Status: SHIPPED | OUTPATIENT
Start: 2018-05-18

## 2018-05-18 RX ORDER — TRAMADOL HYDROCHLORIDE 50 MG/1
50 TABLET ORAL
Qty: 28 TAB | Refills: 0 | Status: SHIPPED | OUTPATIENT
Start: 2018-05-18 | End: 2018-10-19

## 2018-05-18 RX ORDER — PREDNISONE 10 MG/1
TABLET ORAL
Qty: 18 TAB | Refills: 0 | Status: SHIPPED | OUTPATIENT
Start: 2018-05-18 | End: 2018-06-01

## 2018-05-18 RX ORDER — DILTIAZEM HYDROCHLORIDE 180 MG/1
180 CAPSULE, COATED, EXTENDED RELEASE ORAL DAILY
Qty: 30 CAP | Refills: 1 | Status: SHIPPED | OUTPATIENT
Start: 2018-05-18 | End: 2018-07-02 | Stop reason: SDUPTHER

## 2018-05-18 RX ORDER — FAMOTIDINE 20 MG/1
20 TABLET, FILM COATED ORAL DAILY
Qty: 30 TAB | Refills: 0 | Status: SHIPPED | OUTPATIENT
Start: 2018-05-18 | End: 2018-10-19

## 2018-05-18 RX ADMIN — ASPIRIN 81 MG 81 MG: 81 TABLET ORAL at 09:32

## 2018-05-18 RX ADMIN — BUDESONIDE AND FORMOTEROL FUMARATE DIHYDRATE 2 PUFF: 160; 4.5 AEROSOL RESPIRATORY (INHALATION) at 08:02

## 2018-05-18 RX ADMIN — FLUTICASONE PROPIONATE 2 SPRAY: 50 SPRAY, METERED NASAL at 09:28

## 2018-05-18 RX ADMIN — ABACAVIR SULFATE, DOLUTEGRAVIR SODIUM, LAMIVUDINE 1 TABLET: 600; 50; 300 TABLET, FILM COATED ORAL at 09:29

## 2018-05-18 RX ADMIN — PREDNISONE 40 MG: 20 TABLET ORAL at 09:31

## 2018-05-18 RX ADMIN — SERTRALINE HYDROCHLORIDE 25 MG: 50 TABLET ORAL at 09:32

## 2018-05-18 RX ADMIN — PRAVASTATIN SODIUM 10 MG: 20 TABLET ORAL at 21:40

## 2018-05-18 RX ADMIN — TIOTROPIUM BROMIDE 18 MCG: 18 CAPSULE ORAL; RESPIRATORY (INHALATION) at 08:00

## 2018-05-18 RX ADMIN — FAMOTIDINE 20 MG: 20 TABLET ORAL at 09:32

## 2018-05-18 RX ADMIN — DILTIAZEM HYDROCHLORIDE 180 MG: 180 CAPSULE, COATED, EXTENDED RELEASE ORAL at 09:32

## 2018-05-18 RX ADMIN — FUROSEMIDE 40 MG: 40 TABLET ORAL at 09:31

## 2018-05-18 RX ADMIN — TRAMADOL HYDROCHLORIDE 50 MG: 50 TABLET, FILM COATED ORAL at 18:58

## 2018-05-18 NOTE — DISCHARGE SUMMARY
Discharge Summary     Patient: Renee Reis MRN: [id number]  SSN: [id number]    YOB: 1945  Age: 71 y.o. 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 o. female with a history of HIV, who presents to the ED via EMS with complaint of shortness of breath and wheezing which got worse  one day before admission. Patient stated that her shortness of breath has been gradually worsening.       She notes that she was prescribed Advair, but has been unable to take it due to throat irritation .       She reports associated productive cough, chest pain, and bilateral feet swelling over the past few days.  BNP on admission within normal range Patient denies history of CHF.      Per EMS, patient was given 2 albuterol treatments en route to the ED. She currently smokes 0.5ppd. Patient denies recent fever, chills, nausea, vomiting, abdominal pain, or back pain.  She makes no further complaints.      Pt has been f/u ID at [address]  Dr. Pawel White [phone] she has recent change in her med she has been on current med for 3 month due to f/u with her in July  Pt seen by cardiology started on Cardizem 30 mg qid troponin I is negative . Pt still c/o chest pain she has localized tenderness cleared by cardiology f/u as out patient might need stress test  CT Chest w/o contrast 5/13/18  IMPRESSION: Chronic obstructive pulmonary disease with features primarily of emphysema. Bronchial thickening with bronchial luminal filling in the basal zones. Bronchitis with mucous plugging versus aspiration. Findings suggesting acute congestive heart failure or pneumocystis pneumonia are not evident. Pt was treated with Levaquin for 5 days pt started on solumedrol switched to oral prednisone 5/17 pt  Has been stable on current med cleared by pulmonary to go home and f/u as out patient pt also finished diflucan 200 mg x 5 days   Pt had swallow evaluation done recommended Regular diet thin liquid  With aspiration precautions    respiratory culture done yesterday result is pending will f/u as outpatient    Pt had echo   Left ventricle: Systolic function was normal by visual assessment. Ejection fraction was estimated in the range of 55 %  to 60 %. No obvious wall motion abnormalities identified in the views   Obtained. Pt started on Cardizem by cardiology  And hydralazine was added to control Bp pt has been stable with current medication will continue current med and f/u as out patient will get home health to monitor Bp vitals and Bp    Discussed with nursing staff willl wean off o2 by NC and check pulse oximetry with activity before discharge to make sure pt doesn't need o2 at home    Liver enzymes started to go up before discharge will hold pravachol since she just started on It for now repeat lab as out patient       Consults: Cardiology and Pulmonary/Critical Care        Physical Examination:   General:  Alert, cooperative, no acute distress, talking comfortably in full sentences   Head:  Normocephalic, without obvious abnormality, atraumatic.    Eyes: Conjunctivae/corneas clear. PERRL, EOMs intact. Nose: Nares normal. No drainage or sinus tenderness. Throat: Lips, mucosa, and tongue moist, no signs of yeast today   Neck: Supple, symmetrical, trachea midline, no adenopathy, thyroid: no enlargement/tenderness/nodules, no carotid bruit and no JVD. Lungs:   Fine end expiratory wheezes bilaterally    Chest wall:  Positive tenderness midaxillary and Lt chest wall improved   Heart:  Regular rate and rhythm, S1, S2 normal, no murmur, click, rub or gallop.     Abdomen: Soft, non-tender. Bowel sounds normal. No masses,  No organomegaly. Extremities: Extremities normal, atraumatic, trace edema lower extremities improved   Pulses: 2+ and symmetric all extremities. Skin: Skin color, texture,  Dry skin   Neurologic: CNII-XII intact. No focal motor or sensory deficit.             Disposition: home health    Discharge Medications:   Current Discharge Medication List      START taking these medications    Details   albuterol (PROVENTIL VENTOLIN) 2.5 mg /3 mL (0.083 %) nebulizer solution 3 mL by Nebulization route every four (4) hours as needed for Wheezing. Qty: 24 Each, Refills: 2      aspirin 81 mg chewable tablet Take 1 Tab by mouth daily. Qty: 30 Tab, Refills: 0      budesonide-formoterol (SYMBICORT) 160-4.5 mcg/actuation HFAA Take 2 Puffs by inhalation two (2) times a day. Qty: 1 Inhaler, Refills: 0      !! dilTIAZem CD (CARDIZEM CD) 120 mg ER capsule Take 1 Cap by mouth daily. Qty: 30 Cap, Refills: 1      famotidine (PEPCID) 20 mg tablet Take 1 Tab by mouth daily. Qty: 30 Tab, Refills: 0      furosemide (LASIX) 40 mg tablet Take 1 Tab by mouth daily. Qty: 30 Tab, Refills: 0      predniSONE (DELTASONE) 10 mg tablet 3 tab po daily for 3 days then  2 tab po daily for 3 days then   1 tab po daily for 3 days  Qty: 18 Tab, Refills: 0      tiotropium (SPIRIVA) 18 mcg inhalation capsule Take 1 Cap by inhalation daily.   Qty: 30 Cap, Refills: 0      hydrALAZINE (APRESOLINE) 10 mg tablet Take 1 Tab by mouth three (3) times daily. Hold for SBP less than 110  Qty: 90 Tab, Refills: 0      traMADol (ULTRAM) 50 mg tablet Take 1 Tab by mouth every six (6) hours as needed. Max Daily Amount: 200 mg. Qty: 28 Tab, Refills: 0    Associated Diagnoses: Chest pain, atypical      !! dilTIAZem CD (CARDIZEM CD) 180 mg ER capsule Take 1 Cap by mouth daily. Qty: 30 Cap, Refills: 1       !! - Potential duplicate medications found. Please discuss with provider. CONTINUE these medications which have NOT CHANGED    Details   fluticasone (FLONASE) 50 mcg/actuation nasal spray 2 Sprays by Both Nostrils route daily. darunavir-cobicistat (PREZCOBIX) 800-150 mg-mg per tablet Take 1 Tab by mouth daily. abacavir-dolutegravir-lamiVUDine (TRIUMEQ) tablet Take  by mouth daily. sertraline (ZOLOFT) 25 mg tablet Take  by mouth daily. STOP taking these medications       mirtazapine (REMERON) 30 mg tablet Comments:   Reason for Stopping:               Activity: Activity as tolerated  Diet: Cardiac Diet  Wound Care: None needed    Follow-up Appointments   Procedures    FOLLOW UP VISIT Appointment in: 3 - 5 Days F/U Dr Miguelito Rosen within one week cbc, cmp, mag f/u sputum culture  . Hold Pravachol until f/u liver function as outpatient . F/u Cardiology Dr Chaitanya Hanna in 2 week f/u Dr Bety Jenkins in 2 weeks f/u. .. F/U Dr Miguelito Rosen within one week cbc, cmp, mag tsh and free t4  f/u sputum culture  . Hold Pravachol until f/u liver function as   outpatient . F/u Cardiology Dr Chaitanya Hanna in 2 week f/u Dr Bety Jenkins in 2 weeks f/u ID Dr Richard Maguire in [facility] as directed.  Home health for pt and ot medication management monitor vitals BP and pulse oximetry     Standing Status:   Standing     Number of Occurrences:   1     Order Specific Question:   Appointment in     Answer:   3 - 5 Days     Discharge time more than 35 minutes    Signed By: Luther Bell MD     May 18, 2018

## 2018-05-18 NOTE — PROGRESS NOTES
[facility]      CARDIOLOGY PROGRESS NOTE  RECS:      1. Congestive heart failure- acute on chronic diastolic CHF- improving well. on asa, not on bb due to severe COPD. Lasix 40 mg daily . 2. Hypertension- controlled  continue Cardizem 30 mg q 6 hours changed to daily 120 mg .     3. Wheezing shortness of breath fever and productive cough: Likely COPD exacerbation. Workup and treatment per medicine also Pulmonary following   4. Chest pain: Atypical, constant for 3-4 months. Unlikely to be ischemic. C/o chest pain today no nausea or diaphoresis. Lasted for couple seconds. Refused NUC stress test today. But will will consider to have it as outpatient   5. Tobacco abuse-Patient advised to stop smoking to reduce future cardiovascular events.       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) ICD-10-CM: C48.391  ICD-9-CM: V45.89  5/14/2018 Yes        * (Principal)COPD exacerbation ([facility] ICD-10-CM: J44.1  ICD-9-CM: 491.21  5/12/2018 Yes        HIV (human immunodeficiency virus infection) ([facility] (Chronic) ICD-10-CM: B20  ICD-9-CM: V08  5/12/2018 Yes        Anxiety ICD-10-CM: F41.9  ICD-9-CM: 300.00  5/12/2018 Yes                SUBJECTIVE:    CP-improved and occasional now  C/o CUEVAS and cough     OBJECTIVE:    VS:   Visit Vitals    /77 (BP 1 Location: Left arm, BP Patient Position: At rest)    Pulse 89    Temp 98.4 °F (36.9 °C)    Resp 20    Ht 5' 2.99\" (1.6 m)    Wt 73.3 kg (161 lb 8 oz)    SpO2 98%    BMI 28.62 kg/m2         Intake/Output Summary (Last 24 hours) at 05/18/18 1129  Last data filed at 05/18/18 0951   Gross per 24 hour   Intake              600 ml   Output              150 ml   Net              450 ml     TELE: normal sinus rhythm    General: alert, well developed, pleasant and in mild distress  HENT: Normocephalic, atraumatic. Normal external eye. Neck :  no bruit, no JVD  Cardiac:  regular rate and rhythm, S1, S2 normal, no S3 or S4, no click, no rub  Lungs: diminished breath sounds b/l. Abdomen: Soft, nontender, no masses  Extremities:  No edema , peripheral pulses present      Labs: Results:       Chemistry Recent Labs      05/18/18 0300 05/16/18 0440   GLU  141*  133*   NA  140  137   K  4.2  4.4   CL  105  102   CO2  32  27   BUN  32*  33*   CREA  1.33*  1.31*   CA  8.4*  8.3*   AGAP  3  8   BUCR  24*  25*   AP  89  88   TP  6.1*  6.5   ALB  2.5*  2.4*   GLOB  3.6  4.1*   AGRAT  0.7*  0.6*      CBC w/Diff Recent Labs      05/18/18 0300 05/16/18 0440   WBC  6.8  6.9   RBC  3.92*  3.97*   HGB  13.3  13.8   HCT  39.6  40.6   PLT  250  257   GRANS  88*  87*   LYMPH  5*  7*   EOS  0  0      Cardiac Enzymes No results for input(s): CPK, CKND1, MARY in the last 72 hours. No lab exists for component: CKRMB, TROIP   Coagulation No results for input(s): PTP, INR, APTT in the last 72 hours. No lab exists for component: INREXT, INREXT    Lipid Panel Lab Results   Component Value Date/Time    Cholesterol, total 204 (H) 05/13/2018 02:00 AM    HDL Cholesterol 105 (H) 05/13/2018 02:00 AM    LDL, calculated 86.4 05/13/2018 02:00 AM    VLDL, calculated 12.6 05/13/2018 02:00 AM    Triglyceride 63 05/13/2018 02:00 AM    CHOL/HDL Ratio 1.9 05/13/2018 02:00 AM      BNP No results for input(s): BNPP in the last 72 hours.    Liver Enzymes Recent Labs      05/18/18   0300   TP  6.1*   ALB  2.5*   AP  89   SGOT  21      Thyroid Studies Lab Results   Component Value Date/Time    TSH 0.10 (L) 05/14/2018 03:39 AM            CASE GuptaC supervised    I have independently evaluated and examined the patient. All relevant labs and testing data's are reviewed. Care plan discussed and updated after review.     Riya Conner MD

## 2018-05-18 NOTE — PROGRESS NOTES
This writer  Confirmed with patient that she does not have oxygen at home but does have bedside commode, walker, shower chair. Her great niece Edmund Montalvo cares for her every day. Provides her transportation as she does not drive.

## 2018-05-18 NOTE — ROUTINE PROCESS
TRANSFER - IN REPORT:    Verbal report received from [facility], [facility] on [facility]  being received from [employer] for routine progression of care      Report consisted of patients Situation, Background, Assessment and   Recommendations(SBAR). Information from the following report(s) SBAR, Kardex, Intake/Output, MAR and Recent Results was reviewed with the receiving nurse. Opportunity for questions and clarification was provided. Assessment completed upon patients arrival to unit and care assumed.

## 2018-05-18 NOTE — ROUTINE PROCESS
Received report from [facility]. Pt AAOx3, NAD, breathing non labored, on O2 NC at 2L, HOB up. IV site clean, dry and intact. Bed at the lowest level on lock position, call bell w/i reach. Bedside and Verbal shift change report given to AL Robbins (oncoming nurse) by me (offgoing nurse). Report included the following information SBAR, Kardex, Intake/Output, MAR and Recent Results.

## 2018-05-18 NOTE — PROGRESS NOTES
NUTRITION    Nutrition Screen     RECOMMENDATIONS / PLAN:     - No nutrition intervention indicated at this time. Will re-screen as appropriate. NUTRITION INTERVENTIONS & DIAGNOSIS:     Nutrition Diagnosis: No nutrition diagnosis at this time. ASSESSMENT:     Pt with good meal intake/appetite currently and PTA. Observed 95% meal intake at breakfast today. Tolerating diet. Stable weight hx. Denied having nutrition questions or concerns at this time. Average po intake adequate to meet patients estimated nutritional needs:   [x] Yes     [] No   [] Unable to determine at this time    Diet: DIET CARDIAC Regular      Food Allergies: NKFA  Current Appetite:   [x] Good     [] Fair     [] Poor     [] Other:  Appetite/meal intake prior to admission:   [x] Good     [] Fair     [] Poor     [] Other:  Feeding Limitations:  [] Swallowing difficulty    [] Chewing difficulty    [] Other:  Current Meal Intake: Patient Vitals for the past 100 hrs:   % Diet Eaten   05/18/18 0951 75 %   05/17/18 1300 50 %   05/17/18 0820 45 %   05/15/18 1043 100 %       BM:  5/15- loose  Skin Integrity: WDL  Edema: 1+ LEs  Pertinent Medications: Reviewed: lasix, steroid    Recent Labs      05/18/18   0300  05/16/18   0440   NA  140  137   K  4.2  4.4   CL  105  102   CO2  32  27   GLU  141*  133*   BUN  32*  33*   CREA  1.33*  1.31*   CA  8.4*  8.3*   MG   --   2.8*   ALB  2.5*  2.4*   SGOT  21  13*   ALT  65*  28       Intake/Output Summary (Last 24 hours) at 05/18/18 1213  Last data filed at 05/18/18 0951   Gross per 24 hour   Intake              600 ml   Output                0 ml   Net              600 ml       Anthropometrics:  Ht Readings from Last 1 Encounters:   05/17/18 5' 2.99\" (1.6 m)     Last 3 Recorded Weights in this Encounter    05/14/18 0648 05/17/18 0634 05/18/18 0426   Weight: 75 kg (165 lb 6.4 oz) 77.1 kg (170 lb) 73.3 kg (161 lb 8 oz)     Body mass index is 28.62 kg/(m^2).           Weight History: Pt reports stable weight hx PTA. Weight Metrics 5/18/2018 5/16/2013   Weight 161 lb 8 oz 156 lb   BMI 28.62 kg/m2 27.64 kg/m2        Admitting Diagnosis: COPD exacerbation (HCC)  Pertinent PMHx: HIV, CAD    Education Needs:        [x] None identified  [] Identified - Not appropriate at this time  []  Identified and addressed - refer to education log  Learning Limitations:   [x] None identified  [] Identified    Cultural, Caodaism & ethnic food preferences:  [x] None identified    [] Identified and addressed     ESTIMATED NUTRITION NEEDS:     Calories: 4292-5779 kcal (MSJx1.2-1.4) based on  [x] Actual BW: 73 kg      [] IBW   Protein: 58-73 gm (0.8-1 gm/kg) based on  [x] Actual BW      [] IBW   Fluid: 1 mL/kcal     MONITORING & EVALUATION:     Nutrition Goal(s):   1. Po intake of meals will meet >75% of patient estimated nutritional needs within the next 7 days.   Outcome:  [] Met/Ongoing    []  Not Met    [x] New/Initial Goal     Monitoring:   [x] Food and beverage intake   [x] Diet order   [x] Nutrition-focused physical findings   [x] Treatment/therapy   [] Weight   [] Enteral nutrition intake        Previous Recommendations (for follow-up assessments only):     []   Implemented       []   Not Implemented (RD to address)      [] No Longer Appropriate     [] No Recommendation Made     Discharge Planning: cardiac diet  [x] Participated in care planning, discharge planning, & interdisciplinary rounds as appropriate      Piter Mckeon RD   Pager: [phone]

## 2018-05-18 NOTE — PROGRESS NOTES
Problem: Falls - Risk of  Goal: *Absence of Falls  Document Abdias Fall Risk and appropriate interventions in the flowsheet.    Outcome: Progressing Towards Goal  Fall Risk Interventions:  Mobility Interventions: Assess mobility with egress test, Patient to call before getting OOB, PT Consult for mobility concerns, Strengthening exercises (ROM-active/passive)         Medication Interventions: Bed/chair exit alarm, Evaluate medications/consider consulting pharmacy, Patient to call before getting OOB    Elimination Interventions: Bed/chair exit alarm, Call light in reach, Patient to call for help with toileting needs

## 2018-05-18 NOTE — PROGRESS NOTES
Progress Note    Patient: Nakita Sommer MRN: [id number]  CSN: [id number]    YOB: 1945  Age: 71 y.o. Sex: female    DOA: 5/12/2018 LOS:  LOS: 6 days                    Subjective:     Pt feeling better but with activity pt c/o chest pain and SOB pt's O2 dropped to 88 % with activity for one minute  And had chest pain with elevated HR rate to 120 I discussed with Cardiology Dr Wilmer Grimes will keep pt NPO and check if she can have stress test.  Discussed with nursing staff will hold discharge until cardiology clearance    CT Chest w/o contrast 5/13/18  IMPRESSION: Chronic obstructive pulmonary disease with features primarily of emphysema. Bronchial thickening with bronchial luminal filling in the basal zones. Bronchitis with mucous plugging versus aspiration. Findings suggesting acute congestive heart failure or pneumocystis pneumonia are not evident. Hospital course  \"Reshma Barajas is a 71 y. o. female with a history of HIV, who presents to the ED via EMS with complaint of shortness of breath and wheezing which began yesterday. Patient stated that her shortness of breath has been gradually worsening.      She notes that she was prescribed Advair, but has been unable to take it due to throat irritation .      She reports associated productive cough, chest pain, and bilateral feet swelling over the past few days. BNP on admission within normal range Patient denies history of CHF.      Per EMS, patient was given 2 albuterol treatments en route to the ED. She currently smokes 0.5ppd. Patient denies recent fever, chills, nausea, vomiting, abdominal pain, or back pain. She makes no further complaints.     Pt has been f/u ID at [facility]  Dr. Amparo Argueta [phone] she has recent change in her med she has been on current med for 3 month due to f/u with her in July  Pt seen by cardiology started on Cardizem 30 mg qid troponin I is negative .  Pt still c/o chest pain she has localized tenderness  Pulmonary consult is pending discussed with Dr Raj Fuentes yesterday\"    Chief Complaint:   Chief Complaint   Patient presents with    Shortness of Breath       Review of systems  GENERAL: Patient alert, awake and oriented times 3, able to communicate full sentences and not in distress. HEENT: No change in vision, earache, + tinnitus in left ear, sore throat or sinus congestion. NECK: No pain or stiffness. CARDIOVASCULAR: chest pain and tachycardia with activity  PULMONARY: Shortness of breath improved, + productive cough. GASTROINTESTINAL: No abdominal pain, nausea, vomiting or diarrhea, melena or bright red blood per rectum. GENITOURINARY: No urinary frequency, urgency, hesitancy or dysuria. MUSCULOSKELETAL: + mild left upper chest MSK pain, no back pain, no recent trauma. Swelling lower extremities resoved   DERMATOLOGIC: No rash, no itching, no lesions. ENDOCRINE: No polyuria, polydipsia, no heat . Positive intolerance to cold intolerance. No recent change in weight. HEMATOLOGICAL: No anemia or easy bruising or bleeding. NEUROLOGIC: No headache, seizures, numbness  Feet and toes, generalized weakness    PSYCHIATRIC: H/O  Depression, and  anxiety,  No other mood disorder, no loss of interest in normal activity or change in sleep pattern.       Objective:     Physical Exam:  Visit Vitals    BP (!) 157/95 (BP 1 Location: Right arm, BP Patient Position: At rest)    Pulse 86    Temp 98 °F (36.7 °C)    Resp 19    Ht 5' 2.99\" (1.6 m)    Wt 73.3 kg (161 lb 8 oz)    SpO2 97%    BMI 28.62 kg/m2      General:  Alert, cooperative, no acute distress, talking comfortably in full sentences   Head:  Normocephalic, without obvious abnormality, atraumatic. Eyes:  Conjunctivae/corneas clear. PERRL, EOMs intact. Nose: Nares normal. No drainage or sinus tenderness.    Throat: Lips, mucosa, and tongue moist, no signs of yeast today   Neck: Supple, symmetrical, trachea midline, no adenopathy, thyroid: no enlargement/tenderness/nodules, no carotid bruit and no JVD. Lungs:   Fine end expiratory wheezes bilaterally    Chest wall:  Positive tenderness midaxillary and Lt chest wall improved   Heart:  Regular rate and rhythm, S1, S2 normal, no murmur, click, rub or gallop. Abdomen: Soft, non-tender. Bowel sounds normal. No masses,  No organomegaly. Extremities: Extremities normal, atraumatic, trace edema lower extremities improved   Pulses: 2+ and symmetric all extremities. Skin: Skin color, texture,  Dry skin   Neurologic: CNII-XII intact. No focal motor or sensory deficit.             Intake and Output:  Current Shift:     Last three shifts:  05/16 1901 - 05/18 0700  In: 600 [P.O.:600]  Out: 350 [Urine:350]    Labs: Results:       Chemistry Recent Labs      05/18/18   0300 05/16/18   0440   GLU  141*  133*   NA  140  137   K  4.2  4.4   CL  105  102   CO2  32  27   BUN  32*  33*   CREA  1.33*  1.31*   CA  8.4*  8.3*   AGAP  3  8   BUCR  24*  25*   AP  89  88   TP  6.1*  6.5   ALB  2.5*  2.4*   GLOB  3.6  4.1*   AGRAT  0.7*  0.6*      CBC w/Diff Recent Labs      05/18/18   0300  05/16/18   0440   WBC  6.8  6.9   RBC  3.92*  3.97*   HGB  13.3  13.8   HCT  39.6  40.6   PLT  250  257   GRANS  88*  87*   LYMPH  5*  7*   EOS  0  0      Cardiac Enzymes No results for input(s): CPK, CKND1, MARY in the last 72 hours. No lab exists for component: CKRMB, TROIP   Coagulation No results for input(s): PTP, INR, APTT in the last 72 hours. No lab exists for component: INREXT, INREXT    Lipid Panel Lab Results   Component Value Date/Time    Cholesterol, total 204 (H) 05/13/2018 02:00 AM    HDL Cholesterol 105 (H) 05/13/2018 02:00 AM    LDL, calculated 86.4 05/13/2018 02:00 AM    VLDL, calculated 12.6 05/13/2018 02:00 AM    Triglyceride 63 05/13/2018 02:00 AM    CHOL/HDL Ratio 1.9 05/13/2018 02:00 AM      BNP No results for input(s): BNPP in the last 72 hours.    Liver Enzymes Recent Labs      05/18/18   0300   TP  6.1* ALB  2.5*   AP  89   SGOT  21      Thyroid Studies Lab Results   Component Value Date/Time    TSH 0.10 (L) 05/14/2018 03:39 AM          Procedures/imaging: see electronic medical records for all procedures/Xrays and details which were not copied into this note but were reviewed prior to creation of Plan    Medications:   Current Facility-Administered Medications   Medication Dose Route Frequency    dilTIAZem CD (CARDIZEM CD) capsule 180 mg  180 mg Oral DAILY    lidocaine (SALONPAS/ASPERCREME) 4 % patch 1 Patch  1 Patch TransDERmal Q24H    predniSONE (DELTASONE) tablet 40 mg  40 mg Oral DAILY WITH BREAKFAST    budesonide-formoterol (SYMBICORT) 160-4.5 mcg/actuation HFA inhaler 2 Puff  2 Puff Inhalation BID RT    tiotropium (SPIRIVA) inhalation capsule 18 mcg  1 Cap Inhalation DAILY    albuterol (PROVENTIL VENTOLIN) nebulizer solution 2.5 mg  2.5 mg Nebulization Q4H PRN    pravastatin (PRAVACHOL) tablet 10 mg  10 mg Oral QHS    traMADol (ULTRAM) tablet 50 mg  50 mg Oral Q6H PRN    furosemide (LASIX) tablet 40 mg  40 mg Oral DAILY    famotidine (PEPCID) tablet 20 mg  20 mg Oral DAILY    aspirin chewable tablet 81 mg  81 mg Oral DAILY    sertraline (ZOLOFT) tablet 25 mg  25 mg Oral DAILY    fluticasone (FLONASE) 50 mcg/actuation nasal spray 2 Spray  2 Spray Both Nostrils DAILY    heparin (porcine) injection 5,000 Units  5,000 Units SubCUTAneous Q8H    abacavir-dolutegravir-lamiVUDine (TRIUMEQ) 600- mg tablet 1 Tab  1 Tab Oral DAILY    darunavir-cobicistat (PREZCOBIX) 800-150 mg-mg per tablet 1 Tab (Patient Supplied)  1 Tab Oral DAILY    hydrALAZINE (APRESOLINE) 20 mg/mL injection 20 mg  20 mg IntraVENous Q6H PRN       Assessment/Plan     Principal Problem:    COPD exacerbation (HCC) (5/12/2018)    Active Problems:    HIV (human immunodeficiency virus infection) ([facility] [address] (5/12/2018)      Anxiety (5/12/2018)      S/P insertion of IVC (inferior vena caval) filter (5/74/5952)      Diastolic CHF, acute on chronic ([facility] [address] (5/18/2018)      Chest pain, atypical (5/18/2018)      COPD exacerbation  Levaquin completed 5/16/18  Solumedrol 40 mg q6hr d/ulices 5/17/18 and transitioned to Prednisone 40 mg PO daily today   Duo nebulizer treatment q4 prn  Dilfucan 200 mg IV daily empirically for 5 days completed today 5/17/18  Brovana 15 mcg neb BID and Pulimocort started 5/13/18   Continue bronchial hygiene with PEP device   Pulmonary consult, Dr Antonio Lal. Shyla Campo following   Awaiting recollection of respiratory sputum culture- previous sample not delivered. Moderate sputum production reported. Will send today. Recheck cardiac enzymes negative   Cardiology consult, Dr. Mehrdad Nava following   Swallow evaluation 5/15/18- recommended regular solid diet with thin liquids and aspiration precautions  Ct of chest w/o contrast 5/13/18 showed evidence of Chronic obstructive pulmonary disease with features primarily of emphysema. Continue PT/OT- maintain fall precautions   Case manger consult for o2 at home         Chronic diastolic heart failure/ Hypokalemia  Lasix 40 mg PO daily  Monitor K+, normal 4.4 on 5/16/18, recheck CMP tomorrow   Monitor kidney function- BUN/Creatinine improving   Cardiology consult, Dr. Mehrdad Nava following- recommends f/u in office 1-2 weeks after discharge with eventual stress test.     Hypertension   increase Cardizem   mg  .  Might need higher dose if Bp allow   D/C oral hydralazine  Continue IV hydralazine  prn dose    Acute renal insufficiency improving; suspected baseline CKD stage 3, Cr 1.3 as outpatient (only 1 value available to review)  Lasix 40 mg PO daily per cardiology   Monitor kidney function, repeat CMP tomorrow   Monitor BP- hydralazine PRN SBP above 170  Cr remains stable     HIV infection  Continue current treatment with Prezcobix and Triumeq  Viral load undetectable and CD4 count 213  F/u ID as outpatient with Dr. Stevan Loredo     Anxiety/ insomnia  Continue Zoloft 25 mg daily     Seasonal allergies  Flonase nasal spray      Atypical Chest pain/Swelling lower extremities   Lidocaine transdermal patch started 5/17/18  BNP normal  Venous duplex U/S B/L leg negative   Tramadol 50 mg PO q6hr PRN pain started 5/15/18 by cardiology, Norco d/ulices. ASA 81 mg PO daily   ECHO 5/12/18- EF 55-60%, mild regurgitation of tricuspid valve noted. Subclinical Hyperthyroidism - TSH 0.10 and FT4 normal  Thyroid US 5/14/18:  1. Probable tiny right thyroid cysts measuring up to 2 mm. Otherwise, unremarkable exam  Recheck thyroid function in 3-4 weeks.     ASCVD risk 23.37% 10yr and 50.2% lifetime  Added low dose statin therapy, pravachol 10mg qhs as prezcobix may increase statin levels   Liver function slightly up since started Pravachol will check stress test  Result if negative will hold Pravachol and f/u as outpatient     Discussed with Dr Eleonora Pena will evaluate for stress test today    Cbc, cmp In AM if still in the hospital    DVT/GI Prophylaxis:Heparin  H2B/PPI and pepcid        Nneka Valadez MD,   5/18/2018 7:55

## 2018-05-19 ENCOUNTER — HOME HEALTH ADMISSION (OUTPATIENT)
Dept: HOME HEALTH SERVICES | Facility: HOME HEALTH | Age: 73
End: 2018-05-19
Payer: MEDICARE

## 2018-05-19 VITALS
HEIGHT: 63 IN | BODY MASS INDEX: 28.77 KG/M2 | RESPIRATION RATE: 20 BRPM | SYSTOLIC BLOOD PRESSURE: 131 MMHG | DIASTOLIC BLOOD PRESSURE: 88 MMHG | HEART RATE: 90 BPM | WEIGHT: 162.4 LBS | OXYGEN SATURATION: 93 % | TEMPERATURE: 98.3 F

## 2018-05-19 LAB
ALBUMIN SERPL-MCNC: 2.5 G/DL (ref 3.4–5)
ALBUMIN/GLOB SERPL: 0.7 {RATIO} (ref 0.8–1.7)
ALP SERPL-CCNC: 94 U/L (ref 45–117)
ALT SERPL-CCNC: 79 U/L (ref 13–56)
ANION GAP SERPL CALC-SCNC: 3 MMOL/L (ref 3–18)
AST SERPL-CCNC: 21 U/L (ref 15–37)
BACTERIA SPEC CULT: NORMAL
BASOPHILS # BLD: 0 K/UL (ref 0–0.1)
BASOPHILS NFR BLD: 0 % (ref 0–2)
BILIRUB SERPL-MCNC: 0.4 MG/DL (ref 0.2–1)
BUN SERPL-MCNC: 29 MG/DL (ref 7–18)
BUN/CREAT SERPL: 22 (ref 12–20)
CALCIUM SERPL-MCNC: 8.5 MG/DL (ref 8.5–10.1)
CHLORIDE SERPL-SCNC: 103 MMOL/L (ref 100–108)
CO2 SERPL-SCNC: 35 MMOL/L (ref 21–32)
CREAT SERPL-MCNC: 1.34 MG/DL (ref 0.6–1.3)
DIFFERENTIAL METHOD BLD: ABNORMAL
EOSINOPHIL # BLD: 0 K/UL (ref 0–0.4)
EOSINOPHIL NFR BLD: 0 % (ref 0–5)
ERYTHROCYTE [DISTWIDTH] IN BLOOD BY AUTOMATED COUNT: 13.1 % (ref 11.6–14.5)
GLOBULIN SER CALC-MCNC: 3.7 G/DL (ref 2–4)
GLUCOSE SERPL-MCNC: 115 MG/DL (ref 74–99)
GRAM STN SPEC: NORMAL
HCT VFR BLD AUTO: 41 % (ref 35–45)
HGB BLD-MCNC: 13.3 G/DL (ref 12–16)
LYMPHOCYTES # BLD: 0.7 K/UL (ref 0.9–3.6)
LYMPHOCYTES NFR BLD: 9 % (ref 21–52)
MAGNESIUM SERPL-MCNC: 2.6 MG/DL (ref 1.6–2.6)
MCH RBC QN AUTO: 33.3 PG (ref 24–34)
MCHC RBC AUTO-ENTMCNC: 32.4 G/DL (ref 31–37)
MCV RBC AUTO: 102.5 FL (ref 74–97)
MONOCYTES # BLD: 0.8 K/UL (ref 0.05–1.2)
MONOCYTES NFR BLD: 10 % (ref 3–10)
NEUTS SEG # BLD: 6.8 K/UL (ref 1.8–8)
NEUTS SEG NFR BLD: 81 % (ref 40–73)
PLATELET # BLD AUTO: 240 K/UL (ref 135–420)
PMV BLD AUTO: 9.7 FL (ref 9.2–11.8)
POTASSIUM SERPL-SCNC: 4 MMOL/L (ref 3.5–5.5)
PROT SERPL-MCNC: 6.2 G/DL (ref 6.4–8.2)
RBC # BLD AUTO: 4 M/UL (ref 4.2–5.3)
SERVICE CMNT-IMP: NORMAL
SODIUM SERPL-SCNC: 141 MMOL/L (ref 136–145)
WBC # BLD AUTO: 8.3 K/UL (ref 4.6–13.2)

## 2018-05-19 PROCEDURE — 83735 ASSAY OF MAGNESIUM: CPT | Performed by: FAMILY MEDICINE

## 2018-05-19 PROCEDURE — 74011250637 HC RX REV CODE- 250/637: Performed by: INTERNAL MEDICINE

## 2018-05-19 PROCEDURE — 85025 COMPLETE CBC W/AUTO DIFF WBC: CPT | Performed by: FAMILY MEDICINE

## 2018-05-19 PROCEDURE — 74011250637 HC RX REV CODE- 250/637: Performed by: NURSE PRACTITIONER

## 2018-05-19 PROCEDURE — 77010033678 HC OXYGEN DAILY

## 2018-05-19 PROCEDURE — 80053 COMPREHEN METABOLIC PANEL: CPT | Performed by: FAMILY MEDICINE

## 2018-05-19 PROCEDURE — 36415 COLL VENOUS BLD VENIPUNCTURE: CPT | Performed by: FAMILY MEDICINE

## 2018-05-19 PROCEDURE — 74011250637 HC RX REV CODE- 250/637: Performed by: FAMILY MEDICINE

## 2018-05-19 PROCEDURE — 94640 AIRWAY INHALATION TREATMENT: CPT

## 2018-05-19 PROCEDURE — 74011636637 HC RX REV CODE- 636/637: Performed by: FAMILY MEDICINE

## 2018-05-19 RX ORDER — LEVOFLOXACIN 750 MG/1
750 TABLET ORAL
Status: DISCONTINUED | OUTPATIENT
Start: 2018-05-19 | End: 2018-05-19 | Stop reason: HOSPADM

## 2018-05-19 RX ORDER — LEVOFLOXACIN 250 MG/1
250 TABLET ORAL
Qty: 1 TAB | Refills: 0 | Status: SHIPPED | OUTPATIENT
Start: 2018-05-20 | End: 2018-05-21

## 2018-05-19 RX ADMIN — ABACAVIR SULFATE, DOLUTEGRAVIR SODIUM, LAMIVUDINE 1 TABLET: 600; 50; 300 TABLET, FILM COATED ORAL at 09:04

## 2018-05-19 RX ADMIN — FAMOTIDINE 20 MG: 20 TABLET ORAL at 08:54

## 2018-05-19 RX ADMIN — LEVOFLOXACIN 750 MG: 750 TABLET, FILM COATED ORAL at 08:53

## 2018-05-19 RX ADMIN — PREDNISONE 40 MG: 20 TABLET ORAL at 08:54

## 2018-05-19 RX ADMIN — SERTRALINE HYDROCHLORIDE 25 MG: 50 TABLET ORAL at 08:53

## 2018-05-19 RX ADMIN — ASPIRIN 81 MG 81 MG: 81 TABLET ORAL at 08:54

## 2018-05-19 RX ADMIN — FUROSEMIDE 40 MG: 40 TABLET ORAL at 08:54

## 2018-05-19 RX ADMIN — FLUTICASONE PROPIONATE 2 SPRAY: 50 SPRAY, METERED NASAL at 09:03

## 2018-05-19 RX ADMIN — DILTIAZEM HYDROCHLORIDE 180 MG: 180 CAPSULE, COATED, EXTENDED RELEASE ORAL at 08:54

## 2018-05-19 RX ADMIN — TIOTROPIUM BROMIDE 18 MCG: 18 CAPSULE ORAL; RESPIRATORY (INHALATION) at 08:09

## 2018-05-19 NOTE — PROGRESS NOTES
Care Management Interventions  PCP Verified by CM: Yes  Physical Therapy Consult: Yes  Occupational Therapy Consult: Yes  Current Support Network: Lives Alone, Family Lives Nearby  Confirm Follow Up Transport: Family  Plan discussed with Pt/Family/Caregiver: Yes     Pt has a discharge order in and has orders for home oxygen and home health. Called  [employer] and spoke with pt's nurse Evelyne Koehler. CM printed documents to [employer] and asked Evelyne Needs to fax them to [facility] fax number [phone]. CM called Lulu Landry of [facility] and informed her of the order being faxed and she states they will look out for it. CM also asked Evelyne Koehler to find out from pt which home health company she uses and Evelyne Needs states pt states she uses [employer] [phone]:  [phone] and left a message. 1000:  [facility] and left a message. 1018:  [facility]. 1145: Called [facility] and asked [place] Margot Roach to inform pt CM have been calling [facility] and no one is picking up or returning my call if she will like to use [facility] and pt states [facility] is ok and she signed [facility] for [facility]  [phone]:  [facility] sent to [facility]    1239:  Called [facility] to know status of home oxygen order and I was told they did not get the oxygen testing form. Called pt's nurse Evelyne Koehler to fax the form to [phone]:  Liz Patricio of [facility] and she states she spoke to the pt .    1424:  Pt's nurse Evelyne Koehler called and states she did oxygen testing. George Teran [facility] fax number and let her know they have to have the filled out testing form to process the order. 1518:  Called [facility] and spoke with Eneida Veliz  and I was told the oxygen testing form is lacking pt's saturation with oxygen. Evelyne Koehler, pt's nurse informed. 1704:  Called pt,s nurse Evelyne Koehler and she states pt got her home oxygen delivered.

## 2018-05-19 NOTE — DISCHARGE INSTRUCTIONS
Anxiety Disorder: Care Instructions  Your Care Instructions    Anxiety is a normal reaction to stress. Difficult situations can cause you to have symptoms such as sweaty palms and a nervous feeling. In an anxiety disorder, the symptoms are far more severe. Constant worry, muscle tension, trouble sleeping, nausea and diarrhea, and other symptoms can make normal daily activities difficult or impossible. These symptoms may occur for no reason, and they can affect your work, school, or social life. Medicines, counseling, and self-care can all help. Follow-up care is a key part of your treatment and safety. Be sure to make and go to all appointments, and call your doctor if you are having problems. It's also a good idea to know your test results and keep a list of the medicines you take. How can you care for yourself at home? · Take medicines exactly as directed. Call your doctor if you think you are having a problem with your medicine. · Go to your counseling sessions and follow-up appointments. · Recognize and accept your anxiety. Then, when you are in a situation that makes you anxious, say to yourself, \"This is not an emergency. I feel uncomfortable, but I am not in danger. I can keep going even if I feel anxious. \"  · Be kind to your body:  ¨ Relieve tension with exercise or a massage. ¨ Get enough rest.  ¨ Avoid alcohol, caffeine, nicotine, and illegal drugs. They can increase your anxiety level and cause sleep problems. ¨ Learn and do relaxation techniques. See below for more about these techniques. · Engage your mind. Get out and do something you enjoy. Go to a funny movie, or take a walk or hike. Plan your day. Having too much or too little to do can make you anxious. · Keep a record of your symptoms. Discuss your fears with a good friend or family member, or join a support group for people with similar problems. Talking to others sometimes relieves stress.   · Get involved in social groups, or volunteer to help others. Being alone sometimes makes things seem worse than they are. · Get at least 30 minutes of exercise on most days of the week to relieve stress. Walking is a good choice. You also may want to do other activities, such as running, swimming, cycling, or playing tennis or team sports. Relaxation techniques  Do relaxation exercises 10 to 20 minutes a day. You can play soothing, relaxing music while you do them, if you wish. · Tell others in your house that you are going to do your relaxation exercises. Ask them not to disturb you. · Find a comfortable place, away from all distractions and noise. · Lie down on your back, or sit with your back straight. · Focus on your breathing. Make it slow and steady. · Breathe in through your nose. Breathe out through either your nose or mouth. · Breathe deeply, filling up the area between your navel and your rib cage. Breathe so that your belly goes up and down. · Do not hold your breath. · Breathe like this for 5 to 10 minutes. Notice the feeling of calmness throughout your whole body. As you continue to breathe slowly and deeply, relax by doing the following for another 5 to 10 minutes:  · Tighten and relax each muscle group in your body. You can begin at your toes and work your way up to your head. · Imagine your muscle groups relaxing and becoming heavy. · Empty your mind of all thoughts. · Let yourself relax more and more deeply. · Become aware of the state of calmness that surrounds you. · When your relaxation time is over, you can bring yourself back to alertness by moving your fingers and toes and then your hands and feet and then stretching and moving your entire body. Sometimes people fall asleep during relaxation, but they usually wake up shortly afterward. · Always give yourself time to return to full alertness before you drive a car or do anything that might cause an accident if you are not fully alert.  Never play a relaxation tape while you drive a car. When should you call for help? Call 911 anytime you think you may need emergency care. For example, call if:  ? · You feel you cannot stop from hurting yourself or someone else. ? Keep the numbers for these national suicide hotlines: 2-669-043-TALK (9-732.280.2621) and 5-549-MNLXVHT (9-756.286.3111). If you or someone you know talks about suicide or feeling hopeless, get help right away. ? Watch closely for changes in your health, and be sure to contact your doctor if:  ? · You have anxiety or fear that affects your life. ? · You have symptoms of anxiety that are new or different from those you had before. Where can you learn more? Go to http://svetlana-ana maria.info/. Enter P754 in the search box to learn more about \"Anxiety Disorder: Care Instructions. \"  Current as of: May 12, 2017  Content Version: 11.4  © 1192-8057 [employer]. Care instructions adapted under license by [employer] (which disclaims liability or warranty for this information). If you have questions about a medical condition or this instruction, always ask your healthcare professional. [address] any warranty or liability for your use of this information. Chest Pain: Care Instructions  Your Care Instructions    There are many things that can cause chest pain. Some are not serious and will get better on their own in a few days. But some kinds of chest pain need more testing and treatment. Your doctor may have recommended a follow-up visit in the next 8 to 12 hours. If you are not getting better, you may need more tests or treatment. Even though your doctor has released you, you still need to watch for any problems. The doctor carefully checked you, but sometimes problems can develop later. If you have new symptoms or if your symptoms do not get better, get medical care right away.   If you have worse or different chest pain or pressure that lasts more than 5 minutes or you passed out (lost consciousness), call 911 or seek other emergency help right away. A medical visit is only one step in your treatment. Even if you feel better, you still need to do what your doctor recommends, such as going to all suggested follow-up appointments and taking medicines exactly as directed. This will help you recover and help prevent future problems. How can you care for yourself at home? · Rest until you feel better. · Take your medicine exactly as prescribed. Call your doctor if you think you are having a problem with your medicine. · Do not drive after taking a prescription pain medicine. When should you call for help? Call 911 if:  ? · You passed out (lost consciousness). ? · You have severe difficulty breathing. ? · You have symptoms of a heart attack. These may include:  ¨ Chest pain or pressure, or a strange feeling in your chest.  ¨ Sweating. ¨ Shortness of breath. ¨ Nausea or vomiting. ¨ Pain, pressure, or a strange feeling in your back, neck, jaw, or upper belly or in one or both shoulders or arms. ¨ Lightheadedness or sudden weakness. ¨ A fast or irregular heartbeat. After you call 911, the  may tell you to chew 1 adult-strength or 2 to 4 low-dose aspirin. Wait for an ambulance. Do not try to drive yourself. ?Call your doctor today if:  ? · You have any trouble breathing. ? · Your chest pain gets worse. ? · You are dizzy or lightheaded, or you feel like you may faint. ? · You are not getting better as expected. ? · You are having new or different chest pain. Where can you learn more? Go to http://svetlana-ana maria.info/. Enter A120 in the search box to learn more about \"Chest Pain: Care Instructions. \"  Current as of: March 20, 2017  Content Version: 11.4  © 3083-5129 [employer].  Care instructions adapted under license by [employer] (which disclaims liability or warranty for this information). If you have questions about a medical condition or this instruction, always ask your healthcare professional. [address] any warranty or liability for your use of this information. Chronic Obstructive Pulmonary Disease (COPD): Care Instructions  Your Care Instructions    Chronic obstructive pulmonary disease (COPD) is a general term for a group of lung diseases, including emphysema and chronic bronchitis. People with COPD have decreased airflow in and out of the lungs, which makes it hard to breathe. The airways also can get clogged with thick mucus. Cigarette smoking is a major cause of COPD. Although there is no cure for COPD, you can slow its progress. Following your treatment plan and taking care of yourself can help you feel better and live longer. Follow-up care is a key part of your treatment and safety. Be sure to make and go to all appointments, and call your doctor if you are having problems. It's also a good idea to know your test results and keep a list of the medicines you take. How can you care for yourself at home? ?Staying healthy  ? · Do not smoke. This is the most important step you can take to prevent more damage to your lungs. If you need help quitting, talk to your doctor about stop-smoking programs and medicines. These can increase your chances of quitting for good. ? · Avoid colds and flu. Get a pneumococcal vaccine shot. If you have had one before, ask your doctor whether you need a second dose. Get the flu vaccine every fall. If you must be around people with colds or the flu, wash your hands often. ? · Avoid secondhand smoke, air pollution, and high altitudes. Also avoid cold, dry air and hot, humid air. Stay at home with your windows closed when air pollution is bad. ?Medicines and oxygen therapy  ? · Take your medicines exactly as prescribed. Call your doctor if you think you are having a problem with your medicine.    ? · You may be taking medicines such as:  ¨ Bronchodilators. These help open your airways and make breathing easier. Bronchodilators are either short-acting (work for 6 to 9 hours) or long-acting (work for 24 hours). You inhale most bronchodilators, so they start to act quickly. Always carry your quick-relief inhaler with you in case you need it while you are away from home. ¨ Corticosteroids (prednisone, budesonide). These reduce airway inflammation. They come in pill or inhaled form. You must take these medicines every day for them to work well. ? · A spacer may help you get more inhaled medicine to your lungs. Ask your doctor or pharmacist if a spacer is right for you. If it is, ask how to use it properly. ? · Do not take any vitamins, over-the-counter medicine, or herbal products without talking to your doctor first.   ? · If your doctor prescribed antibiotics, take them as directed. Do not stop taking them just because you feel better. You need to take the full course of antibiotics. ? · Oxygen therapy boosts the amount of oxygen in your blood and helps you breathe easier. Use the flow rate your doctor has recommended, and do not change it without talking to your doctor first.   Activity  ? · Get regular exercise. Walking is an easy way to get exercise. Start out slowly, and walk a little more each day. ? · Pay attention to your breathing. You are exercising too hard if you cannot talk while you are exercising. ? · Take short rest breaks when doing household chores and other activities. ? · Learn breathing methods-such as breathing through pursed lips-to help you become less short of breath. ? · If your doctor has not set you up with a pulmonary rehabilitation program, talk to him or her about whether rehab is right for you. Rehab includes exercise programs, education about your disease and how to manage it, help with diet and other changes, and emotional support. Diet  ? · Eat regular, healthy meals.  Use bronchodilators about 1 hour before you eat to make it easier to eat. Eat several small meals instead of three large ones. Drink beverages at the end of the meal. Avoid foods that are hard to chew. ? · Eat foods that contain protein so that you do not lose muscle mass. ? · Talk with your doctor if you gain too much weight or if you lose weight without trying. ?Mental health  ? · Talk to your family, friends, or a therapist about your feelings. It is normal to feel frightened, angry, hopeless, helpless, and even guilty. Talking openly about bad feelings can help you cope. If these feelings last, talk to your doctor. When should you call for help? Call 911 anytime you think you may need emergency care. For example, call if:  ? · You have severe trouble breathing. ?Call your doctor now or seek immediate medical care if:  ? · You have new or worse trouble breathing. ? · You cough up blood. ? · You have a fever. ? Watch closely for changes in your health, and be sure to contact your doctor if:  ? · You cough more deeply or more often, especially if you notice more mucus or a change in the color of your mucus. ? · You have new or worse swelling in your legs or belly. ? · You are not getting better as expected. Where can you learn more? Go to http://svetlana-ana maria.info/. Sydnie Scales in the search box to learn more about \"Chronic Obstructive Pulmonary Disease (COPD): Care Instructions. \"  Current as of: May 12, 2017  Content Version: 11.4  © 6515-2558 [employer]. Care instructions adapted under license by [employer] (which disclaims liability or warranty for this information). If you have questions about a medical condition or this instruction, always ask your healthcare professional. [address] any warranty or liability for your use of this information.          Heart Failure: Care Instructions  Your Care Instructions    Heart failure occurs when your heart does not pump as much blood as the body needs. Failure does not mean that the heart has stopped pumping but rather that it is not pumping as well as it should. Over time, this causes fluid buildup in your lungs and other parts of your body. Fluid buildup can cause shortness of breath, fatigue, swollen ankles, and other problems. By taking medicines regularly, reducing sodium (salt) in your diet, checking your weight every day, and making lifestyle changes, you can feel better and live longer. Follow-up care is a key part of your treatment and safety. Be sure to make and go to all appointments, and call your doctor if you are having problems. It's also a good idea to know your test results and keep a list of the medicines you take. How can you care for yourself at home? Medicines  ? · Be safe with medicines. Take your medicines exactly as prescribed. Call your doctor if you think you are having a problem with your medicine. ? · Do not take any vitamins, over-the-counter medicine, or herbal products without talking to your doctor first. Dana Manuel not take ibuprofen (Advil or Motrin) and naproxen (Aleve) without talking to your doctor first. They could make your heart failure worse. ? · You may be taking some of the following medicine. ¨ Beta-blockers can slow heart rate, decrease blood pressure, and improve your condition. Taking a beta-blocker may lower your chance of needing to be hospitalized. ¨ Angiotensin-converting enzyme inhibitors (ACEIs) reduce the heart's workload, lower blood pressure, and reduce swelling. Taking an ACEI may lower your chance of needing to be hospitalized again. ¨ Angiotensin II receptor blockers (ARBs) work like ACEIs. Your doctor may prescribe them instead of ACEIs. ¨ Diuretics, also called water pills, reduce swelling. ¨ Potassium supplements replace this important mineral, which is sometimes lost with diuretics.   ¨ Aspirin and other blood thinners prevent blood clots, which can cause a stroke or heart attack. ? You will get more details on the specific medicines your doctor prescribes. Diet  ? · Your doctor may suggest that you limit sodium to 2,000 milligrams (mg) a day or less. That is less than 1 teaspoon of salt a day, including all the salt you eat in cooking or in packaged foods. People get most of their sodium from processed foods. Fast food and restaurant meals also tend to be very high in sodium. ? · Ask your doctor how much liquid you can drink each day. You may have to limit liquids. ?Weight  ? · Weigh yourself without clothing at the same time each day. Record your weight. Call your doctor if you have a sudden weight gain, such as more than 2 to 3 pounds in a day or 5 pounds in a week. (Your doctor may suggest a different range of weight gain.) A sudden weight gain may mean that your heart failure is getting worse. ? Activity level  ? · Start light exercise (if your doctor says it is okay). Even if you can only do a small amount, exercise will help you get stronger, have more energy, and manage your weight and your stress. Walking is an easy way to get exercise. Start out by walking a little more than you did before. Bit by bit, increase the amount you walk. ? · When you exercise, watch for signs that your heart is working too hard. You are pushing yourself too hard if you cannot talk while you are exercising. If you become short of breath or dizzy or have chest pain, stop, sit down, and rest.   ? · If you feel \"wiped out\" the day after you exercise, walk slower or for a shorter distance until you can work up to a better pace. ? · Get enough rest at night. Sleeping with 1 or 2 pillows under your upper body and head may help you breathe easier. ? Lifestyle changes  ? · Do not smoke. Smoking can make a heart condition worse. If you need help quitting, talk to your doctor about stop-smoking programs and medicines.  These can increase your chances of quitting for good. Quitting smoking may be the most important step you can take to protect your heart. ? · Limit alcohol to 2 drinks a day for men and 1 drink a day for women. Too much alcohol can cause health problems. ? · Avoid getting sick from colds and the flu. Get a pneumococcal vaccine shot. If you have had one before, ask your doctor whether you need another dose. Get a flu shot each year. If you must be around people with colds or the flu, wash your hands often. When should you call for help? Call 911 if you have symptoms of sudden heart failure such as:  ? · You have severe trouble breathing. ? · You cough up pink, foamy mucus. ? · You have a new irregular or rapid heartbeat. ?Call your doctor now or seek immediate medical care if:  ? · You have new or increased shortness of breath. ? · You are dizzy or lightheaded, or you feel like you may faint. ? · You have sudden weight gain, such as more than 2 to 3 pounds in a day or 5 pounds in a week. (Your doctor may suggest a different range of weight gain.)   ? · You have increased swelling in your legs, ankles, or feet. ? · You are suddenly so tired or weak that you cannot do your usual activities. ? Watch closely for changes in your health, and be sure to contact your doctor if you develop new symptoms. Where can you learn more? Go to http://svetlana-ana maria.info/. Enter N966 in the search box to learn more about \"Heart Failure: Care Instructions. \"  Current as of: September 21, 2016  Content Version: 11.4  © 1958-4044 [employer] Care instructions adapted under license by [employer] (which disclaims liability or warranty for this information). If you have questions about a medical condition or this instruction, always ask your healthcare professional. [place] [address] any warranty or liability for your use of this information.          Low Sodium Diet (2,000 Milligram): Care Instructions  Your Care Instructions    Too much sodium causes your body to hold on to extra water. This can raise your blood pressure and force your heart and kidneys to work harder. In very serious cases, this could cause you to be put in the hospital. It might even be life-threatening. By limiting sodium, you will feel better and lower your risk of serious problems. The most common source of sodium is salt. People get most of the salt in their diet from canned, prepared, and packaged foods. Fast food and restaurant meals also are very high in sodium. Your doctor will probably limit your sodium to less than 2,000 milligrams (mg) a day. This limit counts all the sodium in prepared and packaged foods and any salt you add to your food. Follow-up care is a key part of your treatment and safety. Be sure to make and go to all appointments, and call your doctor if you are having problems. It's also a good idea to know your test results and keep a list of the medicines you take. How can you care for yourself at home? Read food labels  · Read labels on cans and food packages. The labels tell you how much sodium is in each serving. Make sure that you look at the serving size. If you eat more than the serving size, you have eaten more sodium. · Food labels also tell you the Percent Daily Value for sodium. Choose products with low Percent Daily Values for sodium. · Be aware that sodium can come in forms other than salt, including monosodium glutamate (MSG), sodium citrate, and sodium bicarbonate (baking soda). MSG is often added to Asian food. When you eat out, you can sometimes ask for food without MSG or added salt. Buy low-sodium foods  · Buy foods that are labeled \"unsalted\" (no salt added), \"sodium-free\" (less than 5 mg of sodium per serving), or \"low-sodium\" (less than 140 mg of sodium per serving). Foods labeled \"reduced-sodium\" and \"light sodium\" may still have too much sodium.  Be sure to read the label to see how much sodium you are getting. · Buy fresh vegetables, or frozen vegetables without added sauces. Buy low-sodium versions of canned vegetables, soups, and other canned goods. Prepare low-sodium meals  · Cut back on the amount of salt you use in cooking. This will help you adjust to the taste. Do not add salt after cooking. One teaspoon of salt has about 2,300 mg of sodium. · Take the salt shaker off the table. · Flavor your food with garlic, lemon juice, onion, vinegar, herbs, and spices. Do not use soy sauce, lite soy sauce, steak sauce, onion salt, garlic salt, celery salt, mustard, or ketchup on your food. · Use low-sodium salad dressings, sauces, and ketchup. Or make your own salad dressings and sauces without adding salt. · Use less salt (or none) when recipes call for it. You can often use half the salt a recipe calls for without losing flavor. Other foods such as rice, pasta, and grains do not need added salt. · Rinse canned vegetables, and cook them in fresh water. This removes some-but not all-of the salt. · Avoid water that is naturally high in sodium or that has been treated with water softeners, which add sodium. Call your local water company to find out the sodium content of your water supply. If you buy bottled water, read the label and choose a sodium-free brand. Avoid high-sodium foods  · Avoid eating:  ¨ Smoked, cured, salted, and canned meat, fish, and poultry. ¨ Ham, bruce, hot dogs, and luncheon meats. ¨ Regular, hard, and processed cheese and regular peanut butter. ¨ Crackers with salted tops, and other salted snack foods such as pretzels, chips, and salted popcorn. ¨ Frozen prepared meals, unless labeled low-sodium. ¨ Canned and dried soups, broths, and bouillon, unless labeled sodium-free or low-sodium. ¨ Canned vegetables, unless labeled sodium-free or low-sodium. ¨ [place] fries, pizza, tacos, and other fast foods.   ¨ Pickles, olives, ketchup, and other condiments, especially soy sauce, unless labeled sodium-free or low-sodium. Where can you learn more? Go to http://svetlana-ana maria.info/. Enter J334 in the search box to learn more about \"Low Sodium Diet (2,000 Milligram): Care Instructions. \"  Current as of: May 12, 2017  Content Version: 11.4  © 0811-2214 [employer]. Care instructions adapted under license by [employer] (which disclaims liability or warranty for this information). If you have questions about a medical condition or this instruction, always ask your healthcare professional. [place] [address] any warranty or liability for your use of this information. Learning About Fluid Overload  What is fluid overload? Fluid overload means that your body has too much water. The extra fluid in your body can raise your blood pressure and force your heart to work harder. It can also make it hard for you to breathe. Most of your body is made up of water. The body uses minerals like sodium and potassium to help organs such as your heart, kidneys, and liver balance how much water you need. For example, the heart pumps blood to move water around the body. And the kidneys work to get rid of the water that the body doesn't need. Health conditions like kidney disease, heart failure, and cirrhosis can cause fluid overload. Other things can cause extra fluid to build up. IV fluids, some medicines, too much salt (sodium) from food, and certain medical treatments can sometimes cause this fluid increase. What are the symptoms? Some of the most common symptoms are:  · Gaining weight over a short period of time. · Swelling in the ankles or legs. · Shortness of breath. How is it treated? The goal of treatment is to remove the extra fluid in your body. Your treatment will depend on the cause. Your doctor may:  · Give you medicines, such as diuretics (also called \"water pills\").  They help your body get rid of the extra fluid. · Restrict your fluid or salt intake. Follow-up care is a key part of your treatment and safety. Be sure to make and go to all appointments, and call your doctor if you are having problems. It's also a good idea to know your test results and keep a list of the medicines you take. Where can you learn more? Go to http://svetlana-ana maria.info/. Enter O110 in the search box to learn more about \"Learning About Fluid Overload. \"  Current as of: September 21, 2016  Content Version: 11.4  © 2350-2642 [employer]. Care instructions adapted under license by [employer] (which disclaims liability or warranty for this information). If you have questions about a medical condition or this instruction, always ask your healthcare professional. [address] any warranty or liability for your use of this information. Learning About Post-Diagnosis HIV Tests  What are post-diagnosis HIV tests? Soon after you are diagnosed with the human immunodeficiency virus (HIV), you will have other blood tests. These tests help your doctor see how your body is responding to the virus. They also help show how well your treatment is working. These tests may be done every 3 to 6 months. The first set of tests serves as a baseline. Your doctor can compare the results of future tests with the first set. Your doctor will look at the results of several tests over time to see if the infection is getting better or worse. These tests include the viral load test and the CD4 count. Your doctor may order one or both tests. What is the viral load test?  A viral load test measures how much HIV is in your blood. It measures the amount of the genetic material (RNA) of the virus. There are several methods that a lab can use to do this test. Each method reports the results in a different way.  But all of the methods give you important information about how to treat your disease. What it's used for  This test is done to:  · See how well treatment is working. · Track changes in the HIV infection. · Guide treatment choices. How results are reported  Viral load results are reported as the number of HIV copies in a milliliter (copies/mL) of blood. Each virus is called a \"copy\" because HIV increases by making copies of itself. What the results mean  Your numbers may go up or down slightly from test to test. And the specific numbers depend on which method the lab used to measure your viral load. But in general:  · If your viral load increases over time, it means the infection is getting worse. · If the viral load is smaller over time, it means that the infection is being reduced. You are less likely to develop AIDS (acquired immunodeficiency syndrome). · If no HIV copies are found, this does not mean that you don't have HIV anymore. It just means that the amount of HIV in your blood was too low for the test to detect. HIV still can be passed to another person even when the viral load can't be detected. What is the CD4 count? HIV destroys CD4 cells. CD4 cells are a type of white blood cell. White blood cells are important in fighting infections. A CD4 count is a blood test to find the number of CD4 cells. This number shows if your HIV has progressed to AIDS. It also helps find out if other infections may occur. These other infections are often called opportunistic infections. They occur in people with weak immune systems. They usually don't occur in people with healthy immune systems. What it's used for  CD4 counts are done to:  · Track how the HIV infection is affecting your immune system. · Help diagnose AIDS. · Check your risk of other infections. · Decide when to start treatment to prevent other infections, such as medicines to prevent pneumonia. How results are reported  Results are reported as the total number of CD4 cells in a milliliter (cells/mL) of blood.  You may also see a percent number. That number is the percentage of white blood cells that are CD4 cells. The total and the percent numbers go up and down together. What the results mean  The pattern of CD4 counts over time is more important than any single count. As the count rises, the healthier your immune system is. As the count drops, it becomes more likely that AIDS will develop. The ranges listed here are just a guide. The ranges vary from lab to lab. Your lab may use a different range. A CD4 count range of:  · 500 to 1,500 cells per microliter (mcL) is usually found in people who are not infected with HIV. But people who have HIV may have counts over 500 too. · More than 350 but less than 500 cells/mcL means that your immune system is starting to weaken. · Less than 350 cells/mcL shows a weak immune system and an increased risk for other infections. · Less than 200 cells/mcL means you have AIDS and a high risk for other infections. What is the next step? Your doctor will talk with you about the results of these tests and what they mean. He or she will answer any questions you have. Your doctor may also:  · Talk about your current treatment for HIV and suggest any changes that might be needed. · Ask you about any trouble you might have with taking your medicines exactly as prescribed. · Discuss which of these tests you should have next and when you should have them. · Suggest more tests, if you need them. Follow-up care is a key part of your treatment and safety. Be sure to make and go to all appointments, and call your doctor if you are having problems. It's also a good idea to know your test results and keep a list of the medicines you take. Where can you learn more? Go to http://svetlana-ana maria.info/. Enter K484 in the search box to learn more about \"Learning About Post-Diagnosis HIV Tests. \"  Current as of: March 3, 2017  Content Version: 11.4  © 1822-5087 [employer]. Care instructions adapted under license by [employer] (which disclaims liability or warranty for this information). If you have questions about a medical condition or this instruction, always ask your healthcare professional. [address] any warranty or liability for your use of this information. Patient armband removed and shredded  MyChart Activation    Thank you for requesting access to [facility]. Please follow the instructions below to securely access and download your online medical record. [facility] allows you to send messages to your doctor, view your test results, renew your prescriptions, schedule appointments, and more. How Do I Sign Up? 1. In your internet browser, go to [email or web address].[employer]  2. Click on the First Time User? Click Here link in the Sign In box. You will be redirect to the New Member Sign Up page. 3. Enter your [facility] Access Code exactly as it appears below. You will not need to use this code after youve completed the sign-up process. If you do not sign up before the expiration date, you must request a new code. [facility] Access Code: [email or web address]  Expires: 8/10/2018  2:44 AM (This is the date your [facility] access code will )    4. Enter the last four digits of your Social Security Number (xxxx) and Date of Birth (mm/dd/yyyy) as indicated and click Submit. You will be taken to the next sign-up page. 5. Create a [facility] ID. This will be your [facility] login ID and cannot be changed, so think of one that is secure and easy to remember. 6. Create a [facility] password. You can change your password at any time. 7. Enter your Password Reset Question and Answer. This can be used at a later time if you forget your password. 8. Enter your e-mail address. You will receive e-mail notification when new information is available in [address] 9. Click Sign Up. You can now view and download portions of your medical record.   10. Click the Download Summary menu link to download a portable copy of your medical information. Additional Information    If you have questions, please visit the Frequently Asked Questions section of the [employer] website at [email or web address] Remember, [employer] [facility] is NOT to be used for urgent needs. For medical emergencies, dial 911. DISCHARGE SUMMARY from Nurse    PATIENT INSTRUCTIONS:    After general anesthesia or intravenous sedation, for 24 hours or while taking prescription Narcotics:  · Limit your activities  · Do not drive and operate hazardous machinery  · Do not make important personal or business decisions  · Do  not drink alcoholic beverages  · If you have not urinated within 8 hours after discharge, please contact your surgeon on call. Report the following to your surgeon:  · Excessive pain, swelling, redness or odor of or around the surgical area  · Temperature over 100.5  · Nausea and vomiting lasting longer than 4 hours or if unable to take medications  · Any signs of decreased circulation or nerve impairment to extremity: change in color, persistent  numbness, tingling, coldness or increase pain  · Any questions    What to do at Home:  Recommended activity: Activity as tolerated,     If you experience any of the following symptoms chest pain, shortness of breath, dizziness, increased weakness, increased temperature greater than 100.4, nausea, vomiting, diarrhea, please follow up with PCP or call 911. *  Please give a list of your current medications to your Primary Care Provider. *  Please update this list whenever your medications are discontinued, doses are      changed, or new medications (including over-the-counter products) are added. *  Please carry medication information at all times in case of emergency situations.     These are general instructions for a healthy lifestyle:    No smoking/ No tobacco products/ Avoid exposure to second hand smoke  Surgeon General's Warning:  Quitting smoking now greatly reduces serious risk to your health. Obesity, smoking, and sedentary lifestyle greatly increases your risk for illness    A healthy diet, regular physical exercise & weight monitoring are important for maintaining a healthy lifestyle    You may be retaining fluid if you have a history of heart failure or if you experience any of the following symptoms:  Weight gain of 3 pounds or more overnight or 5 pounds in a week, increased swelling in our hands or feet or shortness of breath while lying flat in bed. Please call your doctor as soon as you notice any of these symptoms; do not wait until your next office visit. Recognize signs and symptoms of STROKE:    F-face looks uneven    A-arms unable to move or move unevenly    S-speech slurred or non-existent    T-time-call 911 as soon as signs and symptoms begin-DO NOT go       Back to bed or wait to see if you get better-TIME IS BRAIN. Warning Signs of HEART ATTACK     Call 911 if you have these symptoms:   Chest discomfort. Most heart attacks involve discomfort in the center of the chest that lasts more than a few minutes, or that goes away and comes back. It can feel like uncomfortable pressure, squeezing, fullness, or pain.  Discomfort in other areas of the upper body. Symptoms can include pain or discomfort in one or both arms, the back, neck, jaw, or stomach.  Shortness of breath with or without chest discomfort.  Other signs may include breaking out in a cold sweat, nausea, or lightheadedness. Don't wait more than five minutes to call 911 - MINUTES MATTER! Fast action can save your life. Calling 911 is almost always the fastest way to get lifesaving treatment. Emergency Medical Services staff can begin treatment when they arrive -- up to an hour sooner than if someone gets to the hospital by car. The discharge information has been reviewed with the patient. The patient verbalized understanding.   Discharge medications reviewed with the patient and appropriate educational materials and side effects teaching were provided.   ___________________________________________________________________________________________________________________________________

## 2018-05-19 NOTE — PROGRESS NOTES
[facility]  Pulmonary, Critical Care, and Sleep Medicine    Name: Jelly Sharp MRN: [id number]   : 1945 Hospital: [facility]   Date: 2018        Pulmonary Follow up    IMPRESSION:   · Acute COPD exacerbation with predominant emphysema and basal atelectasis. Hypoxia due to mucus plugging and emphysema. CT- no evidence of ILD to suggest PCP. Slow improvement. · HIV on HAART  · Acute CHF, systolic and possibly diastolic  · Pulmonary HTN- cardiac and Pulmonary etiology  · Esophageal/throat irritation, possible candidiasis? · Hx of htn  · Hx of anxiety d/o  · Hx of marijuana abuse      RECOMMENDATIONS:     · Symbicort, Spiriva and albuterol for home use  · Prednisone taper- starting at 40 mg and tapered off 10 mg every 3 days  · Completing Diflucan empirically for 5 days. · Continue Levaquin - total 7 days  · Continue HAART  · Assess Home O2 needs- at rest and ambulation  · PFT and 6 min walk as outpatient. Pulmonary rehab  · Preventive vaccinations. · Will follow in [facility]  · OK for discharge home this am.     Subjective/History:   18   Feels much better  Plan for discharge home this am.    HPI:  This patient has been seen and evaluated at the request of Dr. Jessy Portillo  for COPD exacerbation. Patient is a 71 y.o. female with hx of HIV ( acquired during blood transfusion) on HAART. Being followed by ID in [place],  Unclear last viral load and CD4, count. Over 46yr smoking hx,  Presented to ED with increase in sob and wheezing. No associated with fever or chills. Unable to use advair due to throat irritations. ? Pain. NO n/v/d/   No hx of headache. No visual disturbances. No changes in weight  No night sweats. Since admission pt has been diuresed. Started on emperically on steroid. Echo remain pending. Pt is also placed on abx for UTI with levaquin.     Since admission pt does reports that her breathing has improved significantly now where she is able to carry conversation. Current Facility-Administered Medications   Medication Dose Route Frequency    levoFLOXacin (LEVAQUIN) tablet 750 mg  750 mg Oral Q48H    dilTIAZem CD (CARDIZEM CD) capsule 180 mg  180 mg Oral DAILY    lidocaine (SALONPAS/ASPERCREME) 4 % patch 1 Patch  1 Patch TransDERmal Q24H    predniSONE (DELTASONE) tablet 40 mg  40 mg Oral DAILY WITH BREAKFAST    budesonide-formoterol (SYMBICORT) 160-4.5 mcg/actuation HFA inhaler 2 Puff  2 Puff Inhalation BID RT    tiotropium (SPIRIVA) inhalation capsule 18 mcg  1 Cap Inhalation DAILY    pravastatin (PRAVACHOL) tablet 10 mg  10 mg Oral QHS    furosemide (LASIX) tablet 40 mg  40 mg Oral DAILY    famotidine (PEPCID) tablet 20 mg  20 mg Oral DAILY    aspirin chewable tablet 81 mg  81 mg Oral DAILY    sertraline (ZOLOFT) tablet 25 mg  25 mg Oral DAILY    fluticasone (FLONASE) 50 mcg/actuation nasal spray 2 Spray  2 Spray Both Nostrils DAILY    heparin (porcine) injection 5,000 Units  5,000 Units SubCUTAneous Q8H    abacavir-dolutegravir-lamiVUDine (TRIUMEQ) 600- mg tablet 1 Tab  1 Tab Oral DAILY    darunavir-cobicistat (PREZCOBIX) 800-150 mg-mg per tablet 1 Tab (Patient Supplied)  1 Tab Oral DAILY     No Known Allergies     Review of Systems:  A comprehensive review of systems was negative except for that written in the HPI.     Objective:   Vital Signs:    Visit Vitals    /86 (BP 1 Location: Right arm, BP Patient Position: At rest)    Pulse 74    Temp 98.3 °F (36.8 °C)    Resp 20    Ht 5' 2.99\" (1.6 m)    Wt 73.7 kg (162 lb 6.4 oz)    SpO2 98%    BMI 28.78 kg/m2       O2 Device: Room air (Weaned from 2LPM NC)   O2 Flow Rate (L/min): 1 l/min   Temp (24hrs), Av.5 °F (36.9 °C), Min:98.2 °F (36.8 °C), Max:99 °F (37.2 °C)       Intake/Output:   Last shift:         Last 3 shifts:  1901 -  0700  In: 700 [P.O.:700]  Out: 600 [Urine:600]    Intake/Output Summary (Last 24 hours) at 18 0850  Last data filed at 05/18/18 2150   Gross per 24 hour   Intake              700 ml   Output              600 ml   Net              100 ml        Physical Exam:    General:  Alert, cooperative, no distress, appears stated age. Head:  Normocephalic, without obvious abnormality, atraumatic. Eyes:  Conjunctivae/corneas clear. PERRL, EOMs intact. Nose: Nares normal. Septum midline. Mucosa normal. No drainage or sinus tenderness. Throat: Lips, mucosa, and tongue normal. Teeth and gums normal.   Neck: Supple, symmetrical, trachea midline, no adenopathy, thyroid: no enlargment/tenderness/nodules, no carotid bruit and no JVD. Back:   Symmetric, no curvature. ROM normal.   Lungs:   Diffuse faint exp wheeze and ronchi   Chest wall:  No tenderness or deformity. Heart:  Regular rate and rhythm, S1, S2 normal, no murmur, click, rub or gallop. Abdomen:   Soft, non-tender. Bowel sounds normal. No masses,  No organomegaly. Extremities: Extremities normal, atraumatic, no cyanosis or edema. Pulses: 2+ and symmetric all extremities. Skin: Skin color, texture, turgor normal. No rashes or lesions   Lymph nodes: Cervical, supraclavicular, and axillary nodes normal.   Neurologic: Grossly nonfocal       Data:     Recent Results (from the past 24 hour(s))   CBC WITH AUTOMATED DIFF    Collection Time: 05/19/18  2:30 AM   Result Value Ref Range    WBC 8.3 4.6 - 13.2 K/uL    RBC 4.00 (L) 4.20 - 5.30 M/uL    HGB 13.3 12.0 - 16.0 g/dL    HCT 41.0 35.0 - 45.0 %    .5 (H) 74.0 - 97.0 FL    MCH 33.3 24.0 - 34.0 PG    MCHC 32.4 31.0 - 37.0 g/dL    RDW 13.1 11.6 - 14.5 %    PLATELET 230 551 - 756 K/uL    MPV 9.7 9.2 - 11.8 FL    NEUTROPHILS 81 (H) 40 - 73 %    LYMPHOCYTES 9 (L) 21 - 52 %    MONOCYTES 10 3 - 10 %    EOSINOPHILS 0 0 - 5 %    BASOPHILS 0 0 - 2 %    ABS. NEUTROPHILS 6.8 1.8 - 8.0 K/UL    ABS. LYMPHOCYTES 0.7 (L) 0.9 - 3.6 K/UL    ABS. MONOCYTES 0.8 0.05 - 1.2 K/UL    ABS. EOSINOPHILS 0.0 0.0 - 0.4 K/UL    ABS. BASOPHILS 0.0 0.0 - 0.1 K/UL    DF AUTOMATED     MAGNESIUM    Collection Time: 05/19/18  2:30 AM   Result Value Ref Range    Magnesium 2.6 1.6 - 2.6 mg/dL   METABOLIC PANEL, COMPREHENSIVE    Collection Time: 05/19/18  2:30 AM   Result Value Ref Range    Sodium 141 136 - 145 mmol/L    Potassium 4.0 3.5 - 5.5 mmol/L    Chloride 103 100 - 108 mmol/L    CO2 35 (H) 21 - 32 mmol/L    Anion gap 3 3.0 - 18 mmol/L    Glucose 115 (H) 74 - 99 mg/dL    BUN 29 (H) 7.0 - 18 MG/DL    Creatinine 1.34 (H) 0.6 - 1.3 MG/DL    BUN/Creatinine ratio 22 (H) 12 - 20      GFR est AA 47 (L) >60 ml/min/1.73m2    GFR est non-AA 39 (L) >60 ml/min/1.73m2    Calcium 8.5 8.5 - 10.1 MG/DL    Bilirubin, total 0.4 0.2 - 1.0 MG/DL    ALT (SGPT) 79 (H) 13 - 56 U/L    AST (SGOT) 21 15 - 37 U/L    Alk. phosphatase 94 45 - 117 U/L    Protein, total 6.2 (L) 6.4 - 8.2 g/dL    Albumin 2.5 (L) 3.4 - 5.0 g/dL    Globulin 3.7 2.0 - 4.0 g/dL    A-G Ratio 0.7 (L) 0.8 - 1.7             Echo 5/2018:    Left ventricle: Systolic function was normal by visual assessment. Ejection   fraction was estimated in the range of 55 %  to 60 %. No obvious wall motion abnormalities identified in the views   obtained. Right ventricle: Systolic pressure was mildly increased. Estimated peak   pressure was at least 45 mmHg. Tricuspid valve: There was mild regurgitation. Telemetry:normal sinus rhythm    Imaging:  I have personally reviewed the patients radiographs and have reviewed the reports:     CT Results  (Last 48 hours)    None        Results for Giuseppe Stafford (MRN [id number]) as of 5/13/2018 12:53   Ref. Range 5/12/2018 05:02   pH (POC) Latest Ref Range: 7.35 - 7.45   7.462 (H)   pCO2 (POC) Latest Ref Range: 35.0 - 45.0 MMHG 42.4   pO2 (POC) Latest Ref Range: 80 - 100 MMHG 53 (L)   HCO3 (POC) Latest Ref Range: 22 - 26 MMOL/L 30.3 (H)   sO2 (POC) Latest Ref Range: 92 - 97 % 89 (L)   Base excess (POC) Latest Units: mmol/L 6   FIO2 (POC) Latest Units: % 94   Patient temp. Latest Units:   98.7   Specimen type (POC) Latest Units:   ARTERIAL   Site Latest Units:   RIGHT RADIAL   Device: Latest Units:   ROOM AIR   Total resp. rate Latest Units:   22   Allens test (POC) Latest Units:   YES       Final result (Exam End: 5/12/2018  3:00 AM) Open        Study Result      Chest     Indication: SOB      Comparison: October 19, 2017     Findings: Single view. Instrumentation stable. No pneumothorax. No pleural  effusion. Bilateral lower lung zone atelectasis again with chronic  pleural-parenchymal changes at the middle and lower lung zones. Cardiomediastinal silhouette and osseous structures grossly unchanged.     IMPRESSION  Impression: No significant change             Right leg :  1. No evidence of deep venous thrombosis detected in the veins  visualized. 2. Deep vein(s) visualized include the common femoral, femoral,  popliteal, posterior tibial, and peroneal veins. 3. No evidence of superficial thrombosis detected. 4. Superficial vein(s) visualized include the great saphenous vein at  the sapheno-femoral junction. Left leg :  1. No evidence of deep venous thrombosis detected in the veins  visualized. 2. Deep vein(s) visualized include the common femoral, femoral,  popliteal, posterior tibial, and peroneal veins. 3. No evidence of superficial thrombosis detected. 4. Superficial vein(s) visualized include the great saphenous vein at  the sapheno-femoral junction.     High complexity decision making was performed during the evaluation of this patient at high risk for decompensation with multiple organ involvement     Above mentioned total time spent on reviewing the case/medical record/data/notes/EMR/patient examination/documentation/coordinating care with nurse/consultants, exclusive of procedures with complex decision making performed and > 50% time spent in face to face evaluation.     Cindy Sorensen MD  5/19/2018

## 2018-05-19 NOTE — PROGRESS NOTES
Patient discharge per MD orders. Patient was giving discharge instruction, prescriptions, and follow-up appointments. Patient stated understanding of discharge instructions, medication administrations and follow-up appointments. Patient was discharge with home oxygen. Patient transported via wheelchair to the exit. No acute distress noted at this time.

## 2018-05-19 NOTE — PROGRESS NOTES
Problem: Falls - Risk of  Goal: *Absence of Falls  Document Abdias Fall Risk and appropriate interventions in the flowsheet.    Outcome: Progressing Towards Goal  Fall Risk Interventions:  Mobility Interventions: Assess mobility with egress test, Bed/chair exit alarm, Communicate number of staff needed for ambulation/transfer, OT consult for ADLs, Patient to call before getting OOB, PT Consult for mobility concerns, PT Consult for assist device competence, Strengthening exercises (ROM-active/passive)         Medication Interventions: Assess postural VS orthostatic hypotension, Bed/chair exit alarm, Evaluate medications/consider consulting pharmacy, Patient to call before getting OOB, Teach patient to arise slowly    Elimination Interventions: Bed/chair exit alarm, Call light in reach, Elevated toilet seat, Patient to call for help with toileting needs, Toilet paper/wipes in reach, Toileting schedule/hourly rounds

## 2018-05-19 NOTE — DISCHARGE SUMMARY
Discharge Summary     Patient: Kathy Richards MRN: [id number]  SSN: [id number]    YOB: 1945  Age: 71 y.o. 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 o. female with a history of HIV, who presents to the ED via EMS with complaint of shortness of breath and wheezing which got worse  one day before admission. Patient stated that her shortness of breath has been gradually worsening.       She notes that she was prescribed Advair, but has been unable to take it due to throat irritation .       She reports associated productive cough, chest pain, and bilateral feet swelling over the past few days.  BNP on admission within normal range Patient denies history of CHF.      Per EMS, patient was given 2 albuterol treatments en route to the ED. She currently smokes 0.5ppd. Patient denies recent fever, chills, nausea, vomiting, abdominal pain, or back pain.  She makes no further complaints.      Pt has been f/u ID at [address]  Dr. Berta De La Cruz [id number] she has recent change in her med she has been on current med for 3 month due to f/u with her in July  Pt seen by cardiology started on Cardizem 30 mg qid troponin I is negative . Pt still c/o chest pain she has localized tenderness cleared by cardiology f/u as out patient might need stress test.  Patient was to be discharged on 5/18/18 however developed chest pain, cardiology discussed option for nuclear stress test while inpatient, patient declined and will set up for outpatient. Chest pain resolved on day of discharge. Pt had echo Left ventricle: Systolic function was normal by visual assessment. Ejection fraction was estimated in the range of 55 % to 60 %. No obvious wall motion abnormalities identified in the views obtained. Pt started on Cardizem by cardiology  Bp pt has been stable with current medication will continue current med and f/u as out patient will get home health to monitor BP and HR. CT Chest w/o contrast 5/13/18  IMPRESSION: Chronic obstructive pulmonary disease with features primarily of emphysema. Bronchial thickening with bronchial luminal filling in the basal zones. Bronchitis with mucous plugging versus aspiration. Findings suggesting acute congestive heart failure or pneumocystis pneumonia are not evident. Pt was treated with Levaquin, pulmonology recommending 7 days total, pt started on solumedrol switched to oral prednisone 5/17 with instruction to taper. pt  Has been stable on current med cleared by pulmonary to go home and f/u as out patient pt also finished diflucan 200 mg x 5 days     Pt had swallow evaluation done recommended Regular diet thin liquid  With aspiration precautions    respiratory culture done 5/17/18 result is pending, preliminary normal respiratory damon, will f/u as outpatient    Discussed with nursing staff willl wean off o2 by NC and check pulse oximetry with activity before discharge to make sure pt doesn't need o2 at home. Patient desat to 88% with ambulation, needs home oxygen 2L NC for discharge.   Monitor as outpatient and wean as able.    Liver enzymes started to go up before discharge will hold pravachol since she just started on It for now repeat lab as out patient       Consults: Cardiology and Pulmonary/Critical Care        Physical Examination:   General:  Alert, cooperative, no acute distress, talking comfortably in full sentences   Head:  Normocephalic, without obvious abnormality, atraumatic. Eyes:  Conjunctivae/corneas clear. PERRL, EOMs intact. Nose: Nares normal. No drainage or sinus tenderness. Throat: Lips, mucosa, and tongue moist, no signs of yeast today   Neck: Supple, symmetrical, trachea midline, no adenopathy, thyroid: no enlargement/tenderness/nodules, no carotid bruit and no JVD. Lungs:   clear bilaterally    Chest wall:  Positive tenderness midaxillary and Lt chest wall improved   Heart:  Regular rate and rhythm, S1, S2 normal, no murmur, click, rub or gallop.     Abdomen: Soft, non-tender. Bowel sounds normal. No masses,  No organomegaly. Extremities: Extremities normal, atraumatic, trace edema lower extremities improved   Pulses: 2+ and symmetric all extremities. Skin: Skin color, texture,  Dry skin   Neurologic: CNII-XII intact. No focal motor or sensory deficit.             Disposition: home health    Discharge Medications:   Current Discharge Medication List      START taking these medications    Details   levoFLOXacin (LEVAQUIN) 250 mg tablet Take 1 Tab by mouth every fourty-eight (48) hours for 1 day. Qty: 1 Tab, Refills: 0      albuterol (PROVENTIL VENTOLIN) 2.5 mg /3 mL (0.083 %) nebulizer solution 3 mL by Nebulization route every four (4) hours as needed for Wheezing. Qty: 24 Each, Refills: 2      aspirin 81 mg chewable tablet Take 1 Tab by mouth daily. Qty: 30 Tab, Refills: 0      budesonide-formoterol (SYMBICORT) 160-4.5 mcg/actuation HFAA Take 2 Puffs by inhalation two (2) times a day. Qty: 1 Inhaler, Refills: 0      famotidine (PEPCID) 20 mg tablet Take 1 Tab by mouth daily.   Qty: 30 Tab, Refills: 0      furosemide (LASIX) 40 mg tablet Take 1 Tab by mouth daily. Qty: 30 Tab, Refills: 0      predniSONE (DELTASONE) 10 mg tablet 3 tab po daily for 3 days then  2 tab po daily for 3 days then   1 tab po daily for 3 days  Qty: 18 Tab, Refills: 0      tiotropium (SPIRIVA) 18 mcg inhalation capsule Take 1 Cap by inhalation daily. Qty: 30 Cap, Refills: 0      hydrALAZINE (APRESOLINE) 10 mg tablet Take 1 Tab by mouth three (3) times daily. Hold for SBP less than 110  Qty: 90 Tab, Refills: 0      traMADol (ULTRAM) 50 mg tablet Take 1 Tab by mouth every six (6) hours as needed. Max Daily Amount: 200 mg. Qty: 28 Tab, Refills: 0    Associated Diagnoses: Chest pain, atypical      dilTIAZem CD (CARDIZEM CD) 180 mg ER capsule Take 1 Cap by mouth daily. Qty: 30 Cap, Refills: 1         CONTINUE these medications which have NOT CHANGED    Details   fluticasone (FLONASE) 50 mcg/actuation nasal spray 2 Sprays by Both Nostrils route daily. darunavir-cobicistat (PREZCOBIX) 800-150 mg-mg per tablet Take 1 Tab by mouth daily. abacavir-dolutegravir-lamiVUDine (TRIUMEQ) tablet Take  by mouth daily. sertraline (ZOLOFT) 25 mg tablet Take  by mouth daily. STOP taking these medications       mirtazapine (REMERON) 30 mg tablet Comments:   Reason for Stopping:               Activity: Activity as tolerated  Diet: Cardiac Diet  Wound Care: None needed    Follow-up Appointments   Procedures    FOLLOW UP VISIT Appointment in: 3 - 5 Days F/U Dr Osiel Marie within one week cbc, cmp, mag f/u sputum culture  . Hold Pravachol until f/u liver function as outpatient . F/u Cardiology Dr Maya Burns in 2 week f/u Dr Sharonda Flores in 2 weeks f/u. .. F/U Dr Osiel Pugh within one week cbc, cmp, mag tsh and free t4  f/u sputum culture  . Hold Pravachol until f/u liver function as   outpatient . F/u Cardiology Dr Maya Burns in 2 week f/u Dr Sharonda Flores in 2 weeks f/u ID Dr Juan Martin in [place] as directed.  Home [facility] for pt and ot medication management monitor vitals BP and pulse oximetry     Standing Status:   Standing     Number of Occurrences:   1     Order Specific Question:   Appointment in     Answer:   3 - 5 Days     Discharge time more than 35 minutes    Signed By: Robert Wilcox MD     May 19, 2018

## 2018-05-19 NOTE — PROGRESS NOTES
1410 Patient's resting pulse oximetry on room air was 96%. Ambulating pulse oximetry on room air was 88%. Patient was extremely short of breath with exertion. Patient returned to bed and oxygen reapplied saturation is 95% on 2 lpm via nasal cannula. Notified weekend .

## 2018-05-21 ENCOUNTER — HOME CARE VISIT (OUTPATIENT)
Dept: HOME HEALTH SERVICES | Facility: HOME HEALTH | Age: 73
End: 2018-05-21

## 2018-05-21 ENCOUNTER — HOME CARE VISIT (OUTPATIENT)
Dept: SCHEDULING | Facility: HOME HEALTH | Age: 73
End: 2018-05-21
Payer: MEDICARE

## 2018-05-21 ENCOUNTER — TELEPHONE (OUTPATIENT)
Dept: CARDIOLOGY CLINIC | Age: 73
End: 2018-05-21

## 2018-05-21 VITALS
WEIGHT: 150 LBS | HEART RATE: 124 BPM | OXYGEN SATURATION: 94 % | BODY MASS INDEX: 27.6 KG/M2 | HEIGHT: 62 IN | SYSTOLIC BLOOD PRESSURE: 110 MMHG | DIASTOLIC BLOOD PRESSURE: 80 MMHG | TEMPERATURE: 98.4 F | RESPIRATION RATE: 20 BRPM

## 2018-05-21 PROCEDURE — G0299 HHS/HOSPICE OF RN EA 15 MIN: HCPCS

## 2018-05-21 PROCEDURE — 400013 HH SOC

## 2018-05-21 PROCEDURE — 3331090002 HH PPS REVENUE DEBIT

## 2018-05-21 PROCEDURE — 3331090001 HH PPS REVENUE CREDIT

## 2018-05-21 NOTE — TELEPHONE ENCOUNTER
Homehealth nurse called and wants to know can you do an order so the patient can get for free a [facility] monitor to monitor BP, pulse ox

## 2018-05-21 NOTE — TELEPHONE ENCOUNTER
Yes I will sign it. Please tell home health nurse to do a verbal order and send it to me for signature.

## 2018-05-22 ENCOUNTER — HOME CARE VISIT (OUTPATIENT)
Dept: SCHEDULING | Facility: HOME HEALTH | Age: 73
End: 2018-05-22
Payer: MEDICARE

## 2018-05-22 ENCOUNTER — HOME CARE VISIT (OUTPATIENT)
Dept: HOME HEALTH SERVICES | Facility: HOME HEALTH | Age: 73
End: 2018-05-22
Payer: MEDICARE

## 2018-05-22 PROCEDURE — G0151 HHCP-SERV OF PT,EA 15 MIN: HCPCS

## 2018-05-22 PROCEDURE — 3331090001 HH PPS REVENUE CREDIT

## 2018-05-22 PROCEDURE — 3331090002 HH PPS REVENUE DEBIT

## 2018-05-23 PROCEDURE — 3331090001 HH PPS REVENUE CREDIT

## 2018-05-23 PROCEDURE — 3331090002 HH PPS REVENUE DEBIT

## 2018-05-24 ENCOUNTER — HOME CARE VISIT (OUTPATIENT)
Dept: SCHEDULING | Facility: HOME HEALTH | Age: 73
End: 2018-05-24
Payer: MEDICARE

## 2018-05-24 VITALS
RESPIRATION RATE: 20 BRPM | OXYGEN SATURATION: 96 % | HEART RATE: 116 BPM | DIASTOLIC BLOOD PRESSURE: 81 MMHG | TEMPERATURE: 97.3 F | SYSTOLIC BLOOD PRESSURE: 121 MMHG

## 2018-05-24 PROCEDURE — 3331090001 HH PPS REVENUE CREDIT

## 2018-05-24 PROCEDURE — 3331090002 HH PPS REVENUE DEBIT

## 2018-05-24 PROCEDURE — G0299 HHS/HOSPICE OF RN EA 15 MIN: HCPCS

## 2018-05-25 ENCOUNTER — HOME CARE VISIT (OUTPATIENT)
Dept: HOME HEALTH SERVICES | Facility: HOME HEALTH | Age: 73
End: 2018-05-25
Payer: MEDICARE

## 2018-05-25 PROCEDURE — 3331090001 HH PPS REVENUE CREDIT

## 2018-05-25 PROCEDURE — 3331090002 HH PPS REVENUE DEBIT

## 2018-05-26 LAB
ATRIAL RATE: 79 BPM
CALCULATED P AXIS, ECG09: 58 DEGREES
CALCULATED R AXIS, ECG10: 16 DEGREES
CALCULATED T AXIS, ECG11: 63 DEGREES
DIAGNOSIS, 93000: NORMAL
P-R INTERVAL, ECG05: 140 MS
Q-T INTERVAL, ECG07: 396 MS
QRS DURATION, ECG06: 80 MS
QTC CALCULATION (BEZET), ECG08: 454 MS
VENTRICULAR RATE, ECG03: 79 BPM

## 2018-05-26 PROCEDURE — 3331090001 HH PPS REVENUE CREDIT

## 2018-05-26 PROCEDURE — 3331090002 HH PPS REVENUE DEBIT

## 2018-05-27 ENCOUNTER — HOSPITAL ENCOUNTER (INPATIENT)
Age: 73
LOS: 5 days | Discharge: HOME HEALTH | DRG: 975 | End: 2018-06-01
Attending: EMERGENCY MEDICINE | Admitting: INTERNAL MEDICINE
Payer: MEDICARE

## 2018-05-27 ENCOUNTER — HOME CARE VISIT (OUTPATIENT)
Dept: HOME HEALTH SERVICES | Facility: HOME HEALTH | Age: 73
End: 2018-05-27
Payer: MEDICARE

## 2018-05-27 ENCOUNTER — APPOINTMENT (OUTPATIENT)
Dept: CT IMAGING | Age: 73
DRG: 975 | End: 2018-05-27
Attending: EMERGENCY MEDICINE
Payer: MEDICARE

## 2018-05-27 ENCOUNTER — APPOINTMENT (OUTPATIENT)
Dept: GENERAL RADIOLOGY | Age: 73
DRG: 975 | End: 2018-05-27
Attending: EMERGENCY MEDICINE
Payer: MEDICARE

## 2018-05-27 DIAGNOSIS — J18.9 HCAP (HEALTHCARE-ASSOCIATED PNEUMONIA): Primary | ICD-10-CM

## 2018-05-27 DIAGNOSIS — A41.9 SEPSIS, DUE TO UNSPECIFIED ORGANISM: ICD-10-CM

## 2018-05-27 PROBLEM — I10 ESSENTIAL HYPERTENSION: Status: ACTIVE | Noted: 2018-05-27

## 2018-05-27 LAB
ALBUMIN SERPL-MCNC: 3.3 G/DL (ref 3.4–5)
ALBUMIN/GLOB SERPL: 0.6 {RATIO} (ref 0.8–1.7)
ALP SERPL-CCNC: 189 U/L (ref 45–117)
ALT SERPL-CCNC: 430 U/L (ref 13–56)
AMPHET UR QL SCN: NEGATIVE
ANION GAP SERPL CALC-SCNC: 15 MMOL/L (ref 3–18)
APPEARANCE UR: CLEAR
ARTERIAL PATENCY WRIST A: ABNORMAL
AST SERPL-CCNC: 143 U/L (ref 15–37)
ATRIAL RATE: 161 BPM
BACTERIA URNS QL MICRO: ABNORMAL /HPF
BARBITURATES UR QL SCN: NEGATIVE
BASE EXCESS BLD CALC-SCNC: 0 MMOL/L
BASOPHILS # BLD: 0 K/UL (ref 0–0.06)
BASOPHILS NFR BLD: 0 % (ref 0–3)
BDY SITE: ABNORMAL
BENZODIAZ UR QL: NEGATIVE
BILIRUB SERPL-MCNC: 1.2 MG/DL (ref 0.2–1)
BILIRUB UR QL: NEGATIVE
BNP SERPL-MCNC: 1058 PG/ML (ref 0–900)
BUN SERPL-MCNC: 29 MG/DL (ref 7–18)
BUN/CREAT SERPL: 13 (ref 12–20)
CALCIUM SERPL-MCNC: 9.9 MG/DL (ref 8.5–10.1)
CALCULATED R AXIS, ECG10: -16 DEGREES
CALCULATED T AXIS, ECG11: 59 DEGREES
CANNABINOIDS UR QL SCN: NEGATIVE
CHLORIDE SERPL-SCNC: 97 MMOL/L (ref 100–108)
CK MB CFR SERPL CALC: NORMAL % (ref 0–4)
CK MB SERPL-MCNC: <1 NG/ML (ref 5–25)
CK SERPL-CCNC: 30 U/L (ref 26–192)
CO2 SERPL-SCNC: 25 MMOL/L (ref 21–32)
COCAINE UR QL SCN: NEGATIVE
COLOR UR: YELLOW
CREAT SERPL-MCNC: 2.26 MG/DL (ref 0.6–1.3)
DIAGNOSIS, 93000: NORMAL
DIFFERENTIAL METHOD BLD: ABNORMAL
EOSINOPHIL # BLD: 0 K/UL (ref 0–0.4)
EOSINOPHIL NFR BLD: 0 % (ref 0–5)
EPITH CASTS URNS QL MICRO: ABNORMAL /LPF (ref 0–5)
ERYTHROCYTE [DISTWIDTH] IN BLOOD BY AUTOMATED COUNT: 12.9 % (ref 11.6–14.5)
GAS FLOW.O2 O2 DELIVERY SYS: ABNORMAL L/MIN
GAS FLOW.O2 SETTING OXYMISER: 2 L/M
GLOBULIN SER CALC-MCNC: 5.3 G/DL (ref 2–4)
GLUCOSE BLD STRIP.AUTO-MCNC: 170 MG/DL (ref 70–110)
GLUCOSE SERPL-MCNC: 165 MG/DL (ref 74–99)
GLUCOSE UR STRIP.AUTO-MCNC: NEGATIVE MG/DL
HCO3 BLD-SCNC: 20.4 MMOL/L (ref 22–26)
HCT VFR BLD AUTO: 49.1 % (ref 35–45)
HDSCOM,HDSCOM: NORMAL
HGB BLD-MCNC: 17.1 G/DL (ref 12–16)
HGB UR QL STRIP: NEGATIVE
KETONES UR QL STRIP.AUTO: NEGATIVE MG/DL
LACTATE BLD-SCNC: 2.3 MMOL/L (ref 0.4–2)
LACTATE BLD-SCNC: 2.4 MMOL/L (ref 0.4–2)
LACTATE BLD-SCNC: 4.5 MMOL/L (ref 0.4–2)
LACTATE BLD-SCNC: 5.5 MMOL/L (ref 0.4–2)
LEUKOCYTE ESTERASE UR QL STRIP.AUTO: NEGATIVE
LYMPHOCYTES # BLD: 1.3 K/UL (ref 0.8–3.5)
LYMPHOCYTES NFR BLD: 7 % (ref 20–51)
MCH RBC QN AUTO: 34.8 PG (ref 24–34)
MCHC RBC AUTO-ENTMCNC: 34.8 G/DL (ref 31–37)
MCV RBC AUTO: 100 FL (ref 74–97)
METHADONE UR QL: NEGATIVE
MONOCYTES # BLD: 0.2 K/UL (ref 0–1)
MONOCYTES NFR BLD: 1 % (ref 2–9)
NEUTS BAND NFR BLD MANUAL: 8 % (ref 0–5)
NEUTS SEG # BLD: 16.9 K/UL (ref 1.8–8)
NEUTS SEG NFR BLD: 84 % (ref 42–75)
NITRITE UR QL STRIP.AUTO: NEGATIVE
OPIATES UR QL: NEGATIVE
P-R INTERVAL, ECG05: 94 MS
PCO2 BLD: 22.7 MMHG (ref 35–45)
PCP UR QL: NEGATIVE
PH BLD: 7.56 [PH] (ref 7.35–7.45)
PH UR STRIP: 6.5 [PH] (ref 5–8)
PLATELET # BLD AUTO: 177 K/UL (ref 135–420)
PLATELET COMMENTS,PCOM: ABNORMAL
PMV BLD AUTO: 9.9 FL (ref 9.2–11.8)
PO2 BLD: 58 MMHG (ref 80–100)
POTASSIUM SERPL-SCNC: 4.3 MMOL/L (ref 3.5–5.5)
PROT SERPL-MCNC: 8.6 G/DL (ref 6.4–8.2)
PROT UR STRIP-MCNC: ABNORMAL MG/DL
Q-T INTERVAL, ECG07: 302 MS
QRS DURATION, ECG06: 74 MS
QTC CALCULATION (BEZET), ECG08: 494 MS
RBC # BLD AUTO: 4.91 M/UL (ref 4.2–5.3)
RBC #/AREA URNS HPF: ABNORMAL /HPF (ref 0–5)
RBC MORPH BLD: ABNORMAL
RBC MORPH BLD: ABNORMAL
SAO2 % BLD: 94 % (ref 92–97)
SERVICE CMNT-IMP: ABNORMAL
SODIUM SERPL-SCNC: 137 MMOL/L (ref 136–145)
SP GR UR REFRACTOMETRY: 1.02 (ref 1–1.03)
SPECIMEN TYPE: ABNORMAL
TOTAL RESP. RATE, ITRR: 31
TROPONIN I SERPL-MCNC: <0.02 NG/ML (ref 0–0.04)
UROBILINOGEN UR QL STRIP.AUTO: 1 EU/DL (ref 0.2–1)
VENTRICULAR RATE, ECG03: 161 BPM
WBC # BLD AUTO: 18.4 K/UL (ref 4.6–13.2)
WBC URNS QL MICRO: ABNORMAL /HPF (ref 0–4)

## 2018-05-27 PROCEDURE — 74011000250 HC RX REV CODE- 250: Performed by: EMERGENCY MEDICINE

## 2018-05-27 PROCEDURE — 83880 ASSAY OF NATRIURETIC PEPTIDE: CPT | Performed by: EMERGENCY MEDICINE

## 2018-05-27 PROCEDURE — 87070 CULTURE OTHR SPECIMN AEROBIC: CPT | Performed by: EMERGENCY MEDICINE

## 2018-05-27 PROCEDURE — 83605 ASSAY OF LACTIC ACID: CPT

## 2018-05-27 PROCEDURE — 74011250636 HC RX REV CODE- 250/636: Performed by: EMERGENCY MEDICINE

## 2018-05-27 PROCEDURE — 65660000000 HC RM CCU STEPDOWN

## 2018-05-27 PROCEDURE — 80053 COMPREHEN METABOLIC PANEL: CPT | Performed by: EMERGENCY MEDICINE

## 2018-05-27 PROCEDURE — 96375 TX/PRO/DX INJ NEW DRUG ADDON: CPT

## 2018-05-27 PROCEDURE — 96365 THER/PROPH/DIAG IV INF INIT: CPT

## 2018-05-27 PROCEDURE — 3331090002 HH PPS REVENUE DEBIT

## 2018-05-27 PROCEDURE — 94640 AIRWAY INHALATION TREATMENT: CPT

## 2018-05-27 PROCEDURE — 87040 BLOOD CULTURE FOR BACTERIA: CPT | Performed by: EMERGENCY MEDICINE

## 2018-05-27 PROCEDURE — 74011250636 HC RX REV CODE- 250/636: Performed by: INTERNAL MEDICINE

## 2018-05-27 PROCEDURE — 81001 URINALYSIS AUTO W/SCOPE: CPT | Performed by: EMERGENCY MEDICINE

## 2018-05-27 PROCEDURE — 93005 ELECTROCARDIOGRAM TRACING: CPT

## 2018-05-27 PROCEDURE — 77030029684 HC NEB SM VOL KT MONA -A

## 2018-05-27 PROCEDURE — 82962 GLUCOSE BLOOD TEST: CPT

## 2018-05-27 PROCEDURE — 99285 EMERGENCY DEPT VISIT HI MDM: CPT

## 2018-05-27 PROCEDURE — 77030038269 HC DRN EXT URIN PURWCK BARD -A

## 2018-05-27 PROCEDURE — 85025 COMPLETE CBC W/AUTO DIFF WBC: CPT | Performed by: EMERGENCY MEDICINE

## 2018-05-27 PROCEDURE — 80307 DRUG TEST PRSMV CHEM ANLYZR: CPT | Performed by: EMERGENCY MEDICINE

## 2018-05-27 PROCEDURE — 3331090001 HH PPS REVENUE CREDIT

## 2018-05-27 PROCEDURE — 82803 BLOOD GASES ANY COMBINATION: CPT

## 2018-05-27 PROCEDURE — 74011000258 HC RX REV CODE- 258: Performed by: EMERGENCY MEDICINE

## 2018-05-27 PROCEDURE — 74011250637 HC RX REV CODE- 250/637: Performed by: INTERNAL MEDICINE

## 2018-05-27 PROCEDURE — 82550 ASSAY OF CK (CPK): CPT | Performed by: EMERGENCY MEDICINE

## 2018-05-27 PROCEDURE — 71045 X-RAY EXAM CHEST 1 VIEW: CPT

## 2018-05-27 PROCEDURE — 36600 WITHDRAWAL OF ARTERIAL BLOOD: CPT

## 2018-05-27 RX ORDER — IPRATROPIUM BROMIDE AND ALBUTEROL SULFATE 2.5; .5 MG/3ML; MG/3ML
3 SOLUTION RESPIRATORY (INHALATION)
Status: COMPLETED | OUTPATIENT
Start: 2018-05-27 | End: 2018-05-27

## 2018-05-27 RX ORDER — SERTRALINE HYDROCHLORIDE 50 MG/1
25 TABLET, FILM COATED ORAL DAILY
Status: DISCONTINUED | OUTPATIENT
Start: 2018-05-28 | End: 2018-06-01 | Stop reason: HOSPADM

## 2018-05-27 RX ORDER — FAMOTIDINE 20 MG/1
20 TABLET, FILM COATED ORAL DAILY
Status: DISCONTINUED | OUTPATIENT
Start: 2018-05-28 | End: 2018-06-01 | Stop reason: HOSPADM

## 2018-05-27 RX ORDER — LEVOFLOXACIN 5 MG/ML
750 INJECTION, SOLUTION INTRAVENOUS EVERY 24 HOURS
Status: DISCONTINUED | OUTPATIENT
Start: 2018-05-27 | End: 2018-05-27

## 2018-05-27 RX ORDER — NALOXONE HYDROCHLORIDE 0.4 MG/ML
0.4 INJECTION, SOLUTION INTRAMUSCULAR; INTRAVENOUS; SUBCUTANEOUS AS NEEDED
Status: DISCONTINUED | OUTPATIENT
Start: 2018-05-27 | End: 2018-06-01 | Stop reason: HOSPADM

## 2018-05-27 RX ORDER — LEVOFLOXACIN 5 MG/ML
750 INJECTION, SOLUTION INTRAVENOUS
Status: DISCONTINUED | OUTPATIENT
Start: 2018-05-29 | End: 2018-05-29

## 2018-05-27 RX ORDER — ONDANSETRON 2 MG/ML
4 INJECTION INTRAMUSCULAR; INTRAVENOUS
Status: DISCONTINUED | OUTPATIENT
Start: 2018-05-27 | End: 2018-06-01 | Stop reason: HOSPADM

## 2018-05-27 RX ORDER — TRAMADOL HYDROCHLORIDE 50 MG/1
50 TABLET ORAL
Status: DISCONTINUED | OUTPATIENT
Start: 2018-05-27 | End: 2018-06-01 | Stop reason: HOSPADM

## 2018-05-27 RX ORDER — BUDESONIDE AND FORMOTEROL FUMARATE DIHYDRATE 160; 4.5 UG/1; UG/1
2 AEROSOL RESPIRATORY (INHALATION) 2 TIMES DAILY
Status: DISCONTINUED | OUTPATIENT
Start: 2018-05-27 | End: 2018-05-27

## 2018-05-27 RX ORDER — DILTIAZEM HYDROCHLORIDE 180 MG/1
180 CAPSULE, COATED, EXTENDED RELEASE ORAL DAILY
Status: DISCONTINUED | OUTPATIENT
Start: 2018-05-28 | End: 2018-06-01 | Stop reason: HOSPADM

## 2018-05-27 RX ORDER — FUROSEMIDE 40 MG/1
40 TABLET ORAL DAILY
Status: DISCONTINUED | OUTPATIENT
Start: 2018-05-28 | End: 2018-06-01 | Stop reason: HOSPADM

## 2018-05-27 RX ORDER — ALBUTEROL SULFATE 0.83 MG/ML
5 SOLUTION RESPIRATORY (INHALATION)
Status: COMPLETED | OUTPATIENT
Start: 2018-05-27 | End: 2018-05-27

## 2018-05-27 RX ORDER — ACETAMINOPHEN 325 MG/1
650 TABLET ORAL
Status: DISCONTINUED | OUTPATIENT
Start: 2018-05-27 | End: 2018-06-01 | Stop reason: HOSPADM

## 2018-05-27 RX ORDER — IPRATROPIUM BROMIDE AND ALBUTEROL SULFATE 2.5; .5 MG/3ML; MG/3ML
3 SOLUTION RESPIRATORY (INHALATION)
Status: DISCONTINUED | OUTPATIENT
Start: 2018-05-27 | End: 2018-06-01 | Stop reason: HOSPADM

## 2018-05-27 RX ORDER — HEPARIN SODIUM 5000 [USP'U]/ML
5000 INJECTION, SOLUTION INTRAVENOUS; SUBCUTANEOUS EVERY 8 HOURS
Status: DISCONTINUED | OUTPATIENT
Start: 2018-05-27 | End: 2018-05-29

## 2018-05-27 RX ORDER — DOCUSATE SODIUM 100 MG/1
100 CAPSULE, LIQUID FILLED ORAL
Status: DISCONTINUED | OUTPATIENT
Start: 2018-05-27 | End: 2018-06-01 | Stop reason: HOSPADM

## 2018-05-27 RX ORDER — SODIUM CHLORIDE 0.9 % (FLUSH) 0.9 %
5-10 SYRINGE (ML) INJECTION AS NEEDED
Status: DISCONTINUED | OUTPATIENT
Start: 2018-05-27 | End: 2018-06-01 | Stop reason: HOSPADM

## 2018-05-27 RX ORDER — GUAIFENESIN 100 MG/5ML
81 LIQUID (ML) ORAL DAILY
Status: DISCONTINUED | OUTPATIENT
Start: 2018-05-28 | End: 2018-06-01 | Stop reason: HOSPADM

## 2018-05-27 RX ORDER — BUDESONIDE AND FORMOTEROL FUMARATE DIHYDRATE 160; 4.5 UG/1; UG/1
2 AEROSOL RESPIRATORY (INHALATION)
Status: DISCONTINUED | OUTPATIENT
Start: 2018-05-27 | End: 2018-06-01 | Stop reason: HOSPADM

## 2018-05-27 RX ADMIN — SODIUM CHLORIDE 1000 ML: 900 INJECTION, SOLUTION INTRAVENOUS at 03:36

## 2018-05-27 RX ADMIN — HEPARIN SODIUM 5000 UNITS: 5000 INJECTION, SOLUTION INTRAVENOUS; SUBCUTANEOUS at 22:27

## 2018-05-27 RX ADMIN — SODIUM CHLORIDE 250 ML: 900 INJECTION, SOLUTION INTRAVENOUS at 14:28

## 2018-05-27 RX ADMIN — HEPARIN SODIUM 5000 UNITS: 5000 INJECTION, SOLUTION INTRAVENOUS; SUBCUTANEOUS at 14:28

## 2018-05-27 RX ADMIN — PIPERACILLIN SODIUM,TAZOBACTAM SODIUM 3.38 G: 3; .375 INJECTION, POWDER, FOR SOLUTION INTRAVENOUS at 10:52

## 2018-05-27 RX ADMIN — PIPERACILLIN SODIUM AND TAZOBACTAM SODIUM 4.5 G: 4; .5 INJECTION, POWDER, LYOPHILIZED, FOR SOLUTION INTRAVENOUS at 03:07

## 2018-05-27 RX ADMIN — VANCOMYCIN HYDROCHLORIDE 1750 MG: 10 INJECTION, POWDER, LYOPHILIZED, FOR SOLUTION INTRAVENOUS at 05:18

## 2018-05-27 RX ADMIN — METHYLPREDNISOLONE SODIUM SUCCINATE 125 MG: 125 INJECTION, POWDER, FOR SOLUTION INTRAMUSCULAR; INTRAVENOUS at 03:03

## 2018-05-27 RX ADMIN — SODIUM CHLORIDE 1000 ML: 900 INJECTION, SOLUTION INTRAVENOUS at 03:05

## 2018-05-27 RX ADMIN — SODIUM CHLORIDE 1000 ML: 900 INJECTION, SOLUTION INTRAVENOUS at 03:27

## 2018-05-27 RX ADMIN — PIPERACILLIN SODIUM,TAZOBACTAM SODIUM 3.38 G: 3; .375 INJECTION, POWDER, FOR SOLUTION INTRAVENOUS at 23:13

## 2018-05-27 RX ADMIN — ALBUTEROL SULFATE 5 MG: 2.5 SOLUTION RESPIRATORY (INHALATION) at 02:45

## 2018-05-27 RX ADMIN — PIPERACILLIN SODIUM,TAZOBACTAM SODIUM 3.38 G: 3; .375 INJECTION, POWDER, FOR SOLUTION INTRAVENOUS at 18:07

## 2018-05-27 RX ADMIN — ABACAVIR SULFATE, DOLUTEGRAVIR SODIUM, LAMIVUDINE 1 TABLET: 600; 50; 300 TABLET, FILM COATED ORAL at 18:07

## 2018-05-27 RX ADMIN — BUDESONIDE AND FORMOTEROL FUMARATE DIHYDRATE 2 PUFF: 160; 4.5 AEROSOL RESPIRATORY (INHALATION) at 19:19

## 2018-05-27 RX ADMIN — LEVOFLOXACIN 750 MG: 5 INJECTION, SOLUTION INTRAVENOUS at 03:39

## 2018-05-27 RX ADMIN — IPRATROPIUM BROMIDE AND ALBUTEROL SULFATE 3 ML: .5; 3 SOLUTION RESPIRATORY (INHALATION) at 02:31

## 2018-05-27 NOTE — ED NOTES
TRANSFER - OUT REPORT:    Verbal report given to Deny Robins RN(name) on Reshma Barajas  being transferred to 3N(unit) for routine progression of care       Report consisted of patients Situation, Background, Assessment and   Recommendations(SBAR). Information from the following report(s) SBAR, ED Summary, Intake/Output, MAR, Recent Results and Cardiac Rhythm NSR was reviewed with the receiving nurse. Opportunity for questions and clarification was provided.       Patient transported with:   O2 @ 3 liters   Patient Belongings  Family members

## 2018-05-27 NOTE — IP AVS SNAPSHOT
[address] [facility], [place], operated by [facility] 
 
 
 [address] [address] Jason Zambrano Patient: Allison Kohli MRN: [id number] :1945 About your hospitalization You were admitted on:  May 27, 2018 You last received care in the:  [address] [facility] You were discharged on:  2018 Why you were hospitalized Your primary diagnosis was:  Hcap (Healthcare-Associated Pneumonia) Your diagnoses also included:  Hiv (Human Immunodeficiency Virus Infection) (Hcc), Essential Hypertension, Copd Exacerbation (Hcc) Follow-up Information Follow up With Details Comments Contact Info Harleen Taylor MD On 2018 at [address] [facility] [address] 
[phone] Your Scheduled Appointments 2018  2:00 PM EDT Follow Up with Surya See MD  
[address] ([address]) [address] [facility], [address] [facility] [address]  
[phone] 2018 10:00 AM EDT Office Visit with Lamin Kwan MD  
[facility] ([address] [facility]) [address] [facility] [address]  
[phone] Discharge Orders None A check brigette indicates which time of day the medication should be taken. My Medications START taking these medications Instructions Each Dose to Equal  
 Morning Noon Evening Bedtime  
 abacavir 300 mg tablet Commonly known as:  Lucille Durham Start taking on:  2018 Your last dose was: Your next dose is: Take 2 Tabs by mouth daily. 600 mg  
    
   
   
   
  
 albuterol-ipratropium 2.5 mg-0.5 mg/3 ml Nebu Commonly known as:  Stephane Bernard Your last dose was: Your next dose is:    
   
   
 3 mL by Nebulization route every four (4) hours as needed. 3 mL  
    
   
   
   
  
 amoxicillin-clavulanate 875-125 mg per tablet Commonly known as:  AUGMENTIN  
   
 Your last dose was: Your next dose is: Take 1 Tab by mouth two (2) times daily (with meals) for 4 days. 1 Tab  
    
   
   
   
  
 dolutegravir 50 mg Tab tablet Commonly known as:  Hawa Nieves Start taking on:  6/2/2018 Your last dose was: Your next dose is: Take 1 Tab by mouth daily. Indications: HIV infection 50 mg  
    
   
   
   
  
 dornase alpha 1 mg/mL nebulizer solution Commonly known as:  Lisa Gutierrez Your last dose was: Your next dose is: Take 2.5 mL by inhalation daily. 2.5 mg  
    
   
   
   
  
 fluticasone-vilanterol 100-25 mcg/dose inhaler Commonly known as:  BREO ELLIPTA Your last dose was: Your next dose is: Take 1 Puff by inhalation daily. 1 Puff  
    
   
   
   
  
 lamiVUDine 150 mg tablet Commonly known as:  Toribio Shoulders Start taking on:  6/2/2018 Your last dose was: Your next dose is: Take 1 Tab by mouth daily. 150 mg CHANGE how you take these medications Instructions Each Dose to Equal  
 Morning Noon Evening Bedtime * dilTIAZem  mg ER capsule Commonly known as:  CARDIZEM CD What changed:  Another medication with the same name was added. Make sure you understand how and when to take each. Your last dose was: Your next dose is: Take 1 Cap by mouth daily. 180 mg  
    
   
   
   
  
 * dilTIAZem  mg ER capsule Commonly known as:  CARDIZEM CD Start taking on:  6/2/2018 What changed: You were already taking a medication with the same name, and this prescription was added. Make sure you understand how and when to take each. Your last dose was: Your next dose is: Take 1 Cap by mouth daily. 180 mg  
    
   
   
   
  
 * furosemide 40 mg tablet Commonly known as:  LASIX What changed:  Another medication with the same name was added. Make sure you understand how and when to take each. Your last dose was: Your next dose is: Take 1 Tab by mouth daily for 30 days. 40 mg  
    
   
   
   
  
 * furosemide 40 mg tablet Commonly known as:  LASIX What changed: You were already taking a medication with the same name, and this prescription was added. Make sure you understand how and when to take each. Your last dose was: Your next dose is: Take 0.5 Tabs by mouth daily. 20 mg  
    
   
   
   
  
 * Notice: This list has 4 medication(s) that are the same as other medications prescribed for you. Read the directions carefully, and ask your doctor or other care provider to review them with you. CONTINUE taking these medications Instructions Each Dose to Equal  
 Morning Noon Evening Bedtime  
 albuterol 2.5 mg /3 mL (0.083 %) nebulizer solution Commonly known as:  PROVENTIL VENTOLIN Your last dose was: Your next dose is:    
   
   
 3 mL by Nebulization route every four (4) hours as needed for Wheezing. 2.5 mg  
    
   
   
   
  
 aspirin 81 mg chewable tablet Your last dose was: Your next dose is: Take 1 Tab by mouth daily. 81 mg  
    
   
   
   
  
 budesonide-formoterol 160-4.5 mcg/actuation Hfaa Commonly known as:  SYMBICORT Your last dose was: Your next dose is: Take 2 Puffs by inhalation two (2) times a day. 2 Puff  
    
   
   
   
  
 famotidine 20 mg tablet Commonly known as:  PEPCID Your last dose was: Your next dose is: Take 1 Tab by mouth daily. 20 mg  
    
   
   
   
  
 fluticasone 50 mcg/actuation nasal spray Commonly known as:  Randi Burns Your last dose was: Your next dose is: 2 Sprays by Both Nostrils route daily. 2 [place] PREZCOBIX 800-150 mg-mg per tablet Generic drug:  darunavir-cobicistat Your last dose was: Your next dose is: Take 1 Tab by mouth daily. 1 Tab  
    
   
   
   
  
 sertraline 25 mg tablet Commonly known as:  ZOLOFT Your last dose was: Your next dose is: Take  by mouth daily. tiotropium 18 mcg inhalation capsule Commonly known as:  Larena Pancake Your last dose was: Your next dose is: Take 1 Cap by inhalation daily. 1 Cap  
    
   
   
   
  
 traMADol 50 mg tablet Commonly known as:  ULTRAM  
   
Your last dose was: Your next dose is: Take 1 Tab by mouth every six (6) hours as needed. Max Daily Amount: 200 mg.  
 50 mg  
    
   
   
   
  
  
STOP taking these medications   
 hydrALAZINE 10 mg tablet Commonly known as:  APRESOLINE  
   
  
 predniSONE 10 mg tablet Commonly known as:  [place] Severin tablet Generic drug:  abacavir-dolutegravir-lamiVUDine Where to Get Your Medications Information on where to get these meds will be given to you by the nurse or doctor. ! Ask your nurse or doctor about these medications  
  abacavir 300 mg tablet  
 albuterol-ipratropium 2.5 mg-0.5 mg/3 ml Nebu  
 amoxicillin-clavulanate 875-125 mg per tablet  
 dilTIAZem  mg ER capsule  
 dolutegravir 50 mg Tab tablet  
 dornase alpha 1 mg/mL nebulizer solution  
 fluticasone-vilanterol 100-25 mcg/dose inhaler  
 furosemide 40 mg tablet  
 furosemide 40 mg tablet  
 lamiVUDine 150 mg tablet Opioid Education Prescription Opioids: What You Need to Know: 
 
Prescription opioids can be used to help relieve moderate-to-severe pain and are often prescribed following a surgery or injury, or for certain health conditions. These medications can be an important part of treatment but also come with serious risks.   Opioids are strong pain medicines. Examples include hydrocodone, oxycodone, fentanyl, and morphine. Heroin is an example of an illegal opioid. It is important to work with your health care provider to make sure you are getting the safest, most effective care. WHAT ARE THE RISKS AND SIDE EFFECTS OF OPIOID USE? Prescription opioids carry serious risks of addiction and overdose, especially with prolonged use. An opioid overdose, often marked by slow breathing, can cause sudden death. The use of prescription opioids can have a number of side effects as well, even when taken as directed. · Tolerance-meaning you might need to take more of a medication for the same pain relief · Physical dependence-meaning you have symptoms of withdrawal when the medication is stopped. Withdrawal symptoms can include nausea, sweating, chills, diarrhea, stomach cramps, and muscle aches. Withdrawal can last up to several weeks, depending on which drug you took and how long you took it. · Increased sensitivity to pain · Constipation · Nausea, vomiting, and dry mouth · Sleepiness and dizziness · Confusion · Depression · Low levels of testosterone that can result in lower sex drive, energy, and strength · Itching and sweating RISKS ARE GREATER WITH:      
· History of drug misuse, substance use disorder, or overdose · Mental health conditions (such as depression or anxiety) · Sleep apnea · Older age (72 years or older) · Pregnancy Avoid alcohol while taking prescription opioids. Also, unless specifically advised by your health care provider, medications to avoid include: · Benzodiazepines (such as Xanax or Valium) · Muscle relaxants (such as Soma or Flexeril) · Hypnotics (such as Ambien or Lunesta) · Other prescription opioids KNOW YOUR OPTIONS Talk to your health care provider about ways to manage your pain that don't involve prescription opioids.   Some of these options may actually work better and have fewer risks and side effects. Options may include: 
· Pain relievers such as acetaminophen, ibuprofen, and naproxen · Some medications that are also used for depression or seizures · Physical therapy and exercise · Counseling to help patients learn how to cope better with triggers of pain and stress. · Application of heat or cold compress · Massage therapy · Relaxation techniques Be Informed Make sure you know the name of your medication, how much and how often to take it, and its potential risks & side effects. IF YOU ARE PRESCRIBED OPIOIDS FOR PAIN: 
· Never take opioids in greater amounts or more often than prescribed. Remember the goal is not to be pain-free but to manage your pain at a tolerable level. · Follow up with your primary care provider to: · Work together to create a plan on how to manage your pain. · Talk about ways to help manage your pain that don't involve prescription opioids. · Talk about any and all concerns and side effects. · Help prevent misuse and abuse. · Never sell or share prescription opioids · Help prevent misuse and abuse. · Store prescription opioids in a secure place and out of reach of others (this may include visitors, children, friends, and family). · Safely dispose of unused/unwanted prescription opioids: Find your community drug take-back program or your pharmacy mail-back program, or flush them down the toilet, following guidance from the Food and Drug Administration (www.fda.gov/Drugs/ResourcesForYou). · Visit www.cdc.gov/drugoverdose to learn about the risks of opioid abuse and overdose. · If you believe you may be struggling with addiction, tell your health care provider and ask for guidance or call [address] [place], [place] [address] at [phone]. Discharge Instructions Acquired Immunodeficiency Syndrome (AIDS): Care Instructions Your Care Instructions Human immunodeficiency virus (HIV) is a virus that attacks the body's immune system. This makes it hard for the body to fight infection and disease. HIV causes acquired immunodeficiency syndrome (AIDS). AIDS is the last and most severe stage of the HIV infection. HIV attacks and destroys a type of white blood cell called CD4+ cells, or helper cells. These cells are an important part of the immune system. You have AIDS when one or both of the following are true: 
· Your CD4+ cell count is below 200 cells per microliter (µL) of blood. · You get certain infections or cancers that are usually only seen in people who have problems with their immune system. Follow-up care is a key part of your treatment and safety. Be sure to make and go to all appointments, and call your doctor if you are having problems. It's also a good idea to know your test results and keep a list of the medicines you take. How can you care for yourself at home? · Take your medicines exactly as prescribed. Call your doctor if you think you are having a problem with your medicine. · Get the vaccines and medicine you need to prevent infections such as pneumonia or tuberculosis. · Learn more about HIV and AIDS so you can be active in your health care decisions. · Join a support group. These let you share experiences and seek support from others in the same situation. · Do not smoke. People with HIV have an increased risk of heart attacks and lung cancer. Smoking increases these risks even more. If you need help quitting, talk to your doctor about stop-smoking programs and medicines. These can increase your chances of quitting for good. · Do not use illegal drugs. And limit your use of alcohol. · Eat a healthy, balanced diet. This keeps your immune system as strong as possible. · Exercise regularly. It can reduce your stress, increase your energy, and lift your mood. · If you have not already done so, prepare a list of advance directives. These tell your doctor and family members what kind of care you want if you become unable to speak for yourself. Helping a partner with AIDS If your partner has AIDS, you can help provide emotional, physical, and medical care that will improve his or her quality of life. · Give emotional support. Listen to and encourage your partner. · Protect yourself and others against HIV infection and other infections by: ¨ Not sharing needles. ¨ Always using condoms during sex. · Protect your partner by staying away from him or her when you are sick. · Take care of yourself. Share your experiences with others and get help when you need it. · Learn how to give medicines, and know where to get help in an emergency. When should you call for help? Call 911 anytime you think you may need emergency care. For example, call if: 
? · You passed out (lost consciousness). ? · You have severe shortness of breath. ? · You have symptoms of a stroke. These may include: 
¨ Sudden numbness, tingling, weakness, or loss of movement in your face, arm, or leg, especially on only one side of your body. ¨ Sudden vision changes. ¨ Sudden trouble speaking. ¨ Sudden confusion or trouble understanding simple statements. ¨ Sudden problems with walking or balance. ¨ A sudden, severe headache that is different from past headaches. ?Call your doctor now or seek immediate medical care if: 
? · You have signs of a new or worse problem from HIV, such as: ¨ A fever. ¨ Coughing. ¨ Diarrhea. ¨ Skin changes. ¨ Bleeding. ¨ Confusion or not thinking clearly. ? Watch closely for changes in your health, and be sure to contact your doctor if you have any problems. Where can you learn more? Go to http://svetlana-ana maria.info/. Enter P068 in the search box to learn more about \"Acquired Immunodeficiency Syndrome (AIDS): Care Instructions. \" 
 Current as of: March 3, 2017 Content Version: 11.4 © 1265-0230 [employer]. Care instructions adapted under license by [employer] (which disclaims liability or warranty for this information). If you have questions about a medical condition or this instruction, always ask your healthcare professional. [address] any warranty or liability for your use of this information. Pneumocystis Pneumonia and AIDS: Care Instructions Your Care Instructions Pneumocystis is a fungus that can sometimes cause pneumonia in people who have AIDS. Pneumonia is an infection of the lungs. Pneumonia caused by pneumocystis can make it hard to breathe and to get enough oxygen into the blood. Pneumocystis pneumonia, or PCP, is the most common serious infection in people with AIDS. PCP can be prevented with medicine. If you get PCP, it can be treated. Antibiotics can get rid of the infection. You should have your blood tested regularly to check the strength of your immune system and to help your doctor decide if you need to take drugs to prevent PCP. Follow-up care is a key part of your treatment and safety. Be sure to make and go to all appointments, and call your doctor if you are having problems. It's also a good idea to know your test results and keep a list of the medicines you take. How can you care for yourself at home? · Take your antibiotics as directed. Do not stop taking them just because you feel better. You need to take the full course of antibiotics. · Take all your medicines exactly as prescribed. Call your doctor if you have any problems with your medicine. · If you are taking IV medicine at home, follow your doctor's instructions. · Get plenty of rest and sleep. You may feel weak and tired for a while, but your energy level will improve with time.  
· Drink plenty of fluids, enough so that your urine is light yellow or clear like water. Choose water and other caffeine-free clear liquids until you feel better. If you have kidney, heart, or liver disease and have to limit fluids, talk with your doctor before you increase the amount of fluids you drink. · Take care of your cough so you can rest. A cough that brings up mucus from your lungs is common with pneumonia. It is one way your body gets rid of the infection. But if coughing keeps you from resting or causes severe fatigue and chest-wall pain, talk to your doctor. He or she may suggest that you take a medicine to reduce the cough. · Use a humidifier to increase the moisture in the air. Dry air makes coughing worse. Follow the instructions for cleaning the machine. · Do not smoke or allow others to smoke around you. If you need help quitting, talk to your doctor about stop-smoking programs and medicines. These can increase your chances of quitting for good. When should you call for help? Call 911 anytime you think you may need emergency care. For example, call if: 
? · You have severe trouble breathing. ?Call your doctor now or seek immediate medical care if: 
? · You are short of breath. ? · You have a worse cough. ? · You have a new or higher fever. ? Watch closely for changes in your health, and be sure to contact your doctor if: 
? · You do not get better as expected. Where can you learn more? Go to http://svetlana-ana maria.info/. Enter L505 in the search box to learn more about \"Pneumocystis Pneumonia and AIDS: Care Instructions. \" Current as of: March 3, 2017 Content Version: 11.4 © 1610-0522 [employer]. Care instructions adapted under license by [employer] (which disclaims liability or warranty for this information).  If you have questions about a medical condition or this instruction, always ask your healthcare professional. [address] [address] any warranty or liability for your use of this information. Learning About Chronic Bronchitis What is chronic bronchitis? Chronic bronchitis is long-term swelling and the buildup of mucus in the airways of your lungs. The airways (bronchial tubes) get inflamed and make a lot of mucus. This can narrow or block the airways, making it hard for you to breathe. It is a form of COPD (chronic obstructive pulmonary disease). Chronic bronchitis is usually caused by smoking. But chemical fumes, dust, or air pollution also can cause it over time. What can you expect when you have chronic bronchitis? Chronic bronchitis gets worse over time. You cannot undo the damage to your lungs. Over time, you may find that: 
· You get short of breath even when you do simple things like get dressed or fix a meal. 
· It is hard to eat or exercise. · You lose weight and feel weaker. Over many years, the swelling and mucus from chronic bronchitis make it more likely that you will get lung infections. But there are things you can do to prevent more damage and feel better. What are the symptoms? The main symptoms of chronic bronchitis are: · A cough that will not go away. · Mucus that comes up when you cough. · Shortness of breath that gets worse when you exercise. At times, your symptoms may suddenly flare up and get much worse. This is a called an exacerbation (say \"egg-LAURA-er-BAY-cathi\"). When this happens, your usual symptoms quickly get worse and stay bad. This can be dangerous. You may have to go to the hospital. 
How can you keep chronic bronchitis from getting worse? Don't smoke. That is the best way to keep chronic bronchitis from getting worse. If you already smoke, it is never too late to stop. If you need help quitting, talk to your doctor about stop-smoking programs and medicines. These can increase your chances of quitting for good. You can do other things to keep chronic bronchitis from getting worse: · Avoid bad air. Air pollution, chemical fumes, and dust also can make chronic bronchitis worse. · Get a flu shot every year. A shot may keep the flu from turning into something more serious, like pneumonia. A flu shot also may lower your chances of having a flare-up. · Get a pneumococcal shot. A shot can prevent some of the serious complications of pneumonia. Ask your doctor how often you should get this shot. How is chronic bronchitis treated? Chronic bronchitis is treated with medicines and oxygen. You also can take steps at home to stay healthy and keep your condition from getting worse. Medicines and oxygen therapy · You may be taking medicines such as: ¨ Bronchodilators. These help open your airways and make breathing easier. Bronchodilators are either short-acting (work for 6 to 9 hours) or long-acting (work for 24 hours). You inhale most bronchodilators, so they start to act quickly. Always carry your quick-relief inhaler with you in case you need it while you are away from home. ¨ Corticosteroids. These reduce airway inflammation. They come in pill or inhaled form. You must take these medicines every day for them to work well. ¨ Antibiotics. These medicines are used when you have a bacterial lung infection. · Take your medicines exactly as prescribed. Call your doctor if you think you are having a problem with your medicine. · Oxygen therapy boosts the amount of oxygen in your blood and helps you breathe easier. Use the flow rate your doctor has recommended, and do not change it without talking to your doctor first. 
Other care at home · If your doctor recommends it, get more exercise. Walking is a good choice. Bit by bit, increase the amount you walk every day. Try for at least 30 minutes on most days of the week. · Learn breathing methods-such as breathing through pursed lips-to help you become less short of breath.  
· If your doctor has not set you up with a pulmonary rehabilitation program, talk to him or her about whether rehab is right for you. Rehab includes exercise programs, education about your disease and how to manage it, help with diet and other changes, and emotional support. · Eat regular, healthy meals. Use bronchodilators about 1 hour before you eat to make it easier to eat. Eat several small meals instead of three large ones. Drink beverages at the end of the meal. Avoid foods that are hard to chew. Follow-up care is a key part of your treatment and safety. Be sure to make and go to all appointments, and call your doctor if you are having problems. It's also a good idea to know your test results and keep a list of the medicines you take. Where can you learn more? Go to http://svetlana-ana maria.info/. Enter N376 in the search box to learn more about \"Learning About Chronic Bronchitis. \" Current as of: May 12, 2017 Content Version: 11.4 © 5559-6511 [employer]. Care instructions adapted under license by [employer] (which disclaims liability or warranty for this information). If you have questions about a medical condition or this instruction, always ask your healthcare professional. [address] [address] any warranty or liability for your use of this information. COPD Exacerbation Plan: Care Instructions Your Care Instructions If you have chronic obstructive pulmonary disease (COPD), your usual shortness of breath could suddenly get worse. You may start coughing more and have more mucus. This flare-up is called a COPD exacerbation (say \"dh-OAG-pi-BAY-shun\"). A lung infection or air pollution could set off an exacerbation. Sometimes it can happen after a quick change in temperature or being around chemicals. Work with your doctor to make a plan for dealing with an exacerbation. You can better manage it if you plan ahead. Follow-up care is a key part of your treatment and safety.  Be sure to make and go to all appointments, and call your doctor if you are having problems. It's also a good idea to know your test results and keep a list of the medicines you take. How can you care for yourself at home? During an exacerbation · Do not panic if you start to have one. Quick treatment at home may help you prevent serious breathing problems. If you have a COPD exacerbation plan that you developed with your doctor, follow it. · Take your medicines exactly as your doctor tells you. ¨ Use your inhaler as directed by your doctor. If your symptoms do not get better after you use your medicine, have someone take you to the emergency room. Call an ambulance if necessary. ¨ With inhaled medicines, a spacer or a nebulizer may help you get more medicine to your lungs. Ask your doctor or pharmacist how to use them properly. Practice using the spacer in front of a mirror before you have an exacerbation. This may help you get the medicine into your lungs quickly. ¨ If your doctor has given you steroid pills, take them as directed. ¨ Your doctor may have given you a prescription for antibiotics, which you can fill if you need it. ¨ Talk to your doctor if you have any problems with your medicine. And call your doctor if you have to use your antibiotic or steroid pills. Preventing an exacerbation · Do not smoke. This is the most important step you can take to prevent more damage to your lungs and prevent problems. If you already smoke, it is never too late to stop. If you need help quitting, talk to your doctor about stop-smoking programs and medicines. These can increase your chances of quitting for good. · Take your daily medicines as prescribed. · Avoid colds and flu. ¨ Get a pneumococcal vaccine. ¨ Get a flu vaccine each year, as soon as it is available. Ask those you live or work with to do the same, so they will not get the flu and infect you. ¨ Try to stay away from people with colds or the flu. ¨ Wash your hands often. · Avoid secondhand smoke; air pollution; cold, dry air; hot, humid air; and high altitudes. Stay at home with your windows closed when air pollution is bad. · Learn breathing techniques for COPD, such as breathing through pursed lips. These techniques can help you breathe easier during an exacerbation. When should you call for help? Call 911 anytime you think you may need emergency care. For example, call if: 
? · You have severe trouble breathing. ? · You have severe chest pain. ?Call your doctor now or seek immediate medical care if: 
? · You have new or worse shortness of breath. ? · You develop new chest pain. ? · You are coughing more deeply or more often, especially if you notice more mucus or a change in the color of your mucus. ? · You cough up blood. ? · You have new or increased swelling in your legs or belly. ? · You have a fever. ? Watch closely for changes in your health, and be sure to contact your doctor if: 
? · You need to use your antibiotic or steroid pills. ? · Your symptoms are getting worse. Where can you learn more? Go to http://svetlana-ana maria.info/. Enter Z590 in the search box to learn more about \"COPD Exacerbation Plan: Care Instructions. \" Current as of: May 12, 2017 Content Version: 11.4 © 2847-5020 [employer]. Care instructions adapted under license by [employer] (which disclaims liability or warranty for this information). If you have questions about a medical condition or this instruction, always ask your healthcare professional. [place] [address] any warranty or liability for your use of this information. Medicines to Prevent HIV in Babies: Care Instructions Your Care Instructions Doctors recommend that babies whose mothers have HIV be treated with medicines. This helps to keep the babies from getting HIV.  Even when babies test negative for HIV at birth, they may have been exposed to the virus during the birth. Treatment can keep the baby from getting infected. Your baby should start to get some anti-HIV medicine after birth. He or she will keep getting treatment for the next 6 weeks. Your baby should also get medicine to prevent other infections, such as pneumonia. This treatment may not start as soon as the anti-HIV treatment. Follow-up care is a key part of your child's treatment and safety. Be sure to make and go to all appointments, and call your doctor if your child is having problems. It's also a good idea to know your child's test results and keep a list of the medicines your child takes. How can you care for your child at home? · Be safe with medicines. Have your child take medicines exactly as prescribed. Call your doctor if you think your child is having a problem with his or her medicine. You will get more details on the specific medicines your doctor prescribes. · After you give birth, keep taking your anti-HIV medicine. Don't stop unless your doctor tells you to. It is important that you stay healthy so you can take the best possible care of your baby. · Do not breastfeed your baby unless your doctor tells you to. Babies can get HIV through breast milk. · Be sure your baby gets all the tests your doctor recommends. Where can you learn more? Go to http://svetlana-ana maria.info/. Enter D188 in the search box to learn more about \"Medicines to Prevent HIV in Babies: Care Instructions. \" Current as of: March 3, 2017 Content Version: 11.4 © 8711-7739 [employer]. Care instructions adapted under license by [employer] (which disclaims liability or warranty for this information).  If you have questions about a medical condition or this instruction, always ask your healthcare professional. [place] [address] any warranty or liability for your use of this information. High Blood Pressure: Care Instructions Your Care Instructions If your blood pressure is usually above 140/90, you have high blood pressure, or hypertension. That means the top number is 140 or higher or the bottom number is 90 or higher, or both. Despite what a lot of people think, high blood pressure usually doesn't cause headaches or make you feel dizzy or lightheaded. It usually has no symptoms. But it does increase your risk for heart attack, stroke, and kidney or eye damage. The higher your blood pressure, the more your risk increases. Your doctor will give you a goal for your blood pressure. Your goal will be based on your health and your age. An example of a goal is to keep your blood pressure below 140/90. Lifestyle changes, such as eating healthy and being active, are always important to help lower blood pressure. You might also take medicine to reach your blood pressure goal. 
Follow-up care is a key part of your treatment and safety. Be sure to make and go to all appointments, and call your doctor if you are having problems. It's also a good idea to know your test results and keep a list of the medicines you take. How can you care for yourself at home? Medical treatment · If you stop taking your medicine, your blood pressure will go back up. You may take one or more types of medicine to lower your blood pressure. Be safe with medicines. Take your medicine exactly as prescribed. Call your doctor if you think you are having a problem with your medicine. · Talk to your doctor before you start taking aspirin every day. Aspirin can help certain people lower their risk of a heart attack or stroke. But taking aspirin isn't right for everyone, because it can cause serious bleeding. · See your doctor regularly. You may need to see the doctor more often at first or until your blood pressure comes down.  
· If you are taking blood pressure medicine, talk to your doctor before you take decongestants or anti-inflammatory medicine, such as ibuprofen. Some of these medicines can raise blood pressure. · Learn how to check your blood pressure at home. Lifestyle changes · Stay at a healthy weight. This is especially important if you put on weight around the waist. Losing even 10 pounds can help you lower your blood pressure. · If your doctor recommends it, get more exercise. Walking is a good choice. Bit by bit, increase the amount you walk every day. Try for at least 30 minutes on most days of the week. You also may want to swim, bike, or do other activities. · Avoid or limit alcohol. Talk to your doctor about whether you can drink any alcohol. · Try to limit how much sodium you eat to less than 2,300 milligrams (mg) a day. Your doctor may ask you to try to eat less than 1,500 mg a day. · Eat plenty of fruits (such as bananas and oranges), vegetables, legumes, whole grains, and low-fat dairy products. · Lower the amount of saturated fat in your diet. Saturated fat is found in animal products such as milk, cheese, and meat. Limiting these foods may help you lose weight and also lower your risk for heart disease. · Do not smoke. Smoking increases your risk for heart attack and stroke. If you need help quitting, talk to your doctor about stop-smoking programs and medicines. These can increase your chances of quitting for good. When should you call for help? Call 911 anytime you think you may need emergency care. This may mean having symptoms that suggest that your blood pressure is causing a serious heart or blood vessel problem. Your blood pressure may be over 180/110. ? For example, call 911 if: 
? · You have symptoms of a heart attack. These may include: ¨ Chest pain or pressure, or a strange feeling in the chest. 
¨ Sweating. ¨ Shortness of breath. ¨ Nausea or vomiting.  
¨ Pain, pressure, or a strange feeling in the back, neck, jaw, or upper belly or in one or both shoulders or arms. ¨ Lightheadedness or sudden weakness. ¨ A fast or irregular heartbeat. ? · You have symptoms of a stroke. These may include: 
¨ Sudden numbness, tingling, weakness, or loss of movement in your face, arm, or leg, especially on only one side of your body. ¨ Sudden vision changes. ¨ Sudden trouble speaking. ¨ Sudden confusion or trouble understanding simple statements. ¨ Sudden problems with walking or balance. ¨ A sudden, severe headache that is different from past headaches. ? · You have severe back or belly pain. ?Do not wait until your blood pressure comes down on its own. Get help right away. ?Call your doctor now or seek immediate care if: 
? · Your blood pressure is much higher than normal (such as 180/110 or higher), but you don't have symptoms. ? · You think high blood pressure is causing symptoms, such as: ¨ Severe headache. ¨ Blurry vision. ? Watch closely for changes in your health, and be sure to contact your doctor if: 
? · Your blood pressure measures 140/90 or higher at least 2 times. That means the top number is 140 or higher or the bottom number is 90 or higher, or both. ? · You think you may be having side effects from your blood pressure medicine. ? · Your blood pressure is usually normal, but it goes above normal at least 2 times. Where can you learn more? Go to http://svetlana-ana maria.info/. Enter N199 in the search box to learn more about \"High Blood Pressure: Care Instructions. \" Current as of: September 21, 2016 Content Version: 11.4 © 7376-3741 Page2Images. Care instructions adapted under license by [employer] (which disclaims liability or warranty for this information). If you have questions about a medical condition or this instruction, always ask your healthcare professional. [address] any warranty or liability for your use of this information. Savored Activation Thank you for requesting access to Savored. Please follow the instructions below to securely access and download your online medical record. Savored allows you to send messages to your doctor, view your test results, renew your prescriptions, schedule appointments, and more. How Do I Sign Up? 1. In your internet browser, go to [email or web address] 
2. Click on the First Time User? Click Here link in the Sign In box. You will be redirect to the New Member Sign Up page. 3. Enter your Savored Access Code exactly as it appears below. You will not need to use this code after youve completed the sign-up process. If you do not sign up before the expiration date, you must request a new code. Savored Access Code: [email or web address] Expires: 8/10/2018  2:44 AM (This is the date your Savored access code will ) 4. Enter the last four digits of your Social Security Number (xxxx) and Date of Birth (mm/dd/yyyy) as indicated and click Submit. You will be taken to the next sign-up page. 5. Create a Savored ID. This will be your Savored login ID and cannot be changed, so think of one that is secure and easy to remember. 6. Create a Savored password. You can change your password at any time. 7. Enter your Password Reset Question and Answer. This can be used at a later time if you forget your password. 8. Enter your e-mail address. You will receive e-mail notification when new information is available in [address]. 9. Click Sign Up. You can now view and download portions of your medical record. 10. Click the Download Summary menu link to download a portable copy of your medical information. Additional Information If you have questions, please visit the Frequently Asked Questions section of the Savored website at [email or web address] Remember, Savored is NOT to be used for urgent needs. For medical emergencies, dial 911. Introducing [facility]! [employer] introduces [employer] patient portal. Now you can access parts of your medical record, email your doctor's office, and request medication refills online. 1. In your internet browser, go to [email or web address] 2. Click on the First Time User? Click Here link in the Sign In box. You will see the New Member Sign Up page. 3. Enter your [employer] Access Code exactly as it appears below. You will not need to use this code after youve completed the sign-up process. If you do not sign up before the expiration date, you must request a new code. · [employer] Access Code: [email or web address] Expires: 8/10/2018  2:44 AM 
 
4. Enter the last four digits of your Social Security Number (xxxx) and Date of Birth (mm/dd/yyyy) as indicated and click Submit. You will be taken to the next sign-up page. 5. Create a [employer] ID. This will be your [employer] login ID and cannot be changed, so think of one that is secure and easy to remember. 6. Create a [employer] password. You can change your password at any time. 7. Enter your Password Reset Question and Answer. This can be used at a later time if you forget your password. 8. Enter your e-mail address. You will receive e-mail notification when new information is available in [address] 9. Click Sign Up. You can now view and download portions of your medical record. 10. Click the Download Summary menu link to download a portable copy of your medical information. If you have questions, please visit the Frequently Asked Questions section of the [employer] website. Remember, [employer] is NOT to be used for urgent needs. For medical emergencies, dial 911. Now available from your iPhone and Android! Introducing Nicholas Whitley As a [employer] patient, I wanted to make you aware of our electronic visit tool called Nicholas Whitley. [employer] 24/7 allows you to connect within minutes with a medical provider 24 hours a day, seven days a week via a mobile device or tablet or logging into a secure website from your computer. You can access [facility] from anywhere in the [place]. A virtual visit might be right for you when you have a simple condition and feel like you just dont want to get out of bed, or cant get away from work for an appointment, when your regular [employer] provider is not available (evenings, weekends or holidays), or when youre out of town and need minor care. Electronic visits cost only $49 and if the [employer] 24/7 provider determines a prescription is needed to treat your condition, one can be electronically transmitted to a nearby pharmacy*. Please take a moment to enroll today if you have not already done so. The enrollment process is free and takes just a few minutes. To enroll, please download the [employer] 24/7 azucena to your tablet or phone, or visit [email or web address] to enroll on your computer. And, as an [address] [place] [address] patient with a [employer] account, the results of your visits will be scanned into your electronic medical record and your primary care provider will be able to view the scanned results. We urge you to continue to see your regular [employer] provider for your ongoing medical care. And while your primary care provider may not be the one available when you seek a Nicholas ON24gatofin virtual visit, the peace of mind you get from getting a real diagnosis real time can be priceless. For more information on [facility], view our Frequently Asked Questions (FAQs) at [email or web address]. Sincerely, 
 
Leonor Moses MD 
Chief Medical Officer Hayley Roche *:  certain medications cannot be prescribed via [facility] Unresulted Labs-Please follow up with your PCP about these lab tests Order Current Status CULTURE, BLOOD Preliminary result CULTURE, BLOOD Preliminary result Providers Seen During Your Hospitalization Provider Specialty Primary office phone Rosi Lanier MD Emergency Medicine [phone] Kristi Mijares MD Family Practice [phone] Lily Xiao MD Hospitalist [phone] Richrd Rubinstein, MD Internal Medicine [phone] Your Primary Care Physician (PCP) Primary Care Physician Office Phone Office Fax Lissett Corey [phone] You are allergic to the following No active allergies Recent Documentation Height Weight Breastfeeding? BMI Smoking Status 1.575 m 72.7 kg No 29.3 kg/m2 Current Every Day Smoker Emergency Contacts Name Discharge Info Relation Home Work Mobile Nichelle Encinas (Neice) DISCHARGE CAREGIVER [3] Other Relative [6] [phone] Patient Belongings The following personal items are in your possession at time of discharge: 
  Dental Appliances: Lowers, Partials  Visual Aid: Glasses      Home Medications: None   Jewelry: None  Clothing: None    Other Valuables: None Please provide this summary of care documentation to your next provider. Signatures-by signing, you are acknowledging that this After Visit Summary has been reviewed with you and you have received a copy. Patient Signature:  ____________________________________________________________ Date:  ____________________________________________________________  
  
Keysha Doan Provider Signature:  ____________________________________________________________ Date:  ____________________________________________________________

## 2018-05-27 NOTE — ROUTINE PROCESS
Received bedside shift report from [facility] and Assumed patient care. Patient in bed, awake, alert and oriented x4. Denies pain or discomforts at this time. HOB elevated to 30 degrees. Call light placed within reach. Bed in low position.

## 2018-05-27 NOTE — Clinical Note
Status[de-identified] Inpatient [101] Type of Bed: Stepdown [17] Inpatient Hospitalization Certified Necessary for the Following Reasons: 9. Other (further clarification in H&P documentation) Admitting Diagnosis: HCAP (healthcare-associated pneumonia) [[id number]] Admitting Physician: Lee Ann Cole Attending Physician: Lee Ann Cole Estimated Length of Stay: 2 Midnights Discharge Plan[de-identified] Home with Office Follow-up

## 2018-05-27 NOTE — H&P
History & Physical    Patient: Jordan Maldonado MRN: [id number]  CSN: [id number]    YOB: 1945  Age: 71 y.o. Sex: female      DOA: 5/27/2018    Chief Complaint:   Chief Complaint   Patient presents with    Chest Pain    Shortness of Breath          HPI:     Jordan Maldonado is a 71 y.o.  female who has PMH of HIV on meds, COPD with multiple exacerbation, discharged from the hospital on 5/12 for COPD exacerbation 2 ry to PNA and HTN with chest  Pt presents with similar episode of worsening productive cough and SOB. CXR shows worsening consolidation and Pt will be admitted for HCAP,  Denies CP?abdominal pain and urinary Sx but continues to c/o SOB  Tachycardiac on admission but adjusted afterworlds. Past Medical History:   Diagnosis Date    Coronary atherosclerosis of native coronary artery     HIV (human immunodeficiency virus infection) ([facility] [address]     Ill-defined condition     Pneumonia    Nonspecific abnormal electrocardiogram (ECG) (EKG)     Pre-operative cardiovascular examination        Past Surgical History:   Procedure Laterality Date    HX HIP REPLACEMENT      HX HYSTERECTOMY         Family History   Problem Relation Age of Onset    Heart Attack Mother 70    Stroke Father 54    Heart Attack Sister 59       Social History     Social History    Marital status:      Spouse name: N/A    Number of children: N/A    Years of education: N/A     Social History Main Topics    Smoking status: Current Every Day Smoker     Packs/day: 0.50    Smokeless tobacco: Not on file    Alcohol use No    Drug use: Not on file    Sexual activity: Not on file     Other Topics Concern    Not on file     Social History Narrative       Prior to Admission medications    Medication Sig Start Date End Date Taking? Authorizing Provider   abacavir-dolutegravir-lamiVUDine (TRIUMEQ) tablet Take 1 Tab by mouth daily.     Kandy Soriano MD   albuterol (PROVENTIL VENTOLIN) 2.5 mg /3 mL (0.083 %) nebulizer solution 3 mL by Nebulization route every four (4) hours as needed for Wheezing. 5/18/18   Cindy Nolan MD   aspirin 81 mg chewable tablet Take 1 Tab by mouth daily. 5/18/18   Cindy Nolan MD   budesonide-formoterol (SYMBICORT) 160-4.5 mcg/actuation HFAA Take 2 Puffs by inhalation two (2) times a day. 5/18/18   Cindy Nolan MD   famotidine (PEPCID) 20 mg tablet Take 1 Tab by mouth daily. 5/18/18   Cindy Nolan MD   furosemide (LASIX) 40 mg tablet Take 1 Tab by mouth daily. 5/18/18   Cindy Nolan MD   predniSONE (DELTASONE) 10 mg tablet 3 tab po daily for 3 days then  2 tab po daily for 3 days then   1 tab po daily for 3 days 5/18/18   Cindy Nolan MD   tiotropium [facility]) 18 mcg inhalation capsule Take 1 Cap by inhalation daily. 5/18/18   Cindy Nolan MD   hydrALAZINE (APRESOLINE) 10 mg tablet Take 1 Tab by mouth three (3) times daily. Hold for SBP less than 110 5/18/18   Cindy Nolan MD   traMADol (ULTRAM) 50 mg tablet Take 1 Tab by mouth every six (6) hours as needed. Max Daily Amount: 200 mg. 5/18/18   Cindy Nolan MD   dilTIAZem CD (CARDIZEM CD) 180 mg ER capsule Take 1 Cap by mouth daily. 5/18/18   Cindy Nolan MD   fluticasone (FLONASE) 50 mcg/actuation nasal spray 2 Sprays by Both Nostrils route daily. Zack Smith MD   darunavir-cobicistat (PREZCOBIX) 800-150 mg-mg per tablet Take 1 Tab by mouth daily. Zack Smith MD   abacavir-dolutegravir-lamiVUDine (TRIUMEQ) tablet Take  by mouth daily. Zack Smith MD   sertraline (ZOLOFT) 25 mg tablet Take  by mouth daily. Zack Smith MD       No Known Allergies      Review of Systems  GENERAL: Patient alert, awake and oriented times 3, unable to communicate full sentences and in distress. HEENT: No change in vision, no earache, tinnitus, sore throat or sinus congestion. NECK: No pain or stiffness. PULMONARY: +ve shortness of breath, cough or wheeze. Cardiovascular: no pnd / orthopnea, no CP  GASTROINTESTINAL: No abdominal pain, nausea, vomiting or diarrhea, melena or bright red blood per rectum. GENITOURINARY: No urinary frequency, urgency, hesitancy or dysuria. MUSCULOSKELETAL: No joint or muscle pain, no back pain, no recent trauma. DERMATOLOGIC: No rash, no itching, no lesions. ENDOCRINE: No polyuria, polydipsia, no heat or cold intolerance. No recent change in weight. HEMATOLOGICAL: No anemia or easy bruising or bleeding. NEUROLOGIC: No headache, seizures, numbness, tingling or weakness. Physical Exam:     Physical Exam:  Visit Vitals    /81 (BP 1 Location: Left arm, BP Patient Position: At rest)    Pulse 80    Temp 97.5 °F (36.4 °C)    Resp 18    Ht 5' 2\" (1.575 m)    Wt 69.4 kg (153 lb)    SpO2 99%    Breastfeeding No    BMI 27.98 kg/m2    O2 Flow Rate (L/min): 2 l/min O2 Device: Nasal cannula    Temp (24hrs), Av.8 °F (36.6 °C), Min:97.5 °F (36.4 °C), Max:98.2 °F (36.8 °C)     1901 -  0700  In: 340 [P.O.:240; I.V.:100]  Out: -         General:  Alert, cooperative, in distress, appears stated age. Head: Normocephalic, without obvious abnormality, atraumatic. Eyes:  Conjunctivae/corneas clear. PERRL, EOMs intact. Nose: Nares normal. No drainage or sinus tenderness. Neck: Supple, symmetrical, trachea midline, no adenopathy, thyroid: no enlargement, no carotid bruit and no JVD. Lungs:   .decrease BS B/L with +ve wheezing   Heart:  Regular rate and rhythm, S1, S2 normal.     Abdomen: Soft, non-tender. Bowel sounds normal.    Extremities: Extremities normal, atraumatic, no cyanosis or edema. Pulses: 2+ and symmetric all extremities. Skin:  No rashes or lesions   Neurologic: AAOx3, No focal motor or sensory deficit. Labs Reviewed: All lab results for the last 24 hours reviewed.   CXR and EKG    Procedures/imaging: see electronic medical records for all procedures/Xrays and details which were not copied into this note but were reviewed prior to creation of Plan      Assessment/Plan     Principal Problem:    HCAP (healthcare-associated pneumonia) (5/27/2018)    Active Problems:    COPD exacerbation ([facility] [address] (5/12/2018)      HIV (human immunodeficiency virus infection) ([facility] (5/12/2018)      Essential hypertension (5/27/2018)       Pt is admitted for HCAP and COPD exacerbation with recent DC from the hospital after being treated for COPD exacerbation on 05/12  Hx of HIV and compliant with meds  Undetectable viral load as per Pt on her last exam    Continue Wide spectrum Abx  Steroids and inhalers  ID consult    Continue home meds for HIV    Decrease Diltiazem dose to 180 >> Pt is hypotensive   Hold hydralazine    DVT/GI Prophylaxis: Hep SQ    Plan of care is discussed in details with Patient/Family at bedside and agreed upon    aPt Estevez MD  5/28/2018 12:26 PM

## 2018-05-27 NOTE — ED NOTES
Pt in ED on stretcher on monitor with c/o chest pain and SOB, Pt denies N/V/D pt reports has been constipated for several days.

## 2018-05-27 NOTE — ED NOTES
Daughter at bedside, Dr Michal Nissen to bedside, pt more awake at this time, requesting oral fluids.

## 2018-05-27 NOTE — ED NOTES
Bedside and Verbal shift change report given to [address] [place] [address] (oncoming nurse) by Micky Gonzalez (offgoing nurse). Report included the following information SBAR, ED Summary, Procedure Summary, Intake/Output, MAR and Recent Results.

## 2018-05-27 NOTE — ED TRIAGE NOTES
Pt arrived via EMS per [employer] pt was at home with c/o increased SOB with chest pain. Pt was given 1 albuterol nebulizer treatment with an improvement in o2 sat from 91 to 97%.

## 2018-05-27 NOTE — ED PROVIDER NOTES
EMERGENCY DEPARTMENT HISTORY AND PHYSICAL EXAM    2:12 AM      Date: 5/27/2018  Patient Name: Ann Justin    History of Presenting Illness     Chief Complaint   Patient presents with    Chest Pain    Shortness of Breath         History Provided By: Patient and EMS    Chief Complaint: SOB  Duration: 2 Days  Timing:  Constant  Location:   Quality:   Severity: Moderate  Modifying Factors: current smoker, Lasix  Associated Symptoms: Chest pain, cough, LE edema      Additional History (Context): Reshma Barajas is a 71 y.o. female with hx of Pneumonia and HIV who presents to ED via EMT with c/o constant moderate SOB onset 2 days. Pt states she was recently discharged after being admitted for similar sx but denies sx being resolved. Pt reports associated sx of chest pain, cough, and LE edema. Pt is a current smoker with last ETOH use in Jan (5 months ago) and occasional marijuana use. Denies any known drug allergies. Pt denies being on any water pills. Per EMT, pt is taking Lasix. EMT reports when they arrived to pt's apartment it smelled of cigarette smoke and pt had 1L of O2 that she stated she used as needed. EMT stated that pt was laying flat upon arrival and was able to speak to medics after she was sat up.     PCP: Sara Smalls MD    Current Facility-Administered Medications   Medication Dose Route Frequency Provider Last Rate Last Dose    levoFLOXacin (LEVAQUIN) 750 mg in D5W IVPB  750 mg IntraVENous Q24H Mike Woody MD   Stopped at 05/27/18 0517    sodium chloride 0.9 % bolus infusion 250 mL  250 mL IntraVENous ONCE Mike Woody MD        sodium chloride (NS) flush 5-10 mL  5-10 mL IntraVENous PRN Mike Woody MD        vancomycin (VANCOCIN) 1,750 mg in 0.9% sodium chloride 500 mL IVPB  1,750 mg IntraVENous ONCE Mike Woody  mL/hr at 05/27/18 0518 1,750 mg at 05/27/18 Matt Doll   Other Rx Dosing/Monitoring MD [facility] [place] piperacillin-tazobactam (ZOSYN) 3.375 g in 0.9% sodium chloride (MBP/ADV) 100 mL MBP  3.375 g IntraVENous Q6H Lucien Bell MD         Current Outpatient Prescriptions   Medication Sig Dispense Refill    abacavir-dolutegravir-lamiVUDine (TRIUMEQ) tablet Take 1 Tab by mouth daily.  albuterol (PROVENTIL VENTOLIN) 2.5 mg /3 mL (0.083 %) nebulizer solution 3 mL by Nebulization route every four (4) hours as needed for Wheezing. 24 Each 2    aspirin 81 mg chewable tablet Take 1 Tab by mouth daily. 30 Tab 0    budesonide-formoterol (SYMBICORT) 160-4.5 mcg/actuation HFAA Take 2 Puffs by inhalation two (2) times a day. 1 Inhaler 0    famotidine (PEPCID) 20 mg tablet Take 1 Tab by mouth daily. 30 Tab 0    furosemide (LASIX) 40 mg tablet Take 1 Tab by mouth daily. 30 Tab 0    predniSONE (DELTASONE) 10 mg tablet 3 tab po daily for 3 days then  2 tab po daily for 3 days then   1 tab po daily for 3 days 18 Tab 0    tiotropium (SPIRIVA) 18 mcg inhalation capsule Take 1 Cap by inhalation daily. 30 Cap 0    hydrALAZINE (APRESOLINE) 10 mg tablet Take 1 Tab by mouth three (3) times daily. Hold for SBP less than 110 90 Tab 0    traMADol (ULTRAM) 50 mg tablet Take 1 Tab by mouth every six (6) hours as needed. Max Daily Amount: 200 mg. 28 Tab 0    dilTIAZem CD (CARDIZEM CD) 180 mg ER capsule Take 1 Cap by mouth daily. 30 Cap 1    fluticasone (FLONASE) 50 mcg/actuation nasal spray 2 Sprays by Both Nostrils route daily.  darunavir-cobicistat (PREZCOBIX) 800-150 mg-mg per tablet Take 1 Tab by mouth daily.  abacavir-dolutegravir-lamiVUDine (TRIUMEQ) tablet Take  by mouth daily.  sertraline (ZOLOFT) 25 mg tablet Take  by mouth daily.          Past History     Past Medical History:  Past Medical History:   Diagnosis Date    Coronary atherosclerosis of native coronary artery     [facility] (human immunodeficiency virus infection) ([facility] [address]     Ill-defined condition     Pneumonia    Nonspecific abnormal electrocardiogram (ECG) (EKG)     Pre-operative cardiovascular examination        Past Surgical History:  Past Surgical History:   Procedure Laterality Date    HX HIP REPLACEMENT      HX HYSTERECTOMY         Family History:  Family History   Problem Relation Age of Onset    Heart Attack Mother 70    Stroke Father 54    Heart Attack Sister 59       Social History:  Social History   Substance Use Topics    Smoking status: Current Every Day Smoker     Packs/day: 0.50    Smokeless tobacco: Not on file    Alcohol use No       Allergies:  No Known Allergies      Review of Systems       Review of Systems   Respiratory: Positive for cough and shortness of breath. Cardiovascular: Positive for chest pain and leg swelling. All other systems reviewed and are negative. Physical Exam     Visit Vitals    /66    Pulse (!) 118    Temp 98.2 °F (36.8 °C)    Resp 27    Ht 5' 2\" (1.575 m)    Wt 69.4 kg (153 lb)    SpO2 99%    BMI 27.98 kg/m2         Physical Exam      Diagnostic Study Results     Labs -  Recent Results (from the past 12 hour(s))   GLUCOSE, POC    Collection Time: 05/27/18  2:33 AM   Result Value Ref Range    Glucose (POC) 170 (H) 70 - 110 mg/dL   POC G3    Collection Time: 05/27/18  2:40 AM   Result Value Ref Range    Device: NASAL CANNULA      Flow rate (POC) 2 L/M    pH (POC) 7.563 (HH) 7.35 - 7.45      pCO2 (POC) 22.7 (L) 35.0 - 45.0 MMHG    pO2 (POC) 58 (L) 80 - 100 MMHG    HCO3 (POC) 20.4 (L) 22 - 26 MMOL/L    sO2 (POC) 94 92 - 97 %    Base excess (POC) 0 mmol/L    Allens test (POC) N/A      Total resp.  rate 31      Site LEFT RADIAL      Specimen type (POC) ARTERIAL      Performed by Jorge Fofana    POC LACTIC ACID    Collection Time: 05/27/18  2:42 AM   Result Value Ref Range    Lactic Acid (POC) 4.5 (HH) 0.4 - 2.0 mmol/L   CBC WITH AUTOMATED DIFF    Collection Time: 05/27/18  2:52 AM   Result Value Ref Range    WBC 18.4 (H) 4.6 - 13.2 K/uL    RBC 4.91 4.20 - 5.30 M/uL    HGB 17.1 (H) 12.0 - 16.0 g/dL    HCT 49.1 (H) 35.0 - 45.0 %    .0 (H) 74.0 - 97.0 FL    MCH 34.8 (H) 24.0 - 34.0 PG    MCHC 34.8 31.0 - 37.0 g/dL    RDW 12.9 11.6 - 14.5 %    PLATELET 442 573 - 648 K/uL    MPV 9.9 9.2 - 11.8 FL    NEUTROPHILS 84 (H) 42 - 75 %    BAND NEUTROPHILS 8 (H) 0 - 5 %    LYMPHOCYTES 7 (L) 20 - 51 %    MONOCYTES 1 (L) 2 - 9 %    EOSINOPHILS 0 0 - 5 %    BASOPHILS 0 0 - 3 %    ABS. NEUTROPHILS 16.9 (H) 1.8 - 8.0 K/UL    ABS. LYMPHOCYTES 1.3 0.8 - 3.5 K/UL    ABS. MONOCYTES 0.2 0 - 1.0 K/UL    ABS. EOSINOPHILS 0.0 0.0 - 0.4 K/UL    ABS. BASOPHILS 0.0 0.0 - 0.06 K/UL    DF AUTOMATED      PLATELET COMMENTS ADEQUATE PLATELETS      RBC COMMENTS ANISOCYTOSIS  1+        RBC COMMENTS MACROCYTOSIS  1+       METABOLIC PANEL, COMPREHENSIVE    Collection Time: 05/27/18  2:52 AM   Result Value Ref Range    Sodium 137 136 - 145 mmol/L    Potassium 4.3 3.5 - 5.5 mmol/L    Chloride 97 (L) 100 - 108 mmol/L    CO2 25 21 - 32 mmol/L    Anion gap 15 3.0 - 18 mmol/L    Glucose 165 (H) 74 - 99 mg/dL    BUN 29 (H) 7.0 - 18 MG/DL    Creatinine 2.26 (H) 0.6 - 1.3 MG/DL    BUN/Creatinine ratio 13 12 - 20      GFR est AA 26 (L) >60 ml/min/1.73m2    GFR est non-AA 21 (L) >60 ml/min/1.73m2    Calcium 9.9 8.5 - 10.1 MG/DL    Bilirubin, total 1.2 (H) 0.2 - 1.0 MG/DL    ALT (SGPT) 430 (H) 13 - 56 U/L    AST (SGOT) 143 (H) 15 - 37 U/L    Alk.  phosphatase 189 (H) 45 - 117 U/L    Protein, total 8.6 (H) 6.4 - 8.2 g/dL    Albumin 3.3 (L) 3.4 - 5.0 g/dL    Globulin 5.3 (H) 2.0 - 4.0 g/dL    A-G Ratio 0.6 (L) 0.8 - 1.7     CARDIAC PANEL,(CK, CKMB & TROPONIN)    Collection Time: 05/27/18  2:52 AM   Result Value Ref Range    CK 30 26 - 192 U/L    CK - MB <1.0 <3.6 ng/ml    CK-MB Index  0.0 - 4.0 %     CALCULATION NOT PERFORMED WHEN RESULT IS BELOW LINEAR LIMIT    Troponin-I, Qt. <0.02 0.0 - 0.045 NG/ML   NT-PRO BNP    Collection Time: 05/27/18  2:52 AM   Result Value Ref Range    NT pro-BNP 1058 (H) 0 - 900 PG/ML       Radiologic Studies -   XR CHEST PORT    (Results Pending)         Medical Decision Making   I am the first provider for this patient. I reviewed the vital signs, available nursing notes, past medical history, past surgical history, family history and social history. Vital Signs-Reviewed the patient's vital signs. Physical exam:  General:  Well-developed, well-nourished, diaphoretic, marked respiratory distress. Head:  Normocephalic atraumatic. Eyes:  Pupils midrange extraocular movements intact. No pallor or conjunctival injection. Nose:  No rhinorrhea, inspection grossly normal.    Ears:  Grossly normal to inspection, no discharge. Mouth:  Mucous membranes moist, no appreciable intraoral lesion. Neck/Back:  Trachea midline, no asymmetry. Chest:  Grossly normal inspection, symmetric chest rise. Pulmonary: Diffuse rhonchi. Cardiovascular:  S1-S2 no murmurs rubs or gallops. Abdomen: Soft, nontender, nondistended no guarding rebound or peritoneal signs. Extremities:  Grossly normal to inspection, peripheral pulses intact no distal edema  Neurologic:  Alert and oriented no appreciable focal neurologic deficit. Skin:  Warm and dry  Psychiatric:  Grossly normal mood and affect. Nursing note reviewed, vital signs reviewed. ED course:  Patient with significant respiratory distress, recently discharged, found to be tachycardic in the 150s, afebrile, short of breath with no chest pain by her report. EMR reviewed history of HIV on HAART, unknown CD4 count per recent hospitalization    It was deemed medically necessary to establish peripheral vascular access. Nursing staff was unable to establish peripheral IV line. Consent: It was deemed medically necessary to perform ultrasound guided peripheral IV access. The patient was informed about the risks benefits and alternatives for the procedure. I answered all questions to the best of my ability.   The patient elected to proceed with the procedure. Verbal consent was obtained. Procedure note:  Ultrasound-guided IV line:  Performed by: Myself  Indication:  Need IV access  The patient's RIGHT antecubital was prepped in the usual fashion. Tourniquet was applied, patient's skin was cleansed. Using a linear probe, I identified a appropriate vessel. 16-gauge angiocatheter was introduced, there was brisk return of dark red blood, line was flushed and secured in the usual fashion. Patient tolerated the procedure well. Total time: Less than 5 minutes  Blood loss: 0 mL    Discharge summary reviewed, felt to have a COPD exacerbation she had an echo done at that time with preserved ejection fraction  EKG done at 0216 hours: Sinus tachycardia heart rate 161 intervals are within normal limits except for prolonged QTC of 494. Prominent T waves    Monitor: Sinus tachycardia  Lactic acid 4.5, we'll repeat as staff reports that this was a long tourniquet time    Ordered 30 mL per KG. Broad-spectrum antibiotics    ABG with acute raspatory alkalosis likely due to hypoxia will trial high flow consider BiPAP if tachycardia does not break  Chest x-ray focal density RIGHT lower lobe compatible with infiltrate/infarct, there is metallic foreign body not sure if this is on the patient's skin, plan for CTA for PE    Reevaluated at 0335 hrs.: Blood pressure within normal limits, heart rate now 114, saturation 100% on supplemental oxygen respiratory rate is 34 we'll continue hydration, her labs are significant for ANGELA, elevated white blood cell count of 18, cc hemoconcentrated with hemoglobin of 17    Worsening LFTs  ALTs is 4:30, alkaline phosphatase 189 total bili 1.2 likely secondary to her HAART since she has no abdominal pain. Her BNP was elevated 1000. Unable to proceed with CTA but she had a IVC filter placed, we'll pursue with admission      Patient's presentation, history, physical exam and laboratory evaluations were reviewed.   I felt the patient would benefit from inpatient management and treatment. Consult:  Discussed care with Dr. Afshan Starkey. Standard discussion; including history of patients chief complaint, available diagnostic results, and treatment course. Patient was accepted to their service. Sepsis 3-6 hour reevaluation and exam:       Reevaluation:  Sitting upright on stretcher, work of breathing has improved persistent rhonchi coarse cough and diminished RIGHT lower    Vital Signs:  Heart rate 122 blood pressure is 106/70 respiratory rate 32, saturation is 100% on 4 L/m nasal cannula    Cardiopulmonary assessment:  Pulmonary: Rhonchi, diminished RIGHT lower  Cardiovascular: S1 and S2 WNL, No murmurs, gallops or rubs. Tachycardic  Capillary refill:  Brisk in all 4 extremities  Peripheral pulse:   Palpable  Skin exam:  Skin color: Pink  Skin Turgor: Normal    Sepsis 3-6 hour reevaluation and exam performed at 0 534. Disposition:    Admitted to medicine service      Portions of this chart were created with Dragon medical speech to text program.   Unrecognized errors may be present. I have spent 65 minutes of critical care time (excluding time for other separate services) involved in lab review, consultations with specialist, family decision-making, and documentation. During this entire length of time I was immediately available to the patient. Critical Care: The reason for providing this level of medical care for this critically ill patient was due a critical illness that impaired one or more vital organ systems such that there was a high probability of imminent or life threatening deterioration in the patients condition. This care involved high complexity decision making to assess, manipulate, and support vital system functions, to treat this degree of vital organ system failure and to prevent further life threatening deterioration of the patients condition. Diagnosis     Clinical Impression:   1.  HCAP (healthcare-associated pneumonia)    2. Sepsis, due to unspecified organism [facility])          Follow-up Information     None           Patient's Medications   Start Taking    No medications on file   Continue Taking    ABACAVIR-DOLUTEGRAVIR-LAMIVUDINE (TRIUMEQ) TABLET    Take  by mouth daily. ABACAVIR-DOLUTEGRAVIR-LAMIVUDINE (TRIUMEQ) TABLET    Take 1 Tab by mouth daily. ALBUTEROL (PROVENTIL VENTOLIN) 2.5 MG /3 ML (0.083 %) NEBULIZER SOLUTION    3 mL by Nebulization route every four (4) hours as needed for Wheezing. ASPIRIN 81 MG CHEWABLE TABLET    Take 1 Tab by mouth daily. BUDESONIDE-FORMOTEROL (SYMBICORT) 160-4.5 MCG/ACTUATION HFAA    Take 2 Puffs by inhalation two (2) times a day. DARUNAVIR-COBICISTAT (PREZCOBIX) 800-150 MG-MG PER TABLET    Take 1 Tab by mouth daily. DILTIAZEM CD (CARDIZEM CD) 180 MG ER CAPSULE    Take 1 Cap by mouth daily. FAMOTIDINE (PEPCID) 20 MG TABLET    Take 1 Tab by mouth daily. FLUTICASONE (FLONASE) 50 MCG/ACTUATION NASAL SPRAY    2 Sprays by Both Nostrils route daily. FUROSEMIDE (LASIX) 40 MG TABLET    Take 1 Tab by mouth daily. HYDRALAZINE (APRESOLINE) 10 MG TABLET    Take 1 Tab by mouth three (3) times daily. Hold for SBP less than 110    PREDNISONE (DELTASONE) 10 MG TABLET    3 tab po daily for 3 days then  2 tab po daily for 3 days then   1 tab po daily for 3 days    SERTRALINE (ZOLOFT) 25 MG TABLET    Take  by mouth daily. TIOTROPIUM (SPIRIVA) 18 MCG INHALATION CAPSULE    Take 1 Cap by inhalation daily. TRAMADOL (ULTRAM) 50 MG TABLET    Take 1 Tab by mouth every six (6) hours as needed. Max Daily Amount: 200 mg.    These Medications have changed    No medications on file   Stop Taking    No medications on file     _______________________________    Attestations:  [id number] acting as a scribe for and in the presence of Prashant Perez MD      May 27, 2018 at 2:19 AM       Provider Attestation:      I personally performed the services described in the documentation, reviewed the documentation, as recorded by the scribe in my presence, and it accurately and completely records my words and actions.  May 27, 2018 at 2:19 AM - Heath Lebron MD    _______________________________

## 2018-05-27 NOTE — ED NOTES
D/w dr Nick Motta for dr Tj Ibrahim;   Pt fired him last week. Does see Dr Swati Aleman.      Dw/ dr Barth Fees; will admit

## 2018-05-27 NOTE — ED NOTES
Unable to obtain IV access after numerous attempts, ED attending at bedside with ultrasound for IV placement.

## 2018-05-28 ENCOUNTER — HOME CARE VISIT (OUTPATIENT)
Dept: HOME HEALTH SERVICES | Facility: HOME HEALTH | Age: 73
End: 2018-05-28
Payer: MEDICARE

## 2018-05-28 LAB
ANION GAP SERPL CALC-SCNC: 6 MMOL/L (ref 3–18)
BASOPHILS # BLD: 0 K/UL (ref 0–0.1)
BASOPHILS NFR BLD: 0 % (ref 0–2)
BUN SERPL-MCNC: 27 MG/DL (ref 7–18)
BUN/CREAT SERPL: 18 (ref 12–20)
CALCIUM SERPL-MCNC: 8.2 MG/DL (ref 8.5–10.1)
CHLORIDE SERPL-SCNC: 113 MMOL/L (ref 100–108)
CO2 SERPL-SCNC: 22 MMOL/L (ref 21–32)
CREAT SERPL-MCNC: 1.46 MG/DL (ref 0.6–1.3)
DIFFERENTIAL METHOD BLD: ABNORMAL
EOSINOPHIL # BLD: 0 K/UL (ref 0–0.4)
EOSINOPHIL NFR BLD: 0 % (ref 0–5)
ERYTHROCYTE [DISTWIDTH] IN BLOOD BY AUTOMATED COUNT: 13.1 % (ref 11.6–14.5)
GLUCOSE SERPL-MCNC: 180 MG/DL (ref 74–99)
HCT VFR BLD AUTO: 32.6 % (ref 35–45)
HGB BLD-MCNC: 11.1 G/DL (ref 12–16)
LYMPHOCYTES # BLD: 0.6 K/UL (ref 0.9–3.6)
LYMPHOCYTES NFR BLD: 4 % (ref 21–52)
MAGNESIUM SERPL-MCNC: 2.2 MG/DL (ref 1.6–2.6)
MCH RBC QN AUTO: 34.2 PG (ref 24–34)
MCHC RBC AUTO-ENTMCNC: 34 G/DL (ref 31–37)
MCV RBC AUTO: 100.3 FL (ref 74–97)
MONOCYTES # BLD: 0.3 K/UL (ref 0.05–1.2)
MONOCYTES NFR BLD: 2 % (ref 3–10)
NEUTS SEG # BLD: 14.4 K/UL (ref 1.8–8)
NEUTS SEG NFR BLD: 94 % (ref 40–73)
PHOSPHATE SERPL-MCNC: 3.8 MG/DL (ref 2.5–4.9)
PLATELET # BLD AUTO: 97 K/UL (ref 135–420)
PMV BLD AUTO: 9.5 FL (ref 9.2–11.8)
POTASSIUM SERPL-SCNC: 4.2 MMOL/L (ref 3.5–5.5)
RBC # BLD AUTO: 3.25 M/UL (ref 4.2–5.3)
SODIUM SERPL-SCNC: 141 MMOL/L (ref 136–145)
VANCOMYCIN SERPL-MCNC: 11.5 UG/ML (ref 5–40)
WBC # BLD AUTO: 15.3 K/UL (ref 4.6–13.2)

## 2018-05-28 PROCEDURE — 87340 HEPATITIS B SURFACE AG IA: CPT | Performed by: INTERNAL MEDICINE

## 2018-05-28 PROCEDURE — 74011250637 HC RX REV CODE- 250/637: Performed by: INTERNAL MEDICINE

## 2018-05-28 PROCEDURE — 36415 COLL VENOUS BLD VENIPUNCTURE: CPT | Performed by: INTERNAL MEDICINE

## 2018-05-28 PROCEDURE — 85025 COMPLETE CBC W/AUTO DIFF WBC: CPT | Performed by: INTERNAL MEDICINE

## 2018-05-28 PROCEDURE — 74011250636 HC RX REV CODE- 250/636: Performed by: EMERGENCY MEDICINE

## 2018-05-28 PROCEDURE — 86706 HEP B SURFACE ANTIBODY: CPT | Performed by: INTERNAL MEDICINE

## 2018-05-28 PROCEDURE — 87350 HEPATITIS BE AG IA: CPT | Performed by: INTERNAL MEDICINE

## 2018-05-28 PROCEDURE — 74011250636 HC RX REV CODE- 250/636: Performed by: INTERNAL MEDICINE

## 2018-05-28 PROCEDURE — 80202 ASSAY OF VANCOMYCIN: CPT | Performed by: INTERNAL MEDICINE

## 2018-05-28 PROCEDURE — 84100 ASSAY OF PHOSPHORUS: CPT | Performed by: INTERNAL MEDICINE

## 2018-05-28 PROCEDURE — 86803 HEPATITIS C AB TEST: CPT | Performed by: INTERNAL MEDICINE

## 2018-05-28 PROCEDURE — 86707 HEPATITIS BE ANTIBODY: CPT | Performed by: INTERNAL MEDICINE

## 2018-05-28 PROCEDURE — 65660000000 HC RM CCU STEPDOWN

## 2018-05-28 PROCEDURE — 86705 HEP B CORE ANTIBODY IGM: CPT | Performed by: INTERNAL MEDICINE

## 2018-05-28 PROCEDURE — 77030038269 HC DRN EXT URIN PURWCK BARD -A

## 2018-05-28 PROCEDURE — 74011000258 HC RX REV CODE- 258: Performed by: EMERGENCY MEDICINE

## 2018-05-28 PROCEDURE — 77010033678 HC OXYGEN DAILY

## 2018-05-28 PROCEDURE — 80048 BASIC METABOLIC PNL TOTAL CA: CPT | Performed by: INTERNAL MEDICINE

## 2018-05-28 PROCEDURE — 86704 HEP B CORE ANTIBODY TOTAL: CPT | Performed by: INTERNAL MEDICINE

## 2018-05-28 PROCEDURE — 83735 ASSAY OF MAGNESIUM: CPT | Performed by: INTERNAL MEDICINE

## 2018-05-28 PROCEDURE — 3331090001 HH PPS REVENUE CREDIT

## 2018-05-28 PROCEDURE — 3331090002 HH PPS REVENUE DEBIT

## 2018-05-28 PROCEDURE — 94640 AIRWAY INHALATION TREATMENT: CPT

## 2018-05-28 RX ADMIN — SERTRALINE HYDROCHLORIDE 25 MG: 50 TABLET ORAL at 08:36

## 2018-05-28 RX ADMIN — VANCOMYCIN HYDROCHLORIDE 1250 MG: 10 INJECTION, POWDER, LYOPHILIZED, FOR SOLUTION INTRAVENOUS at 12:01

## 2018-05-28 RX ADMIN — BUDESONIDE AND FORMOTEROL FUMARATE DIHYDRATE 2 PUFF: 160; 4.5 AEROSOL RESPIRATORY (INHALATION) at 21:14

## 2018-05-28 RX ADMIN — ASPIRIN 81 MG CHEWABLE TABLET 81 MG: 81 TABLET CHEWABLE at 08:36

## 2018-05-28 RX ADMIN — BUDESONIDE AND FORMOTEROL FUMARATE DIHYDRATE 2 PUFF: 160; 4.5 AEROSOL RESPIRATORY (INHALATION) at 07:17

## 2018-05-28 RX ADMIN — PIPERACILLIN SODIUM,TAZOBACTAM SODIUM 3.38 G: 3; .375 INJECTION, POWDER, FOR SOLUTION INTRAVENOUS at 16:12

## 2018-05-28 RX ADMIN — DOCUSATE SODIUM 100 MG: 100 CAPSULE, LIQUID FILLED ORAL at 16:12

## 2018-05-28 RX ADMIN — PIPERACILLIN SODIUM,TAZOBACTAM SODIUM 3.38 G: 3; .375 INJECTION, POWDER, FOR SOLUTION INTRAVENOUS at 22:33

## 2018-05-28 RX ADMIN — HEPARIN SODIUM 5000 UNITS: 5000 INJECTION, SOLUTION INTRAVENOUS; SUBCUTANEOUS at 05:28

## 2018-05-28 RX ADMIN — PIPERACILLIN SODIUM,TAZOBACTAM SODIUM 3.38 G: 3; .375 INJECTION, POWDER, FOR SOLUTION INTRAVENOUS at 05:27

## 2018-05-28 RX ADMIN — ABACAVIR SULFATE, DOLUTEGRAVIR SODIUM, LAMIVUDINE 1 TABLET: 600; 50; 300 TABLET, FILM COATED ORAL at 08:36

## 2018-05-28 RX ADMIN — PIPERACILLIN SODIUM,TAZOBACTAM SODIUM 3.38 G: 3; .375 INJECTION, POWDER, FOR SOLUTION INTRAVENOUS at 11:24

## 2018-05-28 RX ADMIN — DILTIAZEM HYDROCHLORIDE 180 MG: 180 CAPSULE, COATED, EXTENDED RELEASE ORAL at 08:36

## 2018-05-28 RX ADMIN — TIOTROPIUM BROMIDE 18 MCG: 18 CAPSULE ORAL; RESPIRATORY (INHALATION) at 09:00

## 2018-05-28 RX ADMIN — TRAMADOL HYDROCHLORIDE 50 MG: 50 TABLET, FILM COATED ORAL at 12:01

## 2018-05-28 RX ADMIN — FAMOTIDINE 20 MG: 20 TABLET ORAL at 08:36

## 2018-05-28 NOTE — WOUND CARE
Physical Exam   Musculoskeletal:        Legs:  Seen by wound per consult care buttocks skin assessment performed at this time. Tissue injury listed above noted during skin assessment. Treatment plan explained to Vladimir Khalil RN and Pt agreeable to continue requested treatment. Sin barrier cream of clear zinc to be applied each shift and prn incontinent episodes. No use of mepilex over area which may cause further tissue damage/ injury. Wound care education provided to pt at this time. Plan of care reviewed with nursing. Will turn over care to nursing staff at this time  Colin Melissa, Wound Care Department

## 2018-05-28 NOTE — PROGRESS NOTES
7:14 PM Beside shift change report given by Jah Marti, AL  to GANESH Curran RN. Report included the information from the [facility] I/O, and recent results. Admits to have discomfort and pain on the abdomen- repositioned for comfort. Call light within reach. Bed in low position. 8:30 PM: Placed on bedside commode and had very large bowel movement. Admits to feeling much better after bowel movement. Also wishes to continue using bedside commode instead of external catheter. 7:17 AM Beside shift change report given by GANESH Curran RN to [employer]. Adelina Domingo RN. Report included the information from the [facility] I/ANDRZEJ, and recent results.

## 2018-05-28 NOTE — PROGRESS NOTES
conducted an initial consultation and Spiritual Assessment for Allison Kohli, who is a 71 y.o.,female. Patients Primary Language is: [place]. According to the patients EMR Oriental orthodox Affiliation is: Jehovah witness. The reason the Patient came to the hospital is:   Patient Active Problem List    Diagnosis Date Noted    HCAP (healthcare-associated pneumonia) 05/27/2018    Essential hypertension 23/02/2023    Diastolic CHF, acute on chronic (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 05/12/2018    HIV (human immunodeficiency virus infection) ([facility] 05/12/2018    Anxiety 05/12/2018        The  provided the following Interventions:  Initiated a relationship of care and support. Listened empathically. Provided information about Spiritual Care Services. Chart reviewed. The following outcomes where achieved:  Patient expressed gratitude for 's visit. Assessment:  There are no spiritual or Jewish issues which require intervention at this time. Plan:  Chaplains will continue to follow and will provide pastoral care on an as needed/requested basis.  recommends bedside caregivers page  on duty if patient shows signs of acute spiritual or emotional distress.     [address]  ([phone])

## 2018-05-28 NOTE — ROUTINE PROCESS
Bedside shift change report given to Jerman Reyes (oncoming nurse) by Corky Pagan RN (offgoing nurse). Report given with SBAR, Kardex, Intake/Output, MAR and Recent Results.

## 2018-05-28 NOTE — DIABETES MGMT
GLYCEMIC CONTROL SCREENING INITIATED:    No results found for: HBA1C, HGBE8, [id number]   Lab Results   Component Value Date/Time    Glucose 180 (H) 05/28/2018 02:25 AM         [x]  Glucose values exceeding inpatient target range: -180mg/dl            non-ICU 70-180mg/dl      [x]  Consider checking HbA1c       [x]  Consider corrective insulin per IP Insulin order set.      Geneva Huerta, [address], [address] [facility]

## 2018-05-28 NOTE — PROGRESS NOTES
[facility] Hospitalist Group  Progress Note    Patient: Kyle Khan Age: 71 y.o. : 1945 MR#: [id number] SSN: [id number]  Date: 2018     Subjective:     Pt has no specific complaints. Assessment/Plan:   -HCAP-cxr showed basilar right lower lobe pneumonia. On vanc, levofloxacin and zosyn. Also has leukocytosis, unclear if due to steroids in recent past. Pt was dc'd on prednisone taper.  -Chronic respiratory failure/COPD-per dc summary pt was to be dc'd on home 02 nasal cannula  -ANGELA cause unclear-resolving.  -Transaminitis cause unclear  -HIV last CD4 213 18 currently on antiretrovirals  -HTN  -History of diastolic HF-appears compensated at this time  -history of atypical cp, per dc summary was supposed to f/u w/ cardiology.   PLAN:  -check ruq us, hepatitis panel  -cont abx for now  -hold steroids as pt not wheezing       Additional Notes:      Case discussed with:  [x]Patient  []Family  []Nursing  []Case Management  DVT Prophylaxis:  []Lovenox  [x]Hep SQ  []SCDs  []Coumadin   []On Heparin gtt    Objective:   VS:   Visit Vitals    /72 (BP 1 Location: Right arm, BP Patient Position: At rest)    Pulse (!) 106    Temp 98.4 °F (36.9 °C)    Resp 18    Ht 5' 2\" (1.575 m)    Wt 73 kg (160 lb 14.4 oz)    SpO2 98%    Breastfeeding No    BMI 29.43 kg/m2      Tmax/24hrs: Temp (24hrs), Av.9 °F (36.6 °C), Min:97.2 °F (36.2 °C), Max:98.6 °F (37 °C)    Intake/Output Summary (Last 24 hours) at 18 1539  Last data filed at 18 0542   Gross per 24 hour   Intake              440 ml   Output              300 ml   Net              140 ml       General:  Alert, awake, in nad  Cardiovascular:  Rrr, no murmurs  Pulmonary: ctab   GI: soft, nt, nd   Extremities:  No edema  Additional:      Labs:    Recent Results (from the past 24 hour(s))   METABOLIC PANEL, BASIC    Collection Time: 05/28/18  2:25 AM   Result Value Ref Range    Sodium 141 136 - 145 mmol/L Potassium 4.2 3.5 - 5.5 mmol/L    Chloride 113 (H) 100 - 108 mmol/L    CO2 22 21 - 32 mmol/L    Anion gap 6 3.0 - 18 mmol/L    Glucose 180 (H) 74 - 99 mg/dL    BUN 27 (H) 7.0 - 18 MG/DL    Creatinine 1.46 (H) 0.6 - 1.3 MG/DL    BUN/Creatinine ratio 18 12 - 20      GFR est AA 43 (L) >60 ml/min/1.73m2    GFR est non-AA 35 (L) >60 ml/min/1.73m2    Calcium 8.2 (L) 8.5 - 10.1 MG/DL   MAGNESIUM    Collection Time: 05/28/18  2:25 AM   Result Value Ref Range    Magnesium 2.2 1.6 - 2.6 mg/dL   PHOSPHORUS    Collection Time: 05/28/18  2:25 AM   Result Value Ref Range    Phosphorus 3.8 2.5 - 4.9 MG/DL   VANCOMYCIN, RANDOM    Collection Time: 05/28/18  2:25 AM   Result Value Ref Range    Vancomycin, random 11.5 5.0 - 40.0 UG/ML   CBC WITH AUTOMATED DIFF    Collection Time: 05/28/18  4:25 AM   Result Value Ref Range    WBC 15.3 (H) 4.6 - 13.2 K/uL    RBC 3.25 (L) 4.20 - 5.30 M/uL    HGB 11.1 (L) 12.0 - 16.0 g/dL    HCT 32.6 (L) 35.0 - 45.0 %    .3 (H) 74.0 - 97.0 FL    MCH 34.2 (H) 24.0 - 34.0 PG    MCHC 34.0 31.0 - 37.0 g/dL    RDW 13.1 11.6 - 14.5 %    PLATELET 97 (L) 039 - 420 K/uL    MPV 9.5 9.2 - 11.8 FL    NEUTROPHILS 94 (H) 40 - 73 %    LYMPHOCYTES 4 (L) 21 - 52 %    MONOCYTES 2 (L) 3 - 10 %    EOSINOPHILS 0 0 - 5 %    BASOPHILS 0 0 - 2 %    ABS. NEUTROPHILS 14.4 (H) 1.8 - 8.0 K/UL    ABS. LYMPHOCYTES 0.6 (L) 0.9 - 3.6 K/UL    ABS. MONOCYTES 0.3 0.05 - 1.2 K/UL    ABS. EOSINOPHILS 0.0 0.0 - 0.4 K/UL    ABS.  BASOPHILS 0.0 0.0 - 0.1 K/UL    DF AUTOMATED         Signed By: Mulugeta Gann MD     May 28, 2018 3:39 PM

## 2018-05-28 NOTE — PROGRESS NOTES
Nutrition:    Nutrition risk referral received from RN screen. Patient denies unintentional weight loss prior to admission and denies any change in po intake due to decreased appetite. Full nutrition assessment is not indicated at this time. Will re-screen as appropriate.         Amaya Xiao RD

## 2018-05-28 NOTE — PROGRESS NOTES
Problem: Falls - Risk of  Goal: *Absence of Falls  Document Abdias Fall Risk and appropriate interventions in the flowsheet. Outcome: Progressing Towards Goal  Fall Risk Interventions:  Mobility Interventions: Communicate number of staff needed for ambulation/transfer, OT consult for ADLs         Medication Interventions: Evaluate medications/consider consulting pharmacy, Patient to call before getting OOB    Elimination Interventions: Patient to call for help with toileting needs             Problem: Pressure Injury - Risk of  Goal: *Prevention of pressure injury  Document Collins Scale and appropriate interventions in the flowsheet.    Outcome: Progressing Towards Goal  Pressure Injury Interventions:       Moisture Interventions: Absorbent underpads, Apply protective barrier, creams and emollients, Check for incontinence Q2 hours and as needed    Activity Interventions: Assess need for specialty bed, Increase time out of bed, Pressure redistribution bed/mattress(bed type)    Mobility Interventions: HOB 30 degrees or less, Pressure redistribution bed/mattress (bed type), PT/OT evaluation         Friction and Shear Interventions: HOB 30 degrees or less, Foam dressings/transparent film/skin sealants, Feet elevated on foot rest

## 2018-05-29 ENCOUNTER — HOME CARE VISIT (OUTPATIENT)
Dept: HOME HEALTH SERVICES | Facility: HOME HEALTH | Age: 73
End: 2018-05-29
Payer: MEDICARE

## 2018-05-29 ENCOUNTER — APPOINTMENT (OUTPATIENT)
Dept: ULTRASOUND IMAGING | Age: 73
DRG: 975 | End: 2018-05-29
Attending: INTERNAL MEDICINE
Payer: MEDICARE

## 2018-05-29 LAB
ANION GAP SERPL CALC-SCNC: 6 MMOL/L (ref 3–18)
BACTERIA SPEC CULT: ABNORMAL
BACTERIA SPEC CULT: ABNORMAL
BASOPHILS # BLD: 0 K/UL (ref 0–0.06)
BASOPHILS NFR BLD: 0 % (ref 0–2)
BUN SERPL-MCNC: 23 MG/DL (ref 7–18)
BUN/CREAT SERPL: 16 (ref 12–20)
CALCIUM SERPL-MCNC: 8.5 MG/DL (ref 8.5–10.1)
CHLORIDE SERPL-SCNC: 108 MMOL/L (ref 100–108)
CO2 SERPL-SCNC: 27 MMOL/L (ref 21–32)
CREAT SERPL-MCNC: 1.46 MG/DL (ref 0.6–1.3)
DIFFERENTIAL METHOD BLD: ABNORMAL
EOSINOPHIL # BLD: 0 K/UL (ref 0–0.4)
EOSINOPHIL NFR BLD: 0 % (ref 0–5)
ERYTHROCYTE [DISTWIDTH] IN BLOOD BY AUTOMATED COUNT: 13.5 % (ref 11.6–14.5)
GLUCOSE SERPL-MCNC: 100 MG/DL (ref 74–99)
GRAM STN SPEC: ABNORMAL
HBV CORE IGM SER QL: POSITIVE
HBV SURFACE AB SER QL IA: POSITIVE
HBV SURFACE AB SERPL IA-ACNC: 55.16 MIU/ML
HBV SURFACE AG SER QL: <0.1 INDEX
HBV SURFACE AG SER QL: NEGATIVE
HCT VFR BLD AUTO: 33.2 % (ref 35–45)
HCV AB SER IA-ACNC: 0.09 INDEX
HCV AB SERPL QL IA: NEGATIVE
HCV COMMENT,HCGAC: NORMAL
HEP BS AB COMMENT,HBSAC: NORMAL
HGB BLD-MCNC: 11 G/DL (ref 12–16)
LYMPHOCYTES # BLD: 1.1 K/UL (ref 0.9–3.6)
LYMPHOCYTES NFR BLD: 9 % (ref 21–52)
MCH RBC QN AUTO: 34.4 PG (ref 24–34)
MCHC RBC AUTO-ENTMCNC: 33.1 G/DL (ref 31–37)
MCV RBC AUTO: 103.8 FL (ref 74–97)
MONOCYTES # BLD: 0.3 K/UL (ref 0.05–1.2)
MONOCYTES NFR BLD: 3 % (ref 3–10)
NEUTS SEG # BLD: 11.5 K/UL (ref 1.8–8)
NEUTS SEG NFR BLD: 88 % (ref 40–73)
PLATELET # BLD AUTO: 86 K/UL (ref 135–420)
PMV BLD AUTO: 9.4 FL (ref 9.2–11.8)
POTASSIUM SERPL-SCNC: 4.3 MMOL/L (ref 3.5–5.5)
RBC # BLD AUTO: 3.2 M/UL (ref 4.2–5.3)
SERVICE CMNT-IMP: ABNORMAL
SODIUM SERPL-SCNC: 141 MMOL/L (ref 136–145)
WBC # BLD AUTO: 13 K/UL (ref 4.6–13.2)

## 2018-05-29 PROCEDURE — 80048 BASIC METABOLIC PNL TOTAL CA: CPT | Performed by: INTERNAL MEDICINE

## 2018-05-29 PROCEDURE — 74011250637 HC RX REV CODE- 250/637: Performed by: INTERNAL MEDICINE

## 2018-05-29 PROCEDURE — 3331090002 HH PPS REVENUE DEBIT

## 2018-05-29 PROCEDURE — 36415 COLL VENOUS BLD VENIPUNCTURE: CPT | Performed by: INTERNAL MEDICINE

## 2018-05-29 PROCEDURE — 74011000250 HC RX REV CODE- 250: Performed by: INTERNAL MEDICINE

## 2018-05-29 PROCEDURE — 76705 ECHO EXAM OF ABDOMEN: CPT

## 2018-05-29 PROCEDURE — 94640 AIRWAY INHALATION TREATMENT: CPT

## 2018-05-29 PROCEDURE — 65660000000 HC RM CCU STEPDOWN

## 2018-05-29 PROCEDURE — 74011000258 HC RX REV CODE- 258: Performed by: EMERGENCY MEDICINE

## 2018-05-29 PROCEDURE — 74011250636 HC RX REV CODE- 250/636: Performed by: EMERGENCY MEDICINE

## 2018-05-29 PROCEDURE — 74011250636 HC RX REV CODE- 250/636: Performed by: INTERNAL MEDICINE

## 2018-05-29 PROCEDURE — 85025 COMPLETE CBC W/AUTO DIFF WBC: CPT | Performed by: INTERNAL MEDICINE

## 2018-05-29 PROCEDURE — 77030038269 HC DRN EXT URIN PURWCK BARD -A

## 2018-05-29 PROCEDURE — 3331090001 HH PPS REVENUE CREDIT

## 2018-05-29 RX ORDER — ABACAVIR 300 MG/1
600 TABLET ORAL DAILY
Status: DISCONTINUED | OUTPATIENT
Start: 2018-05-30 | End: 2018-06-01 | Stop reason: HOSPADM

## 2018-05-29 RX ORDER — LAMIVUDINE 150 MG/1
150 TABLET, FILM COATED ORAL DAILY
Status: DISCONTINUED | OUTPATIENT
Start: 2018-05-30 | End: 2018-06-01 | Stop reason: HOSPADM

## 2018-05-29 RX ORDER — LEVOFLOXACIN 750 MG/1
750 TABLET ORAL
Status: DISCONTINUED | OUTPATIENT
Start: 2018-05-31 | End: 2018-05-29

## 2018-05-29 RX ADMIN — PIPERACILLIN SODIUM,TAZOBACTAM SODIUM 3.38 G: 3; .375 INJECTION, POWDER, FOR SOLUTION INTRAVENOUS at 04:25

## 2018-05-29 RX ADMIN — DILTIAZEM HYDROCHLORIDE 180 MG: 180 CAPSULE, COATED, EXTENDED RELEASE ORAL at 11:21

## 2018-05-29 RX ADMIN — VANCOMYCIN HYDROCHLORIDE 1250 MG: 10 INJECTION, POWDER, LYOPHILIZED, FOR SOLUTION INTRAVENOUS at 12:51

## 2018-05-29 RX ADMIN — FUROSEMIDE 40 MG: 40 TABLET ORAL at 08:17

## 2018-05-29 RX ADMIN — BUDESONIDE AND FORMOTEROL FUMARATE DIHYDRATE 2 PUFF: 160; 4.5 AEROSOL RESPIRATORY (INHALATION) at 19:43

## 2018-05-29 RX ADMIN — ASPIRIN 81 MG CHEWABLE TABLET 81 MG: 81 TABLET CHEWABLE at 11:20

## 2018-05-29 RX ADMIN — ABACAVIR SULFATE, DOLUTEGRAVIR SODIUM, LAMIVUDINE 1 TABLET: 600; 50; 300 TABLET, FILM COATED ORAL at 09:49

## 2018-05-29 RX ADMIN — IPRATROPIUM BROMIDE AND ALBUTEROL SULFATE 3 ML: 2.5; .5 SOLUTION RESPIRATORY (INHALATION) at 18:41

## 2018-05-29 RX ADMIN — FAMOTIDINE 20 MG: 20 TABLET ORAL at 08:17

## 2018-05-29 RX ADMIN — PIPERACILLIN SODIUM,TAZOBACTAM SODIUM 3.38 G: 3; .375 INJECTION, POWDER, FOR SOLUTION INTRAVENOUS at 10:09

## 2018-05-29 RX ADMIN — LEVOFLOXACIN 750 MG: 5 INJECTION, SOLUTION INTRAVENOUS at 05:00

## 2018-05-29 RX ADMIN — SERTRALINE HYDROCHLORIDE 25 MG: 50 TABLET ORAL at 08:17

## 2018-05-29 RX ADMIN — PIPERACILLIN SODIUM,TAZOBACTAM SODIUM 3.38 G: 3; .375 INJECTION, POWDER, FOR SOLUTION INTRAVENOUS at 22:46

## 2018-05-29 RX ADMIN — PIPERACILLIN SODIUM,TAZOBACTAM SODIUM 3.38 G: 3; .375 INJECTION, POWDER, FOR SOLUTION INTRAVENOUS at 16:44

## 2018-05-29 NOTE — PROGRESS NOTES
7:00 PM Beside shift change report given by Surekha Hickey RN  to GANESH Curran RN. Report included the information from the [employer], RHEA George/ANDRZEJ, and recent results. Respiratory discomfort from sitting up too quickly in bed; RT called for breathing treatment; Call light within reach. Bed in low position; Patient expressed a concern regarding the feeling she has on her chest- patient states \"it feels like a hole\" and wants to speak with provider regarding possible testing that can be done to test for cancer. Patient states that late  had similar symptoms and was later found to have terminal cancer. Patient will be speaking to hospitalist during daily rounding in the am.     7:55 AM Beside shift change report given by GANESH Curran RN to Magalis Borjas RN. Report included the information from the [employer], RHEA George/ANDRZEJ, and recent results.

## 2018-05-29 NOTE — PROGRESS NOTES
Reason for Admission:   SOB               RRAT Score:     39             Resources/supports as identified by patient/family:                   Top Challenges facing patient (as identified by patient/family and CM): Finances/Medication cost?      See above              Transportation? Maddie Smith provides her transportation to [employer]. Support system or lack thereof? Living arrangements? Lives with maddie       [address] [id number] [phone]. Self-care/ADLs/Cognition? Ninate is her care giver. Current Advanced Directive/Advance Care Plan:                            Plan for utilizing home health:    Wants to use [facility] again                      Likelihood of readmission: High/Red                 Transition of Care Plan:        Patient confirmed demographic information. Patient has O2 at home. Has a walker. Would like to have a wheelchair for a few weeks. Patient confirmed Opal Epps as her PCP    Care Management Interventions  PCP Verified by CM: Yes  Palliative Care Criteria Met (RRAT>21 & CHF Dx)?: No  Mode of Transport at Discharge:  Other (see comment) (maddie Smith)  Transition of Care Consult (CM Consult): [address]: Yes  Current Support Network: Relative's Home  Confirm Follow Up Transport: Family  Plan discussed with Pt/Family/Caregiver: Yes   Resource Information Provided?: No  Discharge Location  Discharge Placement: Home with home health

## 2018-05-29 NOTE — PROGRESS NOTES
This patient meets the following P&T approved criteria and has been converted from IV to oral therapy for the following medication: Levofloxacin    1. INITIAL IV Therapy  Patient has received an initial 48 hours of IV therapy for azithromycin and levetiracetam OR received an initial 24 hours of IV therapy for all other medications. 2. Clinically Stable   HR?100 Pulse (Heart Rate): 75   SBP ?90 BP: 125/79   RR ? 24 Resp Rate: 18   Temp < 100.4° F Temp: 97.5 °F (36.4 °C)   O2 ?90% O2 Sat (%): 99 %   3. Functional GI Tract  Please note that a No response means the patient is not a candidate for IV to PO conversion   No active NPO order (check order review activity) Yes   Taking enteral meds (PO, NG, GT, etc)   (check medication activity or order review for a tube) Yes   Able to tolerate enteral medications without risk of aspiration   (check progress notes) Yes   Non-tubed patients can swallow without difficulty  (check progress notes) Yes   Eating or tolerating feeds at goal rate   (check progress notes, order review activity, LDA flowsheet)  Diet =Cardiac  Yes   NG access is available if patient is unconscious Yes   NG tube, if present,  is not on continuous suction   (check progress notes) Yes   Continuous tube feedings can be interrupted for an extended period of time for the drug administration (e.g. Ciprofloxacin)   (check progress notes, order review) Yes   No severe or persistent nausea or vomiting       (check progress notes, use of antiemetics) Yes   No malabsorption  syndromes   (e.g. gastrectomy, intestinal resection, bariatric surgery, uncontrolled diarrhea, short bowel syndrome, ileus, gastrointestinal obstruction)   (check progress notes) Yes   No active gastrointestinal bleeding     (check progress notes)   Yes   4.  Infection Specific Criteria (ABX only)  Please note that a No response means the patient is not a candidate for IV to PO conversion   WBC normal or normalizing (check labs) Yes    WBC =  Lab Results   Component Value Date/Time    WBC 13.0 05/29/2018 08:16 AM      Neutropenia (ANC < 1500 cells/microL) NOT present  (check labs)   Yes   No infection with high treatment failure rate  (e.g. meningitis, brain abscess,orbital cellulitis, other central nervous system infections, endophthalmitis, mediastinitis,  IV TMP/SMX treatment for PCP in AIDs, fungemia, ventriculitis, endocarditis, Pseudomonas pneumonia, intra-abdominal abscess, empyema, necrotizing soft tissue infections, osteomyelitis or post-obstructive pneumonia) (check indication for antibiotic, progress notes) Yes   5. Miscellaneous Criteria    Please note that a No response means the patient is not a candidate for IV to PO conversion   Not on high doses of vasopressor medications        (check medication activity) yes   Not actively seizing or risk of actively seizing        (check progress notes) Yes     Pharmacist Notes: Patient taking oral medication; changed levofloxacin IV to PO  Pharmacy will continue to monitor for the duration of therapy to ensure the patient continues to meet protocol criteria and the conversion remains appropriate.     Flaquita Tang, Pharmacist  5/29/2018 10:59 AM

## 2018-05-29 NOTE — PROGRESS NOTES
[facility] Hospitalist Group  Progress Note    Patient: John Ocampo Age: 71 y.o. : 1945 MR#: [id number] SSN: [id number]  Date: 2018     Subjective:     Pt reports her breathing has improved. Assessment/Plan:   -HCAP-cxr showed basilar right lower lobe pneumonia. On vanc, levofloxacin and zosyn. Also has leukocytosis, unclear if due to steroids in recent past. Pt was dc'd on prednisone taper. Will dc levofloxacin. Cont rest of abx.  -Chronic respiratory failure/COPD-per dc summary pt was to be dc'd on home 02 nasal cannula  -ANGELA cause unclear-resolving.  -Transaminitis cause unclear, hep B results indicate immunity through previous infection, liver us no evidence of biliary, liver parenchymal abnormality, ? Iatrogenic (anti retrovirals). -HIV last CD4 213 18 currently on antiretrovirals, per pharmacy pt's current med dosing too high given low creat clearance, and looking into making adjustments. -HTN  -History of diastolic HF-appears compensated at this time  -history of atypical cp, per dc summary was supposed to f/u w/ cardiology.  -Thrombocytopenia-acuity suggests iatrogenic cause. Will hold heparin products, use scd for dvt prophyx, cont to monitor. Abx may be culprit if cont'd drop.          PLAN:  Verta Lisseth escalate abx  Check walking pulse 0x on 2 L  -hold heparin products and check platelet count  -PT/OT  Additional Notes:      Case discussed with:  [x]Patient  []Family  []Nursing  []Case Management  DVT Prophylaxis:  []Lovenox  [x]Hep SQ  []SCDs  []Coumadin   []On Heparin gtt    Objective:   VS:   Visit Vitals    /75 (BP 1 Location: Right arm, BP Patient Position: At rest)    Pulse 80    Temp 98.2 °F (36.8 °C)    Resp 18    Ht 5' 2\" (1.575 m)    Wt 74.8 kg (164 lb 12.8 oz)    SpO2 98%    Breastfeeding No    BMI 30.14 kg/m2      Tmax/24hrs: Temp (24hrs), Av.8 °F (36.6 °C), Min:97.5 °F (36.4 °C), Max:98.4 °F (36.9 °C)      Intake/Output Summary (Last 24 hours) at 05/29/18 1520  Last data filed at 05/29/18 1335   Gross per 24 hour   Intake              950 ml   Output              500 ml   Net              450 ml       General:  Alert, awake, in nad  Cardiovascular:  Rrr, no murmurs  Pulmonary: ctab   GI: soft, nt, nd   Extremities:  No edema  Additional:      Labs:    Recent Results (from the past 24 hour(s))   HEP B SURFACE AB    Collection Time: 05/28/18  6:22 PM   Result Value Ref Range    Hepatitis B surface Ab 55.16 >10.0 mIU/mL    Hep B surface Ab Interp. POSITIVE POS      Hep B surface Ab comment        Samples with a  value of 10 mIU/mL or greater are considered positive (protective immunity) in accordance with the CDC guidelines. HEP B SURFACE AG    Collection Time: 05/28/18  6:22 PM   Result Value Ref Range    Hepatitis B surface Ag <0.10 <1.00 Index    Hep B surface Ag Interp. NEGATIVE  NEG     HEPATITIS B CORE AB, IGM    Collection Time: 05/28/18  6:22 PM   Result Value Ref Range    Hepatitis B core, IgM POSITIVE (A) NEG     HEPATITIS C AB    Collection Time: 05/28/18  6:22 PM   Result Value Ref Range    Hepatitis C virus Ab 0.09 <0.80 Index    Hep C  virus Ab Interp.  NEGATIVE  NEG      Hep C  virus Ab comment         METABOLIC PANEL, BASIC    Collection Time: 05/29/18  8:16 AM   Result Value Ref Range    Sodium 141 136 - 145 mmol/L    Potassium 4.3 3.5 - 5.5 mmol/L    Chloride 108 100 - 108 mmol/L    CO2 27 21 - 32 mmol/L    Anion gap 6 3.0 - 18 mmol/L    Glucose 100 (H) 74 - 99 mg/dL    BUN 23 (H) 7.0 - 18 MG/DL    Creatinine 1.46 (H) 0.6 - 1.3 MG/DL    BUN/Creatinine ratio 16 12 - 20      GFR est AA 43 (L) >60 ml/min/1.73m2    GFR est non-AA 35 (L) >60 ml/min/1.73m2    Calcium 8.5 8.5 - 10.1 MG/DL   CBC WITH AUTOMATED DIFF    Collection Time: 05/29/18  8:16 AM   Result Value Ref Range    WBC 13.0 4.6 - 13.2 K/uL    RBC 3.20 (L) 4.20 - 5.30 M/uL    HGB 11.0 (L) 12.0 - 16.0 g/dL    HCT 33.2 (L) 35.0 - 45.0 %    .8 (H) 74.0 - 97.0 FL MCH 34.4 (H) 24.0 - 34.0 PG    MCHC 33.1 31.0 - 37.0 g/dL    RDW 13.5 11.6 - 14.5 %    PLATELET 86 (L) 415 - 420 K/uL    MPV 9.4 9.2 - 11.8 FL    NEUTROPHILS 88 (H) 40 - 73 %    LYMPHOCYTES 9 (L) 21 - 52 %    MONOCYTES 3 3 - 10 %    EOSINOPHILS 0 0 - 5 %    BASOPHILS 0 0 - 2 %    ABS. NEUTROPHILS 11.5 (H) 1.8 - 8.0 K/UL    ABS. LYMPHOCYTES 1.1 0.9 - 3.6 K/UL    ABS. MONOCYTES 0.3 0.05 - 1.2 K/UL    ABS. EOSINOPHILS 0.0 0.0 - 0.4 K/UL    ABS.  BASOPHILS 0.0 0.0 - 0.06 K/UL    DF AUTOMATED         Signed By: Luz Maya MD     May 29, 2018 3:39 PM

## 2018-05-29 NOTE — CDMP QUERY
Please clarify if this patient is being treated/managed for:    => Severe Sepsis POA in the setting of PNA, tachcyardia, tachypnea and leukocytosis w/ 8%bands and lactic acidosis  =>Other Explanation of clinical findings  =>Unable to Determine (no explanation of clinical findings)    The medical record reflects the following:    Risk: recent PNA, COPD, HIV    Clinical Indicators: Patient presented w/ SOB, VS 98.2 P 160, RR 27, /71. WBC 18.4, 84% neuts, 8% bands, Lactic acid 4.5, ANGELA with Bun/creat 29/2.26  CXR: Basilar right lower lobe pneumonia    Treatment:  IV fluid bolus, sepsis protocol in ER, IV levaquin, Vanco, Zosyn, BC, sputum cx    Please clarify and document your clinical opinion in the progress notes and discharge summary including the definitive and/or presumptive diagnosis, (suspected or probable), related to the above clinical findings. Please include clinical findings supporting your diagnosis. If you DECLINE this query or would like to communicate with [facility], please utilize the \"[facility] message box\" at the TOP of the Progress Note on the right.       Thank Juno Stokes RN, [address]

## 2018-05-29 NOTE — PROGRESS NOTES
Problem: Falls - Risk of  Goal: *Absence of Falls  Document Abdias Fall Risk and appropriate interventions in the flowsheet. Outcome: Progressing Towards Goal  Fall Risk Interventions:  Mobility Interventions: Assess mobility with egress test, Bed/chair exit alarm, Patient to call before getting OOB, Communicate number of staff needed for ambulation/transfer         Medication Interventions: Bed/chair exit alarm, Patient to call before getting OOB, Teach patient to arise slowly    Elimination Interventions: Bed/chair exit alarm, Call light in reach, Patient to call for help with toileting needs, Toileting schedule/hourly rounds, Toilet paper/wipes in reach             Problem: Patient Education: Go to Patient Education Activity  Goal: Patient/Family Education  p    Problem: Pressure Injury - Risk of  Goal: *Prevention of pressure injury  Document Collins Scale and appropriate interventions in the flowsheet.    Outcome: Progressing Towards Goal  Pressure Injury Interventions:       Moisture Interventions: Apply protective barrier, creams and emollients, Internal/External urinary devices, Maintain skin hydration (lotion/cream), Moisture barrier    Activity Interventions: Increase time out of bed    Mobility Interventions: HOB 30 degrees or less    Nutrition Interventions: Document food/fluid/supplement intake    Friction and Shear Interventions: HOB 30 degrees or less, Apply protective barrier, creams and emollients, Foam dressings/transparent film/skin sealants, Minimize layers

## 2018-05-30 LAB
ANION GAP SERPL CALC-SCNC: 7 MMOL/L (ref 3–18)
BASOPHILS # BLD: 0 K/UL (ref 0–0.06)
BASOPHILS NFR BLD: 0 % (ref 0–2)
BUN SERPL-MCNC: 18 MG/DL (ref 7–18)
BUN/CREAT SERPL: 12 (ref 12–20)
CALCIUM SERPL-MCNC: 8.8 MG/DL (ref 8.5–10.1)
CHLORIDE SERPL-SCNC: 106 MMOL/L (ref 100–108)
CO2 SERPL-SCNC: 30 MMOL/L (ref 21–32)
CREAT SERPL-MCNC: 1.51 MG/DL (ref 0.6–1.3)
DATE LAST DOSE: NORMAL
DIFFERENTIAL METHOD BLD: ABNORMAL
EOSINOPHIL # BLD: 0 K/UL (ref 0–0.4)
EOSINOPHIL NFR BLD: 0 % (ref 0–5)
ERYTHROCYTE [DISTWIDTH] IN BLOOD BY AUTOMATED COUNT: 13.6 % (ref 11.6–14.5)
GLUCOSE SERPL-MCNC: 94 MG/DL (ref 74–99)
HBV CORE AB SERPL QL IA: POSITIVE
HBV CORE IGM SERPL QL IA: POSITIVE
HBV E AG SERPL QL IA: NEGATIVE
HCT VFR BLD AUTO: 33.9 % (ref 35–45)
HGB BLD-MCNC: 11 G/DL (ref 12–16)
LYMPHOCYTES # BLD: 0.9 K/UL (ref 0.9–3.6)
LYMPHOCYTES NFR BLD: 13 % (ref 21–52)
MCH RBC QN AUTO: 33.8 PG (ref 24–34)
MCHC RBC AUTO-ENTMCNC: 32.4 G/DL (ref 31–37)
MCV RBC AUTO: 104.3 FL (ref 74–97)
MONOCYTES # BLD: 0.2 K/UL (ref 0.05–1.2)
MONOCYTES NFR BLD: 3 % (ref 3–10)
NEUTS SEG # BLD: 6.2 K/UL (ref 1.8–8)
NEUTS SEG NFR BLD: 84 % (ref 40–73)
PLATELET # BLD AUTO: 98 K/UL (ref 135–420)
PMV BLD AUTO: 9.8 FL (ref 9.2–11.8)
POTASSIUM SERPL-SCNC: 4.1 MMOL/L (ref 3.5–5.5)
RBC # BLD AUTO: 3.25 M/UL (ref 4.2–5.3)
REPORTED DOSE,DOSE: NORMAL UNITS
REPORTED DOSE/TIME,TMG: 1100
SODIUM SERPL-SCNC: 143 MMOL/L (ref 136–145)
TROPONIN I SERPL-MCNC: <0.02 NG/ML (ref 0–0.04)
VANCOMYCIN TROUGH SERPL-MCNC: 15.5 UG/ML (ref 10–20)
WBC # BLD AUTO: 7.3 K/UL (ref 4.6–13.2)

## 2018-05-30 PROCEDURE — 74011250637 HC RX REV CODE- 250/637: Performed by: INTERNAL MEDICINE

## 2018-05-30 PROCEDURE — 74011000258 HC RX REV CODE- 258: Performed by: EMERGENCY MEDICINE

## 2018-05-30 PROCEDURE — 80048 BASIC METABOLIC PNL TOTAL CA: CPT | Performed by: INTERNAL MEDICINE

## 2018-05-30 PROCEDURE — 77030038269 HC DRN EXT URIN PURWCK BARD -A

## 2018-05-30 PROCEDURE — 97162 PT EVAL MOD COMPLEX 30 MIN: CPT

## 2018-05-30 PROCEDURE — 3331090001 HH PPS REVENUE CREDIT

## 2018-05-30 PROCEDURE — 74011250636 HC RX REV CODE- 250/636: Performed by: INTERNAL MEDICINE

## 2018-05-30 PROCEDURE — 85025 COMPLETE CBC W/AUTO DIFF WBC: CPT | Performed by: INTERNAL MEDICINE

## 2018-05-30 PROCEDURE — 3331090002 HH PPS REVENUE DEBIT

## 2018-05-30 PROCEDURE — 94640 AIRWAY INHALATION TREATMENT: CPT

## 2018-05-30 PROCEDURE — 65660000000 HC RM CCU STEPDOWN

## 2018-05-30 PROCEDURE — 74011250636 HC RX REV CODE- 250/636: Performed by: EMERGENCY MEDICINE

## 2018-05-30 PROCEDURE — 84484 ASSAY OF TROPONIN QUANT: CPT | Performed by: INTERNAL MEDICINE

## 2018-05-30 PROCEDURE — 80202 ASSAY OF VANCOMYCIN: CPT | Performed by: INTERNAL MEDICINE

## 2018-05-30 PROCEDURE — 36415 COLL VENOUS BLD VENIPUNCTURE: CPT | Performed by: INTERNAL MEDICINE

## 2018-05-30 RX ADMIN — LAMIVUDINE 150 MG: 150 TABLET, FILM COATED ORAL at 10:18

## 2018-05-30 RX ADMIN — TIOTROPIUM BROMIDE 18 MCG: 18 CAPSULE ORAL; RESPIRATORY (INHALATION) at 08:58

## 2018-05-30 RX ADMIN — DOLUTEGRAVIR SODIUM 50 MG: 50 TABLET, FILM COATED ORAL at 10:24

## 2018-05-30 RX ADMIN — FAMOTIDINE 20 MG: 20 TABLET ORAL at 10:17

## 2018-05-30 RX ADMIN — FUROSEMIDE 40 MG: 40 TABLET ORAL at 10:17

## 2018-05-30 RX ADMIN — DILTIAZEM HYDROCHLORIDE 180 MG: 180 CAPSULE, COATED, EXTENDED RELEASE ORAL at 10:17

## 2018-05-30 RX ADMIN — PIPERACILLIN SODIUM,TAZOBACTAM SODIUM 3.38 G: 3; .375 INJECTION, POWDER, FOR SOLUTION INTRAVENOUS at 06:07

## 2018-05-30 RX ADMIN — VANCOMYCIN HYDROCHLORIDE 1500 MG: 1 INJECTION, POWDER, LYOPHILIZED, FOR SOLUTION INTRAVENOUS at 18:30

## 2018-05-30 RX ADMIN — ASPIRIN 81 MG CHEWABLE TABLET 81 MG: 81 TABLET CHEWABLE at 10:17

## 2018-05-30 RX ADMIN — PIPERACILLIN SODIUM,TAZOBACTAM SODIUM 3.38 G: 3; .375 INJECTION, POWDER, FOR SOLUTION INTRAVENOUS at 18:18

## 2018-05-30 RX ADMIN — PIPERACILLIN SODIUM,TAZOBACTAM SODIUM 3.38 G: 3; .375 INJECTION, POWDER, FOR SOLUTION INTRAVENOUS at 22:45

## 2018-05-30 RX ADMIN — PIPERACILLIN SODIUM,TAZOBACTAM SODIUM 3.38 G: 3; .375 INJECTION, POWDER, FOR SOLUTION INTRAVENOUS at 12:38

## 2018-05-30 RX ADMIN — BUDESONIDE AND FORMOTEROL FUMARATE DIHYDRATE 2 PUFF: 160; 4.5 AEROSOL RESPIRATORY (INHALATION) at 08:58

## 2018-05-30 RX ADMIN — SERTRALINE HYDROCHLORIDE 25 MG: 50 TABLET ORAL at 10:18

## 2018-05-30 RX ADMIN — BUDESONIDE AND FORMOTEROL FUMARATE DIHYDRATE 2 PUFF: 160; 4.5 AEROSOL RESPIRATORY (INHALATION) at 19:55

## 2018-05-30 RX ADMIN — ABACAVIR SULFATE 600 MG: 300 TABLET, FILM COATED ORAL at 10:24

## 2018-05-30 NOTE — PROGRESS NOTES
This writer called Nelia Nassar with [employer] for wheelchair for this patient.  Nelia Nassar is looking over the request.

## 2018-05-30 NOTE — PROGRESS NOTES
Problem: Falls - Risk of  Goal: *Absence of Falls  Document Abdias Fall Risk and appropriate interventions in the flowsheet. Outcome: Progressing Towards Goal  Fall Risk Interventions:  Mobility Interventions: Bed/chair exit alarm, Patient to call before getting OOB         Medication Interventions: Bed/chair exit alarm, Patient to call before getting OOB, Teach patient to arise slowly    Elimination Interventions: Bed/chair exit alarm, Call light in reach, Patient to call for help with toileting needs, Toilet paper/wipes in reach, Toileting schedule/hourly rounds             Problem: Pressure Injury - Risk of  Goal: *Prevention of pressure injury  Document Collins Scale and appropriate interventions in the flowsheet.    Outcome: Progressing Towards Goal  Pressure Injury Interventions:  Sensory Interventions: Assess changes in LOC, Check visual cues for pain, Keep linens dry and wrinkle-free, Maintain/enhance activity level    Moisture Interventions: Absorbent underpads, Maintain skin hydration (lotion/cream), Offer toileting Q_hr    Activity Interventions: Increase time out of bed    Mobility Interventions: HOB 30 degrees or less    Nutrition Interventions: Document food/fluid/supplement intake    Friction and Shear Interventions: Apply protective barrier, creams and emollients, Minimize layers, HOB 30 degrees or less

## 2018-05-30 NOTE — HOME CARE
Pt is Active with [facility] for SN,PT/OT, notified  Monna Favre) that [facility] prior to d/c, pt states she already has home O2,RW,cane and BSC at home. MARTHA FLOYD.

## 2018-05-30 NOTE — ROUTINE PROCESS
Received report from Jerzy Joshi and Assumed patient care. Patient in bed, awake, alert and oriented x3. Denies pain or discomforts at this time. Call light placed within reach. Bed in low position, personal items placed within reach. 7:47 AM - Bedside shift change report given to JOSIANE Arzola (oncoming nurse) by Yuna Gonzalez RN (offgoing nurse). Report given with SBAR, Kardex, Intake/Output, MAR and Recent Results.

## 2018-05-30 NOTE — PROGRESS NOTES
Problem: Mobility Impaired (Adult and Pediatric)  Goal: *Acute Goals and Plan of Care (Insert Text)  Acute goals not established. Patient reports/demonstrates baseline functional mobility, acute skilled PT services not indicated at this time. physical Therapy EVALUATION and Discharge    Patient: Alfonzo Eubanks (76 y.o. female)  Date: 5/30/2018  Primary Diagnosis: HCAP (healthcare-associated pneumonia)  HCAP (healthcare-associated pneumonia)  HIV (human immunodeficiency virus infection) ([facility] [address]  Precautions: Fall    OBJECTIVE/ASSESSMENT AND RECOMMENDATIONS:  Based on the objective data described below, the patient presents with baseline functional mobility following admission for HCAP and COPD exacerbation. Patient presents today semi-reclined in bed with 2L O2 via NC, alert and agreeable to PT evaluation with niece/caretaker in room. Patient transferred to sitting EOB with SBA, stood with RW and SBA for safety. Patient ambulated ~50 ft in room with SBA to assist with O2 line management before returning to sitting EOB. Patient's seated O2 sats 89%, quickly improved to 97% with deep breathing. Patient's niece educated on pulse oximeter use at home. Patient and niece confirm patient is at baseline with regards to functional mobility. PT made recommendation for 24/7 supervision at home and to use [facility] to conserve energy at night for increased safety at home, patient and niece verbalize agreement. Patient would benefit from wheelchair for community distances d/t significantly impaired endurance with household mobility. Education: bed mobility, transfers, ambulation, assistive device management, activity pacing, energy conservation, safety awareness -- patient/niece verbalize understanding  Skilled physical therapy is not indicated at this time.   Discharge Recommendations: Home Health  Further Equipment Recommendations for Discharge: rolling walker and wheelchair     SUBJECTIVE:   Patient stated I feel like there's a hole in the middle of my chest. I want the doctors to do tests to see if I have cancer.     OBJECTIVE DATA SUMMARY:     Past Medical History:   Diagnosis Date    Coronary atherosclerosis of native coronary artery     HIV (human immunodeficiency virus infection) ([facility] [address]     Ill-defined condition     Pneumonia    Nonspecific abnormal electrocardiogram (ECG) (EKG)     Pre-operative cardiovascular examination      Past Surgical History:   Procedure Laterality Date    HX HIP REPLACEMENT      HX HYSTERECTOMY       Barriers to Learning/Limitations: None  Compensate with: N/A  Prior Level of Function/Home Situation: Patient lives alone, niece is caretaker from 9am-4pm every day. Patient ambulates with RW for short household distances, wears 2L O2 at all times. Home Situation  Home Environment: Apartment  # Steps to Enter: 0  One/Two Story Residence: Other (Comment) (3rd floor with elevator)  # of Interior Steps: 0 (uses elevator)  Living Alone: Yes  Support Systems: Family member(s), Home care staff  Patient Expects to be Discharged to[de-identified] Apartment  Current DME Used/Available at Home: Commode, bedside, Walker, rolling, Cane, straight  Critical Behavior:  Neurologic State: Alert  Psychosocial  Patient Behaviors: Calm; Cooperative; Talkative  Family  Behaviors: Calm; Cooperative  Strength:    Strength: Generally decreased, functional (BLE)  Tone & Sensation:   Tone: Normal (BLE)  Sensation: Intact (BLE)   Range Of Motion:  AROM: Within functional limits (BLE)  Functional Mobility:  Bed Mobility:  Supine to Sit: Stand-by assistance  Scooting: Stand-by assistance  Transfers:  Sit to Stand: Stand-by assistance  Stand to Sit: Stand-by assistance  Balance:   Sitting: Intact  Standing: Intact; With support (RW)  Ambulation/Gait Training:  Distance (ft): 50 Feet (ft)  Assistive Device: Walker, rolling  Ambulation - Level of Assistance: Supervision  Base of Support: Widened  Speed/Maira: Pace decreased (<100 feet/min)  Pain:  Pre session: 8/10 chest \"soreness\"  Post session: 8/10 chest \"soreness\"  Activity Tolerance:   Fair/good  Please refer to the flowsheet for vital signs taken during this treatment. After treatment:   [] Patient left in no apparent distress sitting up in chair  [x] Patient left sitting on EOB  [] Patient left in no apparent distress in bed  [] Patient declined to be OOB at this time due to  [x] Call bell left within reach  [x] Nursing notified(Demetria)  [x] Caregiver present  [] Bed alarm activated  [] SCDs in place  COMMUNICATION/EDUCATION:   [x]         Fall prevention education was provided and the patient/caregiver indicated understanding. [x]         Patient/family have participated as able in goal setting and plan of care. []         Patient/family agree to work toward stated goals and plan of care. []         Patient understands intent and goals of therapy, but is neutral about his/her participation. []         Patient is unable to participate in goal setting and plan of care. Thank you for this referral.  Pilar Terrell   Time Calculation: 17 mins    Mobility  Current  CI= 1-19%  D/C  CI= 1-19%. The severity rating is based on the Level of Assistance required for Functional Mobility and ADLs.     Eval Complexity: History: HIGH Complexity :3+ comorbidities / personal factors will impact the outcome/ POC Exam:MEDIUM Complexity : 3 Standardized tests and measures addressing body structure, function, activity limitation and / or participation in recreation  Presentation: MEDIUM Complexity : Evolving with changing characteristics  Clinical Decision Making:Medium Complexity   Overall Complexity:MEDIUM

## 2018-05-31 ENCOUNTER — APPOINTMENT (OUTPATIENT)
Dept: GENERAL RADIOLOGY | Age: 73
DRG: 975 | End: 2018-05-31
Attending: INTERNAL MEDICINE
Payer: MEDICARE

## 2018-05-31 ENCOUNTER — APPOINTMENT (OUTPATIENT)
Dept: CT IMAGING | Age: 73
DRG: 975 | End: 2018-05-31
Attending: INTERNAL MEDICINE
Payer: MEDICARE

## 2018-05-31 LAB
ANION GAP SERPL CALC-SCNC: 6 MMOL/L (ref 3–18)
BASOPHILS # BLD: 0 K/UL (ref 0–0.1)
BASOPHILS NFR BLD: 0 % (ref 0–2)
BUN SERPL-MCNC: 15 MG/DL (ref 7–18)
BUN/CREAT SERPL: 10 (ref 12–20)
CALCIUM SERPL-MCNC: 8.7 MG/DL (ref 8.5–10.1)
CHLORIDE SERPL-SCNC: 104 MMOL/L (ref 100–108)
CO2 SERPL-SCNC: 29 MMOL/L (ref 21–32)
CREAT SERPL-MCNC: 1.46 MG/DL (ref 0.6–1.3)
DIFFERENTIAL METHOD BLD: ABNORMAL
EOSINOPHIL # BLD: 0 K/UL (ref 0–0.4)
EOSINOPHIL NFR BLD: 0 % (ref 0–5)
ERYTHROCYTE [DISTWIDTH] IN BLOOD BY AUTOMATED COUNT: 13.7 % (ref 11.6–14.5)
GLUCOSE BLD STRIP.AUTO-MCNC: 129 MG/DL (ref 70–110)
GLUCOSE BLD STRIP.AUTO-MCNC: 172 MG/DL (ref 70–110)
GLUCOSE BLD STRIP.AUTO-MCNC: 182 MG/DL (ref 70–110)
GLUCOSE SERPL-MCNC: 146 MG/DL (ref 74–99)
HBV E AB SERPL QL IA: POSITIVE
HCT VFR BLD AUTO: 31.7 % (ref 35–45)
HGB BLD-MCNC: 10.4 G/DL (ref 12–16)
LYMPHOCYTES # BLD: 0.9 K/UL (ref 0.9–3.6)
LYMPHOCYTES NFR BLD: 19 % (ref 21–52)
MCH RBC QN AUTO: 33.4 PG (ref 24–34)
MCHC RBC AUTO-ENTMCNC: 32.8 G/DL (ref 31–37)
MCV RBC AUTO: 101.9 FL (ref 74–97)
MONOCYTES # BLD: 0.3 K/UL (ref 0.05–1.2)
MONOCYTES NFR BLD: 6 % (ref 3–10)
NEUTS SEG # BLD: 3.5 K/UL (ref 1.8–8)
NEUTS SEG NFR BLD: 75 % (ref 40–73)
PLATELET # BLD AUTO: 111 K/UL (ref 135–420)
PMV BLD AUTO: 9.9 FL (ref 9.2–11.8)
POTASSIUM SERPL-SCNC: 3.6 MMOL/L (ref 3.5–5.5)
RBC # BLD AUTO: 3.11 M/UL (ref 4.2–5.3)
SODIUM SERPL-SCNC: 139 MMOL/L (ref 136–145)
T3FREE SERPL-MCNC: 2.2 PG/ML (ref 2.18–3.98)
WBC # BLD AUTO: 4.7 K/UL (ref 4.6–13.2)

## 2018-05-31 PROCEDURE — 74011250637 HC RX REV CODE- 250/637: Performed by: INTERNAL MEDICINE

## 2018-05-31 PROCEDURE — 74011000255 HC RX REV CODE- 255: Performed by: INTERNAL MEDICINE

## 2018-05-31 PROCEDURE — 97165 OT EVAL LOW COMPLEX 30 MIN: CPT

## 2018-05-31 PROCEDURE — 3331090001 HH PPS REVENUE CREDIT

## 2018-05-31 PROCEDURE — 74011250636 HC RX REV CODE- 250/636: Performed by: EMERGENCY MEDICINE

## 2018-05-31 PROCEDURE — 84481 FREE ASSAY (FT-3): CPT | Performed by: INTERNAL MEDICINE

## 2018-05-31 PROCEDURE — 77030038269 HC DRN EXT URIN PURWCK BARD -A

## 2018-05-31 PROCEDURE — 80048 BASIC METABOLIC PNL TOTAL CA: CPT | Performed by: INTERNAL MEDICINE

## 2018-05-31 PROCEDURE — 71250 CT THORAX DX C-: CPT

## 2018-05-31 PROCEDURE — 74011000250 HC RX REV CODE- 250: Performed by: INTERNAL MEDICINE

## 2018-05-31 PROCEDURE — 36415 COLL VENOUS BLD VENIPUNCTURE: CPT | Performed by: INTERNAL MEDICINE

## 2018-05-31 PROCEDURE — 82962 GLUCOSE BLOOD TEST: CPT

## 2018-05-31 PROCEDURE — 77030011256 HC DRSG MEPILEX <16IN NO BORD MOLN -A

## 2018-05-31 PROCEDURE — 3331090002 HH PPS REVENUE DEBIT

## 2018-05-31 PROCEDURE — 65660000000 HC RM CCU STEPDOWN

## 2018-05-31 PROCEDURE — 94640 AIRWAY INHALATION TREATMENT: CPT

## 2018-05-31 PROCEDURE — 85025 COMPLETE CBC W/AUTO DIFF WBC: CPT | Performed by: INTERNAL MEDICINE

## 2018-05-31 PROCEDURE — 92611 MOTION FLUOROSCOPY/SWALLOW: CPT

## 2018-05-31 PROCEDURE — 92610 EVALUATE SWALLOWING FUNCTION: CPT

## 2018-05-31 PROCEDURE — 74011000258 HC RX REV CODE- 258: Performed by: EMERGENCY MEDICINE

## 2018-05-31 PROCEDURE — 92526 ORAL FUNCTION THERAPY: CPT

## 2018-05-31 PROCEDURE — 74230 X-RAY XM SWLNG FUNCJ C+: CPT

## 2018-05-31 RX ORDER — ENOXAPARIN SODIUM 100 MG/ML
40 INJECTION SUBCUTANEOUS EVERY 24 HOURS
Status: DISCONTINUED | OUTPATIENT
Start: 2018-05-31 | End: 2018-06-01 | Stop reason: HOSPADM

## 2018-05-31 RX ORDER — AMOXICILLIN AND CLAVULANATE POTASSIUM 562.5; 437.5; 62.5 MG/1; MG/1; MG/1
1 TABLET, FILM COATED, EXTENDED RELEASE ORAL 2 TIMES DAILY WITH MEALS
Status: DISCONTINUED | OUTPATIENT
Start: 2018-05-31 | End: 2018-05-31

## 2018-05-31 RX ORDER — AMOXICILLIN AND CLAVULANATE POTASSIUM 875; 125 MG/1; MG/1
1 TABLET, FILM COATED ORAL 2 TIMES DAILY WITH MEALS
Status: DISCONTINUED | OUTPATIENT
Start: 2018-05-31 | End: 2018-06-01 | Stop reason: HOSPADM

## 2018-05-31 RX ADMIN — BUDESONIDE AND FORMOTEROL FUMARATE DIHYDRATE 2 PUFF: 160; 4.5 AEROSOL RESPIRATORY (INHALATION) at 10:20

## 2018-05-31 RX ADMIN — FAMOTIDINE 20 MG: 20 TABLET ORAL at 09:57

## 2018-05-31 RX ADMIN — BARIUM SULFATE 700 MG: 700 TABLET ORAL at 14:00

## 2018-05-31 RX ADMIN — SERTRALINE HYDROCHLORIDE 25 MG: 50 TABLET ORAL at 09:58

## 2018-05-31 RX ADMIN — ABACAVIR SULFATE 600 MG: 300 TABLET, FILM COATED ORAL at 09:58

## 2018-05-31 RX ADMIN — TIOTROPIUM BROMIDE 18 MCG: 18 CAPSULE ORAL; RESPIRATORY (INHALATION) at 10:22

## 2018-05-31 RX ADMIN — BARIUM SULFATE 30 ML: 400 PASTE ORAL at 14:00

## 2018-05-31 RX ADMIN — AMOXICILLIN AND CLAVULANATE POTASSIUM 1 TABLET: 875; 125 TABLET, FILM COATED ORAL at 17:28

## 2018-05-31 RX ADMIN — TRAMADOL HYDROCHLORIDE 50 MG: 50 TABLET, FILM COATED ORAL at 22:48

## 2018-05-31 RX ADMIN — BARIUM SULFATE 30 ML: 400 SUSPENSION ORAL at 14:00

## 2018-05-31 RX ADMIN — BUDESONIDE AND FORMOTEROL FUMARATE DIHYDRATE 2 PUFF: 160; 4.5 AEROSOL RESPIRATORY (INHALATION) at 19:49

## 2018-05-31 RX ADMIN — PIPERACILLIN SODIUM,TAZOBACTAM SODIUM 3.38 G: 3; .375 INJECTION, POWDER, FOR SOLUTION INTRAVENOUS at 05:11

## 2018-05-31 RX ADMIN — ASPIRIN 81 MG CHEWABLE TABLET 81 MG: 81 TABLET CHEWABLE at 09:57

## 2018-05-31 RX ADMIN — DOLUTEGRAVIR SODIUM 50 MG: 50 TABLET, FILM COATED ORAL at 09:58

## 2018-05-31 RX ADMIN — BARIUM SULFATE 75 G: 960 POWDER, FOR SUSPENSION ORAL at 14:00

## 2018-05-31 RX ADMIN — DORNASE ALFA 2.5 MG: 1 SOLUTION RESPIRATORY (INHALATION) at 16:00

## 2018-05-31 RX ADMIN — LAMIVUDINE 150 MG: 150 TABLET, FILM COATED ORAL at 09:58

## 2018-05-31 RX ADMIN — DORNASE ALFA 2.5 MG: 1 SOLUTION RESPIRATORY (INHALATION) at 20:00

## 2018-05-31 RX ADMIN — DILTIAZEM HYDROCHLORIDE 180 MG: 180 CAPSULE, COATED, EXTENDED RELEASE ORAL at 09:57

## 2018-05-31 RX ADMIN — FUROSEMIDE 40 MG: 40 TABLET ORAL at 09:57

## 2018-05-31 NOTE — PROGRESS NOTES
Problem: Falls - Risk of  Goal: *Absence of Falls  Document Abdias Fall Risk and appropriate interventions in the flowsheet. Outcome: Progressing Towards Goal  Fall Risk Interventions:  Mobility Interventions: Assess mobility with egress test, Bed/chair exit alarm, Patient to call before getting OOB, Strengthening exercises (ROM-active/passive)         Medication Interventions: Patient to call before getting OOB, Teach patient to arise slowly, Evaluate medications/consider consulting pharmacy    Elimination Interventions: Call light in reach, Patient to call for help with toileting needs, Toilet paper/wipes in reach, Toileting schedule/hourly rounds             Problem: Pressure Injury - Risk of  Goal: *Prevention of pressure injury  Document Collins Scale and appropriate interventions in the flowsheet. Outcome: Progressing Towards Goal  Pressure Injury Interventions:  Sensory Interventions: Assess changes in LOC, Assess need for specialty bed, Avoid rigorous massage over bony prominences, Check visual cues for pain, Discuss PT/OT consult with provider, Keep linens dry and wrinkle-free, Maintain/enhance activity level, Monitor skin under medical devices, Pressure redistribution bed/mattress (bed type), Turn and reposition approx.  every two hours (pillows and wedges if needed)    Moisture Interventions: Absorbent underpads, Apply protective barrier, creams and emollients, Check for incontinence Q2 hours and as needed, Limit adult briefs, Maintain skin hydration (lotion/cream), Minimize layers, Moisture barrier    Activity Interventions: Increase time out of bed, Pressure redistribution bed/mattress(bed type)    Mobility Interventions: HOB 30 degrees or less, Pressure redistribution bed/mattress (bed type)    Nutrition Interventions: Offer support with meals,snacks and hydration, Document food/fluid/supplement intake    Friction and Shear Interventions: Foam dressings/transparent film/skin sealants

## 2018-05-31 NOTE — PROGRESS NOTES
[facility]    Patient: America Hernandez Age: 71 y.o. : 1945 MR#: [id number] SSN: [id number]  Date: 2018     Subjective:     Pt c/o intermittent chest pain, otw no complaints. Assessment/Plan:   -HCAP with severe sepsis POA-cxr showed basilar right lower lobe pneumonia. . Also had leukocytosis, unclear if due to steroids in recent past which has resolved. Pt was dc'd on prednisone taper from recent admission. S/p eval by pulmonary team, Recommendations noted, abx stopped,  Appreciate assistance.  -Chronic respiratory failure/COPD-per dc summary from previous admission pt was to be dc'd on home 02 nasal cannula  -ANGELA cause unclear-resolving.  -Transaminitis cause unclear, hep B results indicate immunity through previous infection, liver us no evidence of biliary, liver parenchymal abnormality, ? Iatrogenic (anti retrovirals). -Chest pain as recurrent complaint: -Will need to f/u closely w/ cardiologist in outpt setting for comprehensive eval of cp. This admission no concerning findings for ACS, cardiac echo from earlier this month did not show wma.  -Tachycardia, TSH low, FT4 nl, sinus tachy cause unclear. -HIV last CD4 213 18 currently on antiretrovirals, per pharmacy pt's meds have been adjusted given renal insuff  -likely CKD  -HTN  -History of diastolic HF-appears compensated at this time  -history of atypical cp, per dc summary was supposed to f/u w/ cardiology.  -Thrombocytopenia-acuity suggests iatrogenic cause. Will hold heparin products, use scd for dvt prophyx, cont to monitor. Abx may be culprit if cont'd drop. Continues to improve. Will cont to monitor.  -Disposition: per PT home health. Will dc once able to ambulate w/o desaturations on 2L nasal cannula and after CT imaging today, and once she has received aggressive bronchial hygiene therapy.          PLAN:  -Augmentin started  -PT/OT  -check free T3    Additional Notes: Case discussed with:  [x]Patient  []Family  []Nursing  []Case Management  DVT Prophylaxis:  []Lovenox  [x]Hep SQ  []SCDs  []Coumadin   []On Heparin gtt    Objective:   VS:   Visit Vitals    BP 94/61 (BP 1 Location: Right arm, BP Patient Position: At rest)    Pulse (!) 117    Temp 98.7 °F (37.1 °C)    Resp 20    Ht 5' 2\" (1.575 m)    Wt 70 kg (154 lb 6.4 oz)    SpO2 99%    Breastfeeding No    BMI 28.24 kg/m2      Tmax/24hrs: Temp (24hrs), Av.6 °F (37 °C), Min:97 °F (36.1 °C), Max:99.4 °F (37.4 °C)      Intake/Output Summary (Last 24 hours) at 18 1440  Last data filed at 18 0511   Gross per 24 hour   Intake              440 ml   Output              450 ml   Net              -10 ml       General:  Alert, awake, in nad  Cardiovascular:  Rrr, no murmurs  Pulmonary: ctab   GI: soft, nt, nd   Extremities:  No edema  Additional:      Labs:    Recent Results (from the past 24 hour(s))   TROPONIN I    Collection Time: 18  4:03 PM   Result Value Ref Range    Troponin-I, Qt. <0.02 0.0 - 0.045 NG/ML   CBC WITH AUTOMATED DIFF    Collection Time: 18  2:19 AM   Result Value Ref Range    WBC 4.7 4.6 - 13.2 K/uL    RBC 3.11 (L) 4.20 - 5.30 M/uL    HGB 10.4 (L) 12.0 - 16.0 g/dL    HCT 31.7 (L) 35.0 - 45.0 %    .9 (H) 74.0 - 97.0 FL    MCH 33.4 24.0 - 34.0 PG    MCHC 32.8 31.0 - 37.0 g/dL    RDW 13.7 11.6 - 14.5 %    PLATELET 734 (L) 093 - 420 K/uL    MPV 9.9 9.2 - 11.8 FL    NEUTROPHILS 75 (H) 40 - 73 %    LYMPHOCYTES 19 (L) 21 - 52 %    MONOCYTES 6 3 - 10 %    EOSINOPHILS 0 0 - 5 %    BASOPHILS 0 0 - 2 %    ABS. NEUTROPHILS 3.5 1.8 - 8.0 K/UL    ABS. LYMPHOCYTES 0.9 0.9 - 3.6 K/UL    ABS. MONOCYTES 0.3 0.05 - 1.2 K/UL    ABS. EOSINOPHILS 0.0 0.0 - 0.4 K/UL    ABS.  BASOPHILS 0.0 0.0 - 0.1 K/UL    DF AUTOMATED     METABOLIC PANEL, BASIC    Collection Time: 18  2:19 AM   Result Value Ref Range    Sodium 139 136 - 145 mmol/L    Potassium 3.6 3.5 - 5.5 mmol/L    Chloride 104 100 - 108 mmol/L    CO2 29 21 - 32 mmol/L    Anion gap 6 3.0 - 18 mmol/L    Glucose 146 (H) 74 - 99 mg/dL    BUN 15 7.0 - 18 MG/DL    Creatinine 1.46 (H) 0.6 - 1.3 MG/DL    BUN/Creatinine ratio 10 (L) 12 - 20      GFR est AA 43 (L) >60 ml/min/1.73m2    GFR est non-AA 35 (L) >60 ml/min/1.73m2    Calcium 8.7 8.5 - 10.1 MG/DL   GLUCOSE, POC    Collection Time: 05/31/18  8:56 AM   Result Value Ref Range    Glucose (POC) 182 (H) 70 - 110 mg/dL   GLUCOSE, POC    Collection Time: 05/31/18 11:07 AM   Result Value Ref Range    Glucose (POC) 129 (H) 70 - 110 mg/dL       Signed By: Jaguar Calderon MD     May 31, 2018 3:39 PM

## 2018-05-31 NOTE — PROGRESS NOTES
Problem: Dysphagia (Adult)  Goal: *Acute Goals and Plan of Care (Insert Text)  Patient will:  1. Tolerate PO trials with 0 s/s overt distress in 4/5 trials  2. Utilize compensatory swallow strategies/maneuvers (decrease bite/sip, size/rate, alt. liq/sol) with min cues in 4/5 trials  3. Perform oral-motor/laryngeal exercises to increase oropharyngeal swallow function with min cues  4. Complete an objective swallow study (i.e., MBSS) to assess swallow integrity, r/o aspiration, and determine of safest LRD, min A-met 5/31/18    Recommend:   Regular diet with thin liquids  No straw   Meds in puree   Aspiration precautions  HOB >45 degrees during all intake and for at least 30 min after po   Small bites/sips, Slow rate of intake   Speech therapy upon discharge to address dysphagia      Outcome: Progressing Towards Goal  Speech Pathology Modified barium swallow Study/dysphagia treatment    Patient: Ja Hess (76 y.o. female)  Date: 5/31/2018  Primary Diagnosis: HCAP (healthcare-associated pneumonia)  HCAP (healthcare-associated pneumonia)  [facility]  Precautions: Aspiration,  Fall    ASSESSMENT :  Based on the objective data described below, the patient presents with mild pharyngeal dysphagia c/b inconsistent trace penetration during the swallow with thin and nectar thick liquids + straw and with thin liquids when used as a wash to clear 13 mm Ba pill from oral cavity. Pt able to tolerate thin and nectar thick liquids by cup, puree and regular solids across multiple trials. Deficits include decreased laryngeal closure, decreased laryngeal sensation and mildly decreased pharyngeal motility. Recommend regular diet with thin liquids, no straws with strict use of the above mentioned compensatory strategies/aspiration precautions. Further recommend SLP services during this admission and upon discharge to address pharyngeal dysphagia.   Treatment performed following examination (see below). Patient will benefit from skilled intervention to address the above impairments. Patients rehabilitation potential is considered to be Good  Factors which may influence rehabilitation potential include:   []              None noted  []              Mental ability/status  [x]              Medical condition  []              Home/family situation and support systems  []              Safety awareness  []              Pain tolerance/management  []              Other:      PLAN :  Recommendations and Planned Interventions:  As above   Frequency/Duration: Patient will be followed by speech-language pathology 1-2 times per day/4-7 days per week to address goals. Discharge Recommendations: To Be Determined     SUBJECTIVE:   Patient stated That's me?     OBJECTIVE:     Past Medical History:   Diagnosis Date    Coronary atherosclerosis of native coronary artery     [facility]human immunodeficiency virus [facility]) ([facility] [address]     Ill-defined condition     Pneumonia    Nonspecific abnormal electrocardiogram (ECG) (EKG)     Pre-operative cardiovascular examination      Past Surgical History:   Procedure Laterality Date    HX HIP REPLACEMENT      HX HYSTERECTOMY       Prior Level of Function/Home Situation:  Home Situation  Home Environment: Apartment  # Steps to Enter: 0  One/Two Story Residence: Other (Comment) (3rd floor with elevator)  # of Interior Steps: 0 (uses elevator)  Living Alone: Yes  Support Systems: Family member(s), Home care staff  Patient Expects to be Discharged to[de-identified] Apartment  Current DME Used/Available at Home: Commode, bedside, Walker, rolling, Cane, straight  Tub or Shower Type: Tub/Shower combination (with seat)  Diet prior to admission: Regular   Current Diet:  Regular; recommend regular diet with thin liquids with no straws    Radiologist:    Film Views: Fluoro;Lateral  Patient Position: 90 in chair    Trial 1: Trial 2:   Consistency Presented: Thin liquid; Nectar thick liquid (Thin liquid used as a wash to clear 13 mm Ba pill) Consistency Presented: Thin liquid; Nectar thick liquid;Puree; Solid   How Presented: Self-fed/presented;Straw How Presented: Cup/sip   Bolus Acceptance: No impairment Bolus Acceptance: No impairment   Bolus Formation/Control: No impairment:   Bolus Formation/Control: No impairment:     Propulsion: No impairment Propulsion: No impairment   Oral Residue: None Oral Residue: None   Initiation of Swallow: No impairment     Timing: No impairment Timing: No impairment   Penetration: Trace;During swallow; To laryngeal vestibule (Trace to moderate ) Penetration: None   Aspiration/Timing: No evidence of aspiration Aspiration/Timing: No evidence of aspiration   Pharyngeal Clearance: Vallecular residue; Less than 10% Pharyngeal Clearance: No residue   Attempted Modifications: Cup/sip;Small sips and bites     Effective Modifications: Small sips and bites;Cup/sip     Cues for Modifications: Minimal       Decreased Tongue Base Retraction?: No  Laryngeal Elevation: Incomplete laryngeal closure  Aspiration/Penetration Score: 2 (Penetration/No residue-Contrast enters the airway penetrates, remains above the folds/cords, and is cleared)  Pharyngeal Symmetry: Not assessed  Pharyngeal-Esophageal Segment: No impairment  Pharyngeal Dysfunction: Decreased strength;Decreased elevation/closure  Oral Phase Severity: No impairment  Pharyngeal Phase Severity: Mild    GCODESwallowing:  Swallow Current Status CI= 1-19%   Swallow Goal Status CH= 0%     Treatment Performed Following Examination:  Treatment provided post diagnostic testing including oropharyngeal anatomy/physiology, MBS results, risk of aspiration, importance of no straws, small bites/sips and recommendation for SLP upon discharge. Video feedback utilized. Pt able to verbalize understanding. Will continue to follow.      The severity rating is based on the following outcomes:    8-point Penetration-Aspiration Scale: Score 2  Professional Judgment    PAIN:  Pt reports 0/10 pain or discomfort prior to MBS. Pt reports 0/10 pain or discomfort post MBS. COMMUNICATION/EDUCATION:   [x]  Patient educated regarding MBS results and diet recommendations. [x]  Patient/family have participated as able in goal setting and plan of care. []  Patient/family agree to work toward stated goals and plan of care. []  Patient understands intent and goals of therapy, but is neutral about his/her participation. []  Patient is unable to participate in goal setting and plan of care.     Thank you for this referral.  Kirsten Wise M.S., [id number] [facility]  Speech-Language Pathologist

## 2018-05-31 NOTE — CONSULTS
[facility]  Pulmonary, Critical Care, and Sleep Medicine    Name: Sherryl Cogan MRN: [id number]   : 1945 Hospital: [facility]   Date: 2018        Pulmonary Initial Patient Consult    Requesting MD:                                                  Reason for CC Consult:  IMPRESSION:   · Hypoxia, chronic. Suspect further exacerbated with atelectasis and possible acute aspirations? Mucus plugging insetting of severe emphysema. On my reviewe of ct from 2018 some evidence of bronchiectasis  · Bronchiectasis  · RLL consolidation, possible aspiration vs HCAP vs atelectasis. Pt is afebrile and normal wbc. Currently on zosyn and vancomycin. · Severe emphysema  · HIV  · Hx of CAD  · afib  · Thrombocytopenia, improving slightly        RECOMMENDATIONS:   · Dc zosyn and vanc  · augmentin  · Agree with MBS  · Ct of the chest without contrast dani helpful for comparison  · Aggressive bronchial hygiene. Including  vest and pep therapy  · Continue bd  · hiv meds per ID  · ANGELA improving creatine  · Increase the cardizem   · lovenox 40mg sc daily now since plt over 100  · GI proph. Subjective/History: This patient has been seen and evaluated at the request of  for HCAP without improvement. Patient is a 71 y.o. female hx of HIV since  with last cd4 213  recent discharge for pneumonia , readmitted on  with persistent cough and sob. No associated wheezing fever or chills. No n/v/d. No chest pain  No headache. No visual disturbances.          Past Medical History:   Diagnosis Date    Coronary atherosclerosis of native coronary artery     HIV (human immunodeficiency virus infection) ([facility]     Ill-defined condition     Pneumonia    Nonspecific abnormal electrocardiogram (ECG) (EKG)     Pre-operative cardiovascular examination       Past Surgical History:   Procedure Laterality Date    HX HIP REPLACEMENT      HX HYSTERECTOMY        Prior to Admission medications    Medication Sig Start Date End Date Taking? Authorizing Provider   abacavir-dolutegravir-lamiVUDine (TRIUMEQ) tablet Take 1 Tab by mouth daily. Kanwal Pedroza MD   albuterol (PROVENTIL VENTOLIN) 2.5 mg /3 mL (0.083 %) nebulizer solution 3 mL by Nebulization route every four (4) hours as needed for Wheezing. 5/18/18   Maryanne Truong MD   aspirin 81 mg chewable tablet Take 1 Tab by mouth daily. 5/18/18   Maryanne Truong MD   budesonide-formoterol (SYMBICORT) 160-4.5 mcg/actuation HFAA Take 2 Puffs by inhalation two (2) times a day. 5/18/18   Maryanne Truong MD   famotidine (PEPCID) 20 mg tablet Take 1 Tab by mouth daily. 5/18/18   Maryanne Truong MD   furosemide (LASIX) 40 mg tablet Take 1 Tab by mouth daily. 5/18/18   Maryanne Truong MD   predniSONE (DELTASONE) 10 mg tablet 3 tab po daily for 3 days then  2 tab po daily for 3 days then   1 tab po daily for 3 days 5/18/18   Maryanne Truong MD   tiotropium [facility]) 18 mcg inhalation capsule Take 1 Cap by inhalation daily. 5/18/18   Maryanne Truong MD   hydrALAZINE (APRESOLINE) 10 mg tablet Take 1 Tab by mouth three (3) times daily. Hold for SBP less than 110 5/18/18   Maryanne Truong MD   traMADol (ULTRAM) 50 mg tablet Take 1 Tab by mouth every six (6) hours as needed. Max Daily Amount: 200 mg. 5/18/18   Maryanne Truong MD   dilTIAZem CD (CARDIZEM CD) 180 mg ER capsule Take 1 Cap by mouth daily. 5/18/18   Maryanne Truong MD   fluticasone (FLONASE) 50 mcg/actuation nasal spray 2 Sprays by Both Nostrils route daily. Zack Smith MD   darunavir-cobicistat (PREZCOBIX) 800-150 mg-mg per tablet Take 1 Tab by mouth daily. Zack Smith MD   abacavir-dolutegravir-lamiVUDine (TRIUMEQ) tablet Take  by mouth daily. Zack Smith MD   sertraline (ZOLOFT) 25 mg tablet Take  by mouth daily.     Zack Smith MD     Current Facility-Administered Medications   Medication Dose Route Frequency    vancomycin (VANCOCIN) 1,500 mg in 0.9% sodium chloride 500 mL IVPB  1,500 mg IntraVENous Q24H    abacavir (ZIAGEN) tablet 600 mg  600 mg Oral DAILY    And    dolutegravir (TIVICAY) tablet 50 mg  50 mg Oral DAILY    And    lamiVUDine (EPIVIR) tablet 150 mg  150 mg Oral DAILY    piperacillin-tazobactam (ZOSYN) 3.375 g in 0.9% sodium chloride (MBP/ADV) 100 mL MBP  3.375 g IntraVENous Q6H    aspirin chewable tablet 81 mg  81 mg Oral DAILY    dilTIAZem CD (CARDIZEM CD) capsule 180 mg  180 mg Oral DAILY    famotidine (PEPCID) tablet 20 mg  20 mg Oral DAILY    sertraline (ZOLOFT) tablet 25 mg  25 mg Oral DAILY    tiotropium (SPIRIVA) inhalation capsule 18 mcg  1 Cap Inhalation DAILY    furosemide (LASIX) tablet 40 mg  40 mg Oral DAILY    darunavir-cobicistat (PREZCOBIX) 800-150 mg-mg per tablet 1 Tab (Patient Supplied)  1 Tab Oral DAILY    budesonide-formoterol (SYMBICORT) 160-4.5 mcg/actuation HFA inhaler 2 Puff  2 Puff Inhalation BID RT     No Known Allergies   Social History   Substance Use Topics    Smoking status: Current Every Day Smoker     Packs/day: 0.50    Smokeless tobacco: Not on file    Alcohol use No      Family History   Problem Relation Age of Onset    Heart Attack Mother 70    Stroke Father 54    Heart Attack Sister 59        Review of Systems:  A comprehensive review of systems was negative except for that written in the HPI. Objective:   Vital Signs:    Visit Vitals    /75 (BP 1 Location: Left arm, BP Patient Position: At rest)    Pulse (!) 105    Temp 98.7 °F (37.1 °C)    Resp 20    Ht 5' 2\" (1.575 m)    Wt 70 kg (154 lb 6.4 oz)    SpO2 99%    Breastfeeding No    BMI 28.24 kg/m2       O2 Device: Nasal cannula   O2 Flow Rate (L/min): 2 l/min   Temp (24hrs), Av.7 °F (37.1 °C), Min:97 °F (36.1 °C), Max:99.4 °F (37.4 °C)       Intake/Output:   Last shift:         Last 3 shifts:  1901 -  0700  In: 540 [P.O.:240;  I.V.:300]  Out: 450 [Urine:450]    Intake/Output Summary (Last 24 hours) at 05/31/18 1040  Last data filed at 05/31/18 0511   Gross per 24 hour   Intake              440 ml   Output              450 ml   Net              -10 ml     Hemodynamics:   . @MAP     . @CVP       Ventilator Settings:  Mode Rate Tidal Volume Pressure FiO2 PEEP            28 %       Peak airway pressure:      Minute ventilation:        ARDS network Guidelines: Lung protective strategy and Pl pressure goals____________    Physical Exam:    General:  Alert, cooperative, no distress, appears stated age. No conversational dyspnea. Head:  Normocephalic, without obvious abnormality, atraumatic. Eyes:  Conjunctivae/corneas clear. PERRL, EOMs intact. Nose: Nares normal. Septum midline. Mucosa normal. No drainage or sinus tenderness. Throat: Lips, mucosa, and tongue normal. Teeth and gums normal.   Neck: Supple, symmetrical, trachea midline, no adenopathy, thyroid: no enlargment/tenderness/nodules, no carotid bruit and no JVD. Back:   Symmetric, no curvature. ROM normal.   Lungs:   Clear to auscultation bilaterally. Chest wall:  No tenderness or deformity. Heart:  Regular rate and rhythm, S1, S2 normal, no murmur, click, rub or gallop. Abdomen:   Soft, non-tender. Bowel sounds normal. No masses,  No organomegaly. Extremities: Extremities normal, atraumatic, no cyanosis or edema. Pulses: 2+ and symmetric all extremities.    Skin: Skin color, texture, turgor normal. No rashes or lesions   Lymph nodes: Cervical, supraclavicular, and axillary nodes normal.   Neurologic: Grossly nonfocal       Data:     Recent Results (from the past 24 hour(s))   TROPONIN I    Collection Time: 05/30/18  4:03 PM   Result Value Ref Range    Troponin-I, Qt. <0.02 0.0 - 0.045 NG/ML   CBC WITH AUTOMATED DIFF    Collection Time: 05/31/18  2:19 AM   Result Value Ref Range    WBC 4.7 4.6 - 13.2 K/uL    RBC 3.11 (L) 4.20 - 5.30 M/uL    HGB 10.4 (L) 12.0 - 16.0 g/dL    HCT 31.7 (L) 35.0 - 45.0 %    .9 (H) 74.0 - 97.0 FL    MCH 33.4 24.0 - 34.0 PG    MCHC 32.8 31.0 - 37.0 g/dL    RDW 13.7 11.6 - 14.5 %    PLATELET 036 (L) 092 - 420 K/uL    MPV 9.9 9.2 - 11.8 FL    NEUTROPHILS 75 (H) 40 - 73 %    LYMPHOCYTES 19 (L) 21 - 52 %    MONOCYTES 6 3 - 10 %    EOSINOPHILS 0 0 - 5 %    BASOPHILS 0 0 - 2 %    ABS. NEUTROPHILS 3.5 1.8 - 8.0 K/UL    ABS. LYMPHOCYTES 0.9 0.9 - 3.6 K/UL    ABS. MONOCYTES 0.3 0.05 - 1.2 K/UL    ABS. EOSINOPHILS 0.0 0.0 - 0.4 K/UL    ABS. BASOPHILS 0.0 0.0 - 0.1 K/UL    DF AUTOMATED     METABOLIC PANEL, BASIC    Collection Time: 05/31/18  2:19 AM   Result Value Ref Range    Sodium 139 136 - 145 mmol/L    Potassium 3.6 3.5 - 5.5 mmol/L    Chloride 104 100 - 108 mmol/L    CO2 29 21 - 32 mmol/L    Anion gap 6 3.0 - 18 mmol/L    Glucose 146 (H) 74 - 99 mg/dL    BUN 15 7.0 - 18 MG/DL    Creatinine 1.46 (H) 0.6 - 1.3 MG/DL    BUN/Creatinine ratio 10 (L) 12 - 20      GFR est AA 43 (L) >60 ml/min/1.73m2    GFR est non-AA 35 (L) >60 ml/min/1.73m2    Calcium 8.7 8.5 - 10.1 MG/DL   GLUCOSE, POC    Collection Time: 05/31/18  8:56 AM   Result Value Ref Range    Glucose (POC) 182 (H) 70 - 110 mg/dL             Telemetry:AFIB    Imaging:  I have personally reviewed the patients radiographs and have reviewed the reports:   CT CHEST WITHOUT IV CONTRAST        COMPARISON: No priors.     INDICATIONS: COPD, hypoxia, HIV, congestive heart failure.     TECHNIQUE: Volumetric data acquisition was performed through the chest with a  multislice scanner. Reconstructions were created in the axial, coronal, and  sagittal planes.     FINDINGS:      Thyroid/Base Of Neck:  Unremarkable  .     Lungs: The lungs are emphysematous. There are segmental and subsegmental areas of  atelectasis in the lower lobes associated with bronchial thickening. Some of the  bronchi show luminal filling. .   No groundglass infiltrate is evident. .     Pleural Spaces: There is no pneumothorax or pleural fluid evident.   No pleural plaques are seen     Lymph Nodes:   Axillae: Normal in size and number. Mediastinum / Alida: Normal in size and number.     Mediastinum, Great Vessels And Heart: There is no mediastinal mass. The heart and great vessels appear unremarkable.     Abdomen Structures Included:  Epigastric abdominal structures show a little persists but otherwise are  unremarkable.        Osseous Structures: Senescent and demineralized findings. Mild compression of an  upper partial vertebral body. .     IMPRESSION  IMPRESSION:      Chronic obstructive pulmonary disease with features primarily of emphysema. .     Bronchial thickening with bronchial luminal filling in the basal zones. Bronchitis with mucous plugging versus aspiration.     Findings suggesting acute congestive heart failure or pneumocystis pneumonia are  not evident  .     All CT scans at this facility are performed using dose optimization technique as  appropriate to the performed exam, to include automated exposure control,  adjustment of the mA and/or kV according to patient's size (Including  appropriate matching for site-specific examinations), or use of iterative  reconstruction technique. Status Provider Status         Final result (Exam End: 5/27/2018  2:55 AM) Open        Study Result      EXAM:  XR CHEST PORT     INDICATION:   SOB with chest pain     COMPARISON: 5/12/2018.     FINDINGS:  The cardiac and mediastinal silhouette are within normal limits. Pulmonary  vasculature is within normal limits. Right lower lobe basilar consolidation. No  pleural effusion or pneumothorax. Mild lingular atelectasis. Degenerative  changes in the shoulders.     IMPRESSION  IMPRESSION:     Basilar right lower lobe pneumonia, recommend follow-up after treatment to  insure resolution. Final result (Exam End: 5/12/2018  3:00 AM) Open        Study Result      Chest     Indication: SOB      Comparison: October 19, 2017     Findings: Single view. Instrumentation stable. No pneumothorax.  No pleural  effusion. Bilateral lower lung zone atelectasis again with chronic  pleural-parenchymal changes at the middle and lower lung zones.   Cardiomediastinal silhouette and osseous structures grossly unchanged.     IMPRESSION  Impression: No significant change                  Ashok Ordaz MD  5/31/2018, 10:40 AM

## 2018-05-31 NOTE — PROGRESS NOTES
[address] [place] [address]  ANTIMICROBIAL [employer] TEAM  PRESCRIBER COMMUNICATION FORM      We have briefly reviewed the antimicrobials  currently prescribed for your patient along with  the clinical indications and would like to Make the following recommendations:    DISCONTINUE ANTIBIOTICS  vancomycin      Rationale:    (  )  No evidence of bacterial infection    ( x )  Microbiology report does not support use    ( x )  Narrowing broad-spectrum empiric coverage    (  )  Therapeutic duplication: overlapping antimicrobial spectrum    (  )  Clinical situation dictates change    (  )  Prolonged duration of therapy not needed    (  )  Allergy/toxicity/drug interaction present    (  ) More clinically/cost effective therapy available  ADD ANTIBIOTICS  Rationale:        (  ) Microbiology report supports use    (  ) Expanded coverage needed: gm positive /negative / anaerobic /                               atypical    ( ) More clinicaly/cost effective therapy than current regimen    ( ) Needed for synergy    ( ) Double coverage needed    ( ) Less toxic therapy    ( ) Antifungal therapy needed  ADDITIONAL SUGGESTIONS    ( ) continue current therapy with the following change    ( ) additional laboratory testing    ( ) consider infectious disease consultation    ( ) consider outpatient IV therapy    ( ) other    COMMENTS:        This communication is not to be considered a consultation. You are under no obligation to follow these suggestions. A physician-patient relationship has not been established between the physician Completing this form and your patient. If a thorough analysis of the case is desired, please request an ID consultation.

## 2018-05-31 NOTE — PROGRESS NOTES
Problem: Self Care Deficits Care Plan (Adult)  Goal: *Acute Goals and Plan of Care (Insert Text)  Outcome: Resolved/Met Date Met: 05/31/18  Occupational Therapy EVALUATION/discharge    Patient: Patricia Stone (76 y.o. female)  Date: 5/31/2018  Primary Diagnosis: HCAP (healthcare-associated pneumonia)  HCAP (healthcare-associated pneumonia)  HIV (human immunodeficiency virus infection) ([facility]        Precautions:   Fall    ASSESSMENT AND RECOMMENDATIONS:  Based on the objective data described below, the patient is able to perform basic self care tasks and functional transfers without assistance. Supportive niece is in the room and can assist patient at home prn. She has all needed DME for home safety. Discussed energy conservation techniques during daily activities and patient verbalized understanding. Skilled occupational therapy is not indicated at this time. Discharge Recommendations: None  Further Equipment Recommendations for Discharge: N/A      Barriers to Learning/Limitations: None  Compensate with: visual, verbal, tactile, kinesthetic cues/model     COMPLEXITY     Eval Complexity: History: LOW Complexity : Brief history review ; Examination: LOW Complexity : 1-3 performance deficits relating to physical, cognitive , or psychosocial skils that result in activity limitations and / or participation restrictions ; Decision Making:LOW Complexity : No comorbidities that affect functional and no verbal or physical assistance needed to complete eval tasks  Assessment: Low Complexity        G-CODES:     Self Care  Current  CI= 1-19%   Goal  CI= 1-19%   D/C  CI= 1-19%. The severity rating is based on the Level of Assistance required for Functional Mobility and ADLs. SUBJECTIVE:   Patient stated I'm mostly sleepy.     OBJECTIVE DATA SUMMARY:     Past Medical History:   Diagnosis Date    Coronary atherosclerosis of native coronary artery     HIV (human immunodeficiency virus infection) ([facility]  Ill-defined condition     Pneumonia    Nonspecific abnormal electrocardiogram (ECG) (EKG)     Pre-operative cardiovascular examination      Past Surgical History:   Procedure Laterality Date    HX HIP REPLACEMENT      HX HYSTERECTOMY       Prior Level of Function/Home Situation: Pt was modified independent with basic self care tasks and used a RW for functional mobility PTA. Supportive niece lives nearby to assist prn. Home Situation  Home Environment: Apartment  # Steps to Enter: 0  One/Two Story Residence: Other (Comment) (3rd floor with elevator)  # of Interior Steps: 0 (uses elevator)  Living Alone: Yes  Support Systems: Family member(s), Home care staff  Patient Expects to be Discharged to[de-identified] Apartment  Current DME Used/Available at Home: Commode, bedside, Walker, rolling, Cane, straight  Tub or Shower Type: Tub/Shower combination (with seat)  [x]     Right hand dominant   []     Left hand dominant  Cognitive/Behavioral Status:  Neurologic State: Alert  Orientation Level: Oriented X4  Cognition: Appropriate decision making; Follows commands  Safety/Judgement: Awareness of environment; Fall prevention    Skin: Intact on UEs    Edema: None noted in UEs    Vision/Perceptual:    Acuity: Within Defined Limits    Corrective Lenses: Glasses    Coordination:  Fine Motor Skills-Upper: Left Intact; Right Intact    Gross Motor Skills-Upper: Left Intact; Right Intact    Balance:  Sitting: Intact  Standing: Intact    Strength:  Strength:  Within functional limits (UEs)    Tone & Sensation:  Tone: Normal (UEs)  Sensation: Intact (UEs)    Range of Motion:  AROM: Within functional limits (UEs)    Functional Mobility and Transfers for ADLs:  Bed Mobility:  Supine to Sit: Supervision  Sit to Supine: Supervision  Transfers:  Sit to Stand: Supervision   Toilet Transfer : Supervision    ADL Assessment:  Feeding: Independent    Oral Facial Hygiene/Grooming: Independent    Bathing: Supervision    Upper Body Dressing: Independent    Lower Body Dressing: Supervision    Toileting: Modified independent    Pain:  Pt reports 0/10 pain or discomfort prior to treatment.    Pt reports 0/10 pain or discomfort post treatment. Activity Tolerance:   Good    Please refer to the flowsheet for vital signs taken during this treatment. After treatment:   []  Patient left in no apparent distress sitting up in chair  [x]  Patient left in no apparent distress in bed  [x]  Call bell left within reach  []  Nursing notified  [x]  Caregiver (niece) present  []  Bed alarm activated    COMMUNICATION/EDUCATION:   Communication/Collaboration:  [x]      Home safety education was provided and the patient/caregiver indicated understanding. [x]      Patient/family have participated as able and agree with findings and recommendations. []      Patient is unable to participate in plan of care at this time.     Eder Clark MS OTR/L  Time Calculation: 10 mins

## 2018-05-31 NOTE — PROGRESS NOTES
Received report on pt.from off going RN. Resting quietly in bed on rounds. Denies c/o pain or SOB at this time. No acute distress noted. Call bell at side. Bed alarm on. Will cont to monitor. 1700 pt ambulated in hallway with 02 on at 2 LPM. 02 sats with ambulation and 02 at 2 LPM was 91 to 92%. HR increased to 144 while ambulating and after resting about 5 mins back doen to 90 -100. Pt became very exerted with ambulation. 1910 Bedside and Verbal shift change report given to Connie Fleming RN (oncoming nurse) by Marcelle Carver RN (offgoing nurse). Report given with Nino BLAND and MAR.

## 2018-06-01 VITALS
SYSTOLIC BLOOD PRESSURE: 109 MMHG | BODY MASS INDEX: 29.48 KG/M2 | TEMPERATURE: 98.7 F | HEART RATE: 95 BPM | WEIGHT: 160.2 LBS | RESPIRATION RATE: 20 BRPM | DIASTOLIC BLOOD PRESSURE: 75 MMHG | OXYGEN SATURATION: 97 % | HEIGHT: 62 IN

## 2018-06-01 LAB
ANION GAP SERPL CALC-SCNC: 7 MMOL/L (ref 3–18)
BUN SERPL-MCNC: 13 MG/DL (ref 7–18)
BUN/CREAT SERPL: 10 (ref 12–20)
CALCIUM SERPL-MCNC: 8.7 MG/DL (ref 8.5–10.1)
CHLORIDE SERPL-SCNC: 103 MMOL/L (ref 100–108)
CO2 SERPL-SCNC: 29 MMOL/L (ref 21–32)
CREAT SERPL-MCNC: 1.33 MG/DL (ref 0.6–1.3)
GLUCOSE SERPL-MCNC: 111 MG/DL (ref 74–99)
POTASSIUM SERPL-SCNC: 3.8 MMOL/L (ref 3.5–5.5)
SODIUM SERPL-SCNC: 139 MMOL/L (ref 136–145)

## 2018-06-01 PROCEDURE — 36415 COLL VENOUS BLD VENIPUNCTURE: CPT | Performed by: INTERNAL MEDICINE

## 2018-06-01 PROCEDURE — 3331090002 HH PPS REVENUE DEBIT

## 2018-06-01 PROCEDURE — 74011000250 HC RX REV CODE- 250: Performed by: INTERNAL MEDICINE

## 2018-06-01 PROCEDURE — 94640 AIRWAY INHALATION TREATMENT: CPT

## 2018-06-01 PROCEDURE — 80048 BASIC METABOLIC PNL TOTAL CA: CPT | Performed by: INTERNAL MEDICINE

## 2018-06-01 PROCEDURE — 74011250637 HC RX REV CODE- 250/637: Performed by: INTERNAL MEDICINE

## 2018-06-01 PROCEDURE — 3331090001 HH PPS REVENUE CREDIT

## 2018-06-01 RX ORDER — AMOXICILLIN AND CLAVULANATE POTASSIUM 875; 125 MG/1; MG/1
1 TABLET, FILM COATED ORAL 2 TIMES DAILY WITH MEALS
Qty: 8 TAB | Refills: 0 | Status: SHIPPED | OUTPATIENT
Start: 2018-06-01 | End: 2018-06-05

## 2018-06-01 RX ORDER — FUROSEMIDE 40 MG/1
20 TABLET ORAL DAILY
Qty: 30 TAB | Refills: 0 | Status: SHIPPED | OUTPATIENT
Start: 2018-06-01 | End: 2018-06-12 | Stop reason: SDUPTHER

## 2018-06-01 RX ORDER — DILTIAZEM HYDROCHLORIDE 180 MG/1
180 CAPSULE, COATED, EXTENDED RELEASE ORAL DAILY
Qty: 30 CAP | Refills: 1 | Status: SHIPPED | OUTPATIENT
Start: 2018-06-02 | End: 2018-06-06 | Stop reason: SDUPTHER

## 2018-06-01 RX ORDER — LAMIVUDINE 150 MG/1
150 TABLET, FILM COATED ORAL DAILY
Qty: 30 TAB | Refills: 1 | Status: SHIPPED | OUTPATIENT
Start: 2018-06-02 | End: 2018-10-19

## 2018-06-01 RX ORDER — IPRATROPIUM BROMIDE AND ALBUTEROL SULFATE 2.5; .5 MG/3ML; MG/3ML
3 SOLUTION RESPIRATORY (INHALATION)
Qty: 30 NEBULE | Refills: 1 | Status: SHIPPED | OUTPATIENT
Start: 2018-06-01 | End: 2018-10-19

## 2018-06-01 RX ORDER — FUROSEMIDE 40 MG/1
40 TABLET ORAL DAILY
Qty: 30 TAB | Refills: 1 | Status: SHIPPED | OUTPATIENT
Start: 2018-06-01 | End: 2018-06-12 | Stop reason: DRUGHIGH

## 2018-06-01 RX ORDER — ABACAVIR 300 MG/1
600 TABLET ORAL DAILY
Qty: 60 TAB | Refills: 1 | Status: SHIPPED | OUTPATIENT
Start: 2018-06-02 | End: 2018-10-19

## 2018-06-01 RX ORDER — FLUTICASONE FUROATE AND VILANTEROL 100; 25 UG/1; UG/1
1 POWDER RESPIRATORY (INHALATION) DAILY
Qty: 1 INHALER | Refills: 1 | Status: SHIPPED | OUTPATIENT
Start: 2018-06-01 | End: 2018-06-19 | Stop reason: SDUPTHER

## 2018-06-01 RX ADMIN — SERTRALINE HYDROCHLORIDE 25 MG: 50 TABLET ORAL at 09:01

## 2018-06-01 RX ADMIN — DILTIAZEM HYDROCHLORIDE 180 MG: 180 CAPSULE, COATED, EXTENDED RELEASE ORAL at 09:01

## 2018-06-01 RX ADMIN — AMOXICILLIN AND CLAVULANATE POTASSIUM 1 TABLET: 875; 125 TABLET, FILM COATED ORAL at 09:00

## 2018-06-01 RX ADMIN — TIOTROPIUM BROMIDE 18 MCG: 18 CAPSULE ORAL; RESPIRATORY (INHALATION) at 07:56

## 2018-06-01 RX ADMIN — DORNASE ALFA 2.5 MG: 1 SOLUTION RESPIRATORY (INHALATION) at 07:57

## 2018-06-01 RX ADMIN — LAMIVUDINE 150 MG: 150 TABLET, FILM COATED ORAL at 09:01

## 2018-06-01 RX ADMIN — FAMOTIDINE 20 MG: 20 TABLET ORAL at 09:01

## 2018-06-01 RX ADMIN — ABACAVIR SULFATE 600 MG: 300 TABLET, FILM COATED ORAL at 09:01

## 2018-06-01 RX ADMIN — BUDESONIDE AND FORMOTEROL FUMARATE DIHYDRATE 2 PUFF: 160; 4.5 AEROSOL RESPIRATORY (INHALATION) at 07:56

## 2018-06-01 RX ADMIN — ASPIRIN 81 MG CHEWABLE TABLET 81 MG: 81 TABLET CHEWABLE at 09:01

## 2018-06-01 RX ADMIN — FUROSEMIDE 40 MG: 40 TABLET ORAL at 09:01

## 2018-06-01 NOTE — PROGRESS NOTES
[facility]  Pulmonary, Critical Care, and Sleep Medicine    Name: Anselmo Reis MRN: [id number]   : 1945 Hospital: [facility]   Date: 2018        Pulmonary Follow up Patient Consult    Requesting MD:                                                  Reason for CC Consult:  IMPRESSION:   · Hypoxia, chronic. Suspect further exacerbated with atelectasis and possible acute aspirations? Mucus plugging insetting of severe emphysema. On my reviewe of ct from 2018 some evidence of bronchiectasis  · Bronchiectasis  · RLL consolidation, possible aspiration vs HCAP vs atelectasis. Pt is afebrile and normal wbc. Currently on zosyn and vancomycin. · Severe emphysema  · HIV  · Hx of CAD  · afib  · Thrombocytopenia, improving slightly        RECOMMENDATIONS:   · augmentin   · Aggressive bronchial hygiene. Including  vest and pep therapy  · Continue bd  · hiv meds per ID  · ANGELA improving creatine  · Increase the cardizem   · lovenox 40mg sc daily now since plt over 100  · GI proph. · Oko discharge from my perspective. · Will sign off. Subjective/History: This patient has been seen and evaluated at the request of  for HCAP without improvement. Patient is a 71 y.o. female hx of HIV since  with last cd4 213  recent discharge for pneumonia , readmitted on  with persistent cough and sob. No associated wheezing fever or chills. No n/v/d. No chest pain  No headache. No visual disturbances.   Doing much better. Near her baseline. Afebrile. HD stable.       Past Medical History:   Diagnosis Date    Coronary atherosclerosis of native coronary artery     HIV (human immunodeficiency virus infection) ([facility] [address]     Ill-defined condition     Pneumonia    Nonspecific abnormal electrocardiogram (ECG) (EKG)     Pre-operative cardiovascular examination       Past Surgical History:   Procedure Laterality Date    HX HIP REPLACEMENT      HX HYSTERECTOMY        Prior to Admission medications    Medication Sig Start Date End Date Taking? Authorizing Provider   abacavir-dolutegravir-lamiVUDine (TRIUMEQ) tablet Take 1 Tab by mouth daily. Kalyan Auguste MD   albuterol (PROVENTIL VENTOLIN) 2.5 mg /3 mL (0.083 %) nebulizer solution 3 mL by Nebulization route every four (4) hours as needed for Wheezing. 5/18/18   Refugio Fields MD   aspirin 81 mg chewable tablet Take 1 Tab by mouth daily. 5/18/18   Refugio Fields MD   budesonide-formoterol (SYMBICORT) 160-4.5 mcg/actuation HFAA Take 2 Puffs by inhalation two (2) times a day. 5/18/18   Refugio Fields MD   famotidine (PEPCID) 20 mg tablet Take 1 Tab by mouth daily. 5/18/18   Refugio Fields MD   furosemide (LASIX) 40 mg tablet Take 1 Tab by mouth daily. 5/18/18   Refugio Fields MD   predniSONE (DELTASONE) 10 mg tablet 3 tab po daily for 3 days then  2 tab po daily for 3 days then   1 tab po daily for 3 days 5/18/18   Refugio Fields MD   tiotropium [facility]) 18 mcg inhalation capsule Take 1 Cap by inhalation daily. 5/18/18   Refugio Fields MD   hydrALAZINE (APRESOLINE) 10 mg tablet Take 1 Tab by mouth three (3) times daily. Hold for SBP less than 110 5/18/18   Refugio Fields MD   traMADol (ULTRAM) 50 mg tablet Take 1 Tab by mouth every six (6) hours as needed. Max Daily Amount: 200 mg. 5/18/18   Refugio Fields MD   dilTIAZem CD (CARDIZEM CD) 180 mg ER capsule Take 1 Cap by mouth daily. 5/18/18   Refugio Fields MD   fluticasone (FLONASE) 50 mcg/actuation nasal spray 2 Sprays by Both Nostrils route daily. Zack Smith MD   darunavir-cobicistat (PREZCOBIX) 800-150 mg-mg per tablet Take 1 Tab by mouth daily. Zack Smith MD   abacavir-dolutegravir-lamiVUDine (TRIUMEQ) tablet Take  by mouth daily. Zack Smith MD   sertraline (ZOLOFT) 25 mg tablet Take  by mouth daily.     Zack Other, MD     Current Facility-Administered Medications   Medication Dose Route Frequency    dornase alpha (PULMOZYME)2.5mg/2.5ml nebulizer solution  2.5 mg Nebulization BID RT    enoxaparin (LOVENOX) injection 40 mg  40 mg SubCUTAneous Q24H    amoxicillin-clavulanate (AUGMENTIN) 875-125 mg per tablet 1 Tab  1 Tab Oral BID WITH MEALS    abacavir (ZIAGEN) tablet 600 mg  600 mg Oral DAILY    And    dolutegravir (TIVICAY) tablet 50 mg  50 mg Oral DAILY    And    lamiVUDine (EPIVIR) tablet 150 mg  150 mg Oral DAILY    aspirin chewable tablet 81 mg  81 mg Oral DAILY    dilTIAZem CD (CARDIZEM CD) capsule 180 mg  180 mg Oral DAILY    famotidine (PEPCID) tablet 20 mg  20 mg Oral DAILY    sertraline (ZOLOFT) tablet 25 mg  25 mg Oral DAILY    tiotropium (SPIRIVA) inhalation capsule 18 mcg  1 Cap Inhalation DAILY    furosemide (LASIX) tablet 40 mg  40 mg Oral DAILY    darunavir-cobicistat (PREZCOBIX) 800-150 mg-mg per tablet 1 Tab (Patient Supplied)  1 Tab Oral DAILY    budesonide-formoterol (SYMBICORT) 160-4.5 mcg/actuation HFA inhaler 2 Puff  2 Puff Inhalation BID RT     No Known Allergies   Social History   Substance Use Topics    Smoking status: Current Every Day Smoker     Packs/day: 0.50    Smokeless tobacco: Not on file    Alcohol use No      Family History   Problem Relation Age of Onset    Heart Attack Mother 70    Stroke Father 54    Heart Attack Sister 59        Review of Systems:  A comprehensive review of systems was negative except for that written in the HPI. Objective:   Vital Signs:    Visit Vitals    /85    Pulse 76    Temp 98 °F (36.7 °C)    Resp 20    Ht 5' 2\" (1.575 m)    Wt 72.7 kg (160 lb 3.2 oz)    SpO2 100%    Breastfeeding No    BMI 29.3 kg/m2       O2 Device: Nasal cannula   O2 Flow Rate (L/min): 3 l/min   Temp (24hrs), Av.5 °F (36.9 °C), Min:98 °F (36.7 °C), Max:98.7 °F (37.1 °C)       Intake/Output:   Last shift:         Last 3 shifts:  1901 -  0700  In: 440 [P.O.:240;  I.V.:200]  Out: 1050 [Urine:1050]    Intake/Output Summary (Last 24 hours) at 06/01/18 0906  Last data filed at 06/01/18 0433   Gross per 24 hour   Intake                0 ml   Output              600 ml   Net             -600 ml     Hemodynamics:   . @MAP     . @CVP       Ventilator Settings:  Mode Rate Tidal Volume Pressure FiO2 PEEP            28 %       Peak airway pressure:      Minute ventilation:        ARDS network Guidelines: Lung protective strategy and Pl pressure goals____________    Physical Exam:    General:  Alert, cooperative, no distress, appears stated age. No conversational dyspnea. Head:  Normocephalic, without obvious abnormality, atraumatic. Eyes:  Conjunctivae/corneas clear. PERRL, EOMs intact. Nose: Nares normal. Septum midline. Mucosa normal. No drainage or sinus tenderness. Throat: Lips, mucosa, and tongue normal. Teeth and gums normal.   Neck: Supple, symmetrical, trachea midline, no adenopathy, thyroid: no enlargment/tenderness/nodules, no carotid bruit and no JVD. Back:   Symmetric, no curvature. ROM normal.   Lungs:   Clear to auscultation bilaterally. Chest wall:  No tenderness or deformity. Heart:  Regular rate and rhythm, S1, S2 normal, no murmur, click, rub or gallop. Abdomen:   Soft, non-tender. Bowel sounds normal. No masses,  No organomegaly. Extremities: Extremities normal, atraumatic, no cyanosis or edema. Pulses: 2+ and symmetric all extremities.    Skin: Skin color, texture, turgor normal. No rashes or lesions   Lymph nodes: Cervical, supraclavicular, and axillary nodes normal.   Neurologic: Grossly nonfocal       Data:     Recent Results (from the past 24 hour(s))   GLUCOSE, POC    Collection Time: 05/31/18 11:07 AM   Result Value Ref Range    Glucose (POC) 129 (H) 70 - 110 mg/dL   GLUCOSE, POC    Collection Time: 05/31/18  4:07 PM   Result Value Ref Range    Glucose (POC) 172 (H) 70 - 110 mg/dL   T3, FREE    Collection Time: 05/31/18  4:15 PM   Result Value Ref Range    Triiodothyronine (T3), free 2.2 2.18 - 9.31 PG/ML   METABOLIC PANEL, BASIC    Collection Time: 06/01/18  3:55 AM   Result Value Ref Range    Sodium 139 136 - 145 mmol/L    Potassium 3.8 3.5 - 5.5 mmol/L    Chloride 103 100 - 108 mmol/L    CO2 29 21 - 32 mmol/L    Anion gap 7 3.0 - 18 mmol/L    Glucose 111 (H) 74 - 99 mg/dL    BUN 13 7.0 - 18 MG/DL    Creatinine 1.33 (H) 0.6 - 1.3 MG/DL    BUN/Creatinine ratio 10 (L) 12 - 20      GFR est AA 48 (L) >60 ml/min/1.73m2    GFR est non-AA 39 (L) >60 ml/min/1.73m2    Calcium 8.7 8.5 - 10.1 MG/DL             Telemetry:AFIB    Imaging:  I have personally reviewed the patients radiographs and have reviewed the reports:   CT CHEST WITHOUT IV CONTRAST        COMPARISON: No priors.     INDICATIONS: COPD, hypoxia, HIV, congestive heart failure.     TECHNIQUE: Volumetric data acquisition was performed through the chest with a  multislice scanner. Reconstructions were created in the axial, coronal, and  sagittal planes.     FINDINGS:      Thyroid/Base Of Neck:  Unremarkable  .     Lungs: The lungs are emphysematous. There are segmental and subsegmental areas of  atelectasis in the lower lobes associated with bronchial thickening. Some of the  bronchi show luminal filling. .   No groundglass infiltrate is evident. .     Pleural Spaces: There is no pneumothorax or pleural fluid evident. No pleural plaques are seen     Lymph Nodes:   Axillae: Normal in size and number. Mediastinum / Alida: Normal in size and number.     Mediastinum, Great Vessels And Heart: There is no mediastinal mass. The heart and great vessels appear unremarkable.     Abdomen Structures Included:  Epigastric abdominal structures show a little persists but otherwise are  unremarkable.        Osseous Structures: Senescent and demineralized findings. Mild compression of an  upper partial vertebral body. .     IMPRESSION  IMPRESSION:      Chronic obstructive pulmonary disease with features primarily of emphysema. .     Bronchial thickening with bronchial luminal filling in the basal zones. Bronchitis with mucous plugging versus aspiration.     Findings suggesting acute congestive heart failure or pneumocystis pneumonia are  not evident  .     All CT scans at this facility are performed using dose optimization technique as  appropriate to the performed exam, to include automated exposure control,  adjustment of the mA and/or kV according to patient's size (Including  appropriate matching for site-specific examinations), or use of iterative  reconstruction technique. Status Provider Status         Final result (Exam End: 5/27/2018  2:55 AM) Open        Study Result      EXAM:  XR CHEST PORT     INDICATION:   SOB with chest pain     COMPARISON: 5/12/2018.     FINDINGS:  The cardiac and mediastinal silhouette are within normal limits. Pulmonary  vasculature is within normal limits. Right lower lobe basilar consolidation. No  pleural effusion or pneumothorax. Mild lingular atelectasis. Degenerative  changes in the shoulders.     IMPRESSION  IMPRESSION:     Basilar right lower lobe pneumonia, recommend follow-up after treatment to  insure resolution. Final result (Exam End: 5/12/2018  3:00 AM) Open        Study Result      Chest     Indication: SOB      Comparison: October 19, 2017     Findings: Single view. Instrumentation stable. No pneumothorax. No pleural  effusion. Bilateral lower lung zone atelectasis again with chronic  pleural-parenchymal changes at the middle and lower lung zones.   Cardiomediastinal silhouette and osseous structures grossly unchanged.     IMPRESSION  Impression: No significant change                  Srinivas Rossi MD  6/1/2018, 10:40 AM

## 2018-06-01 NOTE — DISCHARGE SUMMARY
[facility]  Discharge Summary       Patient: Constance Valera Age: 71 y.o. : 1945 MR#: [id number] SSN: [id number]  PCP on record: Danielle Limon MD  Admit date: 2018  Discharge date: 2018    Consults:  DAWOOD Ladd,-pulmonary medicine  -   Procedures:  -     Significant Diagnostic Studies: CT chest wo contrast 18:  -  There is interval increase in the segmental/subsegmental atelectasis in the  posterior right base but also some interval decrease in similar subsegmental  atelectasis in the posterior left base. No gross consolidations are seen.       Diffuse interstitial prominence and emphysematous changes are seen diffusely  throughout both lungs consistent with chronic obstructive pulmonary disease. There is some persistent bronchial wall thickening.      -barium swallow 18  IMPRESSION  IMPRESSION:     No aspiration. There is trace penetration with thin liquids, nectar, and a pill. Discharge Diagnoses:   -HCAP  -ANGELA resolving  -Transaminitis cause unclear                                          Patient Active Problem List   Diagnosis Code    COPD exacerbation ([facility]) J44.1    [facility]    Anxiety F41.9    S/P insertion of IVC (inferior vena caval) filter L67.510    Diastolic CHF, acute on chronic ([facility]) I50.33    Chest pain, atypical R07.89    HCAP (healthcare-associated pneumonia) J18.9    Essential hypertension [facility] Course by Problem      71year old female presented to the ED w/ cc of cp and sob. -HCAP with severe sepsis POA-cxr showed basilar right lower lobe pneumonia. . Also had leukocytosis, unclear if due to steroids in recent past which has resolved. Pt was dc'd on prednisone taper from recent admission. S/p eval by pulmonary team, Recommendations noted, abx stopped,  Appreciate assistance.  Recommendations to dc pt on augmentin, impression by pulm team is that pt has chronic hypoxia exacerbated by atelaecasis and possible acute aspirations? mucus plugging.  -Chronic respiratory failure/COPD-per dc summary from previous admission pt was to be dc'd on home 02 nasal cannula. -ANGELA cause unclear-resolving.  -Transaminitis cause unclear, hep B results indicate immunity through previous infection, liver us no evidence of biliary, liver parenchymal abnormality, ? Iatrogenic (anti retrovirals). -Chest pain as recurrent complaint: -Will need to f/u closely w/ cardiologist in outpt setting for comprehensive eval of cp. This admission no concerning findings for ACS, cardiac echo from earlier this month did not show wma.  -Tachycardia, TSH low, FT4 nl, sinus tachy cause unclear. -HIV last CD4 213 18 currently on antiretrovirals, per pharmacy pt's meds have been adjusted given renal insuff, pt and her caregiver have been made aware of adjustments for renal insuff. Prescriptions given. Pt to f/u w/ outpt ID doc.  -likely CKD  -HTN  -History of diastolic HF-appears compensated at this time  -Thrombocytopenia-acuity suggests iatrogenic cause. Will hold heparin products, use scd for dvt prophyx, cont to monitor. Abx may be culprit if cont'd drop. Continues to improve. Will cont to monitor.  -Disposition: per PT home health.          Today's examination of the patient revealed:     Subjective:     Objective:   VS:   Visit Vitals    /75 (BP 1 Location: Right arm, BP Patient Position: At rest)    Pulse 95    Temp 98.7 °F (37.1 °C)    Resp 20    Ht 5' 2\" (1.575 m)    Wt 72.7 kg (160 lb 3.2 oz)    SpO2 97%    Breastfeeding No    BMI 29.3 kg/m2      Tmax/24hrs: Temp (24hrs), Av.4 °F (36.9 °C), Min:98 °F (36.7 °C), Max:98.7 °F (37.1 °C)     Input/Output:   Intake/Output Summary (Last 24 hours) at 18 1237  Last data filed at 18 1028   Gross per 24 hour   Intake              240 ml   Output              900 ml   Net             -660 ml       General: Alert, awake, in nad  Cardiovascular: rrr, no murmurs   Pulmonary:  ctab  GI: soft, nt nd   Extremities: no edema   Additional:      Labs:    Recent Results (from the past 24 hour(s))   GLUCOSE, POC    Collection Time: 05/31/18  4:07 PM   Result Value Ref Range    Glucose (POC) 172 (H) 70 - 110 mg/dL   T3, FREE    Collection Time: 05/31/18  4:15 PM   Result Value Ref Range    Triiodothyronine (T3), free 2.2 2.18 - 9.38 PG/ML   METABOLIC PANEL, BASIC    Collection Time: 06/01/18  3:55 AM   Result Value Ref Range    Sodium 139 136 - 145 mmol/L    Potassium 3.8 3.5 - 5.5 mmol/L    Chloride 103 100 - 108 mmol/L    CO2 29 21 - 32 mmol/L    Anion gap 7 3.0 - 18 mmol/L    Glucose 111 (H) 74 - 99 mg/dL    BUN 13 7.0 - 18 MG/DL    Creatinine 1.33 (H) 0.6 - 1.3 MG/DL    BUN/Creatinine ratio 10 (L) 12 - 20      GFR est AA 48 (L) >60 ml/min/1.73m2    GFR est non-AA 39 (L) >60 ml/min/1.73m2    Calcium 8.7 8.5 - 10.1 MG/DL     Additional Data Reviewed:     Condition:   Disposition:    []Home   [x]Home with Home Health   []SNF/NH   []Rehab   []Home with family   []Alternate Facility:____________________      Discharge Medications:     Current Discharge Medication List      START taking these medications    Details   abacavir (ZIAGEN) 300 mg tablet Take 2 Tabs by mouth daily. Qty: 60 Tab, Refills: 1      dolutegravir (TIVICAY) 50 mg tab tablet Take 1 Tab by mouth daily. Indications: HIV infection  Qty: 30 Tab, Refills: 1      lamiVUDine (EPIVIR) 150 mg tablet Take 1 Tab by mouth daily. Qty: 30 Tab, Refills: 1      albuterol-ipratropium (DUO-NEB) 2.5 mg-0.5 mg/3 ml nebu 3 mL by Nebulization route every four (4) hours as needed. Qty: 30 Nebule, Refills: 1      amoxicillin-clavulanate (AUGMENTIN) 875-125 mg per tablet Take 1 Tab by mouth two (2) times daily (with meals) for 4 days. Qty: 8 Tab, Refills: 0      dornase alpha (PULMOZYME) 1 mg/mL nebulizer solution Take 2.5 mL by inhalation daily.   Qty: 1 Bottle, Refills: 1 fluticasone-vilanterol (BREO ELLIPTA) 100-25 mcg/dose inhaler Take 1 Puff by inhalation daily. Qty: 1 Inhaler, Refills: 1         CONTINUE these medications which have CHANGED    Details   !! dilTIAZem CD (CARDIZEM CD) 180 mg ER capsule Take 1 Cap by mouth daily. Qty: 30 Cap, Refills: 1      !! furosemide (LASIX) 40 mg tablet Take 1 Tab by mouth daily for 30 days. Qty: 30 Tab, Refills: 1      !! furosemide (LASIX) 40 mg tablet Take 0.5 Tabs by mouth daily. Qty: 30 Tab, Refills: 0       !! - Potential duplicate medications found. Please discuss with provider. CONTINUE these medications which have NOT CHANGED    Details   albuterol (PROVENTIL VENTOLIN) 2.5 mg /3 mL (0.083 %) nebulizer solution 3 mL by Nebulization route every four (4) hours as needed for Wheezing. Qty: 24 Each, Refills: 2      aspirin 81 mg chewable tablet Take 1 Tab by mouth daily. Qty: 30 Tab, Refills: 0      budesonide-formoterol (SYMBICORT) 160-4.5 mcg/actuation HFAA Take 2 Puffs by inhalation two (2) times a day. Qty: 1 Inhaler, Refills: 0      famotidine (PEPCID) 20 mg tablet Take 1 Tab by mouth daily. Qty: 30 Tab, Refills: 0      tiotropium (SPIRIVA) 18 mcg inhalation capsule Take 1 Cap by inhalation daily. Qty: 30 Cap, Refills: 0      traMADol (ULTRAM) 50 mg tablet Take 1 Tab by mouth every six (6) hours as needed. Max Daily Amount: 200 mg. Qty: 28 Tab, Refills: 0    Associated Diagnoses: Chest pain, atypical      !! dilTIAZem CD (CARDIZEM CD) 180 mg ER capsule Take 1 Cap by mouth daily. Qty: 30 Cap, Refills: 1      fluticasone (FLONASE) 50 mcg/actuation nasal spray 2 Sprays by Both Nostrils route daily. darunavir-cobicistat (PREZCOBIX) 800-150 mg-mg per tablet Take 1 Tab by mouth daily. sertraline (ZOLOFT) 25 mg tablet Take  by mouth daily. !! - Potential duplicate medications found. Please discuss with provider.       STOP taking these medications       abacavir-dolutegravir-lamiVUDine (TRIUMEQ) tablet Comments:   Reason for Stopping:         predniSONE (DELTASONE) 10 mg tablet Comments:   Reason for Stopping:         hydrALAZINE (APRESOLINE) 10 mg tablet Comments:   Reason for Stopping:         abacavir-dolutegravir-lamiVUDine (TRIUMEQ) tablet Comments:   Reason for Stopping: Follow-up Appointments:   1.  Your PCP: Corey Dubois MD, within 7-10days      >30 minutes spent coordinating this discharge (review instructions/follow-up, prescriptions, preparing report for sign off)    Signed:  Jim Wilkinson MD  6/1/2018  12:37 PM

## 2018-06-01 NOTE — DISCHARGE INSTRUCTIONS
Acquired Immunodeficiency Syndrome (AIDS): Care Instructions  Your Care Instructions    Human immunodeficiency virus (HIV) is a virus that attacks the body's immune system. This makes it hard for the body to fight infection and disease. HIV causes acquired immunodeficiency syndrome (AIDS). AIDS is the last and most severe stage of the HIV infection. HIV attacks and destroys a type of white blood cell called CD4+ cells, or helper cells. These cells are an important part of the immune system. You have AIDS when one or both of the following are true:  · Your CD4+ cell count is below 200 cells per microliter (µL) of blood. · You get certain infections or cancers that are usually only seen in people who have problems with their immune system. Follow-up care is a key part of your treatment and safety. Be sure to make and go to all appointments, and call your doctor if you are having problems. It's also a good idea to know your test results and keep a list of the medicines you take. How can you care for yourself at home? · Take your medicines exactly as prescribed. Call your doctor if you think you are having a problem with your medicine. · Get the vaccines and medicine you need to prevent infections such as pneumonia or tuberculosis. · Learn more about HIV and AIDS so you can be active in your health care decisions. · Join a support group. These let you share experiences and seek support from others in the same situation. · Do not smoke. People with HIV have an increased risk of heart attacks and lung cancer. Smoking increases these risks even more. If you need help quitting, talk to your doctor about stop-smoking programs and medicines. These can increase your chances of quitting for good. · Do not use illegal drugs. And limit your use of alcohol. · Eat a healthy, balanced diet. This keeps your immune system as strong as possible. · Exercise regularly.  It can reduce your stress, increase your energy, and lift your mood. · If you have not already done so, prepare a list of advance directives. These tell your doctor and family members what kind of care you want if you become unable to speak for yourself. Helping a partner with AIDS  If your partner has AIDS, you can help provide emotional, physical, and medical care that will improve his or her quality of life. · Give emotional support. Listen to and encourage your partner. · Protect yourself and others against HIV infection and other infections by:  ¨ Not sharing needles. ¨ Always using condoms during sex. · Protect your partner by staying away from him or her when you are sick. · Take care of yourself. Share your experiences with others and get help when you need it. · Learn how to give medicines, and know where to get help in an emergency. When should you call for help? Call 911 anytime you think you may need emergency care. For example, call if:  ? · You passed out (lost consciousness). ? · You have severe shortness of breath. ? · You have symptoms of a stroke. These may include:  ¨ Sudden numbness, tingling, weakness, or loss of movement in your face, arm, or leg, especially on only one side of your body. ¨ Sudden vision changes. ¨ Sudden trouble speaking. ¨ Sudden confusion or trouble understanding simple statements. ¨ Sudden problems with walking or balance. ¨ A sudden, severe headache that is different from past headaches. ?Call your doctor now or seek immediate medical care if:  ? · You have signs of a new or worse problem from HIV, such as:  ¨ A fever. ¨ Coughing. ¨ Diarrhea. ¨ Skin changes. ¨ Bleeding. ¨ Confusion or not thinking clearly. ? Watch closely for changes in your health, and be sure to contact your doctor if you have any problems. Where can you learn more? Go to [email or web address]-ana [email or web address].info/.   Enter L220 in the search box to learn more about \"Acquired Immunodeficiency Syndrome (AIDS): Care Instructions. \"  Current as of: March 3, 2017  Content Version: 11.4  © 2313-7319 [employer]. Care instructions adapted under license by [employer] (which disclaims liability or warranty for this information). If you have questions about a medical condition or this instruction, always ask your healthcare professional. [address] any warranty or liability for your use of this information. Pneumocystis Pneumonia and AIDS: Care Instructions  Your Care Instructions    Pneumocystis is a fungus that can sometimes cause pneumonia in people who have AIDS. Pneumonia is an infection of the lungs. Pneumonia caused by pneumocystis can make it hard to breathe and to get enough oxygen into the blood. Pneumocystis pneumonia, or PCP, is the most common serious infection in people with AIDS. PCP can be prevented with medicine. If you get PCP, it can be treated. Antibiotics can get rid of the infection. You should have your blood tested regularly to check the strength of your immune system and to help your doctor decide if you need to take drugs to prevent PCP. Follow-up care is a key part of your treatment and safety. Be sure to make and go to all appointments, and call your doctor if you are having problems. It's also a good idea to know your test results and keep a list of the medicines you take. How can you care for yourself at home? · Take your antibiotics as directed. Do not stop taking them just because you feel better. You need to take the full course of antibiotics. · Take all your medicines exactly as prescribed. Call your doctor if you have any problems with your medicine. · If you are taking IV medicine at home, follow your doctor's instructions. · Get plenty of rest and sleep. You may feel weak and tired for a while, but your energy level will improve with time. · Drink plenty of fluids, enough so that your urine is light yellow or clear like water.  Choose water and other caffeine-free clear liquids until you feel better. If you have kidney, heart, or liver disease and have to limit fluids, talk with your doctor before you increase the amount of fluids you drink. · Take care of your cough so you can rest. A cough that brings up mucus from your lungs is common with pneumonia. It is one way your body gets rid of the infection. But if coughing keeps you from resting or causes severe fatigue and chest-wall pain, talk to your doctor. He or she may suggest that you take a medicine to reduce the cough. · Use a humidifier to increase the moisture in the air. Dry air makes coughing worse. Follow the instructions for cleaning the machine. · Do not smoke or allow others to smoke around you. If you need help quitting, talk to your doctor about stop-smoking programs and medicines. These can increase your chances of quitting for good. When should you call for help? Call 911 anytime you think you may need emergency care. For example, call if:  ? · You have severe trouble breathing. ?Call your doctor now or seek immediate medical care if:  ? · You are short of breath. ? · You have a worse cough. ? · You have a new or higher fever. ? Watch closely for changes in your health, and be sure to contact your doctor if:  ? · You do not get better as expected. Where can you learn more? Go to http://svetlana-ana [email or web address].info/. Enter V319 in the search box to learn more about \"Pneumocystis Pneumonia and AIDS: Care Instructions. \"  Current as of: March 3, 2017  Content Version: 11.4  © 1071-0598 [employer]. Care instructions adapted under license by [employer] (which disclaims liability or warranty for this information). If you have questions about a medical condition or this instruction, always ask your healthcare professional. [address] any warranty or liability for your use of this information.          Learning About Chronic Bronchitis  What is chronic bronchitis? Chronic bronchitis is long-term swelling and the buildup of mucus in the airways of your lungs. The airways (bronchial tubes) get inflamed and make a lot of mucus. This can narrow or block the airways, making it hard for you to breathe. It is a form of COPD (chronic obstructive pulmonary disease). Chronic bronchitis is usually caused by smoking. But chemical fumes, dust, or air pollution also can cause it over time. What can you expect when you have chronic bronchitis? Chronic bronchitis gets worse over time. You cannot undo the damage to your lungs. Over time, you may find that:  · You get short of breath even when you do simple things like get dressed or fix a meal.  · It is hard to eat or exercise. · You lose weight and feel weaker. Over many years, the swelling and mucus from chronic bronchitis make it more likely that you will get lung infections. But there are things you can do to prevent more damage and feel better. What are the symptoms? The main symptoms of chronic bronchitis are:  · A cough that will not go away. · Mucus that comes up when you cough. · Shortness of breath that gets worse when you exercise. At times, your symptoms may suddenly flare up and get much worse. This is a called an exacerbation (say \"egg-ZAJUWAN-er-BAY-un\"). When this happens, your usual symptoms quickly get worse and stay bad. This can be dangerous. You may have to go to the hospital.  How can you keep chronic bronchitis from getting worse? Don't smoke. That is the best way to keep chronic bronchitis from getting worse. If you already smoke, it is never too late to stop. If you need help quitting, talk to your doctor about stop-smoking programs and medicines. These can increase your chances of quitting for good. You can do other things to keep chronic bronchitis from getting worse:  · Avoid bad air.  Air pollution, chemical fumes, and dust also can make chronic bronchitis worse. · Get a flu shot every year. A shot may keep the flu from turning into something more serious, like pneumonia. A flu shot also may lower your chances of having a flare-up. · Get a pneumococcal shot. A shot can prevent some of the serious complications of pneumonia. Ask your doctor how often you should get this shot. How is chronic bronchitis treated? Chronic bronchitis is treated with medicines and oxygen. You also can take steps at home to stay healthy and keep your condition from getting worse. Medicines and oxygen therapy  · You may be taking medicines such as:  ¨ Bronchodilators. These help open your airways and make breathing easier. Bronchodilators are either short-acting (work for 6 to 9 hours) or long-acting (work for 24 hours). You inhale most bronchodilators, so they start to act quickly. Always carry your quick-relief inhaler with you in case you need it while you are away from home. ¨ Corticosteroids. These reduce airway inflammation. They come in pill or inhaled form. You must take these medicines every day for them to work well. ¨ Antibiotics. These medicines are used when you have a bacterial lung infection. · Take your medicines exactly as prescribed. Call your doctor if you think you are having a problem with your medicine. · Oxygen therapy boosts the amount of oxygen in your blood and helps you breathe easier. Use the flow rate your doctor has recommended, and do not change it without talking to your doctor first.  Other care at home  · If your doctor recommends it, get more exercise. Walking is a good choice. Bit by bit, increase the amount you walk every day. Try for at least 30 minutes on most days of the week. · Learn breathing methods-such as breathing through pursed lips-to help you become less short of breath. · If your doctor has not set you up with a pulmonary rehabilitation program, talk to him or her about whether rehab is right for you.  Rehab includes exercise programs, education about your disease and how to manage it, help with diet and other changes, and emotional support. · Eat regular, healthy meals. Use bronchodilators about 1 hour before you eat to make it easier to eat. Eat several small meals instead of three large ones. Drink beverages at the end of the meal. Avoid foods that are hard to chew. Follow-up care is a key part of your treatment and safety. Be sure to make and go to all appointments, and call your doctor if you are having problems. It's also a good idea to know your test results and keep a list of the medicines you take. Where can you learn more? Go to [email or web address] Enter K772 in the search box to learn more about \"Learning About Chronic Bronchitis. \"  Current as of: May 12, 2017  Content Version: 11.4  © 6696-7347 [employer]. Care instructions adapted under license by [employer] (which disclaims liability or warranty for this information). If you have questions about a medical condition or this instruction, always ask your healthcare professional. [address] any warranty or liability for your use of this information. COPD Exacerbation Plan: Care Instructions  Your Care Instructions    If you have chronic obstructive pulmonary disease (COPD), your usual shortness of breath could suddenly get worse. You may start coughing more and have more mucus. This flare-up is called a COPD exacerbation (say \"ka-IEU-jw-BAY-shun\"). A lung infection or air pollution could set off an exacerbation. Sometimes it can happen after a quick change in temperature or being around chemicals. Work with your doctor to make a plan for dealing with an exacerbation. You can better manage it if you plan ahead. Follow-up care is a key part of your treatment and safety. Be sure to make and go to all appointments, and call your doctor if you are having problems.  It's also a good idea to know your test results and keep a list of the medicines you take. How can you care for yourself at home? During an exacerbation  · Do not panic if you start to have one. Quick treatment at home may help you prevent serious breathing problems. If you have a COPD exacerbation plan that you developed with your doctor, follow it. · Take your medicines exactly as your doctor tells you. ¨ Use your inhaler as directed by your doctor. If your symptoms do not get better after you use your medicine, have someone take you to the emergency room. Call an ambulance if necessary. ¨ With inhaled medicines, a spacer or a nebulizer may help you get more medicine to your lungs. Ask your doctor or pharmacist how to use them properly. Practice using the spacer in front of a mirror before you have an exacerbation. This may help you get the medicine into your lungs quickly. ¨ If your doctor has given you steroid pills, take them as directed. ¨ Your doctor may have given you a prescription for antibiotics, which you can fill if you need it. ¨ Talk to your doctor if you have any problems with your medicine. And call your doctor if you have to use your antibiotic or steroid pills. Preventing an exacerbation  · Do not smoke. This is the most important step you can take to prevent more damage to your lungs and prevent problems. If you already smoke, it is never too late to stop. If you need help quitting, talk to your doctor about stop-smoking programs and medicines. These can increase your chances of quitting for good. · Take your daily medicines as prescribed. · Avoid colds and flu. ¨ Get a pneumococcal vaccine. ¨ Get a flu vaccine each year, as soon as it is available. Ask those you live or work with to do the same, so they will not get the flu and infect you. ¨ Try to stay away from people with colds or the flu. ¨ Wash your hands often.   · Avoid secondhand smoke; air pollution; cold, dry air; hot, humid air; and high altitudes. Stay at home with your windows closed when air pollution is bad. · Learn breathing techniques for COPD, such as breathing through pursed lips. These techniques can help you breathe easier during an exacerbation. When should you call for help? Call 911 anytime you think you may need emergency care. For example, call if:  ? · You have severe trouble breathing. ? · You have severe chest pain. ?Call your doctor now or seek immediate medical care if:  ? · You have new or worse shortness of breath. ? · You develop new chest pain. ? · You are coughing more deeply or more often, especially if you notice more mucus or a change in the color of your mucus. ? · You cough up blood. ? · You have new or increased swelling in your legs or belly. ? · You have a fever. ? Watch closely for changes in your health, and be sure to contact your doctor if:  ? · You need to use your antibiotic or steroid pills. ? · Your symptoms are getting worse. Where can you learn more? Go to [email or web address]-ana [email or web address]/. Enter X349 in the search box to learn more about \"COPD Exacerbation Plan: Care Instructions. \"  Current as of: May 12, 2017  Content Version: 11.4  © 2334-0679 [employer]. Care instructions adapted under license by [employer] (which disclaims liability or warranty for this information). If you have questions about a medical condition or this instruction, always ask your healthcare professional. [place] [address] any warranty or liability for your use of this information. Medicines to Prevent HIV in Babies: Care Instructions  Your Care Instructions    Doctors recommend that babies whose mothers have HIV be treated with medicines. This helps to keep the babies from getting HIV. Even when babies test negative for HIV at birth, they may have been exposed to the virus during the birth. Treatment can keep the baby from getting infected.   Your baby should start to get some anti-HIV medicine after birth. He or she will keep getting treatment for the next 6 weeks. Your baby should also get medicine to prevent other infections, such as pneumonia. This treatment may not start as soon as the anti-HIV treatment. Follow-up care is a key part of your child's treatment and safety. Be sure to make and go to all appointments, and call your doctor if your child is having problems. It's also a good idea to know your child's test results and keep a list of the medicines your child takes. How can you care for your child at home? · Be safe with medicines. Have your child take medicines exactly as prescribed. Call your doctor if you think your child is having a problem with his or her medicine. You will get more details on the specific medicines your doctor prescribes. · After you give birth, keep taking your anti-HIV medicine. Don't stop unless your doctor tells you to. It is important that you stay healthy so you can take the best possible care of your baby. · Do not breastfeed your baby unless your doctor tells you to. Babies can get HIV through breast milk. · Be sure your baby gets all the tests your doctor recommends. Where can you learn more? Go to http://[email or web address]-ana [email or web address].info/. Enter H560 in the search box to learn more about \"Medicines to Prevent HIV in Babies: Care Instructions. \"  Current as of: March 3, 2017  Content Version: 11.4  © 2710-5690 [employer]. Care instructions adapted under license by [employer] (which disclaims liability or warranty for this information). If you have questions about a medical condition or this instruction, always ask your healthcare professional. [place] [address] any warranty or liability for your use of this information.          High Blood Pressure: Care Instructions  Your Care Instructions    If your blood pressure is usually above 140/90, you have high blood pressure, or hypertension. That means the top number is 140 or higher or the bottom number is 90 or higher, or both. Despite what a lot of people think, high blood pressure usually doesn't cause headaches or make you feel dizzy or lightheaded. It usually has no symptoms. But it does increase your risk for heart attack, stroke, and kidney or eye damage. The higher your blood pressure, the more your risk increases. Your doctor will give you a goal for your blood pressure. Your goal will be based on your health and your age. An example of a goal is to keep your blood pressure below 140/90. Lifestyle changes, such as eating healthy and being active, are always important to help lower blood pressure. You might also take medicine to reach your blood pressure goal.  Follow-up care is a key part of your treatment and safety. Be sure to make and go to all appointments, and call your doctor if you are having problems. It's also a good idea to know your test results and keep a list of the medicines you take. How can you care for yourself at home? Medical treatment  · If you stop taking your medicine, your blood pressure will go back up. You may take one or more types of medicine to lower your blood pressure. Be safe with medicines. Take your medicine exactly as prescribed. Call your doctor if you think you are having a problem with your medicine. · Talk to your doctor before you start taking aspirin every day. Aspirin can help certain people lower their risk of a heart attack or stroke. But taking aspirin isn't right for everyone, because it can cause serious bleeding. · See your doctor regularly. You may need to see the doctor more often at first or until your blood pressure comes down. · If you are taking blood pressure medicine, talk to your doctor before you take decongestants or anti-inflammatory medicine, such as ibuprofen. Some of these medicines can raise blood pressure.   · Learn how to check your blood pressure at home. Lifestyle changes  · Stay at a healthy weight. This is especially important if you put on weight around the waist. Losing even 10 pounds can help you lower your blood pressure. · If your doctor recommends it, get more exercise. Walking is a good choice. Bit by bit, increase the amount you walk every day. Try for at least 30 minutes on most days of the week. You also may want to swim, bike, or do other activities. · Avoid or limit alcohol. Talk to your doctor about whether you can drink any alcohol. · Try to limit how much sodium you eat to less than 2,300 milligrams (mg) a day. Your doctor may ask you to try to eat less than 1,500 mg a day. · Eat plenty of fruits (such as bananas and oranges), vegetables, legumes, whole grains, and low-fat dairy products. · Lower the amount of saturated fat in your diet. Saturated fat is found in animal products such as milk, cheese, and meat. Limiting these foods may help you lose weight and also lower your risk for heart disease. · Do not smoke. Smoking increases your risk for heart attack and stroke. If you need help quitting, talk to your doctor about stop-smoking programs and medicines. These can increase your chances of quitting for good. When should you call for help? Call 911 anytime you think you may need emergency care. This may mean having symptoms that suggest that your blood pressure is causing a serious heart or blood vessel problem. Your blood pressure may be over 180/110. ? For example, call 911 if:  ? · You have symptoms of a heart attack. These may include:  ¨ Chest pain or pressure, or a strange feeling in the chest.  ¨ Sweating. ¨ Shortness of breath. ¨ Nausea or vomiting. ¨ Pain, pressure, or a strange feeling in the back, neck, jaw, or upper belly or in one or both shoulders or arms. ¨ Lightheadedness or sudden weakness. ¨ A fast or irregular heartbeat. ? · You have symptoms of a stroke.  These may include:  ¨ Sudden numbness, tingling, weakness, or loss of movement in your face, arm, or leg, especially on only one side of your body. ¨ Sudden vision changes. ¨ Sudden trouble speaking. ¨ Sudden confusion or trouble understanding simple statements. ¨ Sudden problems with walking or balance. ¨ A sudden, severe headache that is different from past headaches. ? · You have severe back or belly pain. ?Do not wait until your blood pressure comes down on its own. Get help right away. ?Call your doctor now or seek immediate care if:  ? · Your blood pressure is much higher than normal (such as 180/110 or higher), but you don't have symptoms. ? · You think high blood pressure is causing symptoms, such as:  ¨ Severe headache. ¨ Blurry vision. ? Watch closely for changes in your health, and be sure to contact your doctor if:  ? · Your blood pressure measures 140/90 or higher at least 2 times. That means the top number is 140 or higher or the bottom number is 90 or higher, or both. ? · You think you may be having side effects from your blood pressure medicine. ? · Your blood pressure is usually normal, but it goes above normal at least 2 times. Where can you learn more? Go to [email or web address] Enter J416 in the search box to learn more about \"High Blood Pressure: Care Instructions. \"  Current as of: September 21, 2016  Content Version: 11.4  © 8888-1933 [employer]. Care instructions adapted under license by "[employer] (which disclaims liability or warranty for this information). If you have questions about a medical condition or this instruction, always ask your healthcare professional. [place] [address] any warranty or liability for your use of this information. [employer] Activation    Thank you for requesting access to [employer]. Please follow the instructions below to securely access and download your online medical record.  [employer] allows you to send messages to your doctor, view your test results, renew your prescriptions, schedule appointments, and more. How Do I Sign Up? 1. In your internet browser, go to [email or web address]  2. Click on the First Time User? Click Here link in the Sign In box. You will be redirect to the New Member Sign Up page. 3. Enter your [employer] Access Code exactly as it appears below. You will not need to use this code after youve completed the sign-up process. If you do not sign up before the expiration date, you must request a new code. [employer] Access Code: [email or web address]  Expires: 8/10/2018  2:44 AM (This is the date your [employer] access code will )    4. Enter the last four digits of your Social Security Number (xxxx) and Date of Birth (mm/dd/yyyy) as indicated and click Submit. You will be taken to the next sign-up page. 5. Create a [employer] ID. This will be your [employer] login ID and cannot be changed, so think of one that is secure and easy to remember. 6. Create a [employer] password. You can change your password at any time. 7. Enter your Password Reset Question and Answer. This can be used at a later time if you forget your password. 8. Enter your e-mail address. You will receive e-mail notification when new information is available in [address] 9. Click Sign Up. You can now view and download portions of your medical record. 10. Click the Download Summary menu link to download a portable copy of your medical information. Additional Information    If you have questions, please visit the Frequently Asked Questions section of the [employer] website at [email or web address] Remember, [employer] is NOT to be used for urgent needs. For medical emergencies, dial 911.

## 2018-06-01 NOTE — PROGRESS NOTES
Problem: Falls - Risk of  Goal: *Absence of Falls  Document Abdias Fall Risk and appropriate interventions in the flowsheet. Outcome: Progressing Towards Goal  Fall Risk Interventions:  Mobility Interventions: Patient to call before getting OOB, Assess mobility with egress test, Utilize walker, cane, or other assitive device         Medication Interventions: Patient to call before getting OOB, Teach patient to arise slowly    Elimination Interventions: Call light in reach, Patient to call for help with toileting needs, Toilet paper/wipes in reach, Toileting schedule/hourly rounds             Problem: Pressure Injury - Risk of  Goal: *Prevention of pressure injury  Document Collins Scale and appropriate interventions in the flowsheet.    Outcome: Progressing Towards Goal  Pressure Injury Interventions:  Sensory Interventions: Assess changes in LOC, Check visual cues for pain, Keep linens dry and wrinkle-free, Pressure redistribution bed/mattress (bed type)    Moisture Interventions: Internal/External urinary devices, Check for incontinence Q2 hours and as needed    Activity Interventions: Increase time out of bed    Mobility Interventions: HOB 30 degrees or less, Pressure redistribution bed/mattress (bed type), PT/OT evaluation    Nutrition Interventions: Document food/fluid/supplement intake, Offer support with meals,snacks and hydration    Friction and Shear Interventions: Apply protective barrier, creams and emollients

## 2018-06-01 NOTE — ROUTINE PROCESS
TRANSFER - IN REPORT:    Verbal report received from Royal enrico RN on [facility]  being received from [employer] for routine progression of care      Report consisted of patients Situation, Background, Assessment and   Recommendations(SBAR). Information from the following report(s) SBAR, Kardex, Intake/Output, MAR and Recent Results was reviewed with the receiving nurse. Opportunity for questions and clarification was provided. Assessment completed upon patients arrival to unit and care assumed.

## 2018-06-01 NOTE — HOME CARE
Discharge orders for today, [facility] will resume [facility] for SN,PT/OT for this Active pt , DME: Ordered Nebulizer and Wheelchair from DME rep [facility] at [employer] , notified [facility] that pt is discharging today and needs her Nebulizer prior to discharge and [facility] will make arrangements for W/C delivery to patient , pt states her niece (Anamika Bradley) will pick her up for discharge today. MARTHA FLOYD.    1:47BU- DME rep Merry,states that pt received a Nebulizer machine and W/C prior to discharge today. MARTHA FLOYD.

## 2018-06-02 LAB
BACTERIA SPEC CULT: NORMAL
BACTERIA SPEC CULT: NORMAL
SERVICE CMNT-IMP: NORMAL
SERVICE CMNT-IMP: NORMAL

## 2018-06-02 PROCEDURE — 3331090001 HH PPS REVENUE CREDIT

## 2018-06-02 PROCEDURE — 3331090002 HH PPS REVENUE DEBIT

## 2018-06-03 ENCOUNTER — HOME CARE VISIT (OUTPATIENT)
Dept: SCHEDULING | Facility: HOME HEALTH | Age: 73
End: 2018-06-03
Payer: MEDICARE

## 2018-06-03 VITALS
DIASTOLIC BLOOD PRESSURE: 59 MMHG | TEMPERATURE: 98.9 F | RESPIRATION RATE: 20 BRPM | HEART RATE: 113 BPM | SYSTOLIC BLOOD PRESSURE: 92 MMHG | OXYGEN SATURATION: 93 %

## 2018-06-03 PROCEDURE — 3331090001 HH PPS REVENUE CREDIT

## 2018-06-03 PROCEDURE — 3331090002 HH PPS REVENUE DEBIT

## 2018-06-03 PROCEDURE — G0495 RN CARE TRAIN/EDU IN HH: HCPCS

## 2018-06-04 ENCOUNTER — HOME CARE VISIT (OUTPATIENT)
Dept: HOME HEALTH SERVICES | Facility: HOME HEALTH | Age: 73
End: 2018-06-04
Payer: MEDICARE

## 2018-06-04 ENCOUNTER — OFFICE VISIT (OUTPATIENT)
Dept: PULMONOLOGY | Age: 73
End: 2018-06-04

## 2018-06-04 VITALS
DIASTOLIC BLOOD PRESSURE: 74 MMHG | RESPIRATION RATE: 16 BRPM | TEMPERATURE: 99.3 F | BODY MASS INDEX: 29.44 KG/M2 | SYSTOLIC BLOOD PRESSURE: 98 MMHG | OXYGEN SATURATION: 97 % | WEIGHT: 160 LBS | HEIGHT: 62 IN | HEART RATE: 111 BPM

## 2018-06-04 DIAGNOSIS — J44.1 ASTHMA WITH COPD WITH EXACERBATION (HCC): Primary | ICD-10-CM

## 2018-06-04 DIAGNOSIS — J45.901 ASTHMA WITH COPD WITH EXACERBATION (HCC): Primary | ICD-10-CM

## 2018-06-04 DIAGNOSIS — J96.21 ACUTE ON CHRONIC RESPIRATORY FAILURE WITH HYPOXIA (HCC): ICD-10-CM

## 2018-06-04 DIAGNOSIS — J18.9 HCAP (HEALTHCARE-ASSOCIATED PNEUMONIA): ICD-10-CM

## 2018-06-04 DIAGNOSIS — B20 HIV (HUMAN IMMUNODEFICIENCY VIRUS INFECTION) (HCC): Chronic | ICD-10-CM

## 2018-06-04 DIAGNOSIS — F41.9 ANXIETY: ICD-10-CM

## 2018-06-04 DIAGNOSIS — J44.1 COPD EXACERBATION (HCC): ICD-10-CM

## 2018-06-04 PROCEDURE — 3331090002 HH PPS REVENUE DEBIT

## 2018-06-04 PROCEDURE — 3331090001 HH PPS REVENUE CREDIT

## 2018-06-04 RX ORDER — MIRTAZAPINE 30 MG/1
TABLET, FILM COATED ORAL
Refills: 5 | COMMUNITY
Start: 2018-04-15

## 2018-06-04 NOTE — PROGRESS NOTES
Chief Complaint   Patient presents with   [facility] Follow Up     1. Have you been to the ER, urgent care clinic since your last visit? Hospitalized since your last visit? Yes Where: [facility]    2. Have you seen or consulted any other health care providers outside of the [facility] since your last visit? Include any pap smears or colon screening.  Yes Where: Dr Jonnie Deluca

## 2018-06-04 NOTE — PROGRESS NOTES
[facility]  Pulmonary, Critical Care, and Sleep Medicine      Pulmonary Office Progress Notes    Name: Ann Justin     : 1945     Date: 2018        Subjective:     Patient is a 71 y.o. female is here for follow up for: Problems-follow up after hospital discharge. Pneumonia, hypoxic respiratory failure. Admit date: 2018  Discharge date: 18    HPI:  Patient is a 71 y.o. female hx of HIV since  with last cd4 213  recent discharge for pneumonia , readmitted on  with persistent cough and sob. Feels better overall- still has antibiotics. Using supplemental Oxygen, incentive spirometer. Less productive cough. No associated wheezing fever or chills. No n/v/d. No chest pain  No headache. Appetite improving. Past Medical History:   Diagnosis Date    Coronary atherosclerosis of native coronary artery     HIV (human immunodeficiency virus infection) ([facility] [facility]     Ill-defined condition     Pneumonia    Nonspecific abnormal electrocardiogram (ECG) (EKG)     Pre-operative cardiovascular examination        No Known Allergies    Current Outpatient Prescriptions   Medication Sig Dispense Refill    mirtazapine (REMERON) 30 mg tablet TK 1 T PO HS  5    OXYGEN-AIR DELIVERY SYSTEMS 2 L/min by Nasal route continuous.  abacavir (ZIAGEN) 300 mg tablet Take 2 Tabs by mouth daily. 60 Tab 1    dolutegravir (TIVICAY) 50 mg tab tablet Take 1 Tab by mouth daily. Indications: HIV infection 30 Tab 1    lamiVUDine (EPIVIR) 150 mg tablet Take 1 Tab by mouth daily. 30 Tab 1    albuterol-ipratropium (DUO-NEB) 2.5 mg-0.5 mg/3 ml nebu 3 mL by Nebulization route every four (4) hours as needed. 30 Nebule 1    amoxicillin-clavulanate (AUGMENTIN) 875-125 mg per tablet Take 1 Tab by mouth two (2) times daily (with meals) for 4 days. 8 Tab 0    dornase alpha (PULMOZYME) 1 mg/mL nebulizer solution Take 2.5 mL by inhalation daily.  1 Bottle 1    furosemide (LASIX) 40 mg tablet Take 1 Tab by mouth daily for 30 days. 30 Tab 1    fluticasone-vilanterol (BREO ELLIPTA) 100-25 mcg/dose inhaler Take 1 Puff by inhalation daily. 1 Inhaler 1    albuterol (PROVENTIL VENTOLIN) 2.5 mg /3 mL (0.083 %) nebulizer solution 3 mL by Nebulization route every four (4) hours as needed for Wheezing. 24 Each 2    aspirin 81 mg chewable tablet Take 1 Tab by mouth daily. 30 Tab 0    famotidine (PEPCID) 20 mg tablet Take 1 Tab by mouth daily. 30 Tab 0    tiotropium (SPIRIVA) 18 mcg inhalation capsule Take 1 Cap by inhalation daily. 30 Cap 0    traMADol (ULTRAM) 50 mg tablet Take 1 Tab by mouth every six (6) hours as needed. Max Daily Amount: 200 mg. 28 Tab 0    dilTIAZem CD (CARDIZEM CD) 180 mg ER capsule Take 1 Cap by mouth daily. 30 Cap 1    fluticasone (FLONASE) 50 mcg/actuation nasal spray 2 Sprays by Both Nostrils route daily.  darunavir-cobicistat (PREZCOBIX) 800-150 mg-mg per tablet Take 1 Tab by mouth daily.  sertraline (ZOLOFT) 25 mg tablet Take  by mouth daily.  dilTIAZem CD (CARDIZEM CD) 180 mg ER capsule Take 1 Cap by mouth daily. 30 Cap 1    furosemide (LASIX) 40 mg tablet Take 0.5 Tabs by mouth daily. 30 Tab 0    budesonide-formoterol (SYMBICORT) 160-4.5 mcg/actuation HFAA Take 2 Puffs by inhalation two (2) times a day.  1 Inhaler 0       Review of Systems:  HEENT: No epistaxis, no nasal drainage, no difficulty in swallowing, no redness in eyes  Respiratory: as above  Cardiovascular: no chest pain, no palpitations, no chronic leg edema, no syncope  Gastrointestinal: no abd pain, no vomiting, no diarrhea, no bleeding symptoms  Genitourinary: No urinary symptoms or hematuria  Integument/breast: No ulcers or rashes  Musculoskeletal:Neg  Neurological: No focal weakness, no seizures, no headaches  Behvioral/Psych: No anxiety, no depression  Constitutional: No fever, no chills, no weight loss, no night sweats     Objective:     Visit Vitals    BP 98/74 (BP 1 Location: Right arm, BP Patient Position: Sitting)    Pulse (!) 111    Temp 99.3 °F (37.4 °C) (Oral)    Resp 16    Ht 5' 2\" (1.575 m)    Wt 72.6 kg (160 lb)    SpO2 97%    BMI 29.26 kg/m2        Physical Exam:   General: comfortable, no acute distress  HEENT: pupils reactive, sclera anicteric, EOM intact  Neck: No adenopathy or thyroid swelling, no JVD, supple  CVS: S1S2 no murmurs  RS: Mod AE bilaterally, no tactile fremitus or egophony, no accessory muscle use  Abd: soft, non tender, no hepatosplenomegaly  Neuro: non focal, awake, alert  Extrm: no leg edema, clubbing or cyanosis  Skin: no rash    Data review:     Admission on 05/27/2018, Discharged on 06/01/2018   No results displayed because visit has over 200 results. Admission on 05/12/2018, Discharged on 05/19/2018   No results displayed because visit has over 200 results. Date FVC FEV1  FEV1/FVC [id number] TLC RV RV/TLC VC DLCO                                                     Imaging:  I have personally reviewed the patients radiographs and have reviewed the reports: The cardiac and mediastinal silhouette are within normal limits. Pulmonary vasculature is within normal limits. Right lower lobe basilar consolidation. No pleural effusion or pneumothorax. Mild lingular atelectasis. Degenerative changes in the shoulders. IMPRESSION:  Basilar right lower lobe pneumonia, recommend follow-up after treatment to ensure resolution. 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 Hypoxia due to mucus plugging and emphysema. CT- no evidence of ILD to suggest PCP. Slow improvement. · HIV on HAART  · Acute CHF, systolic and possibly diastolic  · Pulmonary HTN- cardiac and Pulmonary etiology  · Esophageal/throat irritation, possible candidiasis? · Hx of htn  · Hx of anxiety d/o        RECOMMENDATIONS:   · Continue to intensify bronchial hygiene- needs to continue using PEP device ( lung flute)  · Will continue Breo, Spiriva and albuterol for home use  · Continue Augmentin- total 7 days  · Continue HAART  · Will Assess Home O2 needs- at rest and ambulation  · PFT and 6 min walk as outpatient. Pulmonary rehab  · CXR in 6 weeks- follow up for clearing  · Preventive vaccinations.   · Will follow for [place] and complex care management  · Discussed with daughter and patient        Flaquita Dominguez MD

## 2018-06-05 ENCOUNTER — HOME CARE VISIT (OUTPATIENT)
Dept: SCHEDULING | Facility: HOME HEALTH | Age: 73
End: 2018-06-05
Payer: MEDICARE

## 2018-06-05 VITALS
HEART RATE: 110 BPM | SYSTOLIC BLOOD PRESSURE: 120 MMHG | DIASTOLIC BLOOD PRESSURE: 70 MMHG | OXYGEN SATURATION: 94 % | TEMPERATURE: 98.2 F

## 2018-06-05 VITALS
HEART RATE: 130 BPM | OXYGEN SATURATION: 89 % | DIASTOLIC BLOOD PRESSURE: 76 MMHG | TEMPERATURE: 98.7 F | SYSTOLIC BLOOD PRESSURE: 114 MMHG

## 2018-06-05 PROCEDURE — G0152 HHCP-SERV OF OT,EA 15 MIN: HCPCS

## 2018-06-05 PROCEDURE — 3331090001 HH PPS REVENUE CREDIT

## 2018-06-05 PROCEDURE — G0151 HHCP-SERV OF PT,EA 15 MIN: HCPCS

## 2018-06-05 PROCEDURE — 3331090002 HH PPS REVENUE DEBIT

## 2018-06-06 ENCOUNTER — HOME CARE VISIT (OUTPATIENT)
Dept: SCHEDULING | Facility: HOME HEALTH | Age: 73
End: 2018-06-06
Payer: MEDICARE

## 2018-06-06 ENCOUNTER — PATIENT OUTREACH (OUTPATIENT)
Dept: PULMONOLOGY | Age: 73
End: 2018-06-06

## 2018-06-06 PROCEDURE — G0300 HHS/HOSPICE OF LPN EA 15 MIN: HCPCS

## 2018-06-06 PROCEDURE — 3331090002 HH PPS REVENUE DEBIT

## 2018-06-06 PROCEDURE — 3331090001 HH PPS REVENUE CREDIT

## 2018-06-06 RX ORDER — ACETYLCYSTEINE 100 MG/ML
4 SOLUTION ORAL; RESPIRATORY (INHALATION) 3 TIMES DAILY
Qty: 40 ML | Refills: 1 | Status: SHIPPED | OUTPATIENT
Start: 2018-06-06

## 2018-06-06 NOTE — PROGRESS NOTES
Date/Time:  2018 2:26 PM    Nurse Navigator received referral from Dr. Michelle Gold after seeing patient in office on 18. Pateint was recently discharged for [facility] on 18. Patient was admitted to [facility] on 18 and discharged on 18 for:   · Hypoxia, chronic. Suspect further exacerbated with atelectasis and possible acute aspirations? Mucus plugging insetting of severe emphysema. On my reviewe of ct from 2018 some evidence of bronchiectasis  · Bronchiectasis  · RLL consolidation, possible aspiration vs HCAP vs atelectasis. Pt is afebrile and normal wbc. Currently on zosyn and vancomycin. · Severe emphysema  · HIV  · Hx of CAD  · afib  · Thrombocytopenia, improving slightly           . The physician discharge summary was not available at the time of outreach. Top Challenges reviewed with the provider   Patient has hx of HIVon HAART, mucous plugging /severe emphysema/COPD ( TID acetylcysteine ordered, LABA/ICS, LABA and prn KING/LOWELL) , possible aspiration vs. Pneumonia ( currently on Antibiotics). Method of communication with provider : chart routing   Inpatient RRAT score: N/A   Was this a readmission? yes   Patient stated reason for the readmission: Yes. Last hosp. Stay on 18 for PNA. Nurse Navigator (NN) contacted the patient by telephone to perform post hospital discharge assessment. Verified name and  with patient as identifiers. Provided introduction to self, and explanation of the Nurse Navigator role. Patient reports:    I am doing okay. Saw Dr. Romeo Curtis this week. Tired of taking all of these medications. ( NN encouraged patient to continue her medications as recommended to prevent exacerbations.) Voiced understanding. Having problems with my digestion/everytime I we we , I poop. Stools are soft not loose. I think it is from all these medications.   I was constipated in the hospital and they gave me warm prune juice and I have been going every since. ( NN encouraged patient to drink plenty of water.) patient stated, \" I have been. \"      Occasional nonproductive cough     Using her lung flute and IS    Good appetite. Just finished eating some ice cream     Not as SOB as I was due to my oxygen tubing is longer and I don't have to take it off to go to the BR. Patient reports that when she becomes SOB she stops and catch her breath/like a fish. ( NN encouraged patient to purse lip breath as if she is blowing out a candle.) Patient voiced understanding. They delivered my portable oxygen tanks today. Patient denies:   Wheezing   Use of nebulizer since d/c from hosp. Dizziness  Chest pain  Fever/chills  N/V   Edema  Pain     Nichelle was placed on the phone by patient due to patient was unsure of a few questions. Nichelle reports that patient is on oxygen continuously at 2 L NC. NN informed Nichelle of concern with patient's low B/p as noted on chart from 6/4/18 MD visit and [facility] nurse visit on 6/3/18. B/P was in 59'S systolic . Patient denies dizziness. Nichelle reported that OT checked it today and it was in the 120's. NN encouraged her to ck. pts b/p daily and as needed  when not being seen by [facility] and document results for MD and NN review. Nichelle voiced understanding. Nichelle denies having a Pulse oximetry in the home but reported that she is working on getting one. Patient in [address] [place] [address] per [facility].  NN encouraged Nichelle to notify MD/Specialist of patient being in yellow zone to help prevent exacerbation and hospitalization. She voiced understanding. Reviewed discharge instructions with Patient and her niece, Serge Negrete. who verbalized understanding.  Educated both to monitor and report the following  Red flags: Increased shortness of breath, wheezing not relieved with albuterol, fever, lethargy, increased fatigue, increased cough, change in sputum color or texture, Irregular HR/Palpitations, having to prop up on pillows to sleep or any new or concerning symptoms. They  Voiced understanding. Both  given an opportunity to ask questions and does not have any further questions or concerns at this time. The Niece  agrees to contact the PCP office for questions related to their healthcare. NN provided contact information for future reference. Summary of patient's top problems:  1. Hypoxia, chronic./ Severe Empysema /Bronchiectasis  2. RLL consolidation, possible aspiration vs HCAP vs atelectasis. 3. Thrombocytopenia /HIV    Home Health orders at discharge: Skilled Nursing, Physical Therapy and [address] [place] [address]: [facility]   Date of initial visit: [facility] on 6/3/18    Durable Medical Equipment ordered/company:   [employer]/nebulizer ordered and in home as of 6/6/18    Durable Medical Equipment received:  Nebulizer     Support System:    Patient lives alone but her niece, Tasha Magaña,  who is also her PCA, provides assistance with her Adls , med. Administration, and transportation. Nichelle lives across the street from patient. Barriers to care? None noted at this time.      Advance Care Planning:   Does patient have an Advance Directive:  Not on file      Medication(s):     New Medications at Discharge:   abacavir 300 mg tablet   Commonly known as:  Eris Andrade   Start taking on:  6/2/2018        Your last dose was:          Your next dose is:                 Take 2 Tabs by mouth daily.     600 mg                                    albuterol-ipratropium 2.5 mg-0.5 mg/3 ml Nebu   Commonly known as:  DUO-NEB        Your last dose was:          Your next dose is:                 3 mL by Nebulization route every four (4) hours as needed.     3 mL                                   amoxicillin-clavulanate 875-125 mg per tablet   Commonly known as:  AUGMENTIN        Your last dose was:          Your next dose is:                 Take 1 Tab by mouth two (2) times daily (with meals) for 4 days.     1 Tab                                 dolutegravir 50 mg Tab tablet   Commonly known as:  Garlon Bloodgood   Start taking on:  6/2/2018        Your last dose was:          Your next dose is:                 Take 1 Tab by mouth daily. Indications: HIV infection     50 mg                                   dornase alpha 1 mg/mL nebulizer solution   Commonly known as:  PULMOZYME        Your last dose was:          Your next dose is:                 Take 2.5 mL by inhalation daily.     2.5 mg                                   fluticasone-vilanterol 100-25 mcg/dose inhaler   Commonly known as:  BREO ELLIPTA        Your last dose was:          Your next dose is:                 Take 1 Puff by inhalation daily.   1 Puff                                   lamiVUDine 150 mg tablet   Commonly known as:  EPIVIR   Start taking on:  6/2/2018        Your last dose was:          Your next dose is:                 Take 1 Tab by mouth daily.   150 mg                             Patient's niece, America Vega,  reported that patient's insurance wouldn't cover  Dornase Alpha 1 neb. Solution. New order Written by Dr. Elena Edwards on today for Acetycysteine 100mg/ml ( 10%) neb. Soln, take 4 ml by inhalation TID. [facility]. And f/u with call, spoke with Chirag Laura in pharmacy, re: new rx faxed from officer. NN contacted Nichelle re:picking up patient's new medications and  Rx order, name, dosage, route and frequency. She voiced understanding. NN instructed Nichelle to have patient rinse her mouth after Breo use to prevent thrush. Nichelle stated, \" okay, no one told us that. \"      Changed Medications at Discharge:   dilTIAZem  mg ER capsule   Commonly known as:  CARDIZEM CD   Start taking on:  6/2/2018   What changed: You were already taking a medication with the same name, and this prescription was added.  Make sure you understand how and when to take each.        Your last dose was:          Your next dose is:                 Take 1 Cap by mouth daily.   180 mg                                    * furosemide 40 mg tablet   Commonly known as:  LASIX   What changed:  Another medication with the same name was added. Make sure you understand how and when to take each.        Your last dose was:          Your next dose is:                 Take 1 Tab by mouth daily for 30 days.     40 mg                                   * furosemide 40 mg tablet   Commonly known as:  LASIX   What changed: You were already taking a medication with the same name, and this prescription was added. Make sure you understand how and when to take each.        Your last dose was:          Your next dose is:                 Take 0.5 Tabs by mouth daily.     20 mg              Nichelle reported that patient is only taking lasix 40 mg 1/2 tab. Daily . Discontinued Medications at Discharge:     hydrALAZINE 10 mg tablet   Commonly known as:  APRESOLINE               predniSONE 10 mg tablet   Commonly known as:  DELTASONE              TRIUMEQ tablet   Generic drug:  abacavir-dolutegravir-lamiVUDine          Medication reconciliation was performed with Patient's niece and caregiver/Nichelle [place] Mary[place], who verbalizes understanding of administration of home medications. There were barriers to obtaining medications identified at this time. See Above Note and Change re:  Pulmozyme. Referral to Pharm D needed: no     Current Outpatient Prescriptions   Medication Sig    acetylcysteine (MUCOMYST) 100 mg/mL (10 %) nebulizer solution Take 4 mL by inhalation three (3) times daily.  acetaminophen (TYLENOL) 500 mg tablet Take 500 mg by mouth daily as needed for Pain.  mirtazapine (REMERON) 30 mg tablet TK 1 T PO HS    OXYGEN-AIR DELIVERY SYSTEMS 2 L/min by Nasal route continuous.  abacavir (ZIAGEN) 300 mg tablet Take 2 Tabs by mouth daily.  dolutegravir (TIVICAY) 50 mg tab tablet Take 1 Tab by mouth daily.  Indications: HIV infection    lamiVUDine (EPIVIR) 150 mg tablet Take 1 Tab by mouth daily.  albuterol-ipratropium (DUO-NEB) 2.5 mg-0.5 mg/3 ml nebu 3 mL by Nebulization route every four (4) hours as needed.  fluticasone-vilanterol (BREO ELLIPTA) 100-25 mcg/dose inhaler Take 1 Puff by inhalation daily.  furosemide (LASIX) 40 mg tablet Take 0.5 Tabs by mouth daily.  albuterol (PROVENTIL VENTOLIN) 2.5 mg /3 mL (0.083 %) nebulizer solution 3 mL by Nebulization route every four (4) hours as needed for Wheezing.  aspirin 81 mg chewable tablet Take 1 Tab by mouth daily.  famotidine (PEPCID) 20 mg tablet Take 1 Tab by mouth daily.  tiotropium (SPIRIVA) 18 mcg inhalation capsule Take 1 Cap by inhalation daily.  traMADol (ULTRAM) 50 mg tablet Take 1 Tab by mouth every six (6) hours as needed. Max Daily Amount: 200 mg.    dilTIAZem CD (CARDIZEM CD) 180 mg ER capsule Take 1 Cap by mouth daily.  fluticasone (FLONASE) 50 mcg/actuation nasal spray 2 Sprays by Both Nostrils route daily.  darunavir-cobicistat (PREZCOBIX) 800-150 mg-mg per tablet Take 1 Tab by mouth daily.  sertraline (ZOLOFT) 25 mg tablet Take  by mouth daily.  furosemide (LASIX) 40 mg tablet Take 1 Tab by mouth daily for 30 days. No current facility-administered medications for this visit.         Medications Discontinued During This Encounter   Medication Reason    dilTIAZem CD (CARDIZEM CD) 180 mg ER capsule Duplicate Order       PCP/Specialist follow up: Future Appointments  Date Time Provider Milton Dos Santos   6/6/2018 4:30 PM Marcell Hudson [facility]   6/8/2018 To Be Determined Darryl Auguste [facility]   6/12/2018 To Be Determined Marcell Hudson LPN [facility]   6/12/2018 To Be Determined LAKSHMI hZou [facility]   6/12/2018 10:00 AM MD ELIANA Erwin   6/14/2018 To Be Determined Marcell Hudson LPN [facility] HR [facility]   6/14/2018 To Be Determined LAKSHMI Zhou [address]   6/19/2018 To Be Determined Jeremy Marie [facility] HR [facility]   6/19/2018 To Be Determined [address]   6/21/2018 To Be Determined Rios Garcia [facility] HR [facility]   6/26/2018 To Be Determined Jeremy Marie LPN [facility]   6/26/2018 To Be Determined Perla Baez, OT [facility]      Patient attended f/u visit with Dr. Juancho Stratton on 6/4/18. Crystal and patient aware of appointments and Crystal provides patient's transportation. Plan:    [facility] will check patient's B/p on days HH is not seeing patient and log results. Crystal will  Acetylcysteine neb. Soln from [place] and patient to use TID. Crystal or patient will notify PCP/Specialist if patient is in COPD yellow Zone. Patient will attend all scheduled appts. And inform PCP on next visit of frequent soft stools if problem continues. NN will communicate with [address] [place] [address] as needed for coordination of patient's care. NN will continue to monitor patient for any barriers to care    Goals        COPD     Knowledge and adherence of medication (ie. action, side effects, missed dose, etc.). Target Date:  7/2/18 6/6/18 Patient using LABA/ICS and LAMA as recommended and aware of when to use her PRN nebs. 6/6/18 New Rx for Acetylcysteine faxed to [place] voiced understanding of dosage/route and frequency of use. Pneumonia     Prevent relapse of pneumonia symptoms. Target Date:  7/2/18 6/6/18 Nichelle reported patient completed antibiotic on 6/5/18.      6/6/18 Patient reports using lung flute and IS. To avoid people who are sick and large crowds as much as possible. Post Hospitalization     Attends follow-up appointments as directed.  Target Date:   6/13/18

## 2018-06-06 NOTE — Clinical Note
FYI: Spoke with patient. She is not having any respiratory distress ( Green Zone ) at this time. Some GI issues but will f/u with PCP on 6/12/18. I was concerned with her low B/p on vs. With you as well as HH visit/no dizziness. Niece will keep a close eye on b/p. Niece reported that patient's insurance didn't cover pulmozyme but found your Rx for mucomyst neb and faxed it to [id number].

## 2018-06-07 ENCOUNTER — HOME CARE VISIT (OUTPATIENT)
Dept: SCHEDULING | Facility: HOME HEALTH | Age: 73
End: 2018-06-07
Payer: MEDICARE

## 2018-06-07 PROCEDURE — 3331090002 HH PPS REVENUE DEBIT

## 2018-06-07 PROCEDURE — G0157 HHC PT ASSISTANT EA 15: HCPCS

## 2018-06-07 PROCEDURE — 3331090001 HH PPS REVENUE CREDIT

## 2018-06-08 ENCOUNTER — HOME CARE VISIT (OUTPATIENT)
Dept: HOME HEALTH SERVICES | Facility: HOME HEALTH | Age: 73
End: 2018-06-08
Payer: MEDICARE

## 2018-06-08 PROCEDURE — 3331090002 HH PPS REVENUE DEBIT

## 2018-06-08 PROCEDURE — 3331090001 HH PPS REVENUE CREDIT

## 2018-06-09 PROCEDURE — 3331090001 HH PPS REVENUE CREDIT

## 2018-06-09 PROCEDURE — 3331090002 HH PPS REVENUE DEBIT

## 2018-06-10 ENCOUNTER — HOME CARE VISIT (OUTPATIENT)
Dept: HOME HEALTH SERVICES | Facility: HOME HEALTH | Age: 73
End: 2018-06-10
Payer: MEDICARE

## 2018-06-10 PROCEDURE — 3331090001 HH PPS REVENUE CREDIT

## 2018-06-10 PROCEDURE — 3331090002 HH PPS REVENUE DEBIT

## 2018-06-11 PROCEDURE — 3331090001 HH PPS REVENUE CREDIT

## 2018-06-11 PROCEDURE — 3331090002 HH PPS REVENUE DEBIT

## 2018-06-12 ENCOUNTER — OFFICE VISIT (OUTPATIENT)
Dept: CARDIOLOGY CLINIC | Age: 73
End: 2018-06-12

## 2018-06-12 VITALS
SYSTOLIC BLOOD PRESSURE: 90 MMHG | HEART RATE: 95 BPM | DIASTOLIC BLOOD PRESSURE: 70 MMHG | BODY MASS INDEX: 29.08 KG/M2 | HEIGHT: 62 IN | WEIGHT: 158 LBS

## 2018-06-12 DIAGNOSIS — R07.89 CHEST PAIN, ATYPICAL: Primary | ICD-10-CM

## 2018-06-12 DIAGNOSIS — B20 HIV (HUMAN IMMUNODEFICIENCY VIRUS INFECTION) (HCC): Chronic | ICD-10-CM

## 2018-06-12 DIAGNOSIS — I10 ESSENTIAL HYPERTENSION: ICD-10-CM

## 2018-06-12 DIAGNOSIS — Z95.828 S/P INSERTION OF IVC (INFERIOR VENA CAVAL) FILTER: Chronic | ICD-10-CM

## 2018-06-12 DIAGNOSIS — I50.33 DIASTOLIC CHF, ACUTE ON CHRONIC (HCC): ICD-10-CM

## 2018-06-12 PROCEDURE — 3331090002 HH PPS REVENUE DEBIT

## 2018-06-12 PROCEDURE — 3331090001 HH PPS REVENUE CREDIT

## 2018-06-12 RX ORDER — FUROSEMIDE 40 MG/1
20 TABLET ORAL EVERY OTHER DAY
Qty: 30 TAB | Refills: 0 | Status: SHIPPED | OUTPATIENT
Start: 2018-06-12 | End: 2018-07-02 | Stop reason: ALTCHOICE

## 2018-06-12 NOTE — PATIENT INSTRUCTIONS
Medications Discontinued During This Encounter   Medication Reason    furosemide (LASIX) 40 mg tablet Dose Adjustment    furosemide (LASIX) 40 mg tablet Reorder          Chest Pain: Care Instructions  Your Care Instructions    There are many things that can cause chest pain. Some are not serious and will get better on their own in a few days. But some kinds of chest pain need more testing and treatment. Your doctor may have recommended a follow-up visit in the next 8 to 12 hours. If you are not getting better, you may need more tests or treatment. Even though your doctor has released you, you still need to watch for any problems. The doctor carefully checked you, but sometimes problems can develop later. If you have new symptoms or if your symptoms do not get better, get medical care right away. If you have worse or different chest pain or pressure that lasts more than 5 minutes or you passed out (lost consciousness), call 911 or seek other emergency help right away. A medical visit is only one step in your treatment. Even if you feel better, you still need to do what your doctor recommends, such as going to all suggested follow-up appointments and taking medicines exactly as directed. This will help you recover and help prevent future problems. How can you care for yourself at home? · Rest until you feel better. · Take your medicine exactly as prescribed. Call your doctor if you think you are having a problem with your medicine. · Do not drive after taking a prescription pain medicine. When should you call for help? Call 911 if:  ? · You passed out (lost consciousness). ? · You have severe difficulty breathing. ? · You have symptoms of a heart attack. These may include:  ¨ Chest pain or pressure, or a strange feeling in your chest.  ¨ Sweating. ¨ Shortness of breath. ¨ Nausea or vomiting.   ¨ Pain, pressure, or a strange feeling in your back, neck, jaw, or upper belly or in one or both shoulders or arms.  ¨ Lightheadedness or sudden weakness. ¨ A fast or irregular heartbeat. After you call 911, the  may tell you to chew 1 adult-strength or 2 to 4 low-dose aspirin. Wait for an ambulance. Do not try to drive yourself. ?Call your doctor today if:  ? · You have any trouble breathing. ? · Your chest pain gets worse. ? · You are dizzy or lightheaded, or you feel like you may faint. ? · You are not getting better as expected. ? · You are having new or different chest pain. Where can you learn more? Go to http://svetlana-ana maria.info/. Enter A120 in the search box to learn more about \"Chest Pain: Care Instructions. \"  Current as of: March 20, 2017  Content Version: 11.4  © 9133-7137 [employer]. Care instructions adapted under license by [employer] (which disclaims liability or warranty for this information). If you have questions about a medical condition or this instruction, always ask your healthcare professional. [place] [address] any warranty or liability for your use of this information.

## 2018-06-12 NOTE — PROGRESS NOTES
HISTORY OF PRESENT ILLNESS  Jordan Maldonado is a 71 y.o. female. Leg Swelling   The history is provided by the patient. This is a new problem. The problem has been resolved (since d/c). Associated symptoms include chest pain and shortness of breath. Pertinent negatives include no headaches. Shortness of Breath   The history is provided by the patient. This is a new problem. The problem occurs intermittently. The current episode started more than 1 week ago. Associated symptoms include chest pain. Pertinent negatives include no fever, no headaches, no cough, no wheezing, no PND, no orthopnea, no vomiting, no rash, no leg swelling and no claudication. The problem's precipitants include exercise (50 ft; 2 LPM). Chest Pain (Angina)    The history is provided by the patient. This is a new problem. The current episode started more than 1 week ago. The problem has been gradually improving. The problem occurs constantly. The pain is present in the substernal region. The quality of the pain is described as sharp. The pain does not radiate. Associated symptoms include lower extremity edema and shortness of breath. Pertinent negatives include no claudication, no cough, no dizziness, no fever, no headaches, no malaise/fatigue, no nausea, no orthopnea, no palpitations, no PND and no vomiting. She has tried NSAIDs (tylenol) for the symptoms. The treatment provided significant relief. Review of Systems   Constitutional: Negative for chills, fever, malaise/fatigue and weight loss. HENT: Negative for nosebleeds. Eyes: Negative for discharge. Respiratory: Positive for shortness of breath. Negative for cough and wheezing. Cardiovascular: Positive for chest pain. Negative for palpitations, orthopnea, claudication, leg swelling and PND. Gastrointestinal: Negative for diarrhea, nausea and vomiting. Genitourinary: Negative for dysuria and hematuria. Musculoskeletal: Negative for joint pain.    Skin: Negative for rash.   Neurological: Negative for dizziness, seizures, loss of consciousness and headaches. Endo/Heme/Allergies: Negative for polydipsia. Does not bruise/bleed easily. Psychiatric/Behavioral: Negative for depression and substance abuse. The patient does not have insomnia. No Known Allergies    Past Medical History:   Diagnosis Date    Congestive heart failure (HCC)     Coronary atherosclerosis of native coronary artery     Essential hypertension     HIV [facility]human immunodeficiency virus infection) ([facility] [address]     Ill-defined condition     Pneumonia    Nonspecific abnormal electrocardiogram (ECG) (EKG)     Pre-operative cardiovascular examination        Family History   Problem Relation Age of Onset    Heart Attack Mother 70    Stroke Father 54    Heart Attack Sister 59       Social History   Substance Use Topics    Smoking status: Former Smoker     Packs/day: 0.50     Quit date: 5/13/2018    Smokeless tobacco: Never Used    Alcohol use No        Current Outpatient Prescriptions   Medication Sig    abacavir (ZIAGEN) 300 mg tablet Take 2 Tabs by mouth daily.  albuterol-ipratropium (DUO-NEB) 2.5 mg-0.5 mg/3 ml nebu 3 mL by Nebulization route every four (4) hours as needed.  albuterol (PROVENTIL VENTOLIN) 2.5 mg /3 mL (0.083 %) nebulizer solution 3 mL by Nebulization route every four (4) hours as needed for Wheezing.  acetylcysteine (MUCOMYST) 100 mg/mL (10 %) nebulizer solution Take 4 mL by inhalation three (3) times daily.  acetaminophen (TYLENOL) 500 mg tablet Take 500 mg by mouth daily as needed for Pain.  mirtazapine (REMERON) 30 mg tablet TK 1 T PO HS    OXYGEN-AIR DELIVERY SYSTEMS 2 L/min by Nasal route continuous.  dolutegravir (TIVICAY) 50 mg tab tablet Take 1 Tab by mouth daily. Indications: HIV infection    lamiVUDine (EPIVIR) 150 mg tablet Take 1 Tab by mouth daily.  furosemide (LASIX) 40 mg tablet Take 1 Tab by mouth daily for 30 days.     fluticasone-vilanterol (BREO ELLIPTA) 100-25 mcg/dose inhaler Take 1 Puff by inhalation daily.  furosemide (LASIX) 40 mg tablet Take 0.5 Tabs by mouth daily.  aspirin 81 mg chewable tablet Take 1 Tab by mouth daily.  famotidine (PEPCID) 20 mg tablet Take 1 Tab by mouth daily.  tiotropium (SPIRIVA) 18 mcg inhalation capsule Take 1 Cap by inhalation daily.  traMADol (ULTRAM) 50 mg tablet Take 1 Tab by mouth every six (6) hours as needed. Max Daily Amount: 200 mg.    dilTIAZem CD (CARDIZEM CD) 180 mg ER capsule Take 1 Cap by mouth daily.  fluticasone (FLONASE) 50 mcg/actuation nasal spray 2 Sprays by Both Nostrils route daily.  darunavir-cobicistat (PREZCOBIX) 800-150 mg-mg per tablet Take 1 Tab by mouth daily.  sertraline (ZOLOFT) 25 mg tablet Take  by mouth daily. No current facility-administered medications for this visit. Past Surgical History:   Procedure Laterality Date    HX HIP REPLACEMENT      HX HYSTERECTOMY         Visit Vitals    BP 90/70    Pulse 95    Ht 5' 2\" (1.575 m)    Wt 158 lb (71.7 kg)    BMI 28.9 kg/m2       Diagnostic Studies:  I have reviewed the relevant tests done on the patient and show as follows  EKG tracings reviewed by me today. No flowsheet data found. 5/18 ECHO  SUMMARY:  Left ventricle: Systolic function was normal by visual assessment. Ejection   fraction was estimated in the range of 55 %  to 60 %. No obvious wall motion abnormalities identified in the views   obtained. Right ventricle: Systolic pressure was mildly increased. Estimated peak   pressure was at least 45 mmHg. Tricuspid valve: There was mild regurgitation. Ms. Mireya Delgado has a reminder for a \"due or due soon\" health maintenance. I have asked that she contact her primary care provider for follow-up on this health maintenance. Physical Exam   Constitutional: She is oriented to person, place, and time. She appears well-developed and well-nourished. No distress.    HENT:   Head: Normocephalic and atraumatic. Mouth/Throat: Normal dentition. Eyes: Right eye exhibits no discharge. Left eye exhibits no discharge. No scleral icterus. Neck: Neck supple. No JVD present. Carotid bruit is not present. No thyromegaly present. Cardiovascular: Normal rate, regular rhythm, S1 normal, S2 normal, normal heart sounds and intact distal pulses. Exam reveals no gallop and no friction rub. No murmur heard. Pulmonary/Chest: Effort normal and breath sounds normal. She has no wheezes. She has no rales. Abdominal: Soft. She exhibits no mass. There is no tenderness. Musculoskeletal: She exhibits no edema. Lymphadenopathy:        Right cervical: No superficial cervical adenopathy present. Left cervical: No superficial cervical adenopathy present. Neurological: She is alert and oriented to person, place, and time. Skin: Skin is warm and dry. No rash noted. Psychiatric: She has a normal mood and affect. Her behavior is normal.       ASSESSMENT and PLAN      Diagnoses and all orders for this visit:    1. Chest pain, atypical    2. Diastolic CHF, acute on chronic (HCC)  Comments:  6/18 improved, reduce lasix to EOD; Orders:  -     furosemide (LASIX) 40 mg tablet; Take 0.5 Tabs by mouth every other day. -     METABOLIC PANEL, BASIC; Future    3. Essential hypertension  Comments:  6/18 low normal; watch as lasix reduced    4. HIV (human immunodeficiency virus infection) ([facility] [address]    5. S/P insertion of IVC (inferior vena caval) filter        Pertinent laboratory and test data reviewed and discussed with patient.   See patient instructions also for other medical advice given    Medications Discontinued During This Encounter   Medication Reason    furosemide (LASIX) 40 mg tablet Dose Adjustment    furosemide (LASIX) 40 mg tablet Reorder       Follow-up Disposition:  Return in about 3 months (around 9/12/2018), or if symptoms worsen or fail to improve, for with ekg, post test.

## 2018-06-12 NOTE — MR AVS SNAPSHOT
[address] [address] [address] [id number] Patient: Allison Kohli MRN: [id number] :1945 Visit Information Date & Time Provider Department Dept. Phone Encounter #  
 2018 10:00 AM Ángela Choi MD [facility] [id number] [phone] [id number] Follow-up Instructions Return in about 3 months (around 2018), or if symptoms worsen or fail to improve, for with ekg, post test. Upcoming Health Maintenance Date Due DTaP/Tdap/Td series (1 - Tdap) 11/10/1967 BREAST CANCER SCRN MAMMOGRAM 11/10/1996 FOBT Q 1 YEAR AGE 50-75 11/10/1996 ZOSTER VACCINE AGE 60> 9/10/2006 GLAUCOMA SCREENING Q2Y 11/10/2011 Bone Densitometry (Dexa) Screening 11/10/2011 Pneumococcal 65+ High/Highest Risk (2 of 2 - PPSV23) 2015 Influenza Age 5 to Adult 2018 Allergies as of 2018  Review Complete On: 2018 By: Ángela Choi MD  
 No Known Allergies Current Immunizations  Reviewed on 2018 No immunizations on file. Not reviewed this visit You Were Diagnosed With   
  
 Codes Comments Chest pain, atypical    -  Primary ICD-10-CM: R07.89 ICD-9-CM: 786.59 Diastolic CHF, acute on chronic (HCC)     ICD-10-CM: I50.33 ICD-9-CM: 428.33, 428.0  improved, reduce lasix to EOD; Essential hypertension     ICD-10-CM: I10 
ICD-9-CM: 401.9  low normal; watch as lasix reduced HIV (human immunodeficiency virus infection) ([facility] [address]     ICD-10-CM: B20 
ICD-9-CM: V08 S/P insertion of IVC (inferior vena caval) filter     ICD-10-CM: Z77.264 ICD-9-CM: V45.89 Vitals BP Pulse Height(growth percentile) Weight(growth percentile) BMI OB Status 90/70 95 5' 2\" (1.575 m) 158 lb (71.7 kg) 28.9 kg/m2 Postmenopausal  
 Smoking Status Former Smoker Vitals History BMI and BSA Data Body Mass Index Body Surface Area 28.9 kg/m 2 1.77 m 2 Preferred Pharmacy Pharmacy Name Phone [address], [address] [facility] [phone] [facility] [phone] Your Updated Medication List  
  
   
This list is accurate as of 6/12/18 11:32 AM.  Always use your most recent med list.  
  
  
  
  
 abacavir 300 mg tablet Commonly known as:  Lucille Marva Take 2 Tabs by mouth daily. acetaminophen 500 mg tablet Commonly known as:  TYLENOL Take 500 mg by mouth daily as needed for Pain. acetylcysteine 100 mg/mL (10 %) nebulizer solution Commonly known as:  MUCOMYST Take 4 mL by inhalation three (3) times daily. albuterol 2.5 mg /3 mL (0.083 %) nebulizer solution Commonly known as:  PROVENTIL VENTOLIN  
3 mL by Nebulization route every four (4) hours as needed for Wheezing. albuterol-ipratropium 2.5 mg-0.5 mg/3 ml Nebu Commonly known as:  DUO-NEB  
3 mL by Nebulization route every four (4) hours as needed. aspirin 81 mg chewable tablet Take 1 Tab by mouth daily. AZITHROMYCIN PO Take  by mouth. dilTIAZem  mg ER capsule Commonly known as:  CARDIZEM CD Take 1 Cap by mouth daily. dolutegravir 50 mg Tab tablet Commonly known as:  Manisha Irons Take 1 Tab by mouth daily. Indications: HIV infection  
  
 famotidine 20 mg tablet Commonly known as:  PEPCID Take 1 Tab by mouth daily. fluticasone 50 mcg/actuation nasal spray Commonly known as:  Maeve Courser 2 Sprays by Both Nostrils route daily. fluticasone-vilanterol 100-25 mcg/dose inhaler Commonly known as:  BREO ELLIPTA Take 1 Puff by inhalation daily. furosemide 40 mg tablet Commonly known as:  LASIX Take 0.5 Tabs by mouth every other day. lamiVUDine 150 mg tablet Commonly known as:  Karoline Tammy Take 1 Tab by mouth daily. mirtazapine 30 mg tablet Commonly known as:  REMERON  
TK 1 T PO HS  
  
 OXYGEN-AIR DELIVERY SYSTEMS  
 2 L/min by Nasal route continuous. PREZCOBIX 800-150 mg-mg per tablet Generic drug:  darunavir-cobicistat Take 1 Tab by mouth daily. sertraline 25 mg tablet Commonly known as:  ZOLOFT Take  by mouth daily. tiotropium 18 mcg inhalation capsule Commonly known as:  Ival Bernheim Take 1 Cap by inhalation daily. traMADol 50 mg tablet Commonly known as:  ULTRAM  
Take 1 Tab by mouth every six (6) hours as needed. Max Daily Amount: 200 mg. Prescriptions Printed Refills  
 furosemide (LASIX) 40 mg tablet 0 Sig: Take 0.5 Tabs by mouth every other day. Class: Print Route: Oral  
  
Follow-up Instructions Return in about 3 months (around 9/12/2018), or if symptoms worsen or fail to improve, for with ekg, post test.  
  
To-Do List   
 06/14/2018 To Be Determined Appointment with LAKSHMI Lopes at [address] [place] [address] CTR  
  
 06/14/2018 To Be Determined Appointment with Nhi Carpenter PTA at [address] [place] [address] CTR  
  
 06/15/2018 To Be Determined Appointment with Rosa Russo LPN at [address] [place] [address] CTR  
  
 06/15/2018 To Be Determined Appointment with LAKSHMI Lopes at [address] [place] [address] CTR  
  
 06/19/2018 To Be Determined Appointment with Rosa Russo LPN at [facility] CTR  
  
 06/19/2018 To Be Determined Appointment with Nhi Carpenter PTA at [facility] CTR  
  
 06/19/2018 To Be Determined Appointment with LAKSHMI Lopes at [facility] CTR  
  
 06/21/2018 To Be Determined Appointment with Nhi Carpenter PTA at [facility] CTR  
  
 06/21/2018 To Be Determined Appointment with LAKSHMI Sim at [address] [place] [address] Around 06/22/2018 Lab:  METABOLIC PANEL, BASIC   
  
 06/26/2018 To Be Determined Appointment with Elias Aviles LPN at [address] [place] [address] REG MED CTR  
  
 06/26/2018 To Be Determined Appointment with Joshua Staton OT at [address] [place] [address] SCHEDULING/INTAKE  
  
 06/26/2018 To Be Determined Appointment with Babak Jaquez PTA at [address] [place] [address] REG MED CTR  
  
 06/28/2018 To Be Determined Appointment with Babak Jaquez PTA at [address] [place] [address] MED CTR  
  
 07/03/2018 To Be Determined Appointment with Mora Tracey PT at [address] [place] [address] Patient Instructions Medications Discontinued During This Encounter Medication Reason  furosemide (LASIX) 40 mg tablet Dose Adjustment  furosemide (LASIX) 40 mg tablet Reorder Chest Pain: Care Instructions Your Care Instructions There are many things that can cause chest pain. Some are not serious and will get better on their own in a few days. But some kinds of chest pain need more testing and treatment. Your doctor may have recommended a follow-up visit in the next 8 to 12 hours. If you are not getting better, you may need more tests or treatment. Even though your doctor has released you, you still need to watch for any problems. The doctor carefully checked you, but sometimes problems can develop later. If you have new symptoms or if your symptoms do not get better, get medical care right away. If you have worse or different chest pain or pressure that lasts more than 5 minutes or you passed out (lost consciousness), call 911 or seek other emergency help right away. A medical visit is only one step in your treatment.  Even if you feel better, you still need to do what your doctor recommends, such as going to all suggested follow-up appointments and taking medicines exactly as directed. This will help you recover and help prevent future problems. How can you care for yourself at home? · Rest until you feel better. · Take your medicine exactly as prescribed. Call your doctor if you think you are having a problem with your medicine. · Do not drive after taking a prescription pain medicine. When should you call for help? Call 911 if: 
? · You passed out (lost consciousness). ? · You have severe difficulty breathing. ? · You have symptoms of a heart attack. These may include: ¨ Chest pain or pressure, or a strange feeling in your chest. 
¨ Sweating. ¨ Shortness of breath. ¨ Nausea or vomiting. ¨ Pain, pressure, or a strange feeling in your back, neck, jaw, or upper belly or in one or both shoulders or arms. ¨ Lightheadedness or sudden weakness. ¨ A fast or irregular heartbeat. After you call 911, the  may tell you to chew 1 adult-strength or 2 to 4 low-dose aspirin. Wait for an ambulance. Do not try to drive yourself. ?Call your doctor today if: 
? · You have any trouble breathing. ? · Your chest pain gets worse. ? · You are dizzy or lightheaded, or you feel like you may faint. ? · You are not getting better as expected. ? · You are having new or different chest pain. Where can you learn more? Go to http://svetlana-ana maria.info/. Enter A120 in the search box to learn more about \"Chest Pain: Care Instructions. \" Current as of: March 20, 2017 Content Version: 11.4 © 7188-2788 [employer]. Care instructions adapted under license by [employer] (which disclaims liability or warranty for this information).  If you have questions about a medical condition or this instruction, always ask your healthcare professional. [employer] disclaims any warranty or liability for your use of this information. Introducing [facility]! [employer] introduces [employer] patient portal. Now you can access parts of your medical record, email your doctor's office, and request medication refills online. 1. In your internet browser, go to [email or web address] 2. Click on the First Time User? Click Here link in the Sign In box. You will see the New Member Sign Up page. 3. Enter your [employer] Access Code exactly as it appears below. You will not need to use this code after youve completed the sign-up process. If you do not sign up before the expiration date, you must request a new code. · And[employer] Access Code: [email or web address] Expires: 8/10/2018  2:44 AM 
 
4. Enter the last four digits of your Social Security Number (xxxx) and Date of Birth (mm/dd/yyyy) as indicated and click Submit. You will be taken to the next sign-up page. 5. Create a Androcial ID. This will be your And[employer]cial login ID and cannot be changed, so think of one that is secure and easy to remember. 6. Create a [employer]cial password. You can change your password at any time. 7. Enter your Password Reset Question and Answer. This can be used at a later time if you forget your password. 8. Enter your e-mail address. You will receive e-mail notification when new information is available in [address] 9. Click Sign Up. You can now view and download portions of your medical record. 10. Click the Download Summary menu link to download a portable copy of your medical information. If you have questions, please visit the Frequently Asked Questions section of the [employer] website. Remember, [employer] is NOT to be used for urgent needs. For medical emergencies, dial 911. Now available from your iPhone and Android! Please provide this summary of care documentation to your next provider. Your primary care clinician is listed as Julious Goodell. If you have any questions after today's visit, please call [phone].

## 2018-06-12 NOTE — LETTER
[employer] 1945 
 
6/12/2018 Dear MD Yesi Olivarez MD 
 
I had the pleasure of evaluating  Ms. Barajas in office today. Below are the relevant portions of my assessment and plan of care. ICD-10-CM ICD-9-CM 1. Chest pain, atypical R07.89 786.59   
2. Diastolic CHF, acute on chronic (HCC) I50.33 428.33 furosemide (LASIX) 40 mg tablet 794.1 METABOLIC PANEL, BASIC  
 0/67 improved, reduce lasix to EOD;  
  
3. Essential hypertension I10 401.9 6/18 low normal; watch as lasix reduced 4. HIV (human immunodeficiency virus infection) ([facility] [address] B20 V08   
5. S/P insertion of IVC (inferior vena caval) filter Z95.828 V45.89 Current Outpatient Prescriptions Medication Sig Dispense Refill  AZITHROMYCIN PO Take  by mouth.  furosemide (LASIX) 40 mg tablet Take 0.5 Tabs by mouth every other day. 30 Tab 0  
 acetylcysteine (MUCOMYST) 100 mg/mL (10 %) nebulizer solution Take 4 mL by inhalation three (3) times daily. 40 mL 1  
 acetaminophen (TYLENOL) 500 mg tablet Take 500 mg by mouth daily as needed for Pain.  mirtazapine (REMERON) 30 mg tablet TK 1 T PO HS  5  
 OXYGEN-AIR DELIVERY SYSTEMS 2 L/min by Nasal route continuous.  abacavir (ZIAGEN) 300 mg tablet Take 2 Tabs by mouth daily. 60 Tab 1  
 dolutegravir (TIVICAY) 50 mg tab tablet Take 1 Tab by mouth daily. Indications: HIV infection 30 Tab 1  
 lamiVUDine (EPIVIR) 150 mg tablet Take 1 Tab by mouth daily. 30 Tab 1  
 albuterol-ipratropium (DUO-NEB) 2.5 mg-0.5 mg/3 ml nebu 3 mL by Nebulization route every four (4) hours as needed. 30 Nebule 1  
 fluticasone-vilanterol (BREO ELLIPTA) 100-25 mcg/dose inhaler Take 1 Puff by inhalation daily. 1 Inhaler 1  
 albuterol (PROVENTIL VENTOLIN) 2.5 mg /3 mL (0.083 %) nebulizer solution 3 mL by Nebulization route every four (4) hours as needed for Wheezing. 24 Each 2  
 aspirin 81 mg chewable tablet Take 1 Tab by mouth daily.  30 Tab 0  
  famotidine (PEPCID) 20 mg tablet Take 1 Tab by mouth daily. 30 Tab 0  
 tiotropium (SPIRIVA) 18 mcg inhalation capsule Take 1 Cap by inhalation daily. 30 Cap 0  
 traMADol (ULTRAM) 50 mg tablet Take 1 Tab by mouth every six (6) hours as needed. Max Daily Amount: 200 mg. 28 Tab 0  
 dilTIAZem CD (CARDIZEM CD) 180 mg ER capsule Take 1 Cap by mouth daily. 30 Cap 1  
 fluticasone (FLONASE) 50 mcg/actuation nasal spray 2 Sprays by Both Nostrils route daily.  darunavir-cobicistat (PREZCOBIX) 800-150 mg-mg per tablet Take 1 Tab by mouth daily.  sertraline (ZOLOFT) 25 mg tablet Take  by mouth daily. Orders Placed This Encounter  METABOLIC PANEL, BASIC Standing Status:   Future Standing Expiration Date:   9/12/2018  AZITHROMYCIN PO Sig: Take  by mouth.  furosemide (LASIX) 40 mg tablet Sig: Take 0.5 Tabs by mouth every other day. Dispense:  30 Tab Refill:  0 If you have questions, please do not hesitate to call me. I look forward to following Ms. Barajas along with you. Sincerely, Lorri Barbosa MD

## 2018-06-13 ENCOUNTER — HOME CARE VISIT (OUTPATIENT)
Dept: HOME HEALTH SERVICES | Facility: HOME HEALTH | Age: 73
End: 2018-06-13
Payer: MEDICARE

## 2018-06-13 PROCEDURE — 3331090002 HH PPS REVENUE DEBIT

## 2018-06-13 PROCEDURE — G0157 HHC PT ASSISTANT EA 15: HCPCS

## 2018-06-13 PROCEDURE — 3331090001 HH PPS REVENUE CREDIT

## 2018-06-14 ENCOUNTER — HOME CARE VISIT (OUTPATIENT)
Dept: SCHEDULING | Facility: HOME HEALTH | Age: 73
End: 2018-06-14
Payer: MEDICARE

## 2018-06-14 PROCEDURE — 3331090002 HH PPS REVENUE DEBIT

## 2018-06-14 PROCEDURE — 3331090001 HH PPS REVENUE CREDIT

## 2018-06-15 ENCOUNTER — PATIENT OUTREACH (OUTPATIENT)
Dept: PULMONOLOGY | Age: 73
End: 2018-06-15

## 2018-06-15 ENCOUNTER — HOME CARE VISIT (OUTPATIENT)
Dept: SCHEDULING | Facility: HOME HEALTH | Age: 73
End: 2018-06-15
Payer: MEDICARE

## 2018-06-15 PROCEDURE — 3331090002 HH PPS REVENUE DEBIT

## 2018-06-15 PROCEDURE — 3331090001 HH PPS REVENUE CREDIT

## 2018-06-15 NOTE — Clinical Note
FYI:  Spoke with patient today. CXR ordered by Dr. Paulino Alvarado and completed on yesterday. Dr. Paulino Alvarado placed her on Azithromycin 500 mg x 1 day and 250 mg x 4 days ; Last dose is today. Patient reports her chest hurts/wheezing at intervals. Coughing up small amt. Of mucous/clear. Denies fever. When do you want them to F/u with you?

## 2018-06-15 NOTE — PROGRESS NOTES
Nurse Navigator ( NN ) Contacted pateint for PNA follow up. Verified 2 patient identifiers. Introduced self, role and reason for call. Patient reports:    I have been up and down ( NN asked patient to explain) patient reports some days her chest hurts and the mucous comes up and back down. ( NN encouraged patient to not swallow phelgm if possible )     Cough with clear mucous when able to get it up     Continues using oxygen at [address] Dr. Brian Read, Dr. Judy Quigley last week and has another appt. With Dr. Brian Read on today. Patient handed the phone to Brandon, her niece . Nichelle reported that Dr. Brian Read place patient on Azithromycin 250 mg 2 tabs x 1 day then 1 tab daily x 4, last dose is today, and upper lateral chest xray done on yesterday and results to go to Dr. Christiana Thao. She also reports patient using her nebulizer and taking medications as ordered. NN encouraged nichelle to have patient drink Water, judiciously to thin out secretions in effort to cough up phelgm. She stated, \" she has been drinking water. \"   Brandon reported that Dr. Judy Quigley told her aunt to back off of her lasix and take it every other day and if no swelling she can decrease to every 2 days. Nichelle denies patient having edema at this time. Nichelle denies:      Patient with Fever/chills, dizziness, chest pain     NN routed message to Dr. Christiana Thao of all above via staff and CC chart messaging. Dr. Negrito Gomez responded with fit patient in to be seen of Tuesday 6/19/18. NN f/u with Nu Green to assist with scheduling appt. . Appt. Made for 6/18/18 at 1:30 pm.  NN contacted [place], patient's niece, with appointment time and date. Nichelle agreed to the appointment. Opportunity to ask questions was provided. Nichelle and patient has contact information for future reference or further questions. NN will continue to follow.

## 2018-06-16 PROCEDURE — 3331090002 HH PPS REVENUE DEBIT

## 2018-06-16 PROCEDURE — 3331090001 HH PPS REVENUE CREDIT

## 2018-06-17 PROCEDURE — 3331090002 HH PPS REVENUE DEBIT

## 2018-06-17 PROCEDURE — 3331090001 HH PPS REVENUE CREDIT

## 2018-06-18 ENCOUNTER — HOME CARE VISIT (OUTPATIENT)
Dept: HOME HEALTH SERVICES | Facility: HOME HEALTH | Age: 73
End: 2018-06-18
Payer: MEDICARE

## 2018-06-18 ENCOUNTER — TELEPHONE (OUTPATIENT)
Dept: CARDIOLOGY CLINIC | Age: 73
End: 2018-06-18

## 2018-06-18 PROCEDURE — 3331090001 HH PPS REVENUE CREDIT

## 2018-06-18 PROCEDURE — 3331090002 HH PPS REVENUE DEBIT

## 2018-06-18 NOTE — TELEPHONE ENCOUNTER
Alan Cali wants clarification orders from you concerning patient. Patient heart rate was elevated during OT and PT evaluation and states she need know if you want patient to continue OT and PT and vitals perimeters.  Please advise

## 2018-06-18 NOTE — TELEPHONE ENCOUNTER
If she has no edema, discontinue Lasix.   She can take it occasionally if she develops edema  Send HR/BP record after 4-5 days

## 2018-06-19 ENCOUNTER — HOME CARE VISIT (OUTPATIENT)
Dept: HOME HEALTH SERVICES | Facility: HOME HEALTH | Age: 73
End: 2018-06-19
Payer: MEDICARE

## 2018-06-19 ENCOUNTER — OFFICE VISIT (OUTPATIENT)
Dept: PULMONOLOGY | Age: 73
End: 2018-06-19

## 2018-06-19 VITALS
SYSTOLIC BLOOD PRESSURE: 131 MMHG | RESPIRATION RATE: 20 BRPM | TEMPERATURE: 98.2 F | DIASTOLIC BLOOD PRESSURE: 84 MMHG | OXYGEN SATURATION: 99 % | HEART RATE: 120 BPM

## 2018-06-19 VITALS
TEMPERATURE: 97.9 F | BODY MASS INDEX: 28.78 KG/M2 | SYSTOLIC BLOOD PRESSURE: 106 MMHG | DIASTOLIC BLOOD PRESSURE: 76 MMHG | HEIGHT: 62 IN | WEIGHT: 156.4 LBS | HEART RATE: 125 BPM | OXYGEN SATURATION: 99 % | RESPIRATION RATE: 20 BRPM

## 2018-06-19 DIAGNOSIS — R07.89 CHEST PAIN, ATYPICAL: ICD-10-CM

## 2018-06-19 DIAGNOSIS — B37.81 CANDIDAL ESOPHAGITIS (HCC): ICD-10-CM

## 2018-06-19 DIAGNOSIS — B20 HIV (HUMAN IMMUNODEFICIENCY VIRUS INFECTION) (HCC): Chronic | ICD-10-CM

## 2018-06-19 DIAGNOSIS — J44.1 COPD EXACERBATION (HCC): Primary | ICD-10-CM

## 2018-06-19 DIAGNOSIS — J43.2 CENTRILOBULAR EMPHYSEMA (HCC): ICD-10-CM

## 2018-06-19 DIAGNOSIS — F41.9 ANXIETY: ICD-10-CM

## 2018-06-19 PROCEDURE — 3331090002 HH PPS REVENUE DEBIT

## 2018-06-19 PROCEDURE — 3331090001 HH PPS REVENUE CREDIT

## 2018-06-19 RX ORDER — NYSTATIN 100000 [USP'U]/ML
500000 SUSPENSION ORAL 4 TIMES DAILY
Qty: 200 ML | Refills: 0 | Status: SHIPPED | OUTPATIENT
Start: 2018-06-19 | End: 2018-06-29

## 2018-06-19 RX ORDER — FLUTICASONE FUROATE AND VILANTEROL 100; 25 UG/1; UG/1
1 POWDER RESPIRATORY (INHALATION) DAILY
Qty: 1 INHALER | Refills: 6 | Status: SHIPPED | OUTPATIENT
Start: 2018-06-19 | End: 2018-07-20 | Stop reason: SINTOL

## 2018-06-19 NOTE — PROGRESS NOTES
Patient in to see Dr. Ivory Villalba for follow up. Nurse Navigator introduced self to Patient and her niece Crystal.  Patient seated in W/C with her oxygen on at 2 L NC. No distress noted but reports not feeling so well. Patient very pleasant . Nurse navigator left patient with her niece and office nurse in exam room.

## 2018-06-19 NOTE — LETTER
2018 2:35 PM 
 
Patient:  Allison Kohli YOB: 1945 Date of Visit: 2018 Dear Sarah Julian MD 
[address] [place], [place] [id number] [place] [address] VIA Facsimile: [phone] 
 : Thank you for referring Ms. Reshma Barajas to me for evaluation/treatment. Below are the relevant portions of my assessment and plan of care. [facility] Pulmonary, Critical Care, and Sleep Medicine Pulmonary Office Progress Notes Name: Allison Kohli : 1945 Date: 2018 Subjective:  
 
Patient is a 71 y.o. female is here for follow up for: Problems-follow up after hospital discharge. Pneumonia, hypoxic respiratory failure. Admit date: 2018 [facility] 
 
06/19/18 C/o chest pain - 10 days localized to retrosternal area and stays all the time. Went To ER where she was worked up- EKG, CXR, CTA, troponin, BNP- She was told she has COPD Discharged with follow up. C/o continued discomfort Denies much cough No fever, chills Denies nausea. Using inhalers and SOB at baseline. On Oxygen at 2 L Reviewed notes and labs from ER visit. HPI: 
Patient is a 71 y.o. female hx of HIV since  with last cd4 213  recent discharge for pneumonia , readmitted on  with persistent cough and sob. Feels better overall- still has antibiotics. Using supplemental Oxygen, incentive spirometer. Less productive cough. No associated wheezing fever or chills. No n/v/d. No chest pain  No headache. Appetite improving. Past Medical History:  
Diagnosis Date  Congestive heart failure ([address]  Coronary atherosclerosis of native coronary artery  Essential hypertension  HIV (human immunodeficiency virus infection) ([address]  Ill-defined condition Pneumonia  Nonspecific abnormal electrocardiogram (ECG) (EKG)  Pre-operative cardiovascular examination No Known Allergies Current Outpatient Prescriptions Medication Sig Dispense Refill  furosemide (LASIX) 40 mg tablet Take 0.5 Tabs by mouth every other day. 30 Tab 0  
 acetylcysteine (MUCOMYST) 100 mg/mL (10 %) nebulizer solution Take 4 mL by inhalation three (3) times daily. 40 mL 1  
 acetaminophen (TYLENOL) 500 mg tablet Take 500 mg by mouth daily as needed for Pain.  mirtazapine (REMERON) 30 mg tablet TK 1 T PO HS  5  
 OXYGEN-AIR DELIVERY SYSTEMS 2 L/min by Nasal route continuous.  abacavir (ZIAGEN) 300 mg tablet Take 2 Tabs by mouth daily. 60 Tab 1  
 dolutegravir (TIVICAY) 50 mg tab tablet Take 1 Tab by mouth daily. Indications: HIV infection 30 Tab 1  
 lamiVUDine (EPIVIR) 150 mg tablet Take 1 Tab by mouth daily. 30 Tab 1  
 albuterol-ipratropium (DUO-NEB) 2.5 mg-0.5 mg/3 ml nebu 3 mL by Nebulization route every four (4) hours as needed. 30 Nebule 1  
 fluticasone-vilanterol (BREO ELLIPTA) 100-25 mcg/dose inhaler Take 1 Puff by inhalation daily. 1 Inhaler 1  
 albuterol (PROVENTIL VENTOLIN) 2.5 mg /3 mL (0.083 %) nebulizer solution 3 mL by Nebulization route every four (4) hours as needed for Wheezing. 24 Each 2  
 aspirin 81 mg chewable tablet Take 1 Tab by mouth daily. 30 Tab 0  
 famotidine (PEPCID) 20 mg tablet Take 1 Tab by mouth daily. 30 Tab 0  
 tiotropium (SPIRIVA) 18 mcg inhalation capsule Take 1 Cap by inhalation daily. 30 Cap 0  
 traMADol (ULTRAM) 50 mg tablet Take 1 Tab by mouth every six (6) hours as needed. Max Daily Amount: 200 mg. 28 Tab 0  
 dilTIAZem CD (CARDIZEM CD) 180 mg ER capsule Take 1 Cap by mouth daily. 30 Cap 1  
 fluticasone (FLONASE) 50 mcg/actuation nasal spray 2 Sprays by Both Nostrils route daily.  darunavir-cobicistat (PREZCOBIX) 800-150 mg-mg per tablet Take 1 Tab by mouth daily.  sertraline (ZOLOFT) 25 mg tablet Take  by mouth daily. Review of Systems: 
HEENT: No epistaxis, no nasal drainage, no difficulty in swallowing, no redness in eyes Respiratory: as above Cardiovascular: no chest pain, no palpitations, no chronic leg edema, no syncope Gastrointestinal: no abd pain, no vomiting, no diarrhea, no bleeding symptoms Genitourinary: No urinary symptoms or hematuria Integument/breast: No ulcers or rashes Musculoskeletal:Neg 
Neurological: No focal weakness, no seizures, no headaches Behvioral/Psych: No anxiety, no depression Constitutional: No fever, no chills, no weight loss, no night sweats Objective:  
 
Visit Vitals  /76 (BP 1 Location: Left arm, BP Patient Position: Sitting)  Pulse (!) 125  Temp 97.9 °F (36.6 °C) (Oral)  Resp 20  
 Ht 5' 2\" (1.575 m)  Wt 70.9 kg (156 lb 6.4 oz)  SpO2 99%  BMI 28.61 kg/m2 Physical Exam:  
General: comfortable, no acute distress HEENT: pupils reactive, sclera anicteric, EOM intact Neck: No adenopathy or thyroid swelling, no JVD, supple CVS: S1S2 no murmurs RS: Mod AE bilaterally, no tactile fremitus or egophony, no accessory muscle use Abd: soft, non tender, no hepatosplenomegaly Neuro: non focal, awake, alert Extrm: no leg edema, clubbing or cyanosis Skin: no rash Data review:  
 
Admission on 05/27/2018, Discharged on 06/01/2018 No results displayed because visit has over 200 results. Admission on 05/12/2018, Discharged on 05/19/2018 No results displayed because visit has over 200 results. Date FVC FEV1  FEV1/FVC [id number] TLC RV RV/TLC VC DLCO Imaging: 
I have personally reviewed the patients radiographs and have reviewed the reports: 
CT scan chest 6/15/2018 at [facility]: 1.  No CT evidence of pulmonary thromboembolic disease. 2.  Severe emphysema. 3.  Moderate posterior lower lobe atelectasis. 4.  Slight pericardial effusion. 5.  Left humeral head AVN. Followup recommendations: No specific radiological followup recommended. CXR: 
5/31/18 The cardiac and mediastinal silhouette are within normal limits. Pulmonary vasculature is within normal limits. Right lower lobe basilar consolidation. No pleural effusion or pneumothorax. Mild lingular atelectasis. Degenerative changes in the shoulders. IMPRESSION: 
Basilar right lower lobe pneumonia, recommend follow-up after treatment to ensure resolution. Patient Active Problem List  
Diagnosis Code  COPD exacerbation ([facility] J44.1  HIV (human immunodeficiency virus infection) ([facility] B20  
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  
 Diastolic CHF, acute on chronic (HCC) I50.33  Chest pain, atypical R07.89  
 HCAP (healthcare-associated pneumonia) J18.9  Essential hypertension I10 IMPRESSION:  
· Acute COPD exacerbation with predominant emphysema and basal atelectasis. Hypoxia due to mucus plugging and emphysema. CT- no evidence of ILD to suggest PCP. Slow improvement. Recent imaging with improvement both on CXR and CT scan. · Chest pain- atypical likely esophagitis. ? Candida, GERD. Recent Er workup- negative troponin, EKG, no PE 
· HIV on HAART · Acute CHF, systolic and possibly diastolic · Pulmonary HTN- cardiac and Pulmonary etiology · Hx of htn · Hx of anxiety d/o RECOMMENDATIONS:  
· Continue to intensify bronchial hygiene- needs to continue using PEP device ( lung flute) · Will continue Breo, Spiriva and albuterol for home use · Discussed no new findings on CXR and CT done at [facility] · Will add Nystatin swish and swallow · Consider trial of PPI · Continue HAART · Will Assess Home O2 needs- at rest and ambulation · PFT and 6 min walk as outpatient. Pulmonary rehab · Preventive vaccinations. · Will follow for complex care management- next appointment in 1 month · Discussed with daughter and patient Adi Jade MD 
 
 
 
 
If you have questions, please do not hesitate to call me.   I look forward to following Ms. [place] along with you.  
 
 
 
Sincerely, 
 
 
George Livingston MD

## 2018-06-19 NOTE — TELEPHONE ENCOUNTER
Called and left detail message on Patience voicemail concerning instructions per Dr. Pancho Purdy if any questions to call the office.

## 2018-06-19 NOTE — COMMUNICATION BODY
[facility]  Pulmonary, Critical Care, and Sleep Medicine      Pulmonary Office Progress Notes    Name: Raul Viera     : 1945     Date: 2018        Subjective:     Patient is a 71 y.o. female is here for follow up for: Problems-follow up after hospital discharge. Pneumonia, hypoxic respiratory failure. Admit date: 2018  Discharge date: 2018  Sarah ER visit 6/15/2018    06/19/18  C/o chest pain - 10 days localized to retrosternal area and stays all the time. Went To ER where she was worked up- EKG, CXR, CTA, troponin, BNP-  She was told she has COPD  Discharged with follow up. C/o continued discomfort  Denies much cough  No fever, chills  Denies nausea. Using inhalers and SOB at baseline. On Oxygen at 2 L  Reviewed notes and labs from ER visit. HPI:  Patient is a 71 y.o. female hx of HIV since  with last cd4 213  recent discharge for pneumonia , readmitted on  with persistent cough and sob. Feels better overall- still has antibiotics. Using supplemental Oxygen, incentive spirometer. Less productive cough. No associated wheezing fever or chills. No n/v/d. No chest pain  No headache. Appetite improving. Past Medical History:   Diagnosis Date    Congest[facility] ([facility]     Coronary atherosclerosis of native coronary artery     Essential hypertension     HIV (human immunodeficiency virus infection) ([facility]     Ill-defined condition     Pneumonia    Nonspecific abnormal electrocardiogram (ECG) (EKG)     Pre-operative cardiovascular examination        No Known Allergies    Current Outpatient Prescriptions   Medication Sig Dispense Refill    furosemide (LASIX) 40 mg tablet Take 0.5 Tabs by mouth every other day. 30 Tab 0    acetylcysteine (MUCOMYST) 100 mg/mL (10 %) nebulizer solution Take 4 mL by inhalation three (3) times daily. 40 mL 1    acetaminophen (TYLENOL) 500 mg tablet Take 500 mg by mouth daily as needed for Pain.  mirtazapine (REMERON) 30 mg tablet TK 1 T PO HS  5    OXYGEN-AIR DELIVERY SYSTEMS 2 L/min by Nasal route continuous.  abacavir (ZIAGEN) 300 mg tablet Take 2 Tabs by mouth daily. 60 Tab 1    dolutegravir (TIVICAY) 50 mg tab tablet Take 1 Tab by mouth daily. Indications: HIV infection 30 Tab 1    lamiVUDine (EPIVIR) 150 mg tablet Take 1 Tab by mouth daily. 30 Tab 1    albuterol-ipratropium (DUO-NEB) 2.5 mg-0.5 mg/3 ml nebu 3 mL by Nebulization route every four (4) hours as needed. 30 Nebule 1    fluticasone-vilanterol (BREO ELLIPTA) 100-25 mcg/dose inhaler Take 1 Puff by inhalation daily. 1 Inhaler 1    albuterol (PROVENTIL VENTOLIN) 2.5 mg /3 mL (0.083 %) nebulizer solution 3 mL by Nebulization route every four (4) hours as needed for Wheezing. 24 Each 2    aspirin 81 mg chewable tablet Take 1 Tab by mouth daily. 30 Tab 0    famotidine (PEPCID) 20 mg tablet Take 1 Tab by mouth daily. 30 Tab 0    tiotropium (SPIRIVA) 18 mcg inhalation capsule Take 1 Cap by inhalation daily. 30 Cap 0    traMADol (ULTRAM) 50 mg tablet Take 1 Tab by mouth every six (6) hours as needed. Max Daily Amount: 200 mg. 28 Tab 0    dilTIAZem CD (CARDIZEM CD) 180 mg ER capsule Take 1 Cap by mouth daily. 30 Cap 1    fluticasone (FLONASE) 50 mcg/actuation nasal spray 2 Sprays by Both Nostrils route daily.  darunavir-cobicistat (PREZCOBIX) 800-150 mg-mg per tablet Take 1 Tab by mouth daily.  sertraline (ZOLOFT) 25 mg tablet Take  by mouth daily.        Review of Systems:  HEENT: No epistaxis, no nasal drainage, no difficulty in swallowing, no redness in eyes  Respiratory: as above  Cardiovascular: no chest pain, no palpitations, no chronic leg edema, no syncope  Gastrointestinal: no abd pain, no vomiting, no diarrhea, no bleeding symptoms  Genitourinary: No urinary symptoms or hematuria  Integument/breast: No ulcers or rashes  Musculoskeletal:Neg  Neurological: No focal weakness, no seizures, no headaches  Behvioral/Psych: No anxiety, no depression  Constitutional: No fever, no chills, no weight loss, no night sweats     Objective:     Visit Vitals    /76 (BP 1 Location: Left arm, BP Patient Position: Sitting)    Pulse (!) 125    Temp 97.9 °F (36.6 °C) (Oral)    Resp 20    Ht 5' 2\" (1.575 m)    Wt 70.9 kg (156 lb 6.4 oz)    SpO2 99%    BMI 28.61 kg/m2        Physical Exam:   General: comfortable, no acute distress  HEENT: pupils reactive, sclera anicteric, EOM intact  Neck: No adenopathy or thyroid swelling, no JVD, supple  CVS: S1S2 no murmurs  RS: Mod AE bilaterally, no tactile fremitus or egophony, no accessory muscle use  Abd: soft, non tender, no hepatosplenomegaly  Neuro: non focal, awake, alert  Extrm: no leg edema, clubbing or cyanosis  Skin: no rash    Data review:     Admission on 05/27/2018, Discharged on 06/01/2018   No results displayed because visit has over 200 results. Admission on 05/12/2018, Discharged on 05/19/2018   No results displayed because visit has over 200 results. Date FVC FEV1  FEV1/FVC [id number] TLC RV RV/TLC VC DLCO                             Imaging:  I have personally reviewed the patients radiographs and have reviewed the reports:  CT scan chest 6/15/2018 at [facility]:    1.  No CT evidence of pulmonary thromboembolic disease. 2.  Severe emphysema. 3.  Moderate posterior lower lobe atelectasis. 4.  Slight pericardial effusion. 5.  Left humeral head AVN. Followup recommendations: No specific radiological followup recommended. CXR:  5/31/18  The cardiac and mediastinal silhouette are within normal limits. Pulmonary vasculature is within normal limits. Right lower lobe basilar consolidation. No pleural effusion or pneumothorax. Mild lingular atelectasis. Degenerative changes in the shoulders. IMPRESSION:  Basilar right lower lobe pneumonia, recommend follow-up after treatment to ensure resolution.      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 Hypoxia due to mucus plugging and emphysema. CT- no evidence of ILD to suggest PCP. Slow improvement. Recent imaging with improvement both on CXR and CT scan. · Chest pain- atypical likely esophagitis. ? Candida, GERD. Recent Er workup- negative troponin, EKG, no PE  · HIV on HAART  · Acute CHF, systolic and possibly diastolic  · Pulmonary HTN- cardiac and Pulmonary etiology  · Hx of htn  · Hx of anxiety d/o        RECOMMENDATIONS:   · Continue to intensify bronchial hygiene- needs to continue using PEP device ( lung flute)  · Will continue Breo, Spiriva and albuterol for home use  · Discussed no new findings on CXR and CT done at [facility]  · Will add Nystatin swish and swallow  · Consider trial of PPI  · Continue HAART  · Will Assess Home O2 needs- at rest and ambulation  · PFT and 6 min walk as outpatient. Pulmonary rehab  · Preventive vaccinations.   · Will follow for complex care management- next appointment in 1 month  · Discussed with daughter and patient        Adi Jade MD

## 2018-06-19 NOTE — PROGRESS NOTES
[facility]  Pulmonary, Critical Care, and Sleep Medicine      Pulmonary Office Progress Notes    Name: Renee Reis     : 1945     Date: 2018        Subjective:     Patient is a 71 y.o. female is here for follow up for: Problems-follow up after hospital discharge. Pneumonia, hypoxic respiratory failure. Admit date: 2018  Discharge date: 2018  [facility] ER visit 6/15/2018    06/19/18  C/o chest pain - 10 days localized to retrosternal area and stays all the time. Went To ER where she was worked up- EKG, CXR, CTA, troponin, BNP-  She was told she has COPD  Discharged with follow up. C/o continued discomfort  Denies much cough  No fever, chills  Denies nausea. Using inhalers and SOB at baseline. On Oxygen at 2 L  Reviewed notes and labs from ER visit. HPI:  Patient is a 71 y.o. female hx of HIV since  with last cd4 213  recent discharge for pneumonia , readmitted on  with persistent cough and sob. Feels better overall- still has antibiotics. Using supplemental Oxygen, incentive spirometer. Less productive cough. No associated wheezing fever or chills. No n/v/d. No chest pain  No headache. Appetite improving. Past Medical History:   Diagnosis Date    Congestive heart failure ([facility]     Coronary atherosclerosis of native coronary artery     Essential hypertension     HIV (human immunodeficiency virus infection) ([facility]     Ill-defined condition     Pneumonia    Nonspecific abnormal electrocardiogram (ECG) (EKG)     Pre-operative cardiovascular examination        No Known Allergies    Current Outpatient Prescriptions   Medication Sig Dispense Refill    furosemide (LASIX) 40 mg tablet Take 0.5 Tabs by mouth every other day. 30 Tab 0    acetylcysteine (MUCOMYST) 100 mg/mL (10 %) nebulizer solution Take 4 mL by inhalation three (3) times daily. 40 mL 1    acetaminophen (TYLENOL) 500 mg tablet Take 500 mg by mouth daily as needed for Pain.  mirtazapine (REMERON) 30 mg tablet TK 1 T PO HS  5    OXYGEN-AIR DELIVERY SYSTEMS 2 L/min by Nasal route continuous.  abacavir (ZIAGEN) 300 mg tablet Take 2 Tabs by mouth daily. 60 Tab 1    dolutegravir (TIVICAY) 50 mg tab tablet Take 1 Tab by mouth daily. Indications: HIV infection 30 Tab 1    lamiVUDine (EPIVIR) 150 mg tablet Take 1 Tab by mouth daily. 30 Tab 1    albuterol-ipratropium (DUO-NEB) 2.5 mg-0.5 mg/3 ml nebu 3 mL by Nebulization route every four (4) hours as needed. 30 Nebule 1    fluticasone-vilanterol (BREO ELLIPTA) 100-25 mcg/dose inhaler Take 1 Puff by inhalation daily. 1 Inhaler 1    albuterol (PROVENTIL VENTOLIN) 2.5 mg /3 mL (0.083 %) nebulizer solution 3 mL by Nebulization route every four (4) hours as needed for Wheezing. 24 Each 2    aspirin 81 mg chewable tablet Take 1 Tab by mouth daily. 30 Tab 0    famotidine (PEPCID) 20 mg tablet Take 1 Tab by mouth daily. 30 Tab 0    tiotropium (SPIRIVA) 18 mcg inhalation capsule Take 1 Cap by inhalation daily. 30 Cap 0    traMADol (ULTRAM) 50 mg tablet Take 1 Tab by mouth every six (6) hours as needed. Max Daily Amount: 200 mg. 28 Tab 0    dilTIAZem CD (CARDIZEM CD) 180 mg ER capsule Take 1 Cap by mouth daily. 30 Cap 1    fluticasone (FLONASE) 50 mcg/actuation nasal spray 2 Sprays by Both Nostrils route daily.  darunavir-cobicistat (PREZCOBIX) 800-150 mg-mg per tablet Take 1 Tab by mouth daily.  sertraline (ZOLOFT) 25 mg tablet Take  by mouth daily.        Review of Systems:  HEENT: No epistaxis, no nasal drainage, no difficulty in swallowing, no redness in eyes  Respiratory: as above  Cardiovascular: no chest pain, no palpitations, no chronic leg edema, no syncope  Gastrointestinal: no abd pain, no vomiting, no diarrhea, no bleeding symptoms  Genitourinary: No urinary symptoms or hematuria  Integument/breast: No ulcers or rashes  Musculoskeletal:Neg  Neurological: No focal weakness, no seizures, no headaches  Behvioral/Psych: No anxiety, no depression  Constitutional: No fever, no chills, no weight loss, no night sweats     Objective:     Visit Vitals    /76 (BP 1 Location: Left arm, BP Patient Position: Sitting)    Pulse (!) 125    Temp 97.9 °F (36.6 °C) (Oral)    Resp 20    Ht 5' 2\" (1.575 m)    Wt 70.9 kg (156 lb 6.4 oz)    SpO2 99%    BMI 28.61 kg/m2        Physical Exam:   General: comfortable, no acute distress  HEENT: pupils reactive, sclera anicteric, EOM intact  Neck: No adenopathy or thyroid swelling, no JVD, supple  CVS: S1S2 no murmurs  RS: Mod AE bilaterally, no tactile fremitus or egophony, no accessory muscle use  Abd: soft, non tender, no hepatosplenomegaly  Neuro: non focal, awake, alert  Extrm: no leg edema, clubbing or cyanosis  Skin: no rash    Data review:     Admission on 05/27/2018, Discharged on 06/01/2018   No results displayed because visit has over 200 results. Admission on 05/12/2018, Discharged on 05/19/2018   No results displayed because visit has over 200 results. Date FVC FEV1  FEV1/FVC [id number] TLC RV RV/TLC VC DLCO                             Imaging:  I have personally reviewed the patients radiographs and have reviewed the reports:  CT scan chest 6/15/2018 at [facility]:    1.  No CT evidence of pulmonary thromboembolic disease. 2.  Severe emphysema. 3.  Moderate posterior lower lobe atelectasis. 4.  Slight pericardial effusion. 5.  Left humeral head AVN. Followup recommendations: No specific radiological followup recommended. CXR:  5/31/18  The cardiac and mediastinal silhouette are within normal limits. Pulmonary vasculature is within normal limits. Right lower lobe basilar consolidation. No pleural effusion or pneumothorax. Mild lingular atelectasis. Degenerative changes in the shoulders. IMPRESSION:  Basilar right lower lobe pneumonia, recommend follow-up after treatment to ensure resolution.      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 Hypoxia due to mucus plugging and emphysema. CT- no evidence of ILD to suggest PCP. Slow improvement. Recent imaging with improvement both on CXR and CT scan. · Chest pain- atypical likely esophagitis. ? Candida, GERD. Recent Er workup- negative troponin, EKG, no PE  · HIV on HAART  · Acute CHF, systolic and possibly diastolic  · Pulmonary HTN- cardiac and Pulmonary etiology  · Hx of htn  · Hx of anxiety d/o        RECOMMENDATIONS:   · Continue to intensify bronchial hygiene- needs to continue using PEP device ( lung flute)  · Will continue Breo, Spiriva and albuterol for home use  · Discussed no new findings on CXR and CT done at [facility]  · Will add Nystatin swish and swallow  · Consider trial of PPI  · Continue HAART  · Will Assess Home O2 needs- at rest and ambulation  · PFT and 6 min walk as outpatient. Pulmonary rehab  · Preventive vaccinations.   · Will follow for complex care management- next appointment in 1 month  · Discussed with daughter and patient        Papa Sow MD

## 2018-06-19 NOTE — PROGRESS NOTES
Chief Complaint   Patient presents with    Follow-up     1. Have you been to the ER, urgent care clinic since your last visit? Hospitalized since your last visit? Yes Where: [facility]    2. Have you seen or consulted any other health care providers outside of the [address] [place] [address] since your last visit? Include any pap smears or colon screening.  Yes Where: Dr David Gonzalez

## 2018-06-19 NOTE — MR AVS SNAPSHOT
[address] [facility], [address] [address] 
[phone] Patient: Allison Kohli MRN: [id number] :1945 Visit Information Date & Time Provider Department Dept. Phone Encounter #  
 2018  1:30 PM Maribeth Michael MD [facility] Milton Toussaint [id number] Follow-up Instructions Return in about 1 month (around 2018). Your Appointments 2018 12:00 PM  
Office Visit with Ana Scott MD  
[facility] ([address]) Appt Note: 3 month follow up/BMP with EKG  
 [address] [facility]. [facility] [address]  
  
   
 [address] [address] [address] [address] Upcoming Health Maintenance Date Due DTaP/Tdap/Td series (1 - Tdap) 11/10/1967 BREAST CANCER SCRN MAMMOGRAM 11/10/1996 FOBT Q 1 YEAR AGE 50-75 11/10/1996 ZOSTER VACCINE AGE 60> 9/10/2006 GLAUCOMA SCREENING Q2Y 11/10/2011 Bone Densitometry (Dexa) Screening 11/10/2011 Pneumococcal 65+ High/Highest Risk (2 of 2 - PPSV23) 2015 Influenza Age 5 to Adult 2018 Allergies as of 2018  Review Complete On: 2018 By: Maribeth Michael MD  
 No Known Allergies Current Immunizations  Reviewed on 2018 No immunizations on file. Not reviewed this visit You Were Diagnosed With   
  
 Codes Comments COPD exacerbation ([facility] [address]    -  Primary ICD-10-CM: J44.1 ICD-9-CM: 491.21 Chest pain, atypical     ICD-10-CM: R07.89 ICD-9-CM: 786.59 Candidal esophagitis (HCC)     ICD-10-CM: B37.81 ICD-9-CM: 112.84 Vitals BP Pulse Temp Resp Height(growth percentile) Weight(growth percentile) 106/76 (BP 1 Location: Left arm, BP Patient Position: Sitting) (!) 125 97.9 °F (36.6 °C) (Oral) 20 5' 2\" (1.575 m) 156 lb 6.4 oz (70.9 kg) SpO2 BMI OB Status Smoking Status 99% 28.61 kg/m2 Postmenopausal Former Smoker Vitals History BMI and BSA Data Body Mass Index Body Surface Area  
 28.61 kg/m 2 1.76 m 2 Preferred Pharmacy Pharmacy Name Phone [address], [address] [place] [address] 6772 [facility] [phone] Your Updated Medication List  
  
   
This list is accurate as of 6/19/18  1:57 PM.  Always use your most recent med list.  
  
  
  
  
 abacavir 300 mg tablet Commonly known as:  Rozena  Take 2 Tabs by mouth daily. acetaminophen 500 mg tablet Commonly known as:  TYLENOL Take 500 mg by mouth daily as needed for Pain. acetylcysteine 100 mg/mL (10 %) nebulizer solution Commonly known as:  MUCOMYST Take 4 mL by inhalation three (3) times daily. albuterol 2.5 mg /3 mL (0.083 %) nebulizer solution Commonly known as:  PROVENTIL VENTOLIN  
3 mL by Nebulization route every four (4) hours as needed for Wheezing. albuterol-ipratropium 2.5 mg-0.5 mg/3 ml Nebu Commonly known as:  DUO-NEB  
3 mL by Nebulization route every four (4) hours as needed. aspirin 81 mg chewable tablet Take 1 Tab by mouth daily. dilTIAZem  mg ER capsule Commonly known as:  CARDIZEM CD Take 1 Cap by mouth daily. dolutegravir 50 mg Tab tablet Commonly known as:  Leyda Hill Take 1 Tab by mouth daily. Indications: HIV infection  
  
 famotidine 20 mg tablet Commonly known as:  PEPCID Take 1 Tab by mouth daily. fluticasone 50 mcg/actuation nasal spray Commonly known as:  Artem Maze 2 Sprays by Both Nostrils route daily. fluticasone-vilanterol 100-25 mcg/dose inhaler Commonly known as:  BREO ELLIPTA Take 1 Puff by inhalation daily. furosemide 40 mg tablet Commonly known as:  LASIX Take 0.5 Tabs by mouth every other day. lamiVUDine 150 mg tablet Commonly known as:  [place] Take 1 Tab by mouth daily. mirtazapine 30 mg tablet Commonly known as:  REMERON  
TK 1 T PO HS  
  
 nystatin 100,000 unit/mL suspension Commonly known as:  MYCOSTATIN Take 5 mL by mouth four (4) times daily for 10 days. swish and swallow  Indications: GASTROINTESTINAL CANDIDIASIS, oral candidiasis OXYGEN-AIR DELIVERY SYSTEMS  
2 L/min by Nasal route continuous. PREZCOBIX 800-150 mg-mg per tablet Generic drug:  darunavir-cobicistat Take 1 Tab by mouth daily. sertraline 25 mg tablet Commonly known as:  ZOLOFT Take  by mouth daily. tiotropium 18 mcg inhalation capsule Commonly known as:  Leocadia  Take 1 Cap by inhalation daily. traMADol 50 mg tablet Commonly known as:  ULTRAM  
Take 1 Tab by mouth every six (6) hours as needed. Max Daily Amount: 200 mg. Prescriptions Printed Refills  
 fluticasone-vilanterol (BREO ELLIPTA) 100-25 mcg/dose inhaler 6 Sig: Take 1 Puff by inhalation daily. Class: Print Route: Inhalation  
 tiotropium (SPIRIVA) 18 mcg inhalation capsule 3 Sig: Take 1 Cap by inhalation daily. Class: Print Route: Inhalation Prescriptions Sent to Pharmacy Refills  
 nystatin (MYCOSTATIN) 100,000 unit/mL suspension 0 Sig: Take 5 mL by mouth four (4) times daily for 10 days. swish and swallow  Indications: GASTROINTESTINAL CANDIDIASIS, oral candidiasis Class: Normal  
 Pharmacy: [address] [place] [address] #: [phone] Route: Oral  
  
Follow-up Instructions Return in about 1 month (around 7/19/2018). To-Do List   
 06/26/2018 To Be Determined Appointment with Milagros Huizar LPN at [facility]  
  
 06/26/2018 To Be Determined Appointment with Nathalia Quintanilla OT at [address] [place] [address] SCHEDULING/INTAKE  
  
 07/03/2018 To Be Determined   Appointment with Vince Amaya PT at [address] [place] [address]  
  
  
 Introducing \[facility] [place] [facility]\"" & HEALTH SERVICES! [place] [facility] introduces [employer] patient portal. Now you can access parts of your medical record, email your doctor's office, and request medication refills online. 1. In your internet browser, go to [email or web address] 2. Click on the First Time User? Click Here link in the Sign In box. You will see the New Member Sign Up page. 3. Enter your [employer] Access Code exactly as it appears below. You will not need to use this code after youve completed the sign-up process. If you do not sign up before the expiration date, you must request a new code. · [employer] Access Code: [email or web address] Expires: 8/10/2018  2:44 AM 
 
4. Enter the last four digits of your Social Security Number (xxxx) and Date of Birth (mm/dd/yyyy) as indicated and click Submit. You will be taken to the next sign-up page. 5. Create a [employer] ID. This will be your [employer] login ID and cannot be changed, so think of one that is secure and easy to remember. 6. Create a [employer] password. You can change your password at any time. 7. Enter your Password Reset Question and Answer. This can be used at a later time if you forget your password. 8. Enter your e-mail address. You will receive e-mail notification when new information is available in [address] 9. Click Sign Up. You can now view and download portions of your medical record. 10. Click the Download Summary menu link to download a portable copy of your medical information. If you have questions, please visit the Frequently Asked Questions section of the [employer] website. Remember, [employer] is NOT to be used for urgent needs. For medical emergencies, dial 911. Now available from your iPhone and Android! Please provide this summary of care documentation to your next provider. Your primary care clinician is listed as Aaron Latif. If you have any questions after today's visit, please call [phone].

## 2018-06-20 ENCOUNTER — HOME CARE VISIT (OUTPATIENT)
Dept: SCHEDULING | Facility: HOME HEALTH | Age: 73
End: 2018-06-20
Payer: MEDICARE

## 2018-06-20 VITALS
RESPIRATION RATE: 18 BRPM | SYSTOLIC BLOOD PRESSURE: 104 MMHG | OXYGEN SATURATION: 98 % | TEMPERATURE: 98.3 F | DIASTOLIC BLOOD PRESSURE: 66 MMHG | HEART RATE: 107 BPM

## 2018-06-20 PROCEDURE — 3331090001 HH PPS REVENUE CREDIT

## 2018-06-20 PROCEDURE — G0299 HHS/HOSPICE OF RN EA 15 MIN: HCPCS

## 2018-06-20 PROCEDURE — G0152 HHCP-SERV OF OT,EA 15 MIN: HCPCS

## 2018-06-20 PROCEDURE — 3331090002 HH PPS REVENUE DEBIT

## 2018-06-21 PROCEDURE — 3331090002 HH PPS REVENUE DEBIT

## 2018-06-21 PROCEDURE — 3331090001 HH PPS REVENUE CREDIT

## 2018-06-22 ENCOUNTER — PATIENT OUTREACH (OUTPATIENT)
Dept: PULMONOLOGY | Age: 73
End: 2018-06-22

## 2018-06-22 ENCOUNTER — HOME CARE VISIT (OUTPATIENT)
Dept: SCHEDULING | Facility: HOME HEALTH | Age: 73
End: 2018-06-22
Payer: MEDICARE

## 2018-06-22 PROCEDURE — 3331090001 HH PPS REVENUE CREDIT

## 2018-06-22 PROCEDURE — G0157 HHC PT ASSISTANT EA 15: HCPCS

## 2018-06-22 PROCEDURE — 3331090002 HH PPS REVENUE DEBIT

## 2018-06-22 NOTE — PROGRESS NOTES
Nurse Navigator ( NN ) Contacted pateint for PNA follow up. Verified 2 patient identifiers. Introduced self, role and reason for call. Patient reports:    I've had better days. I am a little upset because my baby brother had heart surgery and my sister is also in the hospital with heart problems. NN performed active listening and encouraged patient to try not to focus on things out of her control but to keep the niki. Patient stated, \" I accept the things I cannot change. \"     Using the Nystatin four time a day and stated, \" it has made a big differences. \" Denies chest discomfort      Continues using oxygen at 2 L NC continuously    CUEVAS    Taking all medications as recommended. ( NN reminded pateint to rinse mouth after using her Breo. ) She voiced understanding. Patient  denies:    Coughing   Mucous   Edema  Fever/chills   Dizziness   Chest pain       Patient very talkative and begin singing song to NN. NN performed active listening and thanked patient for the song. Opportunity to ask questions was provided. Patient has contact information for future reference or further questions. NN will continue to follow.

## 2018-06-23 ENCOUNTER — HOME CARE VISIT (OUTPATIENT)
Dept: HOME HEALTH SERVICES | Facility: HOME HEALTH | Age: 73
End: 2018-06-23
Payer: MEDICARE

## 2018-06-23 PROCEDURE — 3331090002 HH PPS REVENUE DEBIT

## 2018-06-23 PROCEDURE — 3331090001 HH PPS REVENUE CREDIT

## 2018-06-24 PROCEDURE — 3331090001 HH PPS REVENUE CREDIT

## 2018-06-24 PROCEDURE — 3331090002 HH PPS REVENUE DEBIT

## 2018-06-25 ENCOUNTER — TELEPHONE (OUTPATIENT)
Dept: CARDIOLOGY CLINIC | Age: 73
End: 2018-06-25

## 2018-06-25 ENCOUNTER — HOME CARE VISIT (OUTPATIENT)
Dept: HOME HEALTH SERVICES | Facility: HOME HEALTH | Age: 73
End: 2018-06-25
Payer: MEDICARE

## 2018-06-25 VITALS
TEMPERATURE: 98 F | SYSTOLIC BLOOD PRESSURE: 102 MMHG | OXYGEN SATURATION: 98 % | DIASTOLIC BLOOD PRESSURE: 68 MMHG | HEART RATE: 142 BPM

## 2018-06-25 VITALS
DIASTOLIC BLOOD PRESSURE: 85 MMHG | SYSTOLIC BLOOD PRESSURE: 125 MMHG | HEART RATE: 108 BPM | OXYGEN SATURATION: 96 % | TEMPERATURE: 97.9 F

## 2018-06-25 PROCEDURE — 3331090002 HH PPS REVENUE DEBIT

## 2018-06-25 PROCEDURE — 3331090001 HH PPS REVENUE CREDIT

## 2018-06-25 NOTE — TELEPHONE ENCOUNTER
Home health called and states that patients BP on 6/20 @ rest is 125/85 . With little activity while sitting BP is 120/80 and HR is 120-130. While walking short distance HR goes up to 147. On 6/25 BP is 102/68 . Please advise.

## 2018-06-25 NOTE — TELEPHONE ENCOUNTER
Discontinue Lasix  Get HR/BP chart twice daily for 3 days and show me  If heart rate is still fast at greater than 110, get an EKG

## 2018-06-26 ENCOUNTER — HOME CARE VISIT (OUTPATIENT)
Dept: SCHEDULING | Facility: HOME HEALTH | Age: 73
End: 2018-06-26
Payer: MEDICARE

## 2018-06-26 ENCOUNTER — HOME CARE VISIT (OUTPATIENT)
Dept: HOME HEALTH SERVICES | Facility: HOME HEALTH | Age: 73
End: 2018-06-26
Payer: MEDICARE

## 2018-06-26 PROCEDURE — 3331090002 HH PPS REVENUE DEBIT

## 2018-06-26 PROCEDURE — G0157 HHC PT ASSISTANT EA 15: HCPCS

## 2018-06-26 PROCEDURE — G0152 HHCP-SERV OF OT,EA 15 MIN: HCPCS

## 2018-06-26 PROCEDURE — 3331090001 HH PPS REVENUE CREDIT

## 2018-06-26 PROCEDURE — G0300 HHS/HOSPICE OF LPN EA 15 MIN: HCPCS

## 2018-06-27 VITALS
OXYGEN SATURATION: 96 % | SYSTOLIC BLOOD PRESSURE: 116 MMHG | TEMPERATURE: 98 F | DIASTOLIC BLOOD PRESSURE: 70 MMHG | HEART RATE: 106 BPM

## 2018-06-27 PROCEDURE — 3331090002 HH PPS REVENUE DEBIT

## 2018-06-27 PROCEDURE — 3331090001 HH PPS REVENUE CREDIT

## 2018-06-28 PROCEDURE — 3331090002 HH PPS REVENUE DEBIT

## 2018-06-28 PROCEDURE — 3331090001 HH PPS REVENUE CREDIT

## 2018-06-29 ENCOUNTER — HOME CARE VISIT (OUTPATIENT)
Dept: SCHEDULING | Facility: HOME HEALTH | Age: 73
End: 2018-06-29
Payer: MEDICARE

## 2018-06-29 ENCOUNTER — PATIENT OUTREACH (OUTPATIENT)
Dept: PULMONOLOGY | Age: 73
End: 2018-06-29

## 2018-06-29 ENCOUNTER — HOME CARE VISIT (OUTPATIENT)
Dept: HOME HEALTH SERVICES | Facility: HOME HEALTH | Age: 73
End: 2018-06-29
Payer: MEDICARE

## 2018-06-29 PROCEDURE — 3331090002 HH PPS REVENUE DEBIT

## 2018-06-29 PROCEDURE — 3331090001 HH PPS REVENUE CREDIT

## 2018-06-29 PROCEDURE — G0157 HHC PT ASSISTANT EA 15: HCPCS

## 2018-06-29 PROCEDURE — G0152 HHCP-SERV OF OT,EA 15 MIN: HCPCS

## 2018-06-29 NOTE — PROGRESS NOTES
Nurse Navigator ( NN ) Contacted pateint for PNA follow up. Verified 2 patient identifiers. Introduced self, role and reason for call. Patient reports:      I am doing better     I had two home health people in today. I have been walking without my walker today and using the bathroom now and not the potty chair. ( NN applauded patient for progression and  discussed safety with patient to prevent falling) Patient voiced understanding. Her brother and sister are doing better but still in the hospital.     Continues to Use the Nystatin four time a day . Continues using oxygen at 2 L NC continuously    CUEVAS    Taking all medications as recommended. ( NN reminded pateint to rinse mouth after using her Breo. ) She voiced understanding. Patient  denies:    Coughing   Mucous   Edema  Fever/chills   Dizziness   Chest pain     NN discussed patient's tachycardia with activity with patient. Patient stated, \" Willgerson Shipley, they were working with me today on that. I tend to breath through my mouth instead on nose and my HR goes up. I have to remind myself to breath through my nose and I am not a fish. \"      Patient reported that the doctor stopped one of her medications due to increase HR. NN informed patient that was her Lasix . Patient stated, \" okay, Yvonne Ou knows not to give it to me. \"      Opportunity to ask questions was provided. Patient has contact information for future reference or further questions. NN noted that [facility] nurse contacted MD on 6/25/18 re: patient HR increased to 147  with activity and Home health nurse received  new orders  from Dr. Zuleta Dy:    Discontinue Lasix  Get HR/BP chart twice daily for 3 days and show me  If heart rate is still fast at greater than 110, get an EKG                NN  will continue to follow.

## 2018-06-30 PROCEDURE — 3331090002 HH PPS REVENUE DEBIT

## 2018-06-30 PROCEDURE — 3331090001 HH PPS REVENUE CREDIT

## 2018-07-01 PROCEDURE — 3331090002 HH PPS REVENUE DEBIT

## 2018-07-01 PROCEDURE — 3331090001 HH PPS REVENUE CREDIT

## 2018-07-02 ENCOUNTER — TELEPHONE (OUTPATIENT)
Dept: CARDIOLOGY CLINIC | Age: 73
End: 2018-07-02

## 2018-07-02 VITALS
OXYGEN SATURATION: 98 % | HEART RATE: 104 BPM | DIASTOLIC BLOOD PRESSURE: 72 MMHG | SYSTOLIC BLOOD PRESSURE: 120 MMHG | TEMPERATURE: 98 F

## 2018-07-02 VITALS
TEMPERATURE: 97.9 F | DIASTOLIC BLOOD PRESSURE: 78 MMHG | HEART RATE: 74 BPM | RESPIRATION RATE: 20 BRPM | OXYGEN SATURATION: 97 % | SYSTOLIC BLOOD PRESSURE: 118 MMHG

## 2018-07-02 PROCEDURE — 3331090001 HH PPS REVENUE CREDIT

## 2018-07-02 PROCEDURE — 3331090002 HH PPS REVENUE DEBIT

## 2018-07-02 RX ORDER — DILTIAZEM HYDROCHLORIDE 240 MG/1
CAPSULE, EXTENDED RELEASE ORAL
Qty: 90 CAP | Refills: 1 | Status: SHIPPED | OUTPATIENT
Start: 2018-07-02 | End: 2018-10-19 | Stop reason: SDUPTHER

## 2018-07-02 RX ORDER — DILTIAZEM HYDROCHLORIDE 240 MG/1
240 CAPSULE, COATED, EXTENDED RELEASE ORAL DAILY
Qty: 30 CAP | Refills: 1 | Status: SHIPPED | OUTPATIENT
Start: 2018-07-02 | End: 2018-07-02 | Stop reason: SDUPTHER

## 2018-07-02 NOTE — TELEPHONE ENCOUNTER
6/28/18   AM / BP-112/85 P- 104  PM/BP- 119/79 P-114  6/29/18  Am//64 P-104  PM//78 P-102  6/30/18  AM//82 P-104  PM//69 P-105

## 2018-07-03 PROCEDURE — 3331090002 HH PPS REVENUE DEBIT

## 2018-07-03 PROCEDURE — 3331090001 HH PPS REVENUE CREDIT

## 2018-07-04 PROCEDURE — 3331090002 HH PPS REVENUE DEBIT

## 2018-07-04 PROCEDURE — 3331090001 HH PPS REVENUE CREDIT

## 2018-07-05 ENCOUNTER — HOME CARE VISIT (OUTPATIENT)
Dept: HOME HEALTH SERVICES | Facility: HOME HEALTH | Age: 73
End: 2018-07-05
Payer: MEDICARE

## 2018-07-05 ENCOUNTER — HOME CARE VISIT (OUTPATIENT)
Dept: SCHEDULING | Facility: HOME HEALTH | Age: 73
End: 2018-07-05
Payer: MEDICARE

## 2018-07-05 ENCOUNTER — TELEPHONE (OUTPATIENT)
Dept: CARDIOLOGY CLINIC | Age: 73
End: 2018-07-05

## 2018-07-05 VITALS
TEMPERATURE: 98.2 F | OXYGEN SATURATION: 95 % | DIASTOLIC BLOOD PRESSURE: 74 MMHG | HEART RATE: 102 BPM | SYSTOLIC BLOOD PRESSURE: 110 MMHG

## 2018-07-05 PROCEDURE — G0151 HHCP-SERV OF PT,EA 15 MIN: HCPCS

## 2018-07-05 PROCEDURE — G0152 HHCP-SERV OF OT,EA 15 MIN: HCPCS

## 2018-07-05 PROCEDURE — 3331090001 HH PPS REVENUE CREDIT

## 2018-07-05 PROCEDURE — 3331090002 HH PPS REVENUE DEBIT

## 2018-07-05 NOTE — TELEPHONE ENCOUNTER
Increase water intake. Continue same medications. Heart rate higher likely related to severe COPD. Call next week Monday if resting heart rate more than 110.

## 2018-07-05 NOTE — TELEPHONE ENCOUNTER
Called and left detail message for [facility] Patience on voicemail, if any questions to please call office.

## 2018-07-05 NOTE — TELEPHONE ENCOUNTER
Patience HHN called and stated patient reesting heart rate is in range of 104-112 and heart rate with activity, standing and walking around house is 134, after 5 minute break heart rate is steady at 112 /76. Patience the [facility] would like specific perimeters for heart rate.  Please advise

## 2018-07-06 ENCOUNTER — HOME CARE VISIT (OUTPATIENT)
Dept: HOME HEALTH SERVICES | Facility: HOME HEALTH | Age: 73
End: 2018-07-06
Payer: MEDICARE

## 2018-07-06 PROCEDURE — 3331090002 HH PPS REVENUE DEBIT

## 2018-07-06 PROCEDURE — 3331090001 HH PPS REVENUE CREDIT

## 2018-07-07 PROCEDURE — 3331090001 HH PPS REVENUE CREDIT

## 2018-07-07 PROCEDURE — 3331090002 HH PPS REVENUE DEBIT

## 2018-07-08 VITALS — OXYGEN SATURATION: 96 % | TEMPERATURE: 98.8 F | HEART RATE: 104 BPM

## 2018-07-08 PROCEDURE — 3331090001 HH PPS REVENUE CREDIT

## 2018-07-08 PROCEDURE — 3331090002 HH PPS REVENUE DEBIT

## 2018-07-09 ENCOUNTER — PATIENT OUTREACH (OUTPATIENT)
Dept: PULMONOLOGY | Age: 73
End: 2018-07-09

## 2018-07-09 VITALS — HEART RATE: 100 BPM | OXYGEN SATURATION: 97 % | SYSTOLIC BLOOD PRESSURE: 110 MMHG | DIASTOLIC BLOOD PRESSURE: 70 MMHG

## 2018-07-09 DIAGNOSIS — J44.1 COPD EXACERBATION (HCC): Primary | ICD-10-CM

## 2018-07-09 PROCEDURE — 3331090002 HH PPS REVENUE DEBIT

## 2018-07-09 PROCEDURE — 3331090001 HH PPS REVENUE CREDIT

## 2018-07-09 NOTE — PROGRESS NOTES
Nurse Navigator ( NN ) Contacted pateint for PNA follow up. Verified 2 patient identifiers. Introduced self, role and reason for call. Patient reports:      Hanging in there    Becca Prescott on Friday morning  Reaching for my blinds and lost my balance /vision wasn't good. layed on floor from 7:15 to 8:30 AM . Nichelle arrived and call paramedic. Transported to [facility] ED /Xrays done and no broken bones/just bruises on Rt, side of body. No pain now but hurt while they were getting me up. Just sore now. I am doing better     I had two home health people in today. I have been walking without my walker today and using the bathroom now and not the potty chair. ( NN applauded patient for progression and  discussed safety with patient to prevent falling) Patient voiced understanding. Lighting is bad in this apartment and it is suppose to be for Emmanuel Salazar. ( NN discussed the possiblity of patient getting more lamps for her apartment. Wanting medical alert button. She stated, \" I sent off to 3 different companies and never heard from them. \"  ( NN will seek resource for affordable medical alert  For patient. )     Went to see primary MD on today and was given a Rx , which is at [facility] , for medication to dry up the secretions in my ears. She said I had fluid in my ears. I told them that at the hospital and they never did anything. (Crystal and patient unable to tell NN name of drug but Carlos Ruth reported it was for allergies.) family to p/u medication today. Dr. Fabián Valera said other than that I am doing good and to see her in 4 mos. NN spoke with Nichelle ( Pateint placed her on phone when NN asked about her wanting an inogen )     Carlos Ruth reported that she spoke with a carmel from [employer] who told her about medicaid paying for used inogens but they don't have those.   ( NN will f/u with DME companies and update her as well as on the medical alert ) Nichelle stated, \" okay.\"    NN discussed with Nichelle note written by Dr. Nina Sepulveda re:  Patient's HR being elevated. Nichelle reports that Dr. Nina Sepulveda discontinued her aunts lasix and increased her Cardizem to 240 mg daily. Nichelle reports that prior to med. Changes patient's HR resting 103-107 and 125-130 with activitity but decrease down after resting. Her B/p 103/70's . NN asked if she had taken her vitals recently to include pulse ox. Nichelle reports patient's oxygen levels have been good 98-99 % resting and with activity but didn't check VS today and need batteries for Pulse ox. NN encouraged her to check her aunt's VS daily and as needed and log results for NN to review or [facility] to review. Voiced understanding. Continues using oxygen at 2 L NC continuously    CUEVAS    Taking all medications as recommended. Patient  denies:    Coughing   Mucous   Edema  Fever/chills   Dizziness   Chest pain   SOB at rest     Nurse Navigator ( NN ) discussed safety with patient to include never reaching over to  items off floor or reach out for items not in immediate grasp. Patient voiced understanding. NN encouraged patient to keep her cell phone on her at all times. Patient stated, \" I have 2 portable phones and my cell phone were all in another room. \"      Opportunity to ask questions was provided. Patient has contact information for future reference or further questions. Future Appointments:     Provider Milton Dos Santos   7/20/2018 11:00 AM MD CHARLES [address]   9/17/2018 12:00 PM Ailyn Francisco MD [facility]     Pateint aware of appointments and family will provide her transportation. Patient reports that she has an appt. In [place] on 7/18/18 and they will send a cab to pick her up. NN asked pt. About her portable tanks. She stated, \" Nichelle called them but no one answered to let them know I need refills.  She will call them back. I have to take 3-4 of them with me in a bag and that is why I want a portable concentrator. \"    NN informed patient that NN will contact [facility] oxygen companies to check on cost and maybe an alternative to what she currently has as portable tanks. Patient voiced understanding. NN contacted Tanja Castellanos, Liaison at [facility] . No answer. Message left on her voicemail with Verified 2 patient identifiers. Introduced self, role and reason for call. Requesting a callback to NN. NN contacted [facility] No answer. Left message on voicemail for  Rosemary Sousa, Patient's  introducing self, role and reason for call. Requested return call. Contact information provided. NN received return call from Tanja Castellanos at [facility] ''[facility]'' . 2 patient identifiers given. NN discussed patient oxygen need for POC or alternative due to her frequent trips to see MD in [facility], with next trip on 7/18/18 and decreased mobility. Kevin Adkins reports that she will send staff out to evaluate patient for conserving device and switch to POC if she qualifies. Jocelin requested NN fax order for evaluation and switch to Portable oxygen concentrator ( POC ) and she will call Macie Maya and Kimberly Cummings at [facility] re: order. NN thanked Kevin Adkins for her assistance. NN received a return call from Rosemary Sousa CM at [facility]. 2 patient identifiers given. NN discussed patient recent fall without fx. and being on floor for 11/2 hrs before family found her and patient needing a medical alert device. Daisy Harris Stated,\" I don't know how I missed signing her up for one but I will place the order to the company we use for alert button. It usually takes about 2 days for a response and I will call the patient and let her know. NN thanked Daisy Harris for her assistance. NN contacted patient. Verified 2 patient identifiers. Introduced self, and reason for call.   Patient informed that [facility] will call her to come out and evaluate her for conserving device ( explained device to patient ) and switch her to their version of portable oxygen concentrator and Jayla Mann, her CM with [facility] will contact her in a few days re:  Her medical alert . Patient stated, \" thank you, I need to buy you something. \"  NN told patient that staying off the floor is payment enough. Patient laughed. NN asked patient to contact NN once she hears for both companies. Patient voiced understanding. NN routed chart message to Dr. Sherlyn Espana re:  Patient's need for POC if possible and [employer] order to evaluate. NN received approval from Dr. Bogdan Bassett for the DME oxygen order. NN  will continue to follow.

## 2018-07-10 ENCOUNTER — HOME CARE VISIT (OUTPATIENT)
Dept: SCHEDULING | Facility: HOME HEALTH | Age: 73
End: 2018-07-10
Payer: MEDICARE

## 2018-07-10 PROCEDURE — 3331090002 HH PPS REVENUE DEBIT

## 2018-07-10 PROCEDURE — 3331090001 HH PPS REVENUE CREDIT

## 2018-07-10 PROCEDURE — G0152 HHCP-SERV OF OT,EA 15 MIN: HCPCS

## 2018-07-11 ENCOUNTER — HOME CARE VISIT (OUTPATIENT)
Dept: SCHEDULING | Facility: HOME HEALTH | Age: 73
End: 2018-07-11
Payer: MEDICARE

## 2018-07-11 VITALS
SYSTOLIC BLOOD PRESSURE: 130 MMHG | DIASTOLIC BLOOD PRESSURE: 80 MMHG | OXYGEN SATURATION: 100 % | HEART RATE: 89 BPM | SYSTOLIC BLOOD PRESSURE: 135 MMHG | TEMPERATURE: 97.3 F | TEMPERATURE: 98.2 F | HEART RATE: 97 BPM | OXYGEN SATURATION: 94 % | DIASTOLIC BLOOD PRESSURE: 90 MMHG

## 2018-07-11 PROCEDURE — 3331090001 HH PPS REVENUE CREDIT

## 2018-07-11 PROCEDURE — G0152 HHCP-SERV OF OT,EA 15 MIN: HCPCS

## 2018-07-11 PROCEDURE — 3331090002 HH PPS REVENUE DEBIT

## 2018-07-12 PROCEDURE — 3331090002 HH PPS REVENUE DEBIT

## 2018-07-12 PROCEDURE — 3331090001 HH PPS REVENUE CREDIT

## 2018-07-13 PROCEDURE — 3331090002 HH PPS REVENUE DEBIT

## 2018-07-13 PROCEDURE — 3331090001 HH PPS REVENUE CREDIT

## 2018-07-14 PROCEDURE — 3331090002 HH PPS REVENUE DEBIT

## 2018-07-14 PROCEDURE — 3331090001 HH PPS REVENUE CREDIT

## 2018-07-15 PROCEDURE — 3331090001 HH PPS REVENUE CREDIT

## 2018-07-15 PROCEDURE — 3331090002 HH PPS REVENUE DEBIT

## 2018-07-16 PROCEDURE — 3331090002 HH PPS REVENUE DEBIT

## 2018-07-16 PROCEDURE — 3331090001 HH PPS REVENUE CREDIT

## 2018-07-17 ENCOUNTER — PATIENT OUTREACH (OUTPATIENT)
Dept: PULMONOLOGY | Age: 73
End: 2018-07-17

## 2018-07-17 ENCOUNTER — HOME CARE VISIT (OUTPATIENT)
Dept: HOME HEALTH SERVICES | Facility: HOME HEALTH | Age: 73
End: 2018-07-17
Payer: MEDICARE

## 2018-07-17 PROCEDURE — 3331090002 HH PPS REVENUE DEBIT

## 2018-07-17 PROCEDURE — 3331090001 HH PPS REVENUE CREDIT

## 2018-07-18 PROCEDURE — 3331090001 HH PPS REVENUE CREDIT

## 2018-07-18 PROCEDURE — 3331090002 HH PPS REVENUE DEBIT

## 2018-07-19 ENCOUNTER — HOME CARE VISIT (OUTPATIENT)
Dept: HOME HEALTH SERVICES | Facility: HOME HEALTH | Age: 73
End: 2018-07-19
Payer: MEDICARE

## 2018-07-19 PROCEDURE — 3331090003 HH PPS REVENUE ADJ

## 2018-07-19 PROCEDURE — 3331090002 HH PPS REVENUE DEBIT

## 2018-07-19 PROCEDURE — 3331090001 HH PPS REVENUE CREDIT

## 2018-07-19 NOTE — PROGRESS NOTES
Estelle Santiago , patient's niece ( listed on PMI ) contacted Nurse Navigator ( NN ). Verified 2 patient identifiers. Nichelle  reports:      Her aunt, Ms. Barajas, received her portable oxgyen concentrator and her life alert button. Aunt is doing okay     Hanging in there    Soreness and bruising from last fall is getting better. No recent falls    Up to use potty only due to soreness and recent fall. PT released her day before the fall    OT coming out this week and unsure what they will do     Oxygen levels 99 % on 2 L NC    CUEVAS    Taking all medications as recommended. Bella Alva goes to her MD appt. In [place] on tomorrow    Patient  denies:    Coughing   Mucous   Edema  Fever/chills   Dizziness   Chest pain   SOB at rest     Nurse Navigator ( NN ) reminded Nichelle to encourage her pt. To wear life alert button at all times. She voice understanding. Opportunity to ask questions was provided. Patient has contact information for future reference or further questions. Future Appointments:     Provider Milton Dos Santos   7/20/2018 11:00 AM MD CHARLES Mcallister    9/17/2018 12:00 PM Kirill García MD [facility] [address] [place] [address] aware of appointments and family will provide her transportation.

## 2018-07-20 ENCOUNTER — OFFICE VISIT (OUTPATIENT)
Dept: PULMONOLOGY | Age: 73
End: 2018-07-20

## 2018-07-20 VITALS
WEIGHT: 168 LBS | OXYGEN SATURATION: 96 % | HEIGHT: 62 IN | HEART RATE: 124 BPM | TEMPERATURE: 98.8 F | RESPIRATION RATE: 16 BRPM | BODY MASS INDEX: 30.91 KG/M2 | SYSTOLIC BLOOD PRESSURE: 124 MMHG | DIASTOLIC BLOOD PRESSURE: 84 MMHG

## 2018-07-20 DIAGNOSIS — I50.33 DIASTOLIC CHF, ACUTE ON CHRONIC (HCC): ICD-10-CM

## 2018-07-20 DIAGNOSIS — J43.2 CENTRILOBULAR EMPHYSEMA (HCC): Primary | ICD-10-CM

## 2018-07-20 DIAGNOSIS — B20 HIV (HUMAN IMMUNODEFICIENCY VIRUS INFECTION) (HCC): Chronic | ICD-10-CM

## 2018-07-20 NOTE — MR AVS SNAPSHOT
[address] [facility], [address] [place] [address] 
[phone] Patient: Allison Kohli MRN: [id number] :1945 Visit Information Date & Time Provider Department Dept. Phone Encounter #  
 2018 11:00 AM Mario Coley MD [facility] Milton Toussaint [id number] Follow-up Instructions Return in about 2 months (around 2018). Your Appointments 2018 12:00 PM  
Office Visit with Anai Donnelly MD  
[facility] ([facility]) Appt Note: 3 month follow up/BMP with EKG  
 [address] [facility]. [facility] [address]  
  
   
 [address] [place]inda [place] [id number] Upcoming Health Maintenance Date Due DTaP/Tdap/Td series (1 - Tdap) 11/10/1967 BREAST CANCER SCRN MAMMOGRAM 11/10/1996 FOBT Q 1 YEAR AGE 50-75 11/10/1996 ZOSTER VACCINE AGE 60> 9/10/2006 GLAUCOMA SCREENING Q2Y 11/10/2011 Bone Densitometry (Dexa) Screening 11/10/2011 Pneumococcal 65+ High/Highest Risk (2 of 2 - PPSV23) 2015 MEDICARE YEARLY EXAM 2018 Influenza Age 5 to Adult 2018 Allergies as of 2018  Review Complete On: 2018 By: Mario Coley MD  
 No Known Allergies Current Immunizations  Reviewed on 2018 No immunizations on file. Not reviewed this visit You Were Diagnosed With   
  
 Codes Comments Centrilobular emphysema ([facility]    -  Primary ICD-10-CM: J43.2 ICD-9-CM: 492.8 HIV (human immunodeficiency virus infection) ([facility]     ICD-10-CM: B20 
ICD-9-CM: V08 Diastolic CHF, acute on chronic (HCC)     ICD-10-CM: I50.33 ICD-9-CM: 428.33, 428.0 Vitals BP Pulse Temp Resp Height(growth percentile) Weight(growth percentile) 124/84 (BP 1 Location: Left arm, BP Patient Position: Sitting) (!) 124 98.8 °F (37.1 °C) (Oral) 16 5' 2\" (1.575 m) 168 lb (76.2 kg) SpO2 BMI OB Status Smoking Status 96% 30.73 kg/m2 Postmenopausal Former Smoker Vitals History BMI and BSA Data Body Mass Index Body Surface Area 30.73 kg/m 2 1.83 m 2 Preferred Pharmacy Pharmacy Name Phone [address], [address] [place] [address] [phone]  [facility] [phone] Your Updated Medication List  
  
   
This list is accurate as of 7/20/18 11:36 AM.  Always use your most recent med list.  
  
  
  
  
 abacavir 300 mg tablet Commonly known as:  Dewain Slight Take 2 Tabs by mouth daily. acetaminophen 500 mg tablet Commonly known as:  TYLENOL Take 500 mg by mouth daily as needed for Pain. acetylcysteine 100 mg/mL (10 %) nebulizer solution Commonly known as:  MUCOMYST Take 4 mL by inhalation three (3) times daily. albuterol 2.5 mg /3 mL (0.083 %) nebulizer solution Commonly known as:  PROVENTIL VENTOLIN  
3 mL by Nebulization route every four (4) hours as needed for Wheezing. albuterol-ipratropium 2.5 mg-0.5 mg/3 ml Nebu Commonly known as:  DUO-NEB  
3 mL by Nebulization route every four (4) hours as needed. aspirin 81 mg chewable tablet Take 1 Tab by mouth daily. * dilTIAZem  mg XR capsule Commonly known as:  DILACOR XR Take 240 mg by mouth daily. * CARTIA  mg ER capsule Generic drug:  dilTIAZem CD  
TAKE 1 CAPSULE BY MOUTH DAILY  
  
 dolutegravir 50 mg Tab tablet Commonly known as:  Alvarez Pippins Take 1 Tab by mouth daily. Indications: HIV infection  
  
 famotidine 20 mg tablet Commonly known as:  PEPCID Take 1 Tab by mouth daily. fexofenadine 180 mg tablet Commonly known as:  Gloriajean Campo Take 180 mg by mouth daily. fluticasone 50 mcg/actuation nasal spray Commonly known as:  Lisha Gut 2 Sprays by Both Nostrils route daily. lamiVUDine 150 mg tablet Commonly known as:  [place] Crews Take 1 Tab by mouth daily. mirtazapine 30 mg tablet Commonly known as:  REMERON  
TK 1 T PO HS  
  
 nystatin 100,000 unit/mL suspension Commonly known as:  MYCOSTATIN Take 5 mL by mouth four (4) times daily. for 10 days OXYGEN-AIR DELIVERY SYSTEMS  
2 L/min by Nasal route continuous. PREZCOBIX 800-150 mg-mg per tablet Generic drug:  darunavir-cobicistat Take 1 Tab by mouth daily. sertraline 25 mg tablet Commonly known as:  ZOLOFT Take  by mouth daily. tiotropium 18 mcg inhalation capsule Commonly known as:  Graydon Hoguet Take 1 Cap by inhalation daily. traMADol 50 mg tablet Commonly known as:  ULTRAM  
Take 1 Tab by mouth every six (6) hours as needed. Max Daily Amount: 200 mg.  
  
 * Notice: This list has 2 medication(s) that are the same as other medications prescribed for you. Read the directions carefully, and ask your doctor or other care provider to review them with you. Follow-up Instructions Return in about 2 months (around 9/20/2018). To-Do List   
 07/20/2018 PFT:  RT--OXIMETRY 6 MINUTE WALK   
  
 07/21/2018 Procedures: AMB POC PFT COMPLETE W/BRONCHODILATOR   
  
 07/21/2018 Procedures: AMB POC PFT COMPLETE W/O BRONCHODILATOR   
  
 07/21/2018 Procedures:  DIFFUSING CAPACITY   
  
 07/21/2018 Procedures:  GAS DILUT/WASHOUT LUNG VOL W/WO DISTRIB VENT&VOL Introducing [facility] [place] [facility]! [facility] introduces Online Mil[employer] Platform patient portal. Now you can access parts of your medical record, email your doctor's office, and request medication refills online. 1. In your internet browser, go to [email or web address] [email or web address] 2. Click on the First Time User? Click Here link in the Sign In box. You will see the New Member Sign Up page. 3. Enter your Online [employer] Platform Access Code exactly as it appears below. You will not need to use this code after youve completed the sign-up process.  If you do not sign up before the expiration date, you must request a new code. · [employer] Access Code: [email or web address] Expires: 8/10/2018  2:44 AM 
 
4. Enter the last four digits of your Social Security Number (xxxx) and Date of Birth (mm/dd/yyyy) as indicated and click Submit. You will be taken to the next sign-up page. 5. Create a [employer] ID. This will be your [employer] login ID and cannot be changed, so think of one that is secure and easy to remember. 6. Create a [employer] password. You can change your password at any time. 7. Enter your Password Reset Question and Answer. This can be used at a later time if you forget your password. 8. Enter your e-mail address. You will receive e-mail notification when new information is available in [address] 9. Click Sign Up. You can now view and download portions of your medical record. 10. Click the Download Summary menu link to download a portable copy of your medical information. If you have questions, please visit the Frequently Asked Questions section of the [employer] website. Remember, [employer] is NOT to be used for urgent needs. For medical emergencies, dial 911. Now available from your iPhone and Android! Please provide this summary of care documentation to your next provider. Your primary care clinician is listed as Wesly Pickens. If you have any questions after today's visit, please call [phone].

## 2018-07-20 NOTE — PROGRESS NOTES
Chief Complaint   Patient presents with    COPD     1. Have you been to the ER, urgent care clinic since your last visit? Hospitalized since your last visit? Yes [facility]: [facility]    2. Have you seen or consulted any other health care providers outside of the [address] [place] [address] since your last visit? Include any pap smears or colon screening. Yes.  Dr. Ravindra Avila PCP/ [facility]

## 2018-07-20 NOTE — PROGRESS NOTES
[facility] Pulmonary, Critical Care, and Sleep Medicine Pulmonary Office Progress Notes Name: Allison Kohli : 1945 Date: 2018 Subjective:  
 
Patient is a 71 y.o. female is here for follow up for: Problems-follow up after hospital discharge. Pneumonia, hypoxic respiratory failure. 
 
 
18 C/o chest pain - 10 days localized to retrosternal area and stays all the time. Went To ER where she was worked up- EKG, CXR, CTA, troponin, BNP-improved with PPI She was told she has COPD Discharged with follow up. C/o continued discomfort Denies much cough No fever, chills Denies nausea. Using inhalers and SOB at baseline. On Oxygen at 2 L. Per daughter she has less need to use O2 at rest with SaO2 > 94% HPI: 
Patient is a 71 y.o. female hx of HIV since  with last cd4 213  recent discharge for pneumonia , readmitted on  with persistent cough and sob. Feels better overall- still has antibiotics. Using supplemental Oxygen, incentive spirometer. Less productive cough. No associated wheezing fever or chills. No n/v/d. No chest pain  No headache. Appetite improving. Past Medical History:  
Diagnosis Date  Congestive heart failure ([facility]  Coronary atherosclerosis of native coronary artery  Essential hypertension  HIV (human immunodeficiency virus infection) ([facility]  Ill-defined condition Pneumonia  Nonspecific abnormal electrocardiogram (ECG) (EKG)  Pre-operative cardiovascular examination No Known Allergies Current Outpatient Prescriptions Medication Sig Dispense Refill  fexofenadine (ALLEGRA) 180 mg tablet Take 180 mg by mouth daily.  methylPREDNISolone (MEDROL) 4 mg tab Take 4 mg by mouth See Admin Instructions. Beginning 2018, as directed: 
Day 1: 2 tabs before breakfast, 1 tab after lunch and supper, 2 tabs at bedtime Day 2: 1 tab before breakfast, 1 tab after lunch and supper, 2 tabs at bedtime Day 3: 1 tab before breakfast, 1 tab after lunch, after supper, at bedtime Day 4: 1 tab before breakfast, after lunch, and at bedtime Day 5: 1 tab before breakfast and at bedtime Day 6: 1 tab before breakfast    
 dilTIAZem XR (DILACOR XR) 240 mg XR capsule Take 240 mg by mouth daily.  darunavir-cobicistat (PREZCOBIX) 800-150 mg-mg per tablet Take 1 Tab by mouth daily.  dolutegravir (TIVICAY) 50 mg tab tablet Take 50 mg by mouth daily.  CARTIA  mg ER capsule TAKE 1 CAPSULE BY MOUTH DAILY 90 Cap 1  
 nystatin (MYCOSTATIN) 100,000 unit/mL suspension Take 5 mL by mouth four (4) times daily. for 10 days  fluticasone-vilanterol (BREO ELLIPTA) 100-25 mcg/dose inhaler Take 1 Puff by inhalation daily. 1 Inhaler 6  
 tiotropium (SPIRIVA) 18 mcg inhalation capsule Take 1 Cap by inhalation daily. 30 Cap 3  
 acetylcysteine (MUCOMYST) 100 mg/mL (10 %) nebulizer solution Take 4 mL by inhalation three (3) times daily. 40 mL 1  
 acetaminophen (TYLENOL) 500 mg tablet Take 500 mg by mouth daily as needed for Pain.  mirtazapine (REMERON) 30 mg tablet TK 1 T PO HS  5  
 OXYGEN-AIR DELIVERY SYSTEMS 2 L/min by Nasal route continuous.  abacavir (ZIAGEN) 300 mg tablet Take 2 Tabs by mouth daily. 60 Tab 1  
 dolutegravir (TIVICAY) 50 mg tab tablet Take 1 Tab by mouth daily. Indications: HIV infection 30 Tab 1  
 lamiVUDine (EPIVIR) 150 mg tablet Take 1 Tab by mouth daily. 30 Tab 1  
 albuterol-ipratropium (DUO-NEB) 2.5 mg-0.5 mg/3 ml nebu 3 mL by Nebulization route every four (4) hours as needed. 30 Nebule 1  
 albuterol (PROVENTIL VENTOLIN) 2.5 mg /3 mL (0.083 %) nebulizer solution 3 mL by Nebulization route every four (4) hours as needed for Wheezing. 24 Each 2  
 aspirin 81 mg chewable tablet Take 1 Tab by mouth daily. (Patient not taking: Reported on 7/5/2018) 30 Tab 0  
 famotidine (PEPCID) 20 mg tablet Take 1 Tab by mouth daily.  (Patient not taking: Reported on 7/5/2018) 30 Tab 0  
 traMADol (ULTRAM) 50 mg tablet Take 1 Tab by mouth every six (6) hours as needed. Max Daily Amount: 200 mg. (Patient taking differently: Take 50 mg by mouth every eight (8) hours as needed for Pain. for 5 days) 28 Tab 0  
 fluticasone (FLONASE) 50 mcg/actuation nasal spray 2 Sprays by Both Nostrils route daily.  darunavir-cobicistat (PREZCOBIX) 800-150 mg-mg per tablet Take 1 Tab by mouth daily.  sertraline (ZOLOFT) 25 mg tablet Take  by mouth daily. Review of Systems: 
HEENT: No epistaxis, no nasal drainage, no difficulty in swallowing, no redness in eyes Respiratory: as above Cardiovascular: no chest pain, no palpitations, no chronic leg edema, no syncope Gastrointestinal: no abd pain, no vomiting, no diarrhea, no bleeding symptoms Genitourinary: No urinary symptoms or hematuria Integument/breast: No ulcers or rashes Musculoskeletal:Neg 
Neurological: No focal weakness, no seizures, no headaches Behvioral/Psych: No anxiety, no depression Constitutional: No fever, no chills, no weight loss, no night sweats Objective: There were no vitals taken for this visit. Physical Exam:  
General: comfortable, no acute distress HEENT: pupils reactive, sclera anicteric, EOM intact Neck: No adenopathy or thyroid swelling, no JVD, supple CVS: S1S2 no murmurs RS: Mod AE bilaterally, no tactile fremitus or egophony, no accessory muscle use Abd: soft, non tender, no hepatosplenomegaly Neuro: non focal, awake, alert Extrm: no leg edema, clubbing or cyanosis Skin: no rash Data review:  
 
Admission on 05/27/2018, Discharged on 06/01/2018 No results displayed because visit has over 200 results. Admission on 05/12/2018, Discharged on 05/19/2018 No results displayed because visit has over 200 results. Date FVC FEV1  FEV1/FVC [id number] TLC RV RV/TLC VC DLCO Imaging: 
I have personally reviewed the patients radiographs and have reviewed the reports: 
CT scan chest 6/15/2018 [facility]: 1.  No CT evidence of pulmonary thromboembolic disease. 2.  Severe emphysema. 3.  Moderate posterior lower lobe atelectasis. 4.  Slight pericardial effusion. 5.  Left humeral head AVN. Followup recommendations: No specific radiological followup recommended. CXR: 
5/31/18 The cardiac and mediastinal silhouette are within normal limits. Pulmonary vasculature is within normal limits. Right lower lobe basilar consolidation. No pleural effusion or pneumothorax. Mild lingular atelectasis. Degenerative changes in the shoulders. IMPRESSION: 
Basilar right lower lobe pneumonia, recommend follow-up after treatment to ensure resolution. Patient Active Problem List  
Diagnosis Code  COPD exacerbation ([facility] J44.1  HIV (human immunodeficiency virus infection) ([facility] B20  
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  
 Diastolic CHF, acute on chronic (HCC) I50.33  Chest pain, atypical R07.89  
 HCAP (healthcare-associated pneumonia) J18.9  Essential hypertension I10 IMPRESSION:  
· Acute COPD exacerbation with predominant emphysema and basal atelectasis. Hypoxia due to mucus plugging and emphysema. CT- no evidence of ILD to suggest PCP. Slow improvement. Recent imaging with improvement both on CXR and CT scan. Now further clinical improveemnt · Chest pain- atypical likely esophagitis. ? Candida, GERD. Recent Er workup- negative troponin, EKG, no PE 
· HIV on HAART · Acute CHF, systolic and possibly diastolic · Pulmonary HTN- cardiac and Pulmonary etiology · Hx of htn · Hx of anxiety d/o RECOMMENDATIONS:  
· Continue to intensify bronchial hygiene- needs to continue using PEP device ( lung flute) · Will continue Spiriva and albuterol for home use · Stop Breo per ID specialist- side effects from antiviral therapy · Continue HAART- per ID · Will continue to Assess Home O2 needs- at rest and ambulation · PFT and 6 min walk - next visit · Will consider referral to Pulmonary rehab · Preventive vaccinations. · Will follow for complex care management- next appointment in 2 months · Discussed with daughter and patient Ha Altamirano MD

## 2018-08-03 ENCOUNTER — PATIENT OUTREACH (OUTPATIENT)
Dept: PULMONOLOGY | Age: 73
End: 2018-08-03

## 2018-08-03 NOTE — PROGRESS NOTES
Nurse Navigator ( NN ) Contacted pateint for PNA follow up. Verified 2 patient identifiers. Introduced self, role and reason for call. Patient reports:   
 
Hanging in there. Doing okay for a old lady I am doing better Wearing medical alert button. Not taking the [facility] but taking all other meds as ordered. Continues using oxygen at 2 L NC continuously Sp02 97% on 2 L 
 
 without palpitations CUEVAS Patient  denies:   
Coughing Mucous Edema Fever/chills Dizziness Chest pain SOB at rest  
Pain Recent falls Opportunity to ask questions was provided. Patient declined. Patient has contact information for future reference or further questions. Future Appointments: 
 
 Provider Milton Dos Santos 7/20/2018 11:00 AM MD CHARLES [address] 9/17/2018 12:00 PM Chucho Doran MD [facility] Pateint aware of appointments and family will provide her transportation. NN  will continue to follow for q 2 wk  x 1, 1 mo. 1 or as needed then close episode if remains stable. NN discussed plan with patient and patient in agreement. Goals  Supportive resources in place to maintain patient in the community (ie. Home Health, DME equipment, refer to, medication assistant plan, etc.)Target Date:  9/6/18 7/9/19 NN ordered Medical Alert button through [place].   and requested evaluation by [employer] for patient to receive POC for ease of travel out of area to MD appointments. 7/17/18 patient received her Life Alert Button and Portable oxygen concentrator.

## 2018-08-17 ENCOUNTER — PATIENT OUTREACH (OUTPATIENT)
Dept: PULMONOLOGY | Age: 73
End: 2018-08-17

## 2018-08-19 NOTE — PROGRESS NOTES
Nurse Navigator ( NN ) Contacted pateint for PNA follow up. Spoke with her niece, [employer].  Verified 2 patient identifiers. Introduced self, role and reason for call. Nichelle  reports:      She is breathing good    He pulse still goes up when she is moving around    She has an appt. With the cardiologist on sept. 17, 2018. They are suppose to do a stress test on her. She saw a neurologist 2 wks ago. She is to have a MRI done to find out what is going on with her nerves due to she is shaky and off balance      She is [place] in both ears and will see ENT the end of Sept.  ( Dr. Breanna Macdonald scheduled her for this )    Continues using oxygen at 2 L NC continuously    Sp02 97% on 2 L    CUEVAS    Patient  denies:    Coughing   Mucous   Edema  Fever/chills   Dizziness   Chest pain   SOB at rest   Pain   Recent falls    NN reminded Nichelle that patient doesn't have a f/u appt. Scheduled with Dr. Avinash Russell . Nichelle scheduled an appt. During this outreach. NN discussed patient's POC with Girtha Closs agreed to plan of care. See below. Opportunity to ask questions was provided. Nichelle decline. She  has contact information for future reference or further questions. Future Appointments:    [id number]  ENT Appt. 9/17/2018 12:00 PM Gunnar Calvin MD [facility]   10/3/2018 11:45 AM Edgar Wyman MD [facility] [address] [facility] [address] [place] [address]   10/3/18     10:00 PFT   Pulmonary                Pulmonary Specialists       Nichelle is  aware of appointments and She will provide pt's  transportation. NN  will continue to follow for q 2 wk  x 1, 1 mo. 1 or as needed then close episode if remains stable. NN discussed plan with patient and patient in agreement. Goals      Supportive resources in place to maintain patient in the community (ie.  Home Health, DME equipment, refer to, medication assistant plan, etc.)Target Date:  9/6/18 7/9/19 NN ordered Medical Alert button through [place]. [employer]  and requested evaluation by [employer] for patient to receive POC for ease of travel out of area to MD appointments. 7/17/18 patient received her Life Alert Button and Portable oxygen concentrator. Plan:      NN will discuss ACP on next outreach    NN will decrease outreach to 1 x month and close episode after f/u PFT/6 min. Walk and Visit with Dr. Junior Sahni on 10/3/18  if patient remains stable .

## 2018-08-20 ENCOUNTER — PATIENT OUTREACH (OUTPATIENT)
Dept: PULMONOLOGY | Age: 73
End: 2018-08-20

## 2018-08-20 NOTE — PROGRESS NOTES
Nurse Navigator ( NN )  contacted Patient's niece, Robb Zayas. Verified 2 patient identifiers   Introduced self, role and reason for call. NN notified Robb Zayas that patient also needed a PFT and 6 min. Walk before seeing Dr. Marietta Menon which has been scheduled for 10/3/18 at 10:00 . Nichelle reported okay, I will let her know.

## 2018-09-12 ENCOUNTER — PATIENT OUTREACH (OUTPATIENT)
Dept: PULMONOLOGY | Age: 73
End: 2018-09-12

## 2018-09-13 NOTE — PROGRESS NOTES
Nurse Navigator ( NN ) Contacted pateint for PNA follow up. Spoke with her niece, Lulu Kingsley.  Verified 2 patient identifiers. Introduced self, role and reason for call. NN later called Patient. Verified 2 patient identifiers. Introduced self, role and reason for call. Nichelle  reports:   
 
Her breathing is doing walking more vs. Using wheelchair . She saw neurologist on Monday, 9/10/18 and test were done. Test determined that she had nerve damage of both legs due to being immobile and they want her to exercise more ( causing her shaking of legs and hands.  ) She saw her last Friday. She is having coughing due to drainage /swimmers ear and she placed her on allergic medicine. She is trying to get a new portable oxygen machine through medicaid and they will be sending papers to Dr. Pita Gordon office to complete after she has her lung function test. The machine she has now is still a little heavy for her and the battery gives at quickly. The other system is lighter. Her Primary put her on amoxicillin twice a day x 10 days. She has an appt. With the cardiologist on Sept. 17, 2018. They are suppose to do a stress test on her. Continues using oxygen at 2 L NC continuously Sp02 97% on 2 L 
 
CUEVAS Patient reports:   
 
Doing pretty good. Having drainage but  medicine and scheduled to see ENT next week. My brother passed away ( patient verbalizing about brother's death )  NN performed active listening. Occasional dizziness due to fluid in ears. Feels stronger Patient  denies:   
Edema Fever/chills Chest pain SOB at rest  
Pain Recent falls NN reminded Nichelle that patient doesn't have a f/u appt. Scheduled with Dr. Avinash Russell . Nichelle scheduled an appt. During this outreach.    
 
NN discussed patient's POC to continue to monitor monthly x 1 until after visit with Dr. Aubrey Gaspar and if patient remains stable will close episode, with Crystal and patient. Crystal  And patient agreed to plan of care. Opportunity to ask questions was provided. They have  contact information for future reference or further questions. Future Appointments: 
 
10/3/2018 10:00 AM [facility] SP[facility] [address] [place] [address]  
10/3/2018 11:00 AM PPA [facility] [address] [place] [address]  
10/3/2018 11:30 AM Aiyana Whittaker MD [employer] [facility] [address] [place] [address]  
10/19/2018 11:15 AM Kirill García MD [facility] Week of Sept. 17 , 2018   Appt. With ENT Nichelle is  aware of appointments and She will provide pt's  transportation. Goals  Nurse navigator will discuss ACP with patient.  Supportive resources in place to maintain patient in the community (ie. Home Health, DME equipment, refer to, medication assistant plan, etc.)Target Date:  9/6/18 7/9/19 NN ordered Medical Alert button through [place]. [employer]  and requested evaluation by [employer] for patient to receive POC for ease of travel out of area to MD appointments. 7/17/18 patient received her Life Alert Button and Portable oxygen concentrator. 9/12/13  Patient will to possible a new /lighter portable oxygen device through [facility] after performing 6 min. Walk and PFT are done Plan: NN will discuss ACP on next outreach NN will decrease outreach to 1 x month and close episode after f/u PFT/6 min. Walk and Visit with Dr. Jerome Christianson on 10/3/18  if patient remains stable .

## 2018-10-03 ENCOUNTER — CLINICAL SUPPORT (OUTPATIENT)
Dept: PULMONOLOGY | Age: 73
End: 2018-10-03

## 2018-10-03 ENCOUNTER — OFFICE VISIT (OUTPATIENT)
Dept: PULMONOLOGY | Age: 73
End: 2018-10-03

## 2018-10-03 VITALS
TEMPERATURE: 98.1 F | HEART RATE: 130 BPM | HEIGHT: 62 IN | WEIGHT: 171 LBS | DIASTOLIC BLOOD PRESSURE: 80 MMHG | OXYGEN SATURATION: 98 % | BODY MASS INDEX: 31.47 KG/M2 | RESPIRATION RATE: 22 BRPM | SYSTOLIC BLOOD PRESSURE: 110 MMHG

## 2018-10-03 DIAGNOSIS — F41.9 ANXIETY: ICD-10-CM

## 2018-10-03 DIAGNOSIS — J43.2 CENTRILOBULAR EMPHYSEMA (HCC): ICD-10-CM

## 2018-10-03 DIAGNOSIS — I50.33 DIASTOLIC CHF, ACUTE ON CHRONIC (HCC): ICD-10-CM

## 2018-10-03 DIAGNOSIS — J44.9 COPD, GROUP D, BY GOLD 2017 CLASSIFICATION (HCC): Primary | ICD-10-CM

## 2018-10-03 DIAGNOSIS — J11.00 INFLUENZA AND PNEUMONIA: Primary | ICD-10-CM

## 2018-10-03 DIAGNOSIS — B20 HIV (HUMAN IMMUNODEFICIENCY VIRUS INFECTION) (HCC): Chronic | ICD-10-CM

## 2018-10-03 NOTE — LETTER
10/3/2018 7:46 PM 
 
Patient:  Allison Kohli YOB: 1945 Date of Visit: 10/3/2018 Dear Domitila Owusu MD 
[address] [place] [address] Jonas Kelley [id number] VIA Facsimile: [phone] 
 : Thank you for referring Ms. Reshma Barajas to me for evaluation/treatment. Below are the relevant portions of my assessment and plan of care. [facility] Pulmonary, Critical Care, and Sleep Medicine Pulmonary Office Progress Notes Name: Allison Kohli : 1945 Date: 10/3/2018 Subjective:  
 
Patient is a 71 y.o. female is here for follow up for: Problems-follow up after hospital discharge. Pneumonia, hypoxic respiratory failure. 10/03/18 Feels better overall. Does get very SOB with activity. Today on 6 min walk her limiting symptom was tachypnea and tachycardia. Did not desaturate significantly. Denies much cough No fever, chills Denies nausea. Using inhalers and SOB at baseline. On Oxygen at 2 L. Per daughter she has less need to use O2 at rest with SaO2 > 94% HPI: 
Patient is a 71 y.o. female hx of HIV since  with last cd4 213  recent discharge for pneumonia , readmitted on  with persistent cough and sob. Feels better overall- still has antibiotics. Using supplemental Oxygen, incentive spirometer. Less productive cough. No associated wheezing fever or chills. No n/v/d. No chest pain  No headache. Appetite improving. Past Medical History:  
Diagnosis Date  Congestive heart failure ([address]  Coronary atherosclerosis of native coronary artery  Essential hypertension  HIV (human immunodeficiency virus infection) ([facility] [address]  Ill-defined condition Pneumonia  Nonspecific abnormal electrocardiogram (ECG) (EKG)  Pre-operative cardiovascular examination No Known Allergies Current Outpatient Prescriptions Medication Sig Dispense Refill  fexofenadine (ALLEGRA) 180 mg tablet Take 180 mg by mouth daily.  CARTIA  mg ER capsule TAKE 1 CAPSULE BY MOUTH DAILY 90 Cap 1  
 tiotropium (SPIRIVA) 18 mcg inhalation capsule Take 1 Cap by inhalation daily. 30 Cap 3  
 acetylcysteine (MUCOMYST) 100 mg/mL (10 %) nebulizer solution Take 4 mL by inhalation three (3) times daily. 40 mL 1  
 acetaminophen (TYLENOL) 500 mg tablet Take 500 mg by mouth daily as needed for Pain.  mirtazapine (REMERON) 30 mg tablet TK 1 T PO HS  5  
 OXYGEN-AIR DELIVERY SYSTEMS 2 L/min by Nasal route continuous.  abacavir (ZIAGEN) 300 mg tablet Take 2 Tabs by mouth daily. 60 Tab 1  
 albuterol (PROVENTIL VENTOLIN) 2.5 mg /3 mL (0.083 %) nebulizer solution 3 mL by Nebulization route every four (4) hours as needed for Wheezing. 24 Each 2  
 aspirin 81 mg chewable tablet Take 1 Tab by mouth daily. 30 Tab 0  
 fluticasone (FLONASE) 50 mcg/actuation nasal spray 2 Sprays by Both Nostrils route daily.  darunavir-cobicistat (PREZCOBIX) 800-150 mg-mg per tablet Take 1 Tab by mouth daily.  sertraline (ZOLOFT) 25 mg tablet Take  by mouth daily.  dilTIAZem XR (DILACOR XR) 240 mg XR capsule Take 240 mg by mouth daily.  nystatin (MYCOSTATIN) 100,000 unit/mL suspension Take 5 mL by mouth four (4) times daily. for 10 days  dolutegravir (TIVICAY) 50 mg tab tablet Take 1 Tab by mouth daily. Indications: HIV infection 30 Tab 1  
 lamiVUDine (EPIVIR) 150 mg tablet Take 1 Tab by mouth daily. 30 Tab 1  
 albuterol-ipratropium (DUO-NEB) 2.5 mg-0.5 mg/3 ml nebu 3 mL by Nebulization route every four (4) hours as needed. 30 Nebule 1  
 famotidine (PEPCID) 20 mg tablet Take 1 Tab by mouth daily. 30 Tab 0  
 traMADol (ULTRAM) 50 mg tablet Take 1 Tab by mouth every six (6) hours as needed. Max Daily Amount: 200 mg. (Patient taking differently: Take 50 mg by mouth every eight (8) hours as needed for Pain. for 5 days) 28 Tab 0 Review of Systems: HEENT: No epistaxis, no nasal drainage, no difficulty in swallowing, no redness in eyes Respiratory: as above Cardiovascular: no chest pain, no palpitations, no chronic leg edema, no syncope Gastrointestinal: no abd pain, no vomiting, no diarrhea, no bleeding symptoms Genitourinary: No urinary symptoms or hematuria Integument/breast: No ulcers or rashes Musculoskeletal:Neg 
Neurological: No focal weakness, no seizures, no headaches Behvioral/Psych: No anxiety, no depression Constitutional: No fever, no chills, no weight loss, no night sweats Objective:  
 
Visit Vitals  /80 (BP 1 Location: Left arm, BP Patient Position: At rest)  Pulse (!) 130  Temp 98.1 °F (36.7 °C) (Oral)  Resp 22  
 Ht 5' 2\" (1.575 m)  Wt 77.6 kg (171 lb)  SpO2 98%  BMI 31.28 kg/m2 Physical Exam:  
General: comfortable, no acute distress HEENT: pupils reactive, sclera anicteric, EOM intact Neck: No adenopathy or thyroid swelling, no JVD, supple CVS: S1S2 no murmurs RS: Mod AE bilaterally, no tactile fremitus or egophony, no accessory muscle use Abd: soft, non tender, no hepatosplenomegaly Neuro: non focal, awake, alert Extrm: no leg edema, clubbing or cyanosis Skin: no rash Data review:  
 
Admission on 05/27/2018, Discharged on 06/01/2018 No results displayed because visit has over 200 results. Admission on 05/12/2018, Discharged on 05/19/2018 No results displayed because visit has over 200 results. 10/03/18 Patient effort:  
Good Meets ATS criteria for interpretation Flows: 
Maximal Mid Expiratory Flow rate is reduced to 29 % predicted Forced Expiratory Volume in one second is reduced to 53 % predicted FEV 1% is reduced Volumes: 
High Normal Volumes Flow Volume Loop: 
Nonspecific obstructive pattern in Flow Volume Loop Bronchodilator: No significant improvement with bronchodilator therapy Diffusion: Abnormal Diffusion Capacity reduced to 38 % predicted Impression: Moderate obstructive defect, mild Hyperinflation, Reduced diffusion capacity indicating a decrease in alveolar surface area for gas exchange Date FVC FEV1  FEV1/FVC [id number] TLC RV RV/TLC VC DLCO  
10/3/18  53%  29%     38% Imaging: 
I have personally reviewed the patients radiographs and have reviewed the reports: 
CT scan chest 6/15/2018 at [facility]: 1.  No CT evidence of pulmonary thromboembolic disease. 2.  Severe emphysema. 3.  Moderate posterior lower lobe atelectasis. 4.  Slight pericardial effusion. 5.  Left humeral head AVN. Followup recommendations: No specific radiological followup recommended. CXR: 
5/31/18 The cardiac and mediastinal silhouette are within normal limits. Pulmonary vasculature is within normal limits. Right lower lobe basilar consolidation. No pleural effusion or pneumothorax. Mild lingular atelectasis. Degenerative changes in the shoulders. IMPRESSION: 
Basilar right lower lobe pneumonia, recommend follow-up after treatment to ensure resolution. Patient Active Problem List  
Diagnosis Code  COPD exacerbation ([facility] [address] J44.1  HIV (human immunodeficiency virus infection) ([facility] [address] B20  
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  
 Diastolic CHF, acute on chronic (HCC) I50.33  Chest pain, atypical R07.89  
 HCAP (healthcare-associated pneumonia) J18.9  Essential hypertension I10 IMPRESSION:  
· Acute COPD exacerbation with predominant emphysema and basal atelectasis. Hypoxia due to mucus plugging and emphysema. Significantly improved baseline oxygenation. PFT with FEV1-53% predicted and DLCO 38% predicted · CT- no evidence of ILD to suggest PCP. Slow improvement. Recent imaging with improvement both on CXR and CT scan. Now further clinical improveemnt · Chest pain- atypical likely esophagitis. ? Candida, GERD.  Recent Er workup- negative troponin, EKG, no PE 
· HIV on HAART · Acute CHF, systolic and possibly diastolic · Pulmonary HTN- cardiac and Pulmonary etiology · Hx of htn · Hx of anxiety d/o RECOMMENDATIONS:  
 
· Will continue Spiriva and albuterol for home use · Continue HAART- per ID · Will continue to Assess Home O2 needs- at rest and ambulation, encouraged to use · PFT and 6 min walk - results discussed · Will benefit from referral to Pulmonary rehab · Asking for INOGEN- information given to patient. Will prescribe if qualifies · Preventive vaccinations. · Will follow for complex care management- next appointment in 3 months · Discussed with daughter and patient Indio Allen MD 
 
 
 
 
If you have questions, please do not hesitate to call me. I look forward to following Ms. Barajas along with you.  
 
 
 
Sincerely, 
 
 
Indio Allen MD

## 2018-10-03 NOTE — PROGRESS NOTES
[facility] Pulmonary, Critical Care, and Sleep Medicine Pulmonary Office Progress Notes Name: Allison Kohli : 1945 Date: 10/3/2018 Subjective:  
 
Patient is a 71 y.o. female is here for follow up for: Problems-follow up after hospital discharge. Pneumonia, hypoxic respiratory failure. 10/03/18 Feels better overall. Does get very SOB with activity. Today on 6 min walk her limiting symptom was tachypnea and tachycardia. Did not desaturate significantly. Denies much cough No fever, chills Denies nausea. Using inhalers and SOB at baseline. On Oxygen at 2 L. Per daughter she has less need to use O2 at rest with SaO2 > 94% HPI: 
Patient is a 71 y.o. female hx of HIV since  with last cd4 213  recent discharge for pneumonia , readmitted on  with persistent cough and sob. Feels better overall- still has antibiotics. Using supplemental Oxygen, incentive spirometer. Less productive cough. No associated wheezing fever or chills. No n/v/d. No chest pain  No headache. Appetite improving. Past Medical History:  
Diagnosis Date  Congestive heart failure ([facility] [address]  Coronary atherosclerosis of native coronary artery  Essential hypertension  HIV (human immunodeficiency virus infection) ([facility] [address]  Ill-defined condition Pneumonia  Nonspecific abnormal electrocardiogram (ECG) (EKG)  Pre-operative cardiovascular examination No Known Allergies Current Outpatient Prescriptions Medication Sig Dispense Refill  fexofenadine (ALLEGRA) 180 mg tablet Take 180 mg by mouth daily.  CARTIA  mg ER capsule TAKE 1 CAPSULE BY MOUTH DAILY 90 Cap 1  
 tiotropium (SPIRIVA) 18 mcg inhalation capsule Take 1 Cap by inhalation daily. 30 Cap 3  
 acetylcysteine (MUCOMYST) 100 mg/mL (10 %) nebulizer solution Take 4 mL by inhalation three (3) times daily. 40 mL 1  acetaminophen (TYLENOL) 500 mg tablet Take 500 mg by mouth daily as needed for Pain.  mirtazapine (REMERON) 30 mg tablet TK 1 T PO HS  5  
 OXYGEN-AIR DELIVERY SYSTEMS 2 L/min by Nasal route continuous.  abacavir (ZIAGEN) 300 mg tablet Take 2 Tabs by mouth daily. 60 Tab 1  
 albuterol (PROVENTIL VENTOLIN) 2.5 mg /3 mL (0.083 %) nebulizer solution 3 mL by Nebulization route every four (4) hours as needed for Wheezing. 24 Each 2  
 aspirin 81 mg chewable tablet Take 1 Tab by mouth daily. 30 Tab 0  
 fluticasone (FLONASE) 50 mcg/actuation nasal spray 2 Sprays by Both Nostrils route daily.  darunavir-cobicistat (PREZCOBIX) 800-150 mg-mg per tablet Take 1 Tab by mouth daily.  sertraline (ZOLOFT) 25 mg tablet Take  by mouth daily.  dilTIAZem XR (DILACOR XR) 240 mg XR capsule Take 240 mg by mouth daily.  nystatin (MYCOSTATIN) 100,000 unit/mL suspension Take 5 mL by mouth four (4) times daily. for 10 days  dolutegravir (TIVICAY) 50 mg tab tablet Take 1 Tab by mouth daily. Indications: HIV infection 30 Tab 1  
 lamiVUDine (EPIVIR) 150 mg tablet Take 1 Tab by mouth daily. 30 Tab 1  
 albuterol-ipratropium (DUO-NEB) 2.5 mg-0.5 mg/3 ml nebu 3 mL by Nebulization route every four (4) hours as needed. 30 Nebule 1  
 famotidine (PEPCID) 20 mg tablet Take 1 Tab by mouth daily. 30 Tab 0  
 traMADol (ULTRAM) 50 mg tablet Take 1 Tab by mouth every six (6) hours as needed. Max Daily Amount: 200 mg. (Patient taking differently: Take 50 mg by mouth every eight (8) hours as needed for Pain. for 5 days) 28 Tab 0 Review of Systems: 
HEENT: No epistaxis, no nasal drainage, no difficulty in swallowing, no redness in eyes Respiratory: as above Cardiovascular: no chest pain, no palpitations, no chronic leg edema, no syncope Gastrointestinal: no abd pain, no vomiting, no diarrhea, no bleeding symptoms Genitourinary: No urinary symptoms or hematuria Integument/breast: No ulcers or rashes Musculoskeletal:Neg 
Neurological: No focal weakness, no seizures, no headaches Behvioral/Psych: No anxiety, no depression Constitutional: No fever, no chills, no weight loss, no night sweats Objective:  
 
Visit Vitals  /80 (BP 1 Location: Left arm, BP Patient Position: At rest)  Pulse (!) 130  Temp 98.1 °F (36.7 °C) (Oral)  Resp 22  
 Ht 5' 2\" (1.575 m)  Wt 77.6 kg (171 lb)  SpO2 98%  BMI 31.28 kg/m2 Physical Exam:  
General: comfortable, no acute distress HEENT: pupils reactive, sclera anicteric, EOM intact Neck: No adenopathy or thyroid swelling, no JVD, supple CVS: S1S2 no murmurs RS: Mod AE bilaterally, no tactile fremitus or egophony, no accessory muscle use Abd: soft, non tender, no hepatosplenomegaly Neuro: non focal, awake, alert Extrm: no leg edema, clubbing or cyanosis Skin: no rash Data review:  
 
Admission on 05/27/2018, Discharged on 06/01/2018 No results displayed because visit has over 200 results. Admission on 05/12/2018, Discharged on 05/19/2018 No results displayed because visit has over 200 results. 10/03/18 Patient effort:  
Good Meets ATS criteria for interpretation Flows: 
Maximal Mid Expiratory Flow rate is reduced to 29 % predicted Forced Expiratory Volume in one second is reduced to 53 % predicted FEV 1% is reduced Volumes: 
High Normal Volumes Flow Volume Loop: 
Nonspecific obstructive pattern in Flow Volume Loop Bronchodilator: No significant improvement with bronchodilator therapy Diffusion: Abnormal Diffusion Capacity reduced to 38 % predicted Impression: Moderate obstructive defect, mild Hyperinflation, Reduced diffusion capacity indicating a decrease in alveolar surface area for gas exchange Date FVC FEV1  FEV1/FVC [id number] TLC RV RV/TLC VC DLCO  
10/3/18  53%  29%     38% Imaging: 
I have personally reviewed the patients radiographs and have reviewed the reports: CT scan chest 6/15/2018 at [facility]: 1.  No CT evidence of pulmonary thromboembolic disease. 2.  Severe emphysema. 3.  Moderate posterior lower lobe atelectasis. 4.  Slight pericardial effusion. 5.  Left humeral head AVN. Followup recommendations: No specific radiological followup recommended. CXR: 
5/31/18 The cardiac and mediastinal silhouette are within normal limits. Pulmonary vasculature is within normal limits. Right lower lobe basilar consolidation. No pleural effusion or pneumothorax. Mild lingular atelectasis. Degenerative changes in the shoulders. IMPRESSION: 
Basilar right lower lobe pneumonia, recommend follow-up after treatment to ensure resolution. Patient Active Problem List  
Diagnosis Code  COPD exacerbation ([facility] [address] J44.1  HIV (human immunodeficiency virus infection) ([facility] [address] B20  
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  
 Diastolic CHF, acute on chronic (HCC) I50.33  Chest pain, atypical R07.89  
 HCAP (healthcare-associated pneumonia) J18.9  Essential hypertension I10 IMPRESSION:  
· Acute COPD exacerbation with predominant emphysema and basal atelectasis. Hypoxia due to mucus plugging and emphysema. Significantly improved baseline oxygenation. PFT with FEV1-53% predicted and DLCO 38% predicted · CT- no evidence of ILD to suggest PCP. Slow improvement. Recent imaging with improvement both on CXR and CT scan. Now further clinical improveemnt · Chest pain- atypical likely esophagitis. ? Candida, GERD. Recent Er workup- negative troponin, EKG, no PE 
· HIV on HAART · Acute CHF, systolic and possibly diastolic · Pulmonary HTN- cardiac and Pulmonary etiology · Hx of htn · Hx of anxiety d/o RECOMMENDATIONS:  
 
· Will continue Spiriva and albuterol for home use · Continue HAART- per ID · Will continue to Assess Home O2 needs- at rest and ambulation, encouraged to use · PFT and 6 min walk - results discussed · Will benefit from referral to Pulmonary rehab · Asking for INOGEN- information given to patient. Will prescribe if qualifies · Preventive vaccinations. · Will follow for complex care management- next appointment in 3 months · Discussed with daughter and patient Angel Rutherford MD

## 2018-10-03 NOTE — COMMUNICATION BODY
[facility] Pulmonary, Critical Care, and Sleep Medicine Pulmonary Office Progress Notes Name: Allison Kohli : 1945 Date: 10/3/2018 Subjective:  
 
Patient is a 71 y.o. female is here for follow up for: Problems-follow up after hospital discharge. Pneumonia, hypoxic respiratory failure. 10/03/18 Feels better overall. Does get very SOB with activity. Today on 6 min walk her limiting symptom was tachypnea and tachycardia. Did not desaturate significantly. Denies much cough No fever, chills Denies nausea. Using inhalers and SOB at baseline. On Oxygen at 2 L. Per daughter she has less need to use O2 at rest with SaO2 > 94% HPI: 
Patient is a 71 y.o. female hx of HIV since  with last cd4 213  recent discharge for pneumonia , readmitted on  with persistent cough and sob. Feels better overall- still has antibiotics. Using supplemental Oxygen, incentive spirometer. Less productive cough. No associated wheezing fever or chills. No n/v/d. No chest pain  No headache. Appetite improving. Past Medical History:  
Diagnosis Date  Congestive heart failure ([facility] [address] [facility]  Coronary atherosclerosis of native coronary artery  Essential hypertension  HIV (human immunodeficiency virus infection) ([facility] [facility]  Ill-defined condition Pneumonia  Nonspecific abnormal electrocardiogram (ECG) (EKG)  Pre-operative cardiovascular examination No Known Allergies Current Outpatient Prescriptions Medication Sig Dispense Refill  fexofenadine (ALLEGRA) 180 mg tablet Take 180 mg by mouth daily.  CARTIA  mg ER capsule TAKE 1 CAPSULE BY MOUTH DAILY 90 Cap 1  
 tiotropium (SPIRIVA) 18 mcg inhalation capsule Take 1 Cap by inhalation daily. 30 Cap 3  
 acetylcysteine (MUCOMYST) 100 mg/mL (10 %) nebulizer solution Take 4 mL by inhalation three (3) times daily. 40 mL 1  acetaminophen (TYLENOL) 500 mg tablet Take 500 mg by mouth daily as needed for Pain.  mirtazapine (REMERON) 30 mg tablet TK 1 T PO HS  5  
 OXYGEN-AIR DELIVERY SYSTEMS 2 L/min by Nasal route continuous.  abacavir (ZIAGEN) 300 mg tablet Take 2 Tabs by mouth daily. 60 Tab 1  
 albuterol (PROVENTIL VENTOLIN) 2.5 mg /3 mL (0.083 %) nebulizer solution 3 mL by Nebulization route every four (4) hours as needed for Wheezing. 24 Each 2  
 aspirin 81 mg chewable tablet Take 1 Tab by mouth daily. 30 Tab 0  
 fluticasone (FLONASE) 50 mcg/actuation nasal spray 2 Sprays by Both Nostrils route daily.  darunavir-cobicistat (PREZCOBIX) 800-150 mg-mg per tablet Take 1 Tab by mouth daily.  sertraline (ZOLOFT) 25 mg tablet Take  by mouth daily.  dilTIAZem XR (DILACOR XR) 240 mg XR capsule Take 240 mg by mouth daily.  nystatin (MYCOSTATIN) 100,000 unit/mL suspension Take 5 mL by mouth four (4) times daily. for 10 days  dolutegravir (TIVICAY) 50 mg tab tablet Take 1 Tab by mouth daily. Indications: HIV infection 30 Tab 1  
 lamiVUDine (EPIVIR) 150 mg tablet Take 1 Tab by mouth daily. 30 Tab 1  
 albuterol-ipratropium (DUO-NEB) 2.5 mg-0.5 mg/3 ml nebu 3 mL by Nebulization route every four (4) hours as needed. 30 Nebule 1  
 famotidine (PEPCID) 20 mg tablet Take 1 Tab by mouth daily. 30 Tab 0  
 traMADol (ULTRAM) 50 mg tablet Take 1 Tab by mouth every six (6) hours as needed. Max Daily Amount: 200 mg. (Patient taking differently: Take 50 mg by mouth every eight (8) hours as needed for Pain. for 5 days) 28 Tab 0 Review of Systems: 
HEENT: No epistaxis, no nasal drainage, no difficulty in swallowing, no redness in eyes Respiratory: as above Cardiovascular: no chest pain, no palpitations, no chronic leg edema, no syncope Gastrointestinal: no abd pain, no vomiting, no diarrhea, no bleeding symptoms Genitourinary: No urinary symptoms or hematuria Integument/breast: No ulcers or rashes Musculoskeletal:Neg 
Neurological: No focal weakness, no seizures, no headaches Behvioral/Psych: No anxiety, no depression Constitutional: No fever, no chills, no weight loss, no night sweats Objective:  
 
Visit Vitals  /80 (BP 1 Location: Left arm, BP Patient Position: At rest)  Pulse (!) 130  Temp 98.1 °F (36.7 °C) (Oral)  Resp 22  
 Ht 5' 2\" (1.575 m)  Wt 77.6 kg (171 lb)  SpO2 98%  BMI 31.28 kg/m2 Physical Exam:  
General: comfortable, no acute distress HEENT: pupils reactive, sclera anicteric, EOM intact Neck: No adenopathy or thyroid swelling, no JVD, supple CVS: S1S2 no murmurs RS: Mod AE bilaterally, no tactile fremitus or egophony, no accessory muscle use Abd: soft, non tender, no hepatosplenomegaly Neuro: non focal, awake, alert Extrm: no leg edema, clubbing or cyanosis Skin: no rash Data review:  
 
Admission on 05/27/2018, Discharged on 06/01/2018 No results displayed because visit has over 200 results. Admission on 05/12/2018, Discharged on 05/19/2018 No results displayed because visit has over 200 results. 10/03/18 Patient effort:  
Good Meets ATS criteria for interpretation Flows: 
Maximal Mid Expiratory Flow rate is reduced to 29 % predicted Forced Expiratory Volume in one second is reduced to 53 % predicted FEV 1% is reduced Volumes: 
High Normal Volumes Flow Volume Loop: 
Nonspecific obstructive pattern in Flow Volume Loop Bronchodilator: No significant improvement with bronchodilator therapy Diffusion: Abnormal Diffusion Capacity reduced to 38 % predicted Impression: Moderate obstructive defect, mild Hyperinflation, Reduced diffusion capacity indicating a decrease in alveolar surface area for gas exchange Date FVC FEV1  FEV1/FVC QBH39-20 TLC RV RV/TLC VC DLCO  
10/3/18  53%  29%     38% Imaging: 
I have personally reviewed the patients radiographs and have reviewed the reports: [facility]: 1.  No CT evidence of pulmonary thromboembolic disease. 2.  Severe emphysema. 3.  Moderate posterior lower lobe atelectasis. 4.  Slight pericardial effusion. 5.  Left humeral head AVN. Followup recommendations: No specific radiological followup recommended. CXR: 
5/31/18 The cardiac and mediastinal silhouette are within normal limits. Pulmonary vasculature is within normal limits. Right lower lobe basilar consolidation. No pleural effusion or pneumothorax. Mild lingular atelectasis. Degenerative changes in the shoulders. IMPRESSION: 
Basilar right lower lobe pneumonia, recommend follow-up after treatment to ensure resolution. Patient Active Problem List  
Diagnosis Code  COPD exacerbation ([facility] J44.1  [facility] B20  
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  
 Diastolic CHF, acute on chronic (HCC) I50.33  Chest pain, atypical R07.89  
 HCAP (healthcare-associated pneumonia) J18.9  Essential hypertension I10 IMPRESSION:  
· Acute COPD exacerbation with predominant emphysema and basal atelectasis. Hypoxia due to mucus plugging and emphysema. Significantly improved baseline oxygenation. PFT with FEV1-53% predicted and DLCO 38% predicted · CT- no evidence of ILD to suggest PCP. Slow improvement. Recent imaging with improvement both on CXR and CT scan. Now further clinical improveemnt · Chest pain- atypical likely esophagitis. ? Candida, GERD. Recent Er workup- negative troponin, EKG, no PE 
· HIV on HAART · Acute CHF, systolic and possibly diastolic · Pulmonary HTN- cardiac and Pulmonary etiology · Hx of htn · Hx of anxiety d/o RECOMMENDATIONS:  
 
· Will continue Spiriva and albuterol for home use · Continue HAART- per ID · Will continue to Assess Home O2 needs- at rest and ambulation, encouraged to use · PFT and 6 min walk - results discussed · Will benefit from referral to Pulmonary rehab · Asking for INOGEN- information given to patient. Will prescribe if qualifies · Preventive vaccinations. · Will follow for complex care management- next appointment in 3 months · Discussed with daughter and patient Sanket Gonzalez MD

## 2018-10-03 NOTE — MR AVS SNAPSHOT
[address] [facility], [address] [place] [address] 
[phone] Patient: Allison Kohli MRN: [id number] :1945 Visit Information Date & Time Provider Department Dept. Phone Encounter #  
 10/3/2018 11:30 AM Serenity Lilly MD [facility] Milton Toussaint [id number] Follow-up Instructions Return in about 3 months (around 1/3/2019). Your Appointments 10/19/2018 11:15 AM  
Office Visit with Maximo Garcia MD  
[facility] ([address] [facility]) Appt Note: 3 month follow up/BMP with EKG; 3 month follow up/BMP with EKG  
 [address] [facility] [address]  
  
   
 [address] [address] [address] [address] Upcoming Health Maintenance Date Due DTaP/Tdap/Td series (1 - Tdap) 11/10/1967 Shingrix Vaccine Age 50> (1 of 2) 11/10/1996 BREAST CANCER SCRN MAMMOGRAM 11/10/1996 FOBT Q 1 YEAR AGE 50-75 11/10/1996 GLAUCOMA SCREENING Q2Y 11/10/2011 Bone Densitometry (Dexa) Screening 11/10/2011 Pneumococcal 65+ High/Highest Risk (2 of 2 - PPSV23) 2015 MEDICARE YEARLY EXAM 2018 Influenza Age 5 to Adult 2018 Allergies as of 10/3/2018  Review Complete On: 10/3/2018 By: Serenity Lilly MD  
 No Known Allergies Current Immunizations  Reviewed on 2018 No immunizations on file. Not reviewed this visit You Were Diagnosed With   
  
 Codes Comments Chronic obstructive pulmonary disease, unspecified COPD type ([facility] [address] [facility]    -  Primary ICD-10-CM: J44.9 ICD-9-CM: 969 Centrilobular emphysema ([facility] [address]     ICD-10-CM: J43.2 ICD-9-CM: 492.8 Vitals BP Pulse Temp Resp Height(growth percentile) Weight(growth percentile) 110/80 (BP 1 Location: Left arm, BP Patient Position: At rest) (!) 130 98.1 °F (36.7 °C) (Oral) 22 5' 2\" (1.575 m) 171 lb (77.6 kg) SpO2 BMI OB Status Smoking Status 98% 31.28 kg/m2 Postmenopausal Former Smoker BMI and BSA Data Body Mass Index Body Surface Area  
 31.28 kg/m 2 1.84 m 2 Preferred Pharmacy Pharmacy Name Phone [address], [address] [place] [address] [address] [facility] [phone] Your Updated Medication List  
  
   
This list is accurate as of 10/3/18 11:55 AM.  Always use your most recent med list.  
  
  
  
  
 abacavir 300 mg tablet Commonly known as:  Gaye Flores Take 2 Tabs by mouth daily. acetaminophen 500 mg tablet Commonly known as:  TYLENOL Take 500 mg by mouth daily as needed for Pain. acetylcysteine 100 mg/mL (10 %) nebulizer solution Commonly known as:  MUCOMYST Take 4 mL by inhalation three (3) times daily. albuterol 2.5 mg /3 mL (0.083 %) nebulizer solution Commonly known as:  PROVENTIL VENTOLIN  
3 mL by Nebulization route every four (4) hours as needed for Wheezing. albuterol-ipratropium 2.5 mg-0.5 mg/3 ml Nebu Commonly known as:  DUO-NEB  
3 mL by Nebulization route every four (4) hours as needed. aspirin 81 mg chewable tablet Take 1 Tab by mouth daily. * dilTIAZem  mg XR capsule Commonly known as:  DILACOR XR Take 240 mg by mouth daily. * CARTIA  mg ER capsule Generic drug:  dilTIAZem CD  
TAKE 1 CAPSULE BY MOUTH DAILY  
  
 dolutegravir 50 mg Tab tablet Commonly known as:  Mickeal Doing Take 1 Tab by mouth daily. Indications: HIV infection  
  
 famotidine 20 mg tablet Commonly known as:  PEPCID Take 1 Tab by mouth daily. fexofenadine 180 mg tablet Commonly known as:  Yvonna Charles Take 180 mg by mouth daily. fluticasone 50 mcg/actuation nasal spray Commonly known as:  Jovel Gain 2 Sprays by Both Nostrils route daily. lamiVUDine 150 mg tablet Commonly known as:  Ardena  Take 1 Tab by mouth daily. mirtazapine 30 mg tablet Commonly known as:  REMERON  
TK 1 T PO HS  
  
 nystatin 100,000 unit/mL suspension Commonly known as:  MYCOSTATIN Take 5 mL by mouth four (4) times daily. for 10 days OXYGEN-AIR DELIVERY SYSTEMS  
2 L/min by Nasal route continuous. PREZCOBIX 800-150 mg-mg per tablet Generic drug:  darunavir-cobicistat Take 1 Tab by mouth daily. sertraline 25 mg tablet Commonly known as:  ZOLOFT Take  by mouth daily. tiotropium 18 mcg inhalation capsule Commonly known as:  Deanna Oh Take 1 Cap by inhalation daily. traMADol 50 mg tablet Commonly known as:  ULTRAM  
Take 1 Tab by mouth every six (6) hours as needed. Max Daily Amount: 200 mg.  
  
 * Notice: This list has 2 medication(s) that are the same as other medications prescribed for you. Read the directions carefully, and ask your doctor or other care provider to review them with you. We Performed the Following AMB POC PFT COMPLETE W/BRONCHODILATOR [54944 CPT(R)] DIFFUSING CAPACITY [id number] CPT(R)] GAS DILUT/WASHOUT LUNG VOL W/WO DISTRIB VENT&VOL [64913 CPT(R)] VT PULMONARY STRESS TESTING [26122 CPT(R)] Follow-up Instructions Return in about 3 months (around 1/3/2019). To-Do List   
 10/03/2018 VT Charge:  VT PULMONARY STRESS TESTING Introducing [facility] SERVICES! [facility] introduces ITema patient portal. Now you can access parts of your medical record, email your doctor's office, and request medication refills online. 1. In your internet browser, go to [email or web address] 2. Click on the First Time User? Click Here link in the Sign In box. You will see the New Member Sign Up page. 3. Enter your ITema Access Code exactly as it appears below. You will not need to use this code after youve completed the sign-up process. If you do not sign up before the expiration date, you must request a new code. · ITema Access Code: [email or web address] Expires: 11/8/2018 12:55 PM 
 
 4. Enter the last four digits of your Social Security Number (xxxx) and Date of Birth (mm/dd/yyyy) as indicated and click Submit. You will be taken to the next sign-up page. 5. Create a "[employer] ID. This will be your "[employer] login ID and cannot be changed, so think of one that is secure and easy to remember. 6. Create a "[employer] password. You can change your password at any time. 7. Enter your Password Reset Question and Answer. This can be used at a later time if you forget your password. 8. Enter your e-mail address. You will receive e-mail notification when new information is available in [address] 9. Click Sign Up. You can now view and download portions of your medical record. 10. Click the Download Summary menu link to download a portable copy of your medical information. If you have questions, please visit the Frequently Asked Questions section of the "[employer] website. Remember, "[employer] is NOT to be used for urgent needs. For medical emergencies, dial 911. Now available from your iPhone and Android! Please provide this summary of care documentation to your next provider. Your primary care clinician is listed as Navdeep Sloan. If you have any questions after today's visit, please call [phone].

## 2018-10-03 NOTE — PROGRESS NOTES
[facility] 
  [address] [facility], [address] [place], [address] [address] SIMPLE PULMONARY STRESS TEST - 6 MINUTE WALK PATIENT NAME: Reshma Barajas    DATE: 10/3/2018 YOB: 1945     AGE: 71 y.o. DIAGNOSIS:  
Encounter Diagnoses Name Primary?  Chronic obstructive pulmonary disease, unspecified COPD type ([facility] [address] [facility] Yes  Centrilobular emphysema ([facility] [address] [facility] TECHNICIAN: Phyllis Rendon, RT      
 
PHYSICIAN: Dr Renny Chilel MD 
 
 
Visit Vitals  /80 (BP 1 Location: Left arm, BP Patient Position: At rest)  Pulse (!) 130  Temp 98.1 °F (36.7 °C) (Oral)  Resp 22  
 Ht 5' 2\" (1.575 m)  Wt 171 lb (77.6 kg)  SpO2 98%  BMI 31.28 kg/m2 RESTING DATA:  Dyspnea Scale (1-10):    3 SOB EXERCISE DATA:   6 MINUTE WALK - HALLWAY (34 METERS) 1   RA    97 % SAT   133 HR   3 SOB    1 Laps x 34m = 34m 
2   RA    96 % SAT   135 HR   5 SOB    1 Laps x 34m = 34m 
3   RA    96 % SAT   142 HR   6 SOB    . 5 Laps x 34m = 17m 
4   RA    95 % SAT   144 HR   6 SOB    . 5 Laps x 34m = 17m 
5   RA    92 % SAT   137 HR   7 SOB    1 Laps x 34m = 34m 
6   RA    92 % SAT   140 HR   8 SOB    1 Laps x 34m = 34m TOTAL DISTANCE:   170 M 
 
RECOVERY DATA:   
 
1   RA    92 % SAT   150 HR   26 RR   110/80 BP   8 SOB 
2   RA    98 % SAT   133 HR   23 RR   110/80 BP   3 SOB 
 
 
TECHNICIAN COMMENTS: Patient tolerated 6 minute walk well. At rest while on room air, patient's 02 saturation level remained stable at 98% while heart rate remained stable at 130 bpm. During walk, 02 saturation level decreased to a low of 92% while heart rate increased to a high of 150 bpm. Work of breathing also increased rapidly . During rest, 02 saturation level increased to 98% after 2 minutes and heart rate decreased to 133.  Patient's work of breathing also decreased to pre testing level within 1 minute of rest.

## 2018-10-03 NOTE — PROGRESS NOTES
Chief Complaint Patient presents with  COPD  
  follow up from 7/20/2018 1. Have you been to the ER, urgent care clinic since your last visit? Hospitalized since your last visit? No 
 
2. Have you seen or consulted any other health care providers outside of the [address] [place] [address] since your last visit? Include any pap smears or colon screening. Yes Where: Dr. Enedelia Walker, PCP, Dr. Nicole Magaña, Infectious Disease

## 2018-10-05 ENCOUNTER — PATIENT OUTREACH (OUTPATIENT)
Dept: PULMONOLOGY | Age: 73
End: 2018-10-05

## 2018-10-05 NOTE — PROGRESS NOTES
Roxann Demarco, patient niece ( Listed on PHI)  Contacted Nurse Navigator . 2 patient identifiers given. Nichelle reported: She was unable to speak with office nurse at length about a smaller portable oxygen tank during  patient's recent MD visit . They did receive information on inogen but patient would have to pay for it and they have been talking with a representative, Malcom Amezquita, at [employer] that is less expensive due to her insurance should pay for it  and tank last 9 hrs. NN offered to call Malcom Amezquita to see what he would need re: patient's notes. Perry stated, \" he needs the results of the test she recently had done at MD office. I will pick them up . \"  NN informed her that  Noted would be left at front office for her to . ( NN printed out the 6 min. Walk and PFT results/placed in envelope and left at  )

## 2018-10-05 NOTE — PROGRESS NOTES
Nurse Navigator ( NN )  contacted Patient for follow up and close of episode. Verified 2 patient identifiers   Introduced self, role and reason for call. Patient reports:   
 
Doing pretty good. Heart rated continues to go up with activity. ( patient stated, \" I see the heart doctor next month.) Continues using oxygen at 2 L NC continuously Sp02 97% on 2 L 
 
CUEVAS Continues with drainage to back of throat a times. Saw ENT but nothing done Feels stronger/ walking more around the house. Patient  denies:   
Edema Fever/chills Chest pain SOB at rest  
Pain Recent falls NN discussed patient's POC to close episode and patient in agreement. PFT performed on 10/3/18 :  FEV 1 53% predicted and DLCO 38% Opportunity to ask questions was provided. They have  contact information for future reference or further questions. Patient has graduated from the Disease Specific Care Management  program on 10/5/18. Patient's symptoms are stable at this time. Patient/family has the ability to self-manage. Care management goals have been completed at this time. No further nurse navigator follow up scheduled. Goals Addressed Most Recent  COMPLETED: Supportive resources in place to maintain patient in the community (ie. Home Health, DME equipment, refer to, medication assistant plan, etc.)Target Date:  9/6/18    On track (10/5/2018)  
       
  7/9/19 NN ordered Medical Alert button through [facility]  and requested evaluation by [facility] for patient to receive POC for ease of travel out of area to MD appointments. 7/17/18 patient received her Life Alert Button and Portable oxygen concentrator. 9/12/13  Patient will to possible a new /lighter portable oxygen device through [facility] after performing 6 min. Walk and PFT are done 10/5/18  Patient and MD discuss inogen device and Pulmonary Rehab. Pt has nurse navigator's contact information for any further questions, concerns, or needs. Patients upcoming visits:   
Future Appointments Date Time Provider Milton Dos Santos 10/19/2018 11:15 AM Joshua Gray MD [address] [place] [address] informed patient that her Appt. With Dr. Jena Chow is this month. She voiced understanding. Episode closed

## 2018-10-19 ENCOUNTER — OFFICE VISIT (OUTPATIENT)
Dept: CARDIOLOGY CLINIC | Age: 73
End: 2018-10-19

## 2018-10-19 VITALS
HEART RATE: 108 BPM | HEIGHT: 63 IN | BODY MASS INDEX: 30.65 KG/M2 | DIASTOLIC BLOOD PRESSURE: 81 MMHG | WEIGHT: 173 LBS | SYSTOLIC BLOOD PRESSURE: 119 MMHG

## 2018-10-19 DIAGNOSIS — B20 HIV (HUMAN IMMUNODEFICIENCY VIRUS INFECTION) (HCC): Chronic | ICD-10-CM

## 2018-10-19 DIAGNOSIS — Z95.828 S/P INSERTION OF IVC (INFERIOR VENA CAVAL) FILTER: Chronic | ICD-10-CM

## 2018-10-19 DIAGNOSIS — I50.32 CHRONIC DIASTOLIC CONGESTIVE HEART FAILURE (HCC): Primary | ICD-10-CM

## 2018-10-19 DIAGNOSIS — I10 ESSENTIAL HYPERTENSION: ICD-10-CM

## 2018-10-19 RX ORDER — DILTIAZEM HYDROCHLORIDE 240 MG/1
CAPSULE, COATED, EXTENDED RELEASE ORAL
Qty: 90 CAP | Refills: 1 | Status: SHIPPED | OUTPATIENT
Start: 2018-10-19 | End: 2019-05-16

## 2018-10-19 NOTE — LETTER
Reshma [employer] 1945 
 
10/19/2018 Dear MD Spencer Pugh MD 
 
I had the pleasure of evaluating  Ms. Barajas in office today. Below are the relevant portions of my assessment and plan of care. ICD-10-CM ICD-9-CM 1. Chronic diastolic congestive heart failure (HCC) I50.32 428.32   
  428.0 2. Essential hypertension I10 401.9 AMB POC EKG ROUTINE W/ 12 LEADS, INTER & REP  
   AMB POC EKG ROUTINE W/ 12 LEADS, INTER & REP  
   dilTIAZem CD (CARTIA XT) 240 mg ER capsule 3. 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   
4. HIV (human immunodeficiency virus infection) ([email or web address] B20 V08 Current Outpatient Medications Medication Sig Dispense Refill  abacavir/dolutegravir/lamivudi (TRIUMEQ PO) Take  by mouth.  dilTIAZem CD (CARTIA XT) 240 mg ER capsule TAKE 1 CAPSULE BY MOUTH DAILY 90 Cap 1  
 fexofenadine (ALLEGRA) 180 mg tablet Take 180 mg by mouth daily.  tiotropium (SPIRIVA) 18 mcg inhalation capsule Take 1 Cap by inhalation daily. 30 Cap 3  
 acetylcysteine (MUCOMYST) 100 mg/mL (10 %) nebulizer solution Take 4 mL by inhalation three (3) times daily. 40 mL 1  
 acetaminophen (TYLENOL) 500 mg tablet Take 500 mg by mouth daily as needed for Pain.  mirtazapine (REMERON) 30 mg tablet TK 1 T PO HS  5  
 OXYGEN-AIR DELIVERY SYSTEMS 2 L/min by Nasal route continuous.  albuterol (PROVENTIL VENTOLIN) 2.5 mg /3 mL (0.083 %) nebulizer solution 3 mL by Nebulization route every four (4) hours as needed for Wheezing. 24 Each 2  
 aspirin 81 mg chewable tablet Take 1 Tab by mouth daily. 30 Tab 0  
 fluticasone (FLONASE) 50 mcg/actuation nasal spray 2 Sprays by Both Nostrils route daily.  darunavir-cobicistat (PREZCOBIX) 800-150 mg-mg per tablet Take 1 Tab by mouth daily.  sertraline (ZOLOFT) 25 mg tablet Take  by mouth daily. Orders Placed This Encounter  AMB POC EKG ROUTINE W/ 12 LEADS, INTER & REP  
 Order Specific Question:   Reason for Exam: Answer:   hypertension  AMB POC EKG ROUTINE W/ 12 LEADS, INTER & REP Order Specific Question:   Reason for Exam: Answer:   hypertension  abacavir/dolutegravir/lamivudi (TRIUMEQ PO) Sig: Take  by mouth.  dilTIAZem CD (CARTIA XT) 240 mg ER capsule Sig: TAKE 1 CAPSULE BY MOUTH DAILY Dispense:  90 Cap Refill:  1 **Patient requests 90 days supply** If you have questions, please do not hesitate to call me. I look forward to following Ms. Barajas along with you. Sincerely, Bruna Solomon MD

## 2018-10-19 NOTE — PATIENT INSTRUCTIONS
Medications Discontinued During This Encounter   Medication Reason    traMADol (ULTRAM) 50 mg tablet Not A Current Medication    nystatin (MYCOSTATIN) 100,000 unit/mL suspension Not A Current Medication    abacavir (ZIAGEN) 300 mg tablet Not A Current Medication    lamiVUDine (EPIVIR) 150 mg tablet Not A Current Medication    dolutegravir (TIVICAY) 50 mg tab tablet Not A Current Medication    albuterol-ipratropium (DUO-NEB) 2.5 mg-0.5 mg/3 ml nebu Not A Current Medication    dilTIAZem XR (DILACOR XR) 240 mg XR capsule Duplicate Order    famotidine (PEPCID) 20 mg tablet Not A Current Medication    CARTIA  mg ER capsule Reorder          Avoiding Triggers With Heart Failure: Care Instructions  Your Care Instructions    Triggers are anything that make your heart failure flare up. A flare-up is also called \"sudden heart failure\" or \"acute heart failure. \" When you have a flare-up, fluid builds up in your lungs, and you have problems breathing. You might need to go to the hospital. By watching for changes in your condition and avoiding triggers, you can prevent heart failure flare-ups. Follow-up care is a key part of your treatment and safety. Be sure to make and go to all appointments, and call your doctor if you are having problems. It's also a good idea to know your test results and keep a list of the medicines you take. How can you care for yourself at home? Watch for changes in your weight and condition  · Weigh yourself without clothing at the same time each day. Record your weight. Call your doctor if you have sudden weight gain, such as more than 2 to 3 pounds in a day or 5 pounds in a week. (Your doctor may suggest a different range of weight gain.) A sudden weight gain may mean that your heart failure is getting worse. · Keep a daily record of your symptoms. Write down any changes in how you feel, such as new shortness of breath, cough, or problems eating.  Also record if your ankles are more swollen than usual and if you feel more tired than usual. Note anything that you ate or did that could have triggered these changes. Limit sodium  Sodium causes your body to hold on to extra water. This may cause your heart failure symptoms to get worse. People get most of their sodium from processed foods. Fast food and restaurant meals also tend to be very high in sodium. · Your doctor may suggest that you limit sodium to 2,000 milligrams (mg) a day or less. That is less than 1 teaspoon of salt a day, including all the salt you eat in cooking or in packaged foods. · Read food labels on cans and food packages. They tell you how much sodium you get in one serving. Check the serving size. If you eat more than one serving, you are getting more sodium. · Be aware that sodium can come in forms other than salt, including monosodium glutamate (MSG), sodium citrate, and sodium bicarbonate (baking soda). MSG is often added to Asian food. You can sometimes ask for food without MSG or salt. · Slowly reducing salt will help you adjust to the taste. Take the salt shaker off the table. · Flavor your food with garlic, lemon juice, onion, vinegar, herbs, and spices instead of salt. Do not use soy sauce, steak sauce, onion salt, garlic salt, mustard, or ketchup on your food, unless it is labeled \"low-sodium\" or \"low-salt. \"  · Make your own salad dressings, sauces, and ketchup without adding salt. · Use fresh or frozen ingredients, instead of canned ones, whenever you can. Choose low-sodium canned goods. · Eat less processed food and food from restaurants, including fast food. Exercise as directed  Moderate, regular exercise is very good for your heart. It improves your blood flow and helps control your weight. But too much exercise can stress your heart and cause a heart failure flare-up. · Check with your doctor before you start an exercise program.  · Walking is an easy way to get exercise. Start out slowly.  Gradually increase the length and pace of your walk. Swimming, riding a bike, and using a treadmill are also good forms of exercise. · When you exercise, watch for signs that your heart is working too hard. You are pushing yourself too hard if you cannot talk while you are exercising. If you become short of breath or dizzy or have chest pain, stop, sit down, and rest.  · Do not exercise when you do not feel well. Take medicines correctly  · Take your medicines exactly as prescribed. Call your doctor if you think you are having a problem with your medicine. · Make a list of all the medicines you take. Include those prescribed to you by other doctors and any over-the-counter medicines, vitamins, or supplements you take. Take this list with you when you go to any doctor. · Take your medicines at the same time every day. It may help you to post a list of all the medicines you take every day and what time of day you take them. · Make taking your medicine as simple as you can. Plan times to take your medicines when you are doing other things, such as eating a meal or getting ready for bed. This will make it easier to remember to take your medicines. · Get organized. Use helpful tools, such as daily or weekly pill containers. When should you call for help? Call 911 if you have symptoms of sudden heart failure such as:    · You have severe trouble breathing.     · You cough up pink, foamy mucus.     · You have a new irregular or rapid heartbeat.    Call your doctor now or seek immediate medical care if:    · You have new or increased shortness of breath.     · You are dizzy or lightheaded, or you feel like you may faint.     · You have sudden weight gain, such as more than 2 to 3 pounds in a day or 5 pounds in a week. (Your doctor may suggest a different range of weight gain.)     · You have increased swelling in your legs, ankles, or feet.     · You are suddenly so tired or weak that you cannot do your usual activities.  Watch closely for changes in your health, and be sure to contact your doctor if you develop new symptoms. Where can you learn more? Go to http://svetlana-ana maria.info/. Enter D231 in the search box to learn more about \"Avoiding Triggers With Heart Failure: Care Instructions. \"  Current as of: 2017  Content Version: 11.8  © 6366-2948 [employer]. Care instructions adapted under license by [employer] (which disclaims liability or warranty for this information). If you have questions about a medical condition or this instruction, always ask your healthcare professional. [address] any warranty or liability for your use of this information. TestObject Activation    Thank you for requesting access to [employer]. Please follow the instructions below to securely access and download your online medical record. TestObject allows you to send messages to your doctor, view your test results, renew your prescriptions, schedule appointments, and more. How Do I Sign Up? 1. In your internet browser, go to [email or web address]. 2. Click on the First Time User? Click Here link in the Sign In box. You will see the New Member Sign Up page. 3. Enter your TestObject Access Code exactly as it appears below. You will not need to use this code after youve completed the sign-up process. If you do not sign up before the expiration date, you must request a new code. TestObject Access Code: [email or web address]  Expires: 2018 12:55 PM (This is the date your TestObject access code will )    4. Enter the last four digits of your Social Security Number (xxxx) and Date of Birth (mm/dd/yyyy) as indicated and click Submit. You will be taken to the next sign-up page. 5. Create a TestObject ID. This will be your TestObject login ID and cannot be changed, so think of one that is secure and easy to remember. 6. Create a TestObject password.  You can change your password at any time. 7. Enter your Password Reset Question and Answer. This can be used at a later time if you forget your password. 8. Enter your e-mail address. You will receive e-mail notification when new information is available in [address] 9. Click Sign Up. You can now view and download portions of your medical record. 10. Click the Download Summary menu link to download a portable copy of your medical information. Additional Information    If you have questions, please visit the Frequently Asked Questions section of the [facility] website at [email or web address] Remember, [facility] is NOT to be used for urgent needs. For medical emergencies, dial 911.

## 2018-10-19 NOTE — PROGRESS NOTES
1. Have you been to the ER, urgent care clinic since your last visit? Hospitalized since your last visit? No    2. Have you seen or consulted any other health care providers outside of the [address] [place] [address] since your last visit? Include any pap smears or colon screening. Yes Where: PCP/ENT     3. Since your last visit, have you had any of the following symptoms? .          4. Have you had any blood work, X-rays or cardiac testing? Yes Where: [facility]            5.  Where do you normally have your labs drawn? [facility]    6. Do you need any refills today?    No

## 2018-10-19 NOTE — PROGRESS NOTES
HISTORY OF PRESENT ILLNESS  Kym Delgado is a 71 y.o. female. Shortness of Breath   The history is provided by the patient. This is a new problem. The problem occurs intermittently. The current episode started more than 1 week ago. Pertinent negatives include no fever, no headaches, no cough, no wheezing, no PND, no orthopnea, no chest pain, no vomiting, no rash, no leg swelling and no claudication. The problem's precipitants include exercise (50 ft; 2 LPM PRN). Chest Pain (Angina)    The history is provided by the patient. This is a new problem. The current episode started more than 1 week ago. The problem has been resolved. The problem occurs constantly. The pain is present in the substernal region. The quality of the pain is described as sharp. The pain does not radiate. Associated symptoms include lower extremity edema and shortness of breath. Pertinent negatives include no claudication, no cough, no dizziness, no fever, no headaches, no malaise/fatigue, no nausea, no orthopnea, no palpitations, no PND and no vomiting. She has tried NSAIDs (tylenol) for the symptoms. The treatment provided significant relief. Hypertension   The history is provided by the medical records. This is a chronic problem. Associated symptoms include shortness of breath. Pertinent negatives include no chest pain and no headaches. CHF   The history is provided by the medical records. This is a chronic problem. Associated symptoms include shortness of breath. Pertinent negatives include no chest pain and no headaches. Review of Systems   Constitutional: Negative for chills, fever, malaise/fatigue and weight loss. HENT: Negative for nosebleeds. Eyes: Negative for discharge. Respiratory: Positive for shortness of breath. Negative for cough and wheezing. Cardiovascular: Negative for chest pain, palpitations, orthopnea, claudication, leg swelling and PND.    Gastrointestinal: Negative for diarrhea, nausea and vomiting. Genitourinary: Negative for dysuria and hematuria. Musculoskeletal: Negative for joint pain. Skin: Negative for rash. Neurological: Negative for dizziness, seizures, loss of consciousness and headaches. Endo/Heme/Allergies: Negative for polydipsia. Does not bruise/bleed easily. Psychiatric/Behavioral: Negative for depression and substance abuse. The patient does not have insomnia. No Known Allergies    Past Medical History:   Diagnosis Date    Congestive heart failure (HCC)     Coronary atherosclerosis of native coronary artery     Essential hypertension     HIV [facility]human immunodeficiency virus infection) ([facility] [address]     Ill-defined condition     Pneumonia    Nonspecific abnormal electrocardiogram (ECG) (EKG)     Pre-operative cardiovascular examination        Family History   Problem Relation Age of Onset    Heart Attack Mother 70    Stroke Father 54    Heart Attack Sister 59       Social History     Tobacco Use    Smoking status: Former Smoker     Packs/day: 0.50     Years: 55.00     Pack years: 27.50     Types: Cigarettes     Last attempt to quit: 2018     Years since quittin.4    Smokeless tobacco: Never Used   Substance Use Topics    Alcohol use: No    Drug use: Not on file        Current Outpatient Medications   Medication Sig    abacavir/dolutegravir/lamivudi (TRIUMEQ PO) Take  by mouth.  fexofenadine (ALLEGRA) 180 mg tablet Take 180 mg by mouth daily.  CARTIA  mg ER capsule TAKE 1 CAPSULE BY MOUTH DAILY    tiotropium (SPIRIVA) 18 mcg inhalation capsule Take 1 Cap by inhalation daily.  acetylcysteine (MUCOMYST) 100 mg/mL (10 %) nebulizer solution Take 4 mL by inhalation three (3) times daily.  acetaminophen (TYLENOL) 500 mg tablet Take 500 mg by mouth daily as needed for Pain.  mirtazapine (REMERON) 30 mg tablet TK 1 T PO HS    OXYGEN-AIR DELIVERY SYSTEMS 2 L/min by Nasal route continuous.     albuterol (PROVENTIL VENTOLIN) 2.5 mg /3 mL (0.083 %) nebulizer solution 3 mL by Nebulization route every four (4) hours as needed for Wheezing.  aspirin 81 mg chewable tablet Take 1 Tab by mouth daily.  fluticasone (FLONASE) 50 mcg/actuation nasal spray 2 Sprays by Both Nostrils route daily.  darunavir-cobicistat (PREZCOBIX) 800-150 mg-mg per tablet Take 1 Tab by mouth daily.  sertraline (ZOLOFT) 25 mg tablet Take  by mouth daily. No current facility-administered medications for this visit. Past Surgical History:   Procedure Laterality Date    HX HIP REPLACEMENT      HX HYSTERECTOMY         Visit Vitals  /81   Pulse (!) 108   Ht 5' 3\" (1.6 m)   Wt 78.5 kg (173 lb)   BMI 30.65 kg/m²       Diagnostic Studies:  I have reviewed the relevant tests done on the patient and show as follows  EKG tracings reviewed by me today. No flowsheet data found. 5/18 ECHO  SUMMARY:  Left ventricle: Systolic function was normal by visual assessment. Ejection   fraction was estimated in the range of 55 %  to 60 %. No obvious wall motion abnormalities identified in the views   obtained. Right ventricle: Systolic pressure was mildly increased. Estimated peak   pressure was at least 45 mmHg. Tricuspid valve: There was mild regurgitation. Ms. Garret Poole has a reminder for a \"due or due soon\" health maintenance. I have asked that she contact her primary care provider for follow-up on this health maintenance. Physical Exam   Constitutional: She is oriented to person, place, and time. She appears well-developed and well-nourished. No distress. HENT:   Head: Normocephalic and atraumatic. Mouth/Throat: Normal dentition. Eyes: Right eye exhibits no discharge. Left eye exhibits no discharge. No scleral icterus. Neck: Neck supple. No JVD present. Carotid bruit is not present. No thyromegaly present. Cardiovascular: Normal rate, regular rhythm, S1 normal, S2 normal, normal heart sounds and intact distal pulses.  Exam reveals no gallop and no friction rub. No murmur heard. Pulmonary/Chest: Effort normal and breath sounds normal. She has no wheezes. She has no rales. Abdominal: Soft. She exhibits no mass. There is no tenderness. Musculoskeletal: She exhibits no edema. Lymphadenopathy:        Right cervical: No superficial cervical adenopathy present. Left cervical: No superficial cervical adenopathy present. Neurological: She is alert and oriented to person, place, and time. Skin: Skin is warm and dry. No rash noted. Psychiatric: She has a normal mood and affect. Her behavior is normal.       ASSESSMENT and PLAN    CHF is significantly improved and is well compensated. Mild intermittent tachycardia is likely related to hypoxia. Patient feels better if she has oxygen while walking. I encouraged her to use oxygen while walking but will leave the final decision to pulmonary, Dr. Maurilio Loza. 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   See patient instructions also for other medical advice given    Medications Discontinued During This Encounter   Medication Reason    traMADol (ULTRAM) 50 mg tablet Not A Current Medication    nystatin (MYCOSTATIN) 100,000 unit/mL suspension Not A Current Medication    abacavir (ZIAGEN) 300 mg tablet Not A Current Medication    lamiVUDine (EPIVIR) 150 mg tablet Not A Current Medication    dolutegravir (TIVICAY) 50 mg tab tablet Not A Current Medication    albuterol-ipratropium (DUO-NEB) 2.5 mg-0.5 mg/3 ml nebu Not A Current Medication    dilTIAZem XR (DILACOR XR) 240 mg XR capsule Duplicate Order    famotidine (PEPCID) 20 mg tablet Not A Current Medication    CARTIA  mg ER capsule Reorder       Follow-up Disposition:  Return in about 6 months (around 4/19/2019), or if symptoms worsen or fail to improve.

## 2018-12-26 RX ORDER — DILTIAZEM HYDROCHLORIDE 240 MG/1
CAPSULE, EXTENDED RELEASE ORAL
Qty: 90 CAP | Refills: 0 | Status: SHIPPED | OUTPATIENT
Start: 2018-12-26 | End: 2019-05-16 | Stop reason: SDUPTHER

## 2019-01-17 RX ORDER — TIOTROPIUM BROMIDE 18 UG/1
CAPSULE ORAL; RESPIRATORY (INHALATION)
Qty: 30 CAP | Refills: 0 | Status: SHIPPED | OUTPATIENT
Start: 2019-01-17 | End: 2019-09-19 | Stop reason: ALTCHOICE

## 2019-05-16 ENCOUNTER — OFFICE VISIT (OUTPATIENT)
Dept: CARDIOLOGY CLINIC | Age: 74
End: 2019-05-16

## 2019-05-16 VITALS
HEART RATE: 118 BPM | WEIGHT: 162 LBS | HEIGHT: 63 IN | BODY MASS INDEX: 28.7 KG/M2 | SYSTOLIC BLOOD PRESSURE: 126 MMHG | DIASTOLIC BLOOD PRESSURE: 80 MMHG

## 2019-05-16 DIAGNOSIS — R00.2 PALPITATION: ICD-10-CM

## 2019-05-16 DIAGNOSIS — Z21 ASYMPTOMATIC HIV INFECTION (HCC): ICD-10-CM

## 2019-05-16 DIAGNOSIS — I50.32 CHRONIC DIASTOLIC CONGESTIVE HEART FAILURE (HCC): Primary | ICD-10-CM

## 2019-05-16 DIAGNOSIS — I10 ESSENTIAL HYPERTENSION: ICD-10-CM

## 2019-05-16 DIAGNOSIS — Z95.828 S/P INSERTION OF IVC (INFERIOR VENA CAVAL) FILTER: ICD-10-CM

## 2019-05-16 DIAGNOSIS — Z71.6 TOBACCO ABUSE COUNSELING: ICD-10-CM

## 2019-05-16 RX ORDER — DILTIAZEM HYDROCHLORIDE 300 MG/1
CAPSULE, COATED, EXTENDED RELEASE ORAL
Qty: 90 CAP | Refills: 1 | Status: SHIPPED | OUTPATIENT
Start: 2019-05-16 | End: 2019-06-24

## 2019-05-16 NOTE — PROGRESS NOTES
1. Have you been to the ER, urgent care clinic since your last visit? Hospitalized since your last visit? No     2. Have you seen or consulted any other health care providers outside of the [address] [place] [address] since your last visit? Include any pap smears or colon screening. Yes Where: [facility]     3. Since your last visit, have you had any of the following symptoms? .          4. Have you had any blood work, X-rays or cardiac testing? Yes Where: [facility] Reason for visit: Labs        5. Where do you normally have your labs drawn? [facility]    6. Do you need any refills today?    No

## 2019-05-16 NOTE — PROGRESS NOTES
HISTORY OF PRESENT ILLNESS  Renae Collier is a 68 y.o. female. Hypertension   The history is provided by the medical records. This is a chronic problem. Associated symptoms include shortness of breath. Pertinent negatives include no chest pain and no headaches. CHF   The history is provided by the medical records. This is a chronic problem. Associated symptoms include shortness of breath. Pertinent negatives include no chest pain and no headaches. Shortness of Breath   The history is provided by the patient. This is a new problem. The problem occurs intermittently. The current episode started more than 1 week ago. The problem has not changed (O2 PRN at 2 LPM) since onset. Associated symptoms include leg swelling. Pertinent negatives include no fever, no headaches, no cough, no wheezing, no PND, no orthopnea, no chest pain, no vomiting, no rash and no claudication. The problem's precipitants include exercise (50 ft; 2 LPM PRN; 5/19 50 ft). Leg Swelling   The history is provided by the patient. This is a new problem. The current episode started more than 1 week ago. The problem occurs every several days. Associated symptoms include shortness of breath. Pertinent negatives include no chest pain and no headaches. The symptoms are aggravated by standing. The symptoms are relieved by sleep. Palpitations    The history is provided by the patient. This is a new problem. The current episode started more than 1 week ago. The problem occurs daily. The problem is associated with exercise and stress. Associated symptoms include lower extremity edema and shortness of breath. Pertinent negatives include no fever, no malaise/fatigue, no chest pain, no claudication, no orthopnea, no PND, no nausea, no vomiting, no headaches, no dizziness and no cough. Her past medical history is significant for hypertension. Review of Systems   Constitutional: Negative for chills, fever, malaise/fatigue and weight loss.    HENT: Negative for nosebleeds. Eyes: Negative for discharge. Respiratory: Positive for shortness of breath. Negative for cough and wheezing. Cardiovascular: Positive for palpitations and leg swelling. Negative for chest pain, orthopnea, claudication and PND. Gastrointestinal: Negative for diarrhea, nausea and vomiting. Genitourinary: Negative for dysuria and hematuria. Musculoskeletal: Negative for joint pain. Skin: Negative for rash. Neurological: Negative for dizziness, seizures, loss of consciousness and headaches. Endo/Heme/Allergies: Negative for polydipsia. Does not bruise/bleed easily. Psychiatric/Behavioral: Negative for depression and substance abuse. The patient does not have insomnia. No Known Allergies    Past Medical History:   Diagnosis Date    Congestive heart failure (HCC)     Coronary atherosclerosis of native coronary artery     Essential hypertension     [facility]     Ill-defined condition     Pneumonia    Nonspecific abnormal electrocardiogram (ECG) (EKG)     Pre-operative cardiovascular examination        Family History   Problem Relation Age of Onset    Heart Attack Mother 70    Stroke Father 54    Heart Attack Sister 59       Social History     Tobacco Use    Smoking status: Former Smoker     Packs/day: 0.50     Years: 55.00     Pack years: 27.50     Types: Cigarettes     Last attempt to quit: 2018     Years since quittin.0    Smokeless tobacco: Never Used   Substance Use Topics    Alcohol use: No    Drug use: Not on file        Current Outpatient Medications   Medication Sig    beclomethasone (QVAR) 80 mcg/actuation aero Take 1 Puff by inhalation two (2) times a day.  SPIRIVA WITH HANDIHALER 18 mcg inhalation capsule INHALE CONTENTS OF 1 CAPSULE ONCE DAILY USING HANDIHALER    abacavir/dolutegravir/lamivudi (TRIUMEQ PO) Take  by mouth.     dilTIAZem CD (CARTIA XT) 240 mg ER capsule TAKE 1 CAPSULE BY MOUTH DAILY    acetylcysteine (MUCOMYST) 100 mg/mL (10 %) nebulizer solution Take 4 mL by inhalation three (3) times daily.  acetaminophen (TYLENOL) 500 mg tablet Take 500 mg by mouth daily as needed for Pain.  mirtazapine (REMERON) 30 mg tablet TK 1 T PO HS    OXYGEN-AIR DELIVERY SYSTEMS 2 L/min by Nasal route continuous.  albuterol (PROVENTIL VENTOLIN) 2.5 mg /3 mL (0.083 %) nebulizer solution 3 mL by Nebulization route every four (4) hours as needed for Wheezing.  aspirin 81 mg chewable tablet Take 1 Tab by mouth daily.  sertraline (ZOLOFT) 25 mg tablet Take  by mouth daily.  fexofenadine (ALLEGRA) 180 mg tablet Take 180 mg by mouth daily.  fluticasone (FLONASE) 50 mcg/actuation nasal spray 2 Sprays by Both Nostrils route daily.  darunavir-cobicistat (PREZCOBIX) 800-150 mg-mg per tablet Take 1 Tab by mouth daily. No current facility-administered medications for this visit. Past Surgical History:   Procedure Laterality Date    HX HIP REPLACEMENT      HX HYSTERECTOMY         Visit Vitals  /80   Pulse (!) 118   Ht 5' 3\" (1.6 m)   Wt 73.5 kg (162 lb)   BMI 28.70 kg/m²       Diagnostic Studies:  I have reviewed the relevant tests done on the patient and show as follows  EKG tracings reviewed by me today. No flowsheet data found. 5/18 ECHO  SUMMARY:  Left ventricle: Systolic function was normal by visual assessment. Ejection   fraction was estimated in the range of 55 %  to 60 %. No obvious wall motion abnormalities identified in the views   obtained. Right ventricle: Systolic pressure was mildly increased. Estimated peak   pressure was at least 45 mmHg. Tricuspid valve: There was mild regurgitation. Ms. Cristiana Pabon has a reminder for a \"due or due soon\" health maintenance. I have asked that she contact her primary care provider for follow-up on this health maintenance. Physical Exam   Constitutional: She is oriented to person, place, and time.  She appears well-developed and well-nourished. No distress. HENT:   Head: Normocephalic and atraumatic. Mouth/Throat: Normal dentition. Eyes: Right eye exhibits no discharge. Left eye exhibits no discharge. No scleral icterus. Neck: Neck supple. No JVD present. Carotid bruit is not present. No thyromegaly present. Cardiovascular: Normal rate, regular rhythm, S1 normal, S2 normal, normal heart sounds and intact distal pulses. Exam reveals no gallop and no friction rub. No murmur heard. Pulmonary/Chest: Effort normal and breath sounds normal. She has no wheezes. She has no rales. Abdominal: Soft. She exhibits no mass. There is no tenderness. Musculoskeletal: She exhibits no edema. Lymphadenopathy:        Right cervical: No superficial cervical adenopathy present. Left cervical: No superficial cervical adenopathy present. Neurological: She is alert and oriented to person, place, and time. Skin: Skin is warm and dry. No rash noted. Psychiatric: She has a normal mood and affect. Her behavior is normal.       ASSESSMENT and PLAN    Patient advised to stop smoking to reduce future cardiovascular events. CHF is significantly improved and is well compensated. Mild intermittent tachycardia is likely related to hypoxia and deconditioning. 5/19 Discussed with patient the stress test to evaluate ischemia and coronary disease but she refuses it. 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 She also understands the risk of future MIs. Patient to call if she changes her mind. Diagnoses and all orders for this visit:    1. Chronic diastolic congestive heart failure (HCC)    2. Palpitation  -     AMB POC EKG ROUTINE W/ 12 LEADS, INTER & REP    3. Essential hypertension  -     dilTIAZem CD (CARDIZEM CD) 300 mg ER capsule; TAKE 1 CAPSULE BY MOUTH DAILY    4. 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 Asymptomatic HIV infection ([facility]    6.  Tobacco abuse counseling        Pertinent laboratory and test data reviewed and discussed with patient. See patient instructions also for other medical advice given    Medications Discontinued During This Encounter   Medication Reason    CARTIA  mg ER capsule Duplicate Order    dilTIAZem CD (CARTIA XT) 240 mg ER capsule        Follow-up and Dispositions    · Return in about 6 months (around 11/16/2019), or if symptoms worsen or fail to improve.

## 2019-05-16 NOTE — PATIENT INSTRUCTIONS
Medications Discontinued During This Encounter   Medication Reason    CARTIA  mg ER capsule Duplicate Order    dilTIAZem CD (CARTIA XT) 240 mg ER capsule           Learning About Benefits From Quitting Smoking  How does quitting smoking make you healthier? If you're thinking about quitting smoking, you may have a few reasons to be smoke-free. Your health may be one of them. · When you quit smoking, you lower your risks for cancer, lung disease, heart attack, stroke, blood vessel disease, and blindness from macular degeneration. · When you're smoke-free, you get sick less often, and you heal faster. You are less likely to get colds, flu, bronchitis, and pneumonia. · As a nonsmoker, you may find that your mood is better and you are less stressed. When and how will you feel healthier? Quitting has real health benefits that start from day 1 of being smoke-free. And the longer you stay smoke-free, the healthier you get and the better you feel. The first hours  · After just 20 minutes, your blood pressure and heart rate go down. That means there's less stress on your heart and blood vessels. · Within 12 hours, the level of carbon monoxide in your blood drops back to normal. That makes room for more oxygen. With more oxygen in your body, you may notice that you have more energy than when you smoked. After 2 weeks  · Your lungs start to work better. · Your risk of heart attack starts to drop. After 1 month  · When your lungs are clear, you cough less and breathe deeper, so it's easier to be active. · Your sense of taste and smell return. That means you can enjoy food more than you have since you started smoking. Over the years  · After 1 year, your risk of heart disease is half what it would be if you kept smoking. · After 5 years, your risk of stroke starts to shrink. Within a few years after that, it's about the same as if you'd never smoked.   · After 10 years, your risk of dying from lung cancer is cut by about half. And your risk for many other types of cancer is lower too. How would quitting help others in your life? When you quit smoking, you improve the health of everyone who now breathes in your smoke. · Their heart, lung, and cancer risks drop, much like yours. · They are sick less. For babies and small children, living smoke-free means they're less likely to have ear infections, pneumonia, and bronchitis. · If you're a woman who is or will be pregnant someday, quitting smoking means a healthier . · Children who are close to you are less likely to become adult smokers. Where can you learn more? Go to http://svetlana-ana maria.info/. Enter 052 [id number] [id number] 11 in the search box to learn more about \"Learning About Benefits From Quitting Smoking. \"  Current as of: 2018  Content Version: 11.9  © 1439-5390 [employer]. Care instructions adapted under license by [employer] (which disclaims liability or warranty for this information). If you have questions about a medical condition or this instruction, always ask your healthcare professional. [address] any warranty or liability for your use of this information. Influx[employer] Activation    Thank you for requesting access to [employer]. Please follow the instructions below to securely access and download your online medical record. In[employer] allows you to send messages to your doctor, view your test results, renew your prescriptions, schedule appointments, and more. How Do I Sign Up? 1. In your internet browser, go to [email or web address]. 2. Click on the First Time User? Click Here link in the Sign In box. You will see the New Member Sign Up page. 3. Enter your Influx[employer] Access Code exactly as it appears below. You will not need to use this code after youve completed the sign-up process.  If you do not sign up before the expiration date, you must request a new code.    Retail Info Access Code: [email or web address]  Expires: 2019 12:11 PM (This is the date your Retail Info access code will )    4. Enter the last four digits of your Social Security Number (xxxx) and Date of Birth (mm/dd/yyyy) as indicated and click Submit. You will be taken to the next sign-up page. 5. Create a Retail Info ID. This will be your Retail Info login ID and cannot be changed, so think of one that is secure and easy to remember. 6. Create a Retail Info password. You can change your password at any time. 7. Enter your Password Reset Question and Answer. This can be used at a later time if you forget your password. 8. Enter your e-mail address. You will receive e-mail notification when new information is available in [address]. 9. Click Sign Up. You can now view and download portions of your medical record. 10. Click the Download Summary menu link to download a portable copy of your medical information. Additional Information    If you have questions, please visit the Frequently Asked Questions section of the Retail Info website at [email or web address] Remember, Retail Info is NOT to be used for urgent needs. For medical emergencies, dial 911.

## 2019-05-21 ENCOUNTER — OFFICE VISIT (OUTPATIENT)
Dept: PULMONOLOGY | Age: 74
End: 2019-05-21

## 2019-05-21 VITALS
TEMPERATURE: 97.2 F | SYSTOLIC BLOOD PRESSURE: 108 MMHG | OXYGEN SATURATION: 97 % | HEART RATE: 92 BPM | WEIGHT: 161 LBS | RESPIRATION RATE: 18 BRPM | HEIGHT: 63 IN | BODY MASS INDEX: 28.53 KG/M2 | DIASTOLIC BLOOD PRESSURE: 76 MMHG

## 2019-05-21 DIAGNOSIS — Z71.6 TOBACCO ABUSE COUNSELING: ICD-10-CM

## 2019-05-21 DIAGNOSIS — J44.1 COPD EXACERBATION (HCC): Primary | ICD-10-CM

## 2019-05-21 DIAGNOSIS — I50.32 CHRONIC DIASTOLIC CONGESTIVE HEART FAILURE (HCC): ICD-10-CM

## 2019-05-21 DIAGNOSIS — Z21 ASYMPTOMATIC HIV INFECTION (HCC): Chronic | ICD-10-CM

## 2019-05-21 RX ORDER — BRIMONIDINE TARTRATE, TIMOLOL MALEATE 2; 5 MG/ML; MG/ML
SOLUTION/ DROPS OPHTHALMIC
Refills: 1 | COMMUNITY
Start: 2019-05-08

## 2019-05-21 NOTE — PROGRESS NOTES
Osbaldo Chavez presents today for   Chief Complaint   Patient presents with    Cough with sputum       Is someone accompanying this pt? Yes  Great Niece    Is the patient using any DME equipment during OV? Oxygen     -DME Company  [employer]    Depression Screening:  3 most recent PHQ Screens 10/3/2018   Little interest or pleasure in doing things Nearly every day   Feeling down, depressed, irritable, or hopeless Nearly every day   Total Score PHQ 2 6       Learning Assessment:  Learning Assessment 6/12/2018   PRIMARY LEARNER Patient   PRIMARY LANGUAGE ENGLISH   LEARNER PREFERENCE PRIMARY DEMONSTRATION   ANSWERED BY patient   RELATIONSHIP SELF       Abuse Screening:  No flowsheet data found. Fall Risk  Fall Risk Assessment, last 12 mths 10/3/2018   Able to walk? Yes   Fall in past 12 months? Yes   Fall with injury? Yes   Number of falls in past 12 months 1   Fall Risk Score 2         Coordination of Care:  1. Have you been to the ER, urgent care clinic since your last visit? Hospitalized since your last visit? No    2. Have you seen or consulted any other health care providers outside of the [address] [place] [address] since your last visit? Include any pap smears or colon screening.  No

## 2019-05-21 NOTE — LETTER
5/21/19 Patient: Allison Kohli YOB: 1945 Date of Visit: 5/21/2019 Marcela Carlin MD 
[address] [address] VIA Facsimile: [phone] Dear Marcela Carlin MD, Thank you for referring Ms. Reshma Barajas to [address] [place] [address] for evaluation. My notes for this consultation are attached. If you have questions, please do not hesitate to call me. I look forward to following your patient along with you.  
 
 
Sincerely, 
 
Mimi Smith MD

## 2019-06-24 DIAGNOSIS — I10 ESSENTIAL HYPERTENSION: ICD-10-CM

## 2019-06-24 RX ORDER — DILTIAZEM HYDROCHLORIDE 240 MG/1
CAPSULE, COATED, EXTENDED RELEASE ORAL
Qty: 90 CAP | Refills: 0 | OUTPATIENT
Start: 2019-06-24

## 2019-06-25 RX ORDER — DILTIAZEM HYDROCHLORIDE 300 MG/1
CAPSULE, COATED, EXTENDED RELEASE ORAL
Qty: 90 CAP | Refills: 1 | Status: SHIPPED | OUTPATIENT
Start: 2019-06-25

## 2019-09-19 ENCOUNTER — OFFICE VISIT (OUTPATIENT)
Dept: PULMONOLOGY | Age: 74
End: 2019-09-19

## 2019-09-19 VITALS
HEART RATE: 100 BPM | DIASTOLIC BLOOD PRESSURE: 82 MMHG | SYSTOLIC BLOOD PRESSURE: 125 MMHG | HEIGHT: 63 IN | OXYGEN SATURATION: 98 % | TEMPERATURE: 98.1 F | RESPIRATION RATE: 18 BRPM | BODY MASS INDEX: 28.42 KG/M2 | WEIGHT: 160.4 LBS

## 2019-09-19 DIAGNOSIS — Z72.0 TOBACCO USE: ICD-10-CM

## 2019-09-19 DIAGNOSIS — J44.9 CHRONIC OBSTRUCTIVE PULMONARY DISEASE, UNSPECIFIED COPD TYPE (HCC): Primary | ICD-10-CM

## 2019-09-19 DIAGNOSIS — J96.90 RESPIRATORY FAILURE, UNSPECIFIED CHRONICITY, UNSPECIFIED WHETHER WITH HYPOXIA OR HYPERCAPNIA (HCC): ICD-10-CM

## 2019-09-19 DIAGNOSIS — I50.9 CONGESTIVE HEART FAILURE, UNSPECIFIED HF CHRONICITY, UNSPECIFIED HEART FAILURE TYPE (HCC): ICD-10-CM

## 2019-09-19 RX ORDER — BIMATOPROST 0.1 MG/ML
SOLUTION/ DROPS OPHTHALMIC
Refills: 3 | COMMUNITY
Start: 2019-06-20

## 2019-09-19 RX ORDER — ALBUTEROL SULFATE 0.83 MG/ML
2.5 SOLUTION RESPIRATORY (INHALATION)
Qty: 24 EACH | Refills: 3 | Status: SHIPPED | OUTPATIENT
Start: 2019-09-19 | End: 2019-10-19

## 2019-09-19 RX ORDER — BRINZOLAMIDE 10 MG/ML
SUSPENSION/ DROPS OPHTHALMIC
Refills: 4 | COMMUNITY
Start: 2019-06-17

## 2019-09-19 NOTE — PROGRESS NOTES
Redd [address] presents today for   Chief Complaint   Patient presents with    Follow Up Chronic Condition       Is someone accompanying this pt? Yes Great Niece    Is the patient using any DME equipment during OV? No    -DME Company [employer]     Depression Screening:  3 most recent PHQ Screens 10/3/2018   Little interest or pleasure in doing things Nearly every day   Feeling down, depressed, irritable, or hopeless Nearly every day   Total Score PHQ 2 6       Learning Assessment:  Learning Assessment 6/12/2018   PRIMARY LEARNER Patient   PRIMARY LANGUAGE ENGLISH   LEARNER PREFERENCE PRIMARY DEMONSTRATION   ANSWERED BY patient   RELATIONSHIP SELF       Abuse Screening:  No flowsheet data found. Fall Risk  Fall Risk Assessment, last 12 mths 9/19/2019   Able to walk? Yes   Fall in past 12 months? No   Fall with injury? -   Number of falls in past 12 months -   Fall Risk Score -         Coordination of Care:  1. Have you been to the ER, urgent care clinic since your last visit? Hospitalized since your last visit? Yes; Name: [facility] 6/19    2. Have you seen or consulted any other health care providers outside of the [address] [place] [address] since your last visit? Include any pap smears or colon screening.  No

## 2019-09-19 NOTE — PROGRESS NOTES
[facility]  Pulmonary, Critical Care, and Sleep Medicine      Pulmonary Office Progress Notes    Name: Marah Walsh     : 1945     Date: 2019        Subjective:     2019          [facility]    Reshma Baxter  is a 68 y.o. female with PMH of COPD and respiratory failure who presents for routine follow-up visit. She was last seen here on 2019 by Emerita Martinez and had reported good symptom control with daily ICS/LABA/L AMA, supplemental oxygen and albuterol as needed. Since her last visit here she was admitted to [facility] and treated for COPD exacerbation with pneumonia. Today she appears comfortable, in no distress and reports that her symptoms have improved significantly since her hospital discharge. She continues to have some shortness of breath with exertion, however this is alleviated with rest, supplemental oxygen at 2 L/min and albuterol as needed. She continues to have a chronic, daily cough that has not worsened and is productive of clear/white sputum. She denies chest pain or hemoptysis. No fever, chills or orthopnea; no leg/calf pain or swelling and no decreased appetite or weight loss. Her last PFTs were on 10/3/2018 and demonstrated moderate obstructive defect with reduced diffusion capacity. She is a current smoker of cigarettes, 0.5 packs/day with a 27.5-pack-year history.         Past Medical History:   Diagnosis Date    Congestive heart failure (HCC)     Coronary atherosclerosis of native coronary artery     Essential hypertension     HIV (human immunodeficiency virus infection) ([facility]     Ill-defined condition     Pneumonia    Nonspecific abnormal electrocardiogram (ECG) (EKG)     Pre-operative cardiovascular examination        No Known Allergies    Current Outpatient Medications   Medication Sig Dispense Refill    AZOPT 1 % ophthalmic suspension INT 1 GTT INTO  OS BID  4    LUMIGAN 0.01 % ophthalmic drops INT 1 GTT IN OU HS  3    beclomethasone (QVAR) 80 mcg/actuation aero Take 1 Puff by inhalation two (2) times a day for 30 days. 1 Inhaler 3    tiotropium (SPIRIVA) 18 mcg inhalation capsule Take 1 Cap by inhalation daily for 30 days. 30 Cap 3    albuterol (PROVENTIL VENTOLIN) 2.5 mg /3 mL (0.083 %) nebu 3 mL by Nebulization route every four (4) hours as needed for Cough (shortness of breath) for up to 30 days. 24 Each 3    dilTIAZem CD (CARDIZEM CD) 300 mg ER capsule TAKE 1 CAPSULE BY MOUTH DAILY 90 Cap 1    COMBIGAN 0.2-0.5 % drop ophthalmic solution INT 1 GTT IN OU BID  1    abacavir/dolutegravir/lamivudi (TRIUMEQ PO) Take  by mouth.  fexofenadine (ALLEGRA) 180 mg tablet Take 180 mg by mouth daily.  acetylcysteine (MUCOMYST) 100 mg/mL (10 %) nebulizer solution Take 4 mL by inhalation three (3) times daily. 40 mL 1    acetaminophen (TYLENOL) 500 mg tablet Take 500 mg by mouth daily as needed for Pain.  mirtazapine (REMERON) 30 mg tablet TK 1 T PO HS  5    OXYGEN-AIR DELIVERY SYSTEMS 2 L/min by Nasal route continuous.  aspirin 81 mg chewable tablet Take 1 Tab by mouth daily. 30 Tab 0    darunavir-cobicistat (PREZCOBIX) 800-150 mg-mg per tablet Take 1 Tab by mouth daily.  sertraline (ZOLOFT) 25 mg tablet Take  by mouth daily. Review of Systems:    HEENT: No epistaxis, no nasal drainage, no difficulty in swallowing, no redness in eyes  Respiratory: As stated above in HPI  Cardiovascular: no chest pain, no palpitations, no chronic leg edema, no syncope  Gastrointestinal: no abd pain, no vomiting, no diarrhea, no bleeding symptoms  Genitourinary: No urinary symptoms or hematuria  Integument/breast: No ulcers or rashes  Musculoskeletal: No leg / calf pain  Neurological: No focal weakness, no seizures, no headaches  Behvioral/Psych: No anxiety, no depression  Constitutional: No fever, chills or night sweats.   No decreased appetite or weight loss     Objective:     Visit Vitals  /82 (BP 1 Location: Right arm, BP Patient Position: Sitting)   Pulse 100   Temp 98.1 °F (36.7 °C) (Oral)   Resp 18   Ht 5' 3\" (1.6 m)   Wt 72.8 kg (160 lb 6.4 oz)   SpO2 98% Comment: RA Rest   BMI 28.41 kg/m²        PHYSICAL EXAM      General: Oriented to person, place, and time. Well-developed, well-nourished, and in no distress      Head:   Normocephalic, without obvious abnormality, atraumatic       Eyes:   Pupils reactive, conjunctivae / corneas clear. EOM's intact, no scleral icterus       Nose:   Nares normal, no drainage. Throat:    Lips, mucosa and tongue normal. Teeth and gums normal       Neck:   Supple, symmetrical, trachea midline. No adenopathy or thyroid swelling; no carotid bruit or JVD. CVS:    Regular rate and rhythm. S1S2 normal,  no murmurs       RS:      Symmetrical chest rise, moderate AE bilaterally. Lung sounds clear to auscultation bilaterally. No wheezing, rales or rhonchi, no accessory muscle use      Abd:     Soft, non-tender. No hepatosplenomegaly                                                     Neuro:   non focal, awake, alert and oriented to person, place, time and situation    Extrm:   no leg edema,  clubbing or cyanosis       Skin:   no rash    Data review:     No visits with results within 6 Month(s) from this visit. Latest known visit with results is:   No results displayed because visit has over 200 results. PULMONARY FUNCTION TESTS    Date FVC FEV1  FEV1/FVC [id number] TLC RV RV/TLC VC DLCO   10/3/2018  79%  53%  52%  26%  122%  108%  89%  134%  43%                                             Imaging:  I have personally reviewed the patients radiographs and have reviewed the reports:  XR Results (most recent):  Results from Hospital Encounter encounter on 05/27/18   XR SWALLOW FUNC VIDEO    Narrative Modified barium swallow. CPT CODE: 13138    HISTORY: Dysphagia in the setting of right lower lobe pneumonia, rule out  aspiration.     COMPARISON: None.    FLUORO TIME: 48 seconds    NUMBER OF FLUOROSCOPIC IMAGES: 9 loops    FINDINGS:    Study was performed in conjunction with the speech therapist.  The video is  available in the department for review. Patient swallowed multiple consistencies  of barium mixtures including thin liquids, nectar, puree, solids. There is trace penetration with thin liquids, nectar, and pill with thin  liquids. There is residua in the valleculae as well. There is no aspiration. There is normal swallowing with nectar. Impression IMPRESSION:    No aspiration. There is trace penetration with thin liquids, nectar, and a pill. CT Results (most recent):  Results from Hospital Encounter encounter on 05/27/18   CT CHEST WO CONT    Narrative CT CHEST    CPT code: 29626    History: Shortness of breath, consolidation and atelectasis. Findings: Helical axial non-contrast images of the chest are obtained from the  thoracic inlet to the adrenal glands and reviewed with soft tissue and lung  windows, as appropriate. Comparison study is 15 May 2018. The visualized neck structures are unremarkable. There is some interval  increase in the posterior base segmental/subsegmental atelectasis in the right  lung, mainly right lower lobe. There has been some interval decrease in the  posterior left base subsegmental atelectasis. There is diffuse emphysematous  and interstitial fibrotic change in both lungs. There is no pleural fluid or  thickening. Some bronchial wall thickening persists but there is less evidence  of plugging. There is no significant mediastinal lymphadenopathy. No  esophageal abnormality is seen. No vascular abnormalities are seen. The  visualized portion of the abdomen is unremarkable except for the 1.3 cm cyst in  the medial left lobe of the liver. The bones and soft tissues are unremarkable.       Impression IMPRESSION[de-identified]     There is interval increase in the segmental/subsegmental atelectasis in the  posterior right base but also some interval decrease in similar subsegmental  atelectasis in the posterior left base. No gross consolidations are seen. Diffuse interstitial prominence and emphysematous changes are seen diffusely  throughout both lungs consistent with chronic obstructive pulmonary disease. There is some persistent bronchial wall thickening.      ===================  Note: [facility] maintains that all CT scans at their facilities  are performed by using dose optimization technique as appropriate to a performed  examination, to include automated exposure control, adjustment of the mAs and/or  kVp according to patient size (including appropriate matching for site specific  examinations) or use of iterative reconstruction technique. 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 Discussed appropriate use of respiratory  maintenance and rescue  medications with patient  · Continue supplemental oxygen at 2 L/min daily as needed  · Smoking cessation strongly advised - The patient was counseled on the dangers of tobacco use,  Including increased risk for cardiovascular problems, stroke and cancer, and was advised to quit.  I reviewed strategies to maximize success with the patient, including use of nicotine patch, gum, etc.  The patient declined assistance at this time. I also discussed the dangers of smoking while on oxygen. The patient verbalized understanding. · Follow up in pulmonary clinic in 6 months      or sooner with worsening of symptoms  · Recommend healthy diet / weight / exercise as tolerated  · Report to ER with chest pain or difficulty breathing.         Smiley Power NP

## 2019-09-24 NOTE — TELEPHONE ENCOUNTER
Pt niece [phone] [facility] states pt needs a refill for Breo to be sent to [facility]. Please advise .

## 2019-12-13 ENCOUNTER — APPOINTMENT (OUTPATIENT)
Dept: PHYSICAL THERAPY | Age: 74
End: 2019-12-13
Payer: MEDICARE

## 2019-12-16 ENCOUNTER — HOSPITAL ENCOUNTER (OUTPATIENT)
Dept: PHYSICAL THERAPY | Age: 74
Discharge: HOME | End: 2019-12-16
Payer: MEDICARE

## 2019-12-16 PROCEDURE — 97165 OT EVAL LOW COMPLEX 30 MIN: CPT

## 2019-12-16 NOTE — PROGRESS NOTES
OT DAILY TREATMENT NOTE 10-18    Patient Name: Yomi Gonsalez  Date:2019  : 1945  [x]  Patient  Verified  Payor: [facility] MEDICARE / Plan: [address] [place] [address] / Product Type: Managed Care Medicare /    In time:1030  Out time:1105  Total Treatment Time (min): 35  Visit #: 1 of 12    Treatment Area: Other specified disorders of muscle [M62.89]  Severe sepsis ([address] [A41.9, R65.20]    SUBJECTIVE  Pain Level (0-10 scale): 0/10  Any medication changes, allergies to medications, adverse drug reactions, diagnosis change, or new procedure performed?: [x] No    [] Yes (see summary sheet for update)  Subjective functional status/changes:   [] No changes reported  I was in three hospitals    OBJECTIVE      30 min []Eval                  []Re-Eval       5 min Therapeutic Exercise:  [] See flow sheet :   Rationale: increase strength to improve the patients ability to improve UE strength  UE HEP reviewed for BUEs no weight 10 reps      With   [x] TE   [] TA   [] neuro   [] other: Patient Education: [x] Review HEP    [x] Progressed/Changed HEP based on: UE HEP  [] positioning   [] body mechanics   [] transfers   [] heat/ice application   [] Splint wear/care   [] Sensory re-education   [] scar management      [] other:            Other Objective/Functional Measures:   Subjective: pt is a right hand dominant, 69 y.o.y/o, female who had long history of pneumonia, went to [facility] x 2 weeks, then home then went [facility] x 1 week, then home for several months the to [facility] from late  until 12/3/19. Cardiac arrest x 2.     Prior level of function: Used oxygen mostly at night, retired [employer], dance, arts and crafts, I self care home care  Pain level:(0-no pain 10-debilitating pain) no   Current functional limitations/living situation: Alone in apt, dressing, bathing, cooking, shopping, cooking, home care,     Medical hx: COPD, depression, arthritis, , B THR , AIDS/HIV    Medications: See the written copy of this report in the patient's paper medical record.        Objective:    Range of Motion:WFL  Strength:    Right Left   Shoulder Flex 3+ 3+          abd 3+ 3+                     Elbow Ext/flex 3+ 3+               Wrist Flex 4 4    Ext 4 4                 Hand ROM WFL,   Hand Strength:   Gross Grasp 3pt Pinch Lateral Pinch Tip Pinch   Right  40 12 12 8   Left 38 11 10 8     Nine-Hole Peg Test:  Left= _32____seconds  Right=_31____seconds  Finger Opposition:WNL    ADLs  Feeding:        []MaxA   []ModA   []Kurt   [] CGA   []SBA   []Melita   [x]Independent  UE Dressing:       []MaxA   []ModA   []Kurt   [] CGA   []SBA   []Melita   [x]Independent  LE Dressing:       []MaxA   []ModA   []Kurt   [] CGA   []SBA   []Melita   [x]Independent  Grooming:       []MaxA   []ModA   [x]Kurt   [] CGA   []SBA   []Melita   []Independent  Toileting:       []MaxA   []ModA   []Kurt   [] CGA   []SBA   []Melita   [x]Independent  Bathing:       []MaxA   []ModA   []Kurt   [] CGA   []SBA   [x]Melita   []Independent  Light Meal Prep:    [x]MaxA   []ModA   []Kurt   [] CGA   []SBA   []Melita   []Independent  Household/Other:  [x]MaxA   []ModA   []Kurt   [] CGA   []SBA   []Melita   []Independent  Adaptive Equip:     []MaxA   []ModA   []Kurt   [] CGA   []SBA   []Melita   []Independent  Driving:       []MaxA   []ModA   []Kurt   [] CGA   []SBA   []Melita   []Independent  Money Mgmt:        [x]MaxA   []ModA   []Kurt   [] CGA   []SBA   []Melita   []Independent    Coordination   GM                           FM [x]WFL          [] Impaired   []WFL          [x] Impaired    Tone/Motor Control []WFL          [x] Impaired    Midline Symmetry [x]WFL          [] Impaired    Visual Perception:                    R/L   Neglect           R/L Discrimination                   Body Scheme [x]WFL          [] Impaired   [x]WFL          [] Impaired     [x]WFL          [] Impaired     [x]WFL          [] Impaired    Visual Motor Skills                           Tracking Tracing                            Writing   [x]WFL          [] Impaired     [x]WFL          [] Impaired   [x]WFL          [] Impaired    Cognition [x]Person         [x]Place            []Date   [x]Situation/ Behavior    Follows Commands  []     1-step  [] 2-step   [x]Multi-step      Memory:                             STM                             LTM   [x]WFL          [] Impaired   [x]WFL          [] Impaired    Safety Awareness [x]WFL          [] Impaired    Judgment  [x]WFL          [] Impaired    Express Needs [x]WFL          [] Impaired           Pain Level (0-10 scale) post treatment: 0/10    ASSESSMENT/Changes in Function:      [x]  See Plan of Care  []  See progress note/recertification  []  See Discharge Summary             PLAN  []  Upgrade activities as tolerated     []  Continue plan of care  []  Update interventions per flow sheet       []  Discharge due to:_  []  Other:_      Roxana Garrido OTR/L 12/16/2019  10:17 AM    Future Appointments   Date Time Provider Milton Dos Santos   12/26/2019  1:00 PM Luis Manuel Cullen [id number] [facility]   1/20/2020 12:45 PM Dario Monet MD [id number] [place] [address]

## 2019-12-16 NOTE — PROGRESS NOTES
[facility] [employer] [facility] [employer] [facility]  [place]  [address] [facility]  [phone] fax    Plan of Care/Statement of Necessity for Occupational Therapy Services    Patient name: Herbie Randall Start of Care: 2019   Referral source: Simon, Not On File, MD : 1945    Medical Diagnosis: Other specified disorders of muscle [M62.89]  Severe sepsis ([address] [A41.9, R65.20]  Payor: [facility] MEDICARE / [facility] [address] [place] [address] / Product Type: Managed Care Medicare /  Onset Date:Several months    Treatment Diagnosis: Weakness B UEs, IADLs   Prior Hospitalization: see medical history Provider#: [id number]   Medications: Verified on Patient summary List    Comorbidities: HIV, COPD, arthritis, B THR, cardiac arrext x 2,depression   Prior Level of Function: I self care home care, has not diriven for several years, likes crafts, dancing          The Plan of Care and following information is based on the information from the initial evaluation. Assessment/ key information: 69year old RHD female who has had several month history of pneumonia and was hospitalized several times, experiencing cardiac arrest x 2, sepsis and general debility. She was most recently at [facility] and was released on 12/3/19. She has portable oxygen at 2 liters which she uses when moving about. She denies pain. She reports she is able to perform self care except hair care without help but has aide ( niece) for home care and shopping, transportation. Her AROM is WFL in both UEs. She has marked tremors in both hands which she reports have been present for several years. Her strength is grossly 3+-4/5 in both UEs.  is reduced bilaterally at right 40, left 38. Pinches are 8-12 on right and 8-11 on left. fine motor skills are markedly slowed at 31 seconds for right and 32 for left on 9 hole peg test.  Her FOTO is 65/100 reflecting slight impairment in function.    She will benefit from skilled occupational therapy to improve UE strength, endurance and coordination for return to usual activities in home such as laundry and cooking. Evaluation Complexity: History LOW Complexity : Brief history review  Examination LOW Complexity : 1-3 performance deficits relating to physical, cognitive , or psychosocial skils that result in activity limitations and / or participation restrictions  Clinical Decision Making LOW Complexity : No comorbidities that affect functional and no verbal or physical assistance needed to complete eval tasks   Overall Complexity Rating: LOW     Problem List: Decreased strength, Decreased coordination/prehension, Decreased ADL/functional abilities  and Decreased activity tolerance     Treatment Plan may include any combination of the following: Therapeutic exercise, Therapeutic activities, Patient education and ADLs/IADLs    Patient / Family readiness to learn indicated by: asking questions, trying to perform skills and interest    Persons(s) to be included in education:   patient (P)    Barriers to Learning/Limitations: None    Patient Goal (s): Cook, walk better, clean the house the way I like to     Patient Self Reported Health Status: fair    Rehabilitation Potential: good    Short Term Goals: To be accomplished in 2 weeks:  1. Patient will be independent in home exercise program for bilateral UE strengthening and endurance for home care tasks. 2.  Patient will be independent in home exercise program for fine motor skills  3. Patient will be able to participate in desired fine motor tasks(such as crafts) due tot improved fine motor skills. Long Term Goals: To be accomplished in 4 weeks:   1. Patient will be able to complete simple home care tasks such as folding clothes and washing dishes due ot improved UE strength and endurance. 2.  Patient will improve both hand  by at least 5# for ease of opening jars and carrying.   3.  Patient will report improved performance of home care and carrying tasks as shown by increase in FOTO of at least 5 points. Frequency / Duration: Patient to be seen 2-3 times per week for 4 weeks:    Patient/ Caregiver education and instruction: Diagnosis, prognosis, self care, activity modification and exercises   [x]  Plan of care has been reviewed with NANCY Hill 12/16/2019 1:12 PM    _____________________________________________________________________    I certify that the above Therapy Services are being furnished while the patient is under my care. I agree with the treatment plan and certify that this therapy is necessary.     Physician's Signature:____________Date:_________TIME:________    ** Signature, Date and Time must be completed for valid certification **    Please sign and return to [facility] [employer]   [address] [place] [address]  [phone] fax

## 2019-12-18 DIAGNOSIS — J44.9 CHRONIC OBSTRUCTIVE PULMONARY DISEASE, UNSPECIFIED COPD TYPE (HCC): ICD-10-CM

## 2019-12-18 RX ORDER — TIOTROPIUM BROMIDE 18 UG/1
CAPSULE ORAL; RESPIRATORY (INHALATION)
Qty: 30 CAP | Refills: 3 | Status: SHIPPED | OUTPATIENT
Start: 2019-12-18 | End: 2020-04-10

## 2020-02-06 NOTE — IP AVS SNAPSHOT
[address] [address] [place] Patient: Deborah Alcantar MRN: [id number] :1945 A check brigette indicates which time of day the medication should be taken. My Medications START taking these medications Instructions Each Dose to Equal  
 Morning Noon Evening Bedtime  
 albuterol 2.5 mg /3 mL (0.083 %) nebulizer solution Commonly known as:  PROVENTIL VENTOLIN Your last dose was: Your next dose is:    
   
   
 3 mL by Nebulization route every four (4) hours as needed for Wheezing. 2.5 mg  
    
   
   
   
  
 aspirin 81 mg chewable tablet Your last dose was: Your next dose is: Take 1 Tab by mouth daily. 81 mg  
    
   
   
   
  
 budesonide-formoterol 160-4.5 mcg/actuation Hfaa Commonly known as:  SYMBICORT Your last dose was: Your next dose is: Take 2 Puffs by inhalation two (2) times a day. 2 Puff  
    
   
   
   
  
 dilTIAZem  mg ER capsule Commonly known as:  CARDIZEM CD Your last dose was: Your next dose is: Take 1 Cap by mouth daily. 180 mg  
    
   
   
   
  
 famotidine 20 mg tablet Commonly known as:  PEPCID Your last dose was: Your next dose is: Take 1 Tab by mouth daily. 20 mg  
    
   
   
   
  
 furosemide 40 mg tablet Commonly known as:  LASIX Your last dose was: Your next dose is: Take 1 Tab by mouth daily. 40 mg  
    
   
   
   
  
 hydrALAZINE 10 mg tablet Commonly known as:  APRESOLINE Your last dose was: Your next dose is: Take 1 Tab by mouth three (3) times daily. Hold for SBP less than 110  
 10 mg  
    
   
   
   
  
 levoFLOXacin 250 mg tablet Commonly known as:  Alba Bring Start taking on:  2018 Your last dose was: Your next dose is: Take 1 Tab by mouth every fourty-eight (48) hours for 1 day. 250 mg  
    
   
   
   
  
 predniSONE 10 mg tablet Commonly known as:  Chente Rolon Your last dose was: Your next dose is:    
   
   
 3 tab po daily for 3 days then 2 tab po daily for 3 days then  1 tab po daily for 3 days  
     
   
   
   
  
 tiotropium 18 mcg inhalation capsule Commonly known as:  Karly Mendosae Your last dose was: Your next dose is: Take 1 Cap by inhalation daily. 1 Cap  
    
   
   
   
  
 traMADol 50 mg tablet Commonly known as:  ULTRAM  
   
Your last dose was: Your next dose is: Take 1 Tab by mouth every six (6) hours as needed. Max Daily Amount: 200 mg.  
 50 mg CONTINUE taking these medications Instructions Each Dose to Equal  
 Morning Noon Evening Bedtime  
 fluticasone 50 mcg/actuation nasal spray Commonly known as:  Indira Cee Your last dose was: Your next dose is: 2 Sprays by Both Nostrils route daily. 2 [place] PREZCOBIX 800-150 mg-mg per tablet Generic drug:  darunavir-cobicistat Your last dose was: Your next dose is: Take 1 Tab by mouth daily. 1 Tab  
    
   
   
   
  
 sertraline 25 mg tablet Commonly known as:  ZOLOFT Your last dose was: Your next dose is: Take  by mouth daily. TRIUMEQ tablet Generic drug:  abacavir-dolutegravir-lamiVUDine Your last dose was: Your next dose is: Take  by mouth daily. STOP taking these medications   
 mirtazapine 30 mg tablet Commonly known as:  Gracie People Where to Get Your Medications Information on where to get these meds will be given to you by the nurse or doctor. ! Ask your nurse or doctor about these medications albuterol 2.5 mg /3 mL (0.083 %) nebulizer solution  
 aspirin 81 mg chewable tablet  
 budesonide-formoterol 160-4.5 mcg/actuation Hfaa  
 dilTIAZem  mg ER capsule  
 famotidine 20 mg tablet  
 furosemide 40 mg tablet  
 hydrALAZINE 10 mg tablet  
 levoFLOXacin 250 mg tablet  
 predniSONE 10 mg tablet  
 tiotropium 18 mcg inhalation capsule  
 traMADol 50 mg tablet Complex Repair And Rotation Flap Text: The defect edges were debeveled with a #15 scalpel blade.  The primary defect was closed partially with a complex linear closure.  Given the location of the remaining defect, shape of the defect and the proximity to free margins a rotation flap was deemed most appropriate for complete closure of the defect.  Using a sterile surgical marker, an appropriate advancement flap was drawn incorporating the defect and placing the expected incisions within the relaxed skin tension lines where possible.    The area thus outlined was incised deep to adipose tissue with a #15 scalpel blade.  The skin margins were undermined to an appropriate distance in all directions utilizing iris scissors.

## 2020-03-03 NOTE — PROGRESS NOTES
[facility] Hospitalist Group  Progress Note    Patient: Rad Felton Age: 71 y.o. : 1945 MR#: [id number] SSN: [id number]  Date: 2018     Subjective:     Pt reports she did not sleep well last night due to neighboring pt being loud. She also c/o her usual cp, states she's worried she may have lung cancer as cause for cp and wants to get \"tested for cancer\"    Assessment/Plan:   -HCAP-cxr showed basilar right lower lobe pneumonia. On vanc, levofloxacin and zosyn. Also had leukocytosis, unclear if due to steroids in recent past which has resolved. Pt was dc'd on prednisone taper from recent admission. Levofloxacin dc'd. Cont rest of abx.  -Chronic respiratory failure/COPD-per dc summary from previous admission pt was to be dc'd on home 02 nasal cannula  -ANGELA cause unclear-resolving.  -Transaminitis cause unclear, hep B results indicate immunity through previous infection, liver us no evidence of biliary, liver parenchymal abnormality, ? Iatrogenic (anti retrovirals). -HIV last CD4 213 18 currently on antiretrovirals, per pharmacy pt's meds have been adjusted given renal insuff  -likely CKD  -HTN  -History of diastolic HF-appears compensated at this time  -history of atypical cp, per dc summary was supposed to f/u w/ cardiology.  -Thrombocytopenia-acuity suggests iatrogenic cause. Will hold heparin products, use scd for dvt prophyx, cont to monitor. Abx may be culprit if cont'd drop. Some improvement today. Will cont to monitor.  -Disposition: per PT home health. Will dc once able to ambulate w/o desaturations on 2L nasal cannula.        PLAN:  Cont current abx  Check walking pulse 0x on 2 L-discussed w/ nursing.  -hold heparin products and follow platelet count  -PT/OT  Additional Notes:      Case discussed with:  [x]Patient  []Family  []Nursing  []Case Management  DVT Prophylaxis:  []Lovenox  [x]Hep SQ  []SCDs  []Coumadin   []On Heparin gtt    Objective:   VS:   Visit Vitals    /80 (BP 1 Location: Left arm, BP Patient Position: At rest)    Pulse 95    Temp 98.9 °F (37.2 °C)    Resp 20    Ht 5' 2\" (1.575 m)    Wt 74.5 kg (164 lb 3.9 oz)    SpO2 96%    Breastfeeding No    BMI 30.04 kg/m2      Tmax/24hrs: Temp (24hrs), Av.4 °F (36.9 °C), Min:98.1 °F (36.7 °C), Max:98.9 °F (37.2 °C)      Intake/Output Summary (Last 24 hours) at 18 1500  Last data filed at 18 2300   Gross per 24 hour   Intake              110 ml   Output                0 ml   Net              110 ml       General:  Alert, awake, in nad  Cardiovascular:  Rrr, no murmurs  Pulmonary: ctab   GI: soft, nt, nd   Extremities:  No edema  Additional:      Labs:    Recent Results (from the past 24 hour(s))   METABOLIC PANEL, BASIC    Collection Time: 18  3:55 AM   Result Value Ref Range    Sodium 143 136 - 145 mmol/L    Potassium 4.1 3.5 - 5.5 mmol/L    Chloride 106 100 - 108 mmol/L    CO2 30 21 - 32 mmol/L    Anion gap 7 3.0 - 18 mmol/L    Glucose 94 74 - 99 mg/dL    BUN 18 7.0 - 18 MG/DL    Creatinine 1.51 (H) 0.6 - 1.3 MG/DL    BUN/Creatinine ratio 12 12 - 20      GFR est AA 41 (L) >60 ml/min/1.73m2    GFR est non-AA 34 (L) >60 ml/min/1.73m2    Calcium 8.8 8.5 - 10.1 MG/DL   CBC WITH AUTOMATED DIFF    Collection Time: 18  3:55 AM   Result Value Ref Range    WBC 7.3 4.6 - 13.2 K/uL    RBC 3.25 (L) 4.20 - 5.30 M/uL    HGB 11.0 (L) 12.0 - 16.0 g/dL    HCT 33.9 (L) 35.0 - 45.0 %    .3 (H) 74.0 - 97.0 FL    MCH 33.8 24.0 - 34.0 PG    MCHC 32.4 31.0 - 37.0 g/dL    RDW 13.6 11.6 - 14.5 %    PLATELET 98 (L) 349 - 420 K/uL    MPV 9.8 9.2 - 11.8 FL    NEUTROPHILS 84 (H) 40 - 73 %    LYMPHOCYTES 13 (L) 21 - 52 %    MONOCYTES 3 3 - 10 %    EOSINOPHILS 0 0 - 5 %    BASOPHILS 0 0 - 2 %    ABS. NEUTROPHILS 6.2 1.8 - 8.0 K/UL    ABS. LYMPHOCYTES 0.9 0.9 - 3.6 K/UL    ABS. MONOCYTES 0.2 0.05 - 1.2 K/UL    ABS. EOSINOPHILS 0.0 0.0 - 0.4 K/UL    ABS.  BASOPHILS 0.0 0.0 - 0.06 K/UL    DF AUTOMATED     VANCOMYCIN, TROUGH    Collection Time: 05/30/18 10:16 AM   Result Value Ref Range    Vancomycin,trough 15.5 10.0 - 20.0 ug/mL    Reported dose date: 20180529      Reported dose time: 1100      Reported dose: 1250 MG UNITS       Signed By: Gabriella Cee MD     May 30, 2018 3:39 PM Detail Level: Simple

## 2020-04-08 ENCOUNTER — TELEPHONE (OUTPATIENT)
Dept: PULMONOLOGY | Age: 75
End: 2020-04-08

## 2020-04-08 DIAGNOSIS — J44.9 CHRONIC OBSTRUCTIVE PULMONARY DISEASE, UNSPECIFIED COPD TYPE (HCC): ICD-10-CM

## 2020-04-08 NOTE — TELEPHONE ENCOUNTER
Per Domi Freitas at [facility], pt is requesting a lightweight portable oxgyen. Pt uses [address] for her oxygen.

## 2020-04-08 NOTE — TELEPHONE ENCOUNTER
I called and spoke with Rosario Nye with [facility]. Pt already has a POC.    I tried calling [facility] back but no extension # left and unable to speak with anyone, every option is a recorded message

## 2020-04-10 RX ORDER — TIOTROPIUM BROMIDE 18 UG/1
CAPSULE ORAL; RESPIRATORY (INHALATION)
Qty: 30 CAP | Refills: 3 | Status: SHIPPED | OUTPATIENT
Start: 2020-04-10

## 2020-08-14 RX ORDER — ALBUTEROL SULFATE 90 UG/1
2 AEROSOL, METERED RESPIRATORY (INHALATION)
COMMUNITY

## 2020-08-14 RX ORDER — METHYLPREDNISOLONE 4 MG/1
TABLET ORAL
COMMUNITY
Start: 2018-07-06

## 2020-08-14 RX ORDER — BECLOMETHASONE DIPROPIONATE HFA 80 UG/1
AEROSOL, METERED RESPIRATORY (INHALATION)
COMMUNITY
Start: 2019-06-19 | End: 2020-10-26 | Stop reason: SDUPTHER

## 2020-08-14 RX ORDER — FLUTICASONE FUROATE AND VILANTEROL 200; 25 UG/1; UG/1
1 POWDER RESPIRATORY (INHALATION)
COMMUNITY
Start: 2019-06-28 | End: 2020-10-27 | Stop reason: ALTCHOICE

## 2020-08-14 RX ORDER — FAMOTIDINE 40 MG/1
TABLET, FILM COATED ORAL
COMMUNITY
Start: 2020-05-08

## 2020-08-14 RX ORDER — ABACAVIR 300 MG/1
300 TABLET ORAL
COMMUNITY

## 2020-08-14 RX ORDER — LAMIVUDINE 150 MG/1
150 TABLET, FILM COATED ORAL
COMMUNITY

## 2020-08-14 RX ORDER — TRAZODONE HYDROCHLORIDE 100 MG/1
TABLET ORAL
COMMUNITY
Start: 2018-11-26

## 2020-08-14 RX ORDER — IBUPROFEN 200 MG
1 TABLET ORAL
COMMUNITY
Start: 2019-06-29

## 2020-08-14 RX ORDER — IPRATROPIUM BROMIDE AND ALBUTEROL SULFATE 2.5; .5 MG/3ML; MG/3ML
SOLUTION RESPIRATORY (INHALATION)
COMMUNITY
Start: 2018-11-09

## 2020-08-14 RX ORDER — TRAMADOL HYDROCHLORIDE 50 MG/1
TABLET ORAL
COMMUNITY
Start: 2018-07-06

## 2020-09-15 ENCOUNTER — OFFICE VISIT (OUTPATIENT)
Dept: PULMONOLOGY | Age: 75
End: 2020-09-15

## 2020-09-15 VITALS
OXYGEN SATURATION: 99 % | WEIGHT: 158.6 LBS | RESPIRATION RATE: 22 BRPM | DIASTOLIC BLOOD PRESSURE: 60 MMHG | HEIGHT: 63 IN | TEMPERATURE: 98.6 F | BODY MASS INDEX: 28.1 KG/M2 | HEART RATE: 144 BPM | SYSTOLIC BLOOD PRESSURE: 110 MMHG

## 2020-09-15 DIAGNOSIS — J44.9 MODERATE COPD (CHRONIC OBSTRUCTIVE PULMONARY DISEASE) (HCC): ICD-10-CM

## 2020-09-15 DIAGNOSIS — I50.32 CHRONIC DIASTOLIC CONGESTIVE HEART FAILURE (HCC): ICD-10-CM

## 2020-09-15 DIAGNOSIS — J96.11 CHRONIC RESPIRATORY FAILURE WITH HYPOXIA (HCC): ICD-10-CM

## 2020-09-15 DIAGNOSIS — Z21 ASYMPTOMATIC HIV INFECTION (HCC): Primary | Chronic | ICD-10-CM

## 2020-09-15 RX ORDER — CHOLECALCIFEROL (VITAMIN D3) 125 MCG
10 CAPSULE ORAL
COMMUNITY

## 2020-09-15 NOTE — PROGRESS NOTES
The pt. Has exertional SOB and generalized weakness. Her Apical is quite rapid. Various testing was done at [facility] but, the pt. States it was WNL. Including her Thyroid level.

## 2020-09-15 NOTE — PATIENT INSTRUCTIONS
Oxygen Therapy: Care Instructions Your Care Instructions Oxygen therapy helps you get more oxygen into your lungs and bloodstream. You may use it if you have a disease that makes it hard to breathe, such as COPD, pulmonary fibrosis (scarring of the lungs), or heart failure. Oxygen therapy can make it easier for you to breathe and can reduce your heart's workload. Some people need extra oxygen all the time. Others need it from time to time throughout the day or overnight. A doctor will prescribe how much oxygen you need and how often to use it. To breathe the oxygen, most people use a nasal cannula (say \"GUERO-yuh-dionna\"). This is a thin tube with two prongs that fit just inside your nose. People who need a lot of oxygen may need to use a mask that fits over the nose and mouth. Follow-up care is a key part of your treatment and safety. Be sure to make and go to all appointments, and call your doctor if you are having problems. It's also a good idea to know your test results and keep a list of the medicines you take. How can you care for yourself at home? To help yourself · Using oxygen may dry out your nose or lips. Use water-based lubricants on your lips or nostrils. Do not use an oil-based product like petroleum jelly. · If you use a nasal cannula, the tubing may rub under your nostrils and around your ears. To keep your skin from getting sore, tuck some gauze under the tubing. Use a water-based lotion on rubbed areas. · Do not use alcohol or take drugs that relax you, because they will slow your breathing rate. · Keep track of how much oxygen is in the tank, and reorder before it runs out. If a holiday is coming up or you expect bad weather, order in advance or make your regular order larger. · You may need extra oxygen when you travel to high altitudes or travel by plane. Ask your doctor about this.  
· If you are getting oxygen directly to your windpipe through an opening in your neck, your doctor will teach you how to care for the equipment. To make sure oxygen is flowing · Check the flow by holding your mask or cannula up to your ear and listening for the \"hiss\" of airflow. · If you have a nasal cannula, dip the prongs in a glass of water. If you see bubbles, oxygen is coming through. · Check your pressure gauge or contents indicator. · If you use an oxygen concentrator, make sure it is turned on and plugged in. If you use a cylinder, make sure the valve is open. · Look for kinks, blockages, or water in the tubing. Be sure the tubing is connected to the oxygen source. · Do not change your oxygen flow rate. Your doctor sets this at the correct level. Higher flow rates usually do not help and can increase the risk of harmful carbon dioxide buildup in the blood. To be safe · Do not leave cords or tubing running across an area where you or someone else may trip on it. · Do not let oxygen containers get hot. Store them in a cool place where there is airflow. Do not leave them in a car trunk or a hot vehicle. · Keep oxygen containers upright. Make sure they do not fall over and get damaged. Try securing the tanks in a sturdy container or securing them with a rope or a chain. · Watch for signs of oxygen leaks. If you hear a loud hissing from your container or if it empties too fast, stay away from the container. Open windows right away and call the company that brought the oxygen system to your home. · Do not use oxygen around anything that could spark or easily cause a fire. ? Do not smoke or let others smoke while you are using oxygen. Put up \"no smoking\" signs in your home. ? Do not use oxygen near open flames, such as candles, fireplaces, gas stoves, or hot water heaters. Do not use it near electric razors, hair dryers, heating pads, or anything that may spark. ? Keep a working fire extinguisher in your home where it is easy to get to. ? If a fire starts, turn off the oxygen right away and leave the house. ? If you have an oxygen concentrator, do not use it if the cord looks damaged. Do not use an extension cord to plug it in. Do not plug it into an outlet that has other appliances plugged into it. To care for the equipment · Follow the directions that come with the equipment for using and caring for it. · Wash your cannula or mask with a liquid soap and warm water 1 or 2 times a week. Replace them every 2 to 4 weeks. · If you have a cold, change the nasal prongs when your cold symptoms are done. · If you have an oxygen concentrator, unplug the unit and wipe down the cabinet with a damp cloth daily. Clean the air filter at least 2 times a week. Where can you learn more? Go to http://svetlana-ana maria.info/ Enter E117 in the search box to learn more about \"Oxygen Therapy: Care Instructions. \" Current as of: February 24, 2020               Content Version: 12.6 © 2006-2020 [employer]. Care instructions adapted under license by [employer] (which disclaims liability or warranty for this information). If you have questions about a medical condition or this instruction, always ask your healthcare professional. [address] any warranty or liability for your use of this information.

## 2020-09-15 NOTE — PROGRESS NOTES
[facility]  Pulmonary, Critical Care, and Sleep Medicine      Pulmonary Office Progress Notes    Name: Raya Finley     : 1945     Date: 9/15/2020        Subjective:     9/15/2020    Feels better overall. Does get very SOB with activity and has to wear her oxygen all the time. She is asking for light weight POC device which can last her longer when she goes out  Currently she has been staying home as much as possible due to the COVID pandemic  Denies much cough except in the mornings when she has to cough and clear the mucus. She noticed an increase in cough after she was exposed to someone who had a bad cough. No fever, chills  Denies nausea. Using inhalers and SOB at baseline. She is on retroviral therapy tolerating well. States that her counts are doing good.     KENZIE Barajas  is a 69 y.o. female with PMH of COPD and respiratory failure who presents for routine follow-up visit. She was last seen here on 2019 by Donovan Guillen and had reported good symptom control with daily ICS/LABA/L AMA, supplemental oxygen and albuterol as needed. She continues to have some shortness of breath with exertion, however this is alleviated with rest, supplemental oxygen at 2 L/min and albuterol as needed. Her last PFTs were on 10/3/2018 and demonstrated moderate obstructive defect with reduced diffusion capacity. She is a current smoker of cigarettes, 0.5 packs/day with a 27.5-pack-year history.         Past Medical History:   Diagnosis Date    Congestive heart failure (HCC)     Coronary atherosclerosis of native coronary artery     Essential hypertension     [facility]     Ill-defined condition     Pneumonia    Moderate COPD (chronic obstructive pulmonary disease) ([facility] 9/15/2020    FEV1 - 53% predicted    Nonspecific abnormal electrocardiogram (ECG) (EKG)     Pre-operative cardiovascular examination        No Known Allergies    Current Outpatient Medications   Medication Sig Dispense Refill    melatonin 5 mg tablet Take 10 mg by mouth nightly.  albuterol (PROVENTIL HFA, VENTOLIN HFA, PROAIR HFA) 90 mcg/actuation inhaler Take 2 Puffs by inhalation every four (4) hours as needed.  beclomethasone dipropionate (Qvar RediHaler) 80 mcg/actuation HFAb inhaler INHALE 1 PUFF PO BID      famotidine (PEPCID) 40 mg tablet TK 1 T PO QD HS      albuterol-ipratropium (DUO-NEB) 2.5 mg-0.5 mg/3 ml nebu INHALE THE CONTENTS OF 1 VIAL VIA NEBULIZER Q 4 H PRN      Spiriva with HandiHaler 18 mcg inhalation capsule INHALE CONTENTS OF 1 CAPSULE ONCE DAILY USING HANDIHALER 30 Cap 3    AZOPT 1 % ophthalmic suspension INT 1 GTT INTO  OS BID  4    LUMIGAN 0.01 % ophthalmic drops INT 1 GTT IN OU HS  3    dilTIAZem CD (CARDIZEM CD) 300 mg ER capsule TAKE 1 CAPSULE BY MOUTH DAILY 90 Cap 1    COMBIGAN 0.2-0.5 % drop ophthalmic solution INT 1 GTT IN OU BID  1    abacavir/dolutegravir/lamivudi (TRIUMEQ PO) Take  by mouth.  acetylcysteine (MUCOMYST) 100 mg/mL (10 %) nebulizer solution Take 4 mL by inhalation three (3) times daily. 40 mL 1    acetaminophen (TYLENOL) 500 mg tablet Take 500 mg by mouth daily as needed for Pain.  OXYGEN-AIR DELIVERY SYSTEMS 2 L/min by Nasal route continuous.  aspirin 81 mg chewable tablet Take 1 Tab by mouth daily. 30 Tab 0    darunavir-cobicistat (PREZCOBIX) 800-150 mg-mg per tablet Take 1 Tab by mouth daily.  sertraline (ZOLOFT) 25 mg tablet Take  by mouth daily.  abacavir (ZIAGEN) 300 mg tablet Take 300 mg by mouth.  darunavir ethanolate (PREZISTA) 600 mg tablet Take 600 mg by mouth.  dolutegravir (TIVICAY) 50 mg tab tablet Take 50 mg by mouth.  fluticasone furoate-vilanteroL (BREO ELLIPTA) 200-25 mcg/dose inhaler Take 1 Puff by inhalation.  lamiVUDine (EPIVIR) 150 mg tablet Take 150 mg by mouth.       methylPREDNISolone (MEDROL DOSEPACK) 4 mg tablet Take orally As directed on packaging.  nicotine (NICODERM CQ) 21 mg/24 hr 1 Patch by TransDERmal route.  traMADoL (ULTRAM) 50 mg tablet TK 1 T PO  Q 8 H FOR 5 DAYS      traZODone (DESYREL) 100 mg tablet TK 1 T PO  QHS      fexofenadine (ALLEGRA) 180 mg tablet Take 180 mg by mouth daily.  mirtazapine (REMERON) 30 mg tablet TK 1 T PO HS  5       Review of Systems:    HEENT: No epistaxis, no nasal drainage, no difficulty in swallowing, no redness in eyes  Respiratory: As stated above in HPI  Cardiovascular: no chest pain, no palpitations, no chronic leg edema, no syncope  Gastrointestinal: no abd pain, no vomiting, no diarrhea, no bleeding symptoms  Genitourinary: No urinary symptoms or hematuria  Integument/breast: No ulcers or rashes  Musculoskeletal: No leg / calf pain  Neurological: No focal weakness, no seizures, no headaches  Behvioral/Psych: No anxiety, no depression  Constitutional: No fever, chills or night sweats. No decreased appetite or weight loss     Objective:     Visit Vitals  /60 (BP 1 Location: Left arm, BP Patient Position: Sitting)   Pulse (!) 144   Temp 98.6 °F (37 °C)   Resp 22   Ht 5' 3\" (1.6 m)   Wt 71.9 kg (158 lb 9.6 oz)   SpO2 99% Comment: POC on level 2   BMI 28.09 kg/m²        PHYSICAL EXAM      General: Oriented to person, place, and time. Well-developed, well-nourished, and in no distress      Head:   Normocephalic, without obvious abnormality, atraumatic       Eyes:   Pupils reactive, conjunctivae / corneas clear. EOM's intact, no scleral icterus       Nose:   Nares normal, no drainage. Throat:    Lips, mucosa and tongue normal. Teeth and gums normal       Neck:   Supple, symmetrical, trachea midline. No adenopathy or thyroid swelling; no carotid bruit or JVD. CVS:    Regular rate and rhythm. S1S2 normal,  no murmurs       RS:      Symmetrical chest rise, moderate AE bilateral.expiratory wheezing heard bilaterally      Abd:     Soft, non-tender.   No hepatosplenomegaly                                                     Neuro:   non focal, awake, alert and oriented to person, place, time and situation    Extrm:   no leg edema,  clubbing or cyanosis       Skin:   no rash    Data review:       PULMONARY FUNCTION TESTS    Date FVC FEV1  FEV1/FVC [id number] TLC RV RV/TLC VC DLCO   10/3/2018  79%  53%  52%  26%  122%  108%  89%  134%  43%                                         Imaging:  I have personally reviewed the patients radiographs and have reviewed the reports:  XR Results (most recent):  Results from Hospital Encounter encounter on 05/27/18   XR SWALLOW FUNC VIDEO    Narrative Modified barium swallow. CPT CODE: 25769    HISTORY: Dysphagia in the setting of right lower lobe pneumonia, rule out  aspiration. COMPARISON: None. FLUORO TIME: 48 seconds    NUMBER OF FLUOROSCOPIC IMAGES: 9 loops    FINDINGS:    Study was performed in conjunction with the speech therapist.  The video is  available in the department for review. Patient swallowed multiple consistencies  of barium mixtures including thin liquids, nectar, puree, solids. There is trace penetration with thin liquids, nectar, and pill with thin  liquids. There is residua in the valleculae as well. There is no aspiration. There is normal swallowing with nectar. Impression IMPRESSION:    No aspiration. There is trace penetration with thin liquids, nectar, and a pill. CT Results (most recent):  Results from Hospital Encounter encounter on 05/27/18   CT CHEST WO CONT    Narrative CT CHEST    CPT code: 94177    History: Shortness of breath, consolidation and atelectasis. Findings: Helical axial non-contrast images of the chest are obtained from the  thoracic inlet to the adrenal glands and reviewed with soft tissue and lung  windows, as appropriate. Comparison study is 15 May 2018. The visualized neck structures are unremarkable.   There is some interval  increase in the posterior base segmental/subsegmental atelectasis in the right  lung, mainly right lower lobe. There has been some interval decrease in the  posterior left base subsegmental atelectasis. There is diffuse emphysematous  and interstitial fibrotic change in both lungs. There is no pleural fluid or  thickening. Some bronchial wall thickening persists but there is less evidence  of plugging. There is no significant mediastinal lymphadenopathy. No  esophageal abnormality is seen. No vascular abnormalities are seen. The  visualized portion of the abdomen is unremarkable except for the 1.3 cm cyst in  the medial left lobe of the liver. The bones and soft tissues are unremarkable. Impression IMPRESSION[de-identified]     There is interval increase in the segmental/subsegmental atelectasis in the  posterior right base but also some interval decrease in similar subsegmental  atelectasis in the posterior left base. No gross consolidations are seen. Diffuse interstitial prominence and emphysematous changes are seen diffusely  throughout both lungs consistent with chronic obstructive pulmonary disease. There is some persistent bronchial wall thickening.      ===================  Note: [facility] maintains that all CT scans at their facilities  are performed by using dose optimization technique as appropriate to a performed  examination, to include automated exposure control, adjustment of the mAs and/or  kVp according to patient size (including appropriate matching for site specific  examinations) or use of iterative reconstruction technique.           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 PFT with FEV1-53% predicted and DLCO 38% predicted. · HIV positive- asymptomatic. on retroviral therapy. CT- no evidence of ILD to suggest PCP. Slow improvement. Recent imaging with improvement both on CXR and CT scan. Now further clinical improvement  · Respiratory failurecurrently on supplemental oxygen at 2 L/min   · Chest pain- atypical likely esophagitis. ? Candida, GERD. · Acute CHF, systolic and possibly diastolic  · Pulmonary HTN- cardiac and Pulmonary etiology  · Hx of htn  · Hx of anxiety d/o  · Tobacco use      RECOMMENDATIONS:     · Continue   Qvar 80 mcg 1 inhalation twice daily, Spiriva HandiHaler 18 mcg, 1 daily    and albuterol PRN. Discussed appropriate use of respiratory  maintenance and rescue  medications with patient  · Continue supplemental oxygen at 2 L/min . Will place order for light weight POC  · Smoking cessation strongly advised - The patient was counseled on the dangers of tobacco use,  Including increased risk for cardiovascular problems, stroke and cancer, and was advised to quit. I reviewed strategies to maximize success with the patient, including use of nicotine patch, gum, etc.  The patient declined assistance at this time. I also discussed the dangers of smoking while on oxygen. The patient verbalized understanding. · Follow up in pulmonary clinic in 6 months or sooner with worsening of symptoms  · Recommend healthy diet / weight / exercise as tolerated  · Completed papers for [employer] as she needs power for her oxygen as well as nebulizer  · Report to ER with chest pain or difficulty breathing.         Jonathan Shoemaker MD

## 2020-09-15 NOTE — LETTER
9/15/20 Patient: Allison Kohli YOB: 1945 Date of Visit: 9/15/2020 Kristyn Clemens MD 
[address] [address] [place] [address] VIA Facsimile: [phone] Dear Kristyn Clemens MD, Thank you for referring Ms. Reshma Barajas to [address] [place] [address] for evaluation. My notes for this consultation are attached. If you have questions, please do not hesitate to call me. I look forward to following your patient along with you.  
 
 
Sincerely, 
 
Naveed Fallon MD

## 2020-09-22 ENCOUNTER — TELEPHONE (OUTPATIENT)
Dept: PULMONOLOGY | Age: 75
End: 2020-09-22

## 2020-09-22 NOTE — TELEPHONE ENCOUNTER
Pt states she's returning a call from nurse regarding pt receiving portable o2. Please advise [phone].

## 2020-09-22 NOTE — TELEPHONE ENCOUNTER
The pt. Tells me that Medicaid will pay for an [employer]. She is given a phone number to talk with them.

## 2020-10-26 ENCOUNTER — TELEPHONE (OUTPATIENT)
Dept: PULMONOLOGY | Age: 75
End: 2020-10-26

## 2020-10-27 ENCOUNTER — TELEPHONE (OUTPATIENT)
Dept: PULMONOLOGY | Age: 75
End: 2020-10-27

## 2020-10-27 RX ORDER — BECLOMETHASONE DIPROPIONATE HFA 80 UG/1
AEROSOL, METERED RESPIRATORY (INHALATION)
Qty: 1 INHALER | Refills: 3 | Status: SHIPPED | OUTPATIENT
Start: 2020-10-27 | End: 2021-09-08

## 2020-10-27 NOTE — TELEPHONE ENCOUNTER
Patient c/o cough and sob. She also had a low grade temp yesterday 99.  Niece taking patient to be evaluated at urgent care

## 2020-10-27 NOTE — TELEPHONE ENCOUNTER
Pt grand daughter [employer] states pt has a cough that is causing her to be SOB. Please advise [phone].

## 2020-11-18 ENCOUNTER — TELEPHONE (OUTPATIENT)
Dept: PULMONOLOGY | Age: 75
End: 2020-11-18

## 2020-11-18 NOTE — TELEPHONE ENCOUNTER
Pt called asking to speak with a nurse re oxygen.  She said that she is trying to get O2 through a company with [employer] called [employer]. Please call pt [phone]

## 2020-11-18 NOTE — TELEPHONE ENCOUNTER
Patient state the small POC from [employer] is too heavy for her and she wants to get an [employer]. She states weight is 15#. I talked to her about [employer] not being a local company and she would have to ship back if something stops working and a [address] [place] can not service her while she waits for another machine. I spoke with [employer] and she states the POC is only 3 # not 15. She feels she needs to keep what she has with local [employer]. Patient also states the battery is only lasting 30min. I have advised the niece to call [employer] to get a replacement battery. She states the battery last about 1 hr.  Advised if it starts lasting a shorter time to call [employer] to trade out the battery

## 2021-04-08 ENCOUNTER — TELEPHONE (OUTPATIENT)
Dept: PULMONOLOGY | Age: 76
End: 2021-04-08

## 2021-04-08 NOTE — TELEPHONE ENCOUNTER
Kevin Campo is contacted re: pt. need for office follow up and 6 MW. The pt. cancelled her 4/7/2021 appt. Secondary to elevated temp. .  She did not reschedule the appt. Ren Bailey states that if, necessary a [facility] appt. without the 6 MW is authorized by Medicare at present secondary to CoVID precautions.

## 2021-09-08 RX ORDER — BECLOMETHASONE DIPROPIONATE HFA 80 UG/1
AEROSOL, METERED RESPIRATORY (INHALATION)
Qty: 10.6 G | Refills: 5 | Status: SHIPPED | OUTPATIENT
Start: 2021-09-08

## 2022-03-18 PROBLEM — F41.9 ANXIETY: Status: ACTIVE | Noted: 2018-05-12

## 2022-03-18 PROBLEM — J18.9 HCAP (HEALTHCARE-ASSOCIATED PNEUMONIA): Status: ACTIVE | Noted: 2018-05-27

## 2022-03-18 PROBLEM — I10 ESSENTIAL HYPERTENSION: Status: ACTIVE | Noted: 2018-05-27

## 2022-03-19 PROBLEM — R07.89 CHEST PAIN, ATYPICAL: Status: ACTIVE | Noted: 2018-05-18

## 2022-03-19 PROBLEM — B20 HIV (HUMAN IMMUNODEFICIENCY VIRUS INFECTION) (HCC): Status: ACTIVE | Noted: 2018-05-12

## 2022-03-19 PROBLEM — Z21 HIV (HUMAN IMMUNODEFICIENCY VIRUS INFECTION) (HCC): Status: ACTIVE | Noted: 2018-05-12

## 2022-03-19 PROBLEM — I50.32 CHRONIC DIASTOLIC CONGESTIVE HEART FAILURE (HCC): Status: ACTIVE | Noted: 2018-05-18

## 2022-03-19 PROBLEM — Z95.828 S/P INSERTION OF IVC (INFERIOR VENA CAVAL) FILTER: Status: ACTIVE | Noted: 2018-05-14

## 2022-03-19 PROBLEM — J44.1 COPD EXACERBATION (HCC): Status: ACTIVE | Noted: 2018-05-12

## 2022-03-19 PROBLEM — Z71.6 TOBACCO ABUSE COUNSELING: Status: ACTIVE | Noted: 2019-05-16

## 2022-03-20 PROBLEM — J44.9 MODERATE COPD (CHRONIC OBSTRUCTIVE PULMONARY DISEASE) (HCC): Status: ACTIVE | Noted: 2020-09-15

## 2022-05-10 NOTE — ROUTINE PROCESS
Bedside and Verbal shift change report given to Jakob Verdin RN   (oncoming nurse) by Anastasiya Ruiz RN (offgoing nurse). Report included the following information SBAR, Kardex, Intake/Output, MAR and Recent Results. 0710 Bedside and Verbal shift change report given to Anastasiya Ruiz [facility] (oncoming nurse) by Jakob Verdin RN   (offgoing nurse). Report included the following information SBAR, Kardex, Intake/Output, MAR and Recent Results. Plastic Surgeon Procedure Text (E): After obtaining clear surgical margins the patient was sent to plastics for surgical repair.  The patient understands they will receive post-surgical care and follow-up from the referring physician's office.

## 2022-12-23 RX ORDER — BECLOMETHASONE DIPROPIONATE HFA 80 UG/1
AEROSOL, METERED RESPIRATORY (INHALATION)
Qty: 10.6 G | Refills: 5 | Status: SHIPPED | OUTPATIENT
Start: 2022-12-23

## 2023-10-19 NOTE — PROGRESS NOTES
Patient attended f/u appt. With Dr. Reilly Patton. Nurse Navigator ( NN ) briefly met with patient. Pt in w/c with portable oxygen and assisted  by her niece, Van Roldan.   Very cheerful and no distress apparent distress noted. Nichelle reports that on patient's next visit Dr. Joanna Hernandez will have patient perform 6 min. Walk and PFT. NN will continue to monitor patient. Ilumya Counseling: I discussed with the patient the risks of tildrakizumab including but not limited to immunosuppression, malignancy, posterior leukoencephalopathy syndrome, and serious infections.  The patient understands that monitoring is required including a PPD at baseline and must alert us or the primary physician if symptoms of infection or other concerning signs are noted.

## 2024-01-01 NOTE — PROGRESS NOTES
Problem: Dysphagia (Adult)  Goal: *Acute Goals and Plan of Care (Insert Text)  Patient will:  1. Tolerate PO trials with 0 s/s overt distress in 4/5 trials  2. Utilize compensatory swallow strategies/maneuvers (decrease bite/sip, size/rate, alt. liq/sol) with min cues in 4/5 trials  3. Perform oral-motor/laryngeal exercises to increase oropharyngeal swallow function with min cues  4. Complete an objective swallow study (i.e., MBSS) to assess swallow integrity, r/o aspiration, and determine of safest LRD, min A    Recommend:   Regular diet with thin liquids  Meds as tolerated   Aspiration precautions  HOB >45 degrees during all intake and for at least 30 min after po   Small bites/sips, Slow rate of intake   MBS to further assess swallow integrity and rule out silent aspiration     Speech LAnguage Pathology bedside swallow evaluation    Patient: Frieda Clark (76 y.o. female)  Date: 5/31/2018  Primary Diagnosis: HCAP (healthcare-associated pneumonia)  HCAP (healthcare-associated pneumonia)  HIV [facility] immunodeficiency virus infection) ([facility]  Precautions: Aspiration,  Fall    ASSESSMENT :  Based on the objective data described below, the patient presents with suspected mild pharyngeal dysphagia (to be further determined by MBS) c/b infiltrate on CXR and report of chest discomfort following intake. Pt observed at b/s feeding self regular/thin breakfast meal.  Pt demo adequate oral prep phase, timely swallow and adequate laryngeal elevation to palpation. No overt s/sx aspiration noted; however, recommend MBS to objectively assess oropharyngeal swallow function and rule out silent aspiration. Results/recommendations discussed with pt and RN. Pt able to verbalize understanding. Will continue to follow for further dysphagia management as indicated. Patient will benefit from skilled intervention to address the above impairments.   Patients rehabilitation potential is considered to be Good  Factors which may influence rehabilitation potential include:   []            None noted  [x]            Mental ability/status  [x]            Medical condition  []            Home/family situation and support systems  []            Safety awareness  []            Pain tolerance/management  []            Other:      PLAN :  Recommendations and Planned Interventions:  MBS  Frequency/Duration: Patient will be followed by speech-language pathology 1-2 times per day/4-7 days per week to address goals. Discharge Recommendations: To Be Determined     SUBJECTIVE:   Patient stated That sounds good.     OBJECTIVE:     Past Medical History:   Diagnosis Date    Coronary atherosclerosis of native coronary artery     [facility] immunodeficiency virus infection) [facility]     Ill-defined condition     Pneumonia    Nonspecific abnormal electrocardiogram (ECG) (EKG)     Pre-operative cardiovascular examination      Past Surgical History:   Procedure Laterality Date    HX HIP REPLACEMENT      HX HYSTERECTOMY       Prior Level of Function/Home Situation:   Home Situation  Home Environment: Apartment  # Steps to Enter: 0  One/Two Story Residence: Other (Comment) (3rd floor with elevator)  # of Interior Steps: 0 (uses elevator)  Living Alone: Yes  Support Systems: Family member(s), Home care staff  Patient Expects to be Discharged to[de-identified] Apartment  Current DME Used/Available at Home: Commode, bedside, Walker, rolling, Cane, straight  Diet prior to admission: Regular  Current Diet:  Regular   Cognitive and Communication Status:  Neurologic State: Alert  Orientation Level: Oriented X4  Cognition: Follows commands, Appropriate decision making  Perception: Appears intact  Perseveration: No perseveration noted  Safety/Judgement: Awareness of environment, Fall prevention, Insight into deficits  Oral Assessment:  Oral Assessment  Labial: No impairment  Dentition: Natural  Oral Hygiene: Good  Lingual: No impairment  Velum: No impairment  Mandible: No impairment  P.O. Trials:  Patient Position: [facility]  Vocal quality prior to P.O.: No impairment  Consistency Presented: Thin liquid; Solid  How Presented: Self-fed/presented;Cup/sip;Straw;Spoon; Successive swallows  Bolus Acceptance: No impairment  Bolus Formation/Control: No impairment  Propulsion: No impairment  Oral Residue: None  Initiation of Swallow: No impairment  Laryngeal Elevation: Functional  Aspiration Signs/Symptoms: Infiltrate on chest xray (Chest discomfort )  Pharyngeal Phase Characteristics: No impairment, issues, or problems  (To be further determined by MBS)  Oral Phase Severity: No impairment  Pharyngeal Phase Severity : Mild    GCODESwallowing:  Swallow Current Status CI= 1-19%   Swallow Goal Status CH= 0%    The severity rating is based on the following outcomes:    Clinical Judgment    Pain:  Pt c/o 0/10 pain prior to evaluation. Pt c/o 0/10 pain post evaluation. After treatment:   []            Patient left in no apparent distress sitting up in chair  [x]            Patient left in no apparent distress in bed  [x]            Call bell left within reach  [x]            Nursing notified  []            Caregiver present  []            Bed alarm activated    COMMUNICATION/EDUCATION:   [x]            SLP educated pt with regard to compensatory swallow strategies and       aspiration/reflux precautions including: small bites/sips,       alternate liquids/solids, decrease feeding rate, HOB > 45 with all po, and upright in bed at 30 degrees after po for at least 45 minutes. [x]            Patient/family have participated as able in goal setting and plan of care. []            Patient/family agree to work toward stated goals and plan of care. []            Patient understands intent and goals of therapy; neutral about participation. []            Patient is unable to participate in goal setting and plan of care.     Thank you for this referral.  Celia Cabrera M.S., [address] [facility]  Speech-Language Pathologist negative

## 2024-02-21 RX ORDER — BECLOMETHASONE DIPROPIONATE HFA 80 UG/1
1 AEROSOL, METERED RESPIRATORY (INHALATION) 2 TIMES DAILY
Qty: 10.6 G | OUTPATIENT
Start: 2024-02-21

## 2024-12-23 NOTE — PATIENT INSTRUCTIONS
 Continue Spiriva  daily, rinse mouth out after use.       · Continue Qvar 80 mcg, one inhalation twice daily     Continue albuterol nebulizer treatment or rescue inhaler, 2 inhalations every 4-6 hours as needed for shortness of breath, wheezing or cough    · Continue supplemental oxygen @ 2LPM as needed daily     Recommend healthy diet/weight and exercise as tolerated    · Smoking cessation strongly advised     Follow-up in pulmonary clinic in 6 months or sooner with worsening of symptoms     Report to the ER with chest pain or difficulty breathing
to PLOF and address remaining functional goals.  (see flow sheet as applicable)     Details if applicable:       8  75440 Therapeutic Exercise (timed):  increase ROM, strength, coordination, balance, and proprioception to improve patient's ability to progress to PLOF and address remaining functional goals. (see flow sheet as applicable)     Details if applicable:     8  31759 Neuromuscular Re-Education (timed):  improve balance, coordination, kinesthetic sense, posture, core stability and proprioception to improve patient's ability to develop conscious control of individual muscles and awareness of position of extremities in order to progress to PLOF and address remaining functional goals. (see flow sheet as applicable)     Details if applicable:     10  86138 Manual Therapy (timed):  decrease pain, increase ROM, increase tissue extensibility, and decrease trigger points to improve patient's ability to progress to PLOF and address remaining functional goals.  The manual therapy interventions were performed at a separate and distinct time from the therapeutic activities interventions . (see flow sheet as applicable)     Details if applicable:  UPA mob L C1-C2 TP gr3+; PA mob T3-8 gr3+;           Details if applicable:     40  [facility] BC Totals Reminder: bill using total billable min of TIMED therapeutic procedures (example: do not include dry needle or estim unattended, both untimed codes, in totals to left)  8-22 min = 1 unit; 23-37 min = 2 units; 38-52 min = 3 units; 53-67 min = 4 units; 68-82 min = 5 units   Total Total     [x]  Patient Education billed concurrently with other procedures   [x] Review HEP    [] Progressed/Changed HEP, detail:    [] Other detail:       Objective Information/Functional Measures/Assessment    Patient has only attended 3 total PT sessions and missed 1 since IE. Patient has noted improve C/S ROM which has helped her driving and looking into her mirrors but continues to have poor sleeping,

## 2025-08-26 RX ORDER — LACTOSE-REDUCED FOOD
1 LIQUID (ML) ORAL 2 TIMES DAILY
COMMUNITY

## 2025-08-26 RX ORDER — DILTIAZEM HYDROCHLORIDE EXTENDED-RELEASE TABLETS 300 MG/1
1 TABLET, EXTENDED RELEASE ORAL DAILY
COMMUNITY
Start: 2025-08-07

## 2025-08-26 RX ORDER — BRINZOLAMIDE/BRIMONIDINE TARTRATE 10; 2 MG/ML; MG/ML
8 SUSPENSION/ DROPS OPHTHALMIC DAILY
COMMUNITY
Start: 2025-08-10

## 2025-08-26 RX ORDER — ABACAVIR SULFATE, DOLUTEGRAVIR SODIUM, LAMIVUDINE 600; 50; 300 MG/1; MG/1; MG/1
1 TABLET, FILM COATED ORAL DAILY
COMMUNITY
Start: 2024-11-24

## 2025-08-26 RX ORDER — HYDROCORTISONE 10 MG/1
10 TABLET ORAL DAILY
COMMUNITY
Start: 2025-03-23

## 2025-08-26 RX ORDER — ALCOHOL 70.47 ML/100ML
1 GEL TOPICAL EVERY EVENING
COMMUNITY

## 2025-08-26 RX ORDER — ROSUVASTATIN CALCIUM 5 MG/1
5 TABLET, COATED ORAL DAILY
COMMUNITY
Start: 2024-10-22

## 2025-08-26 RX ORDER — PANTOPRAZOLE SODIUM 40 MG/1
40 TABLET, DELAYED RELEASE ORAL DAILY
COMMUNITY
Start: 2025-03-31

## 2025-08-26 RX ORDER — NETARSUDIL 0.2 MG/ML
1 SOLUTION/ DROPS OPHTHALMIC; TOPICAL EVERY EVENING
COMMUNITY
Start: 2025-08-24

## 2025-08-26 RX ORDER — LORATADINE 10 MG/1
10 TABLET ORAL DAILY PRN
COMMUNITY

## 2025-08-27 ENCOUNTER — OFFICE VISIT (OUTPATIENT)
Age: 80
End: 2025-08-27
Payer: MEDICARE

## 2025-08-27 VITALS
TEMPERATURE: 98.6 F | HEART RATE: 118 BPM | OXYGEN SATURATION: 100 % | HEIGHT: 63 IN | SYSTOLIC BLOOD PRESSURE: 94 MMHG | RESPIRATION RATE: 16 BRPM | DIASTOLIC BLOOD PRESSURE: 68 MMHG

## 2025-08-27 DIAGNOSIS — J44.9 MODERATE COPD (CHRONIC OBSTRUCTIVE PULMONARY DISEASE) (HCC): ICD-10-CM

## 2025-08-27 DIAGNOSIS — Z87.891 PERSONAL HISTORY OF TOBACCO USE: Primary | ICD-10-CM

## 2025-08-27 PROCEDURE — 1123F ACP DISCUSS/DSCN MKR DOCD: CPT | Performed by: HOSPITALIST

## 2025-08-27 PROCEDURE — 3078F DIAST BP <80 MM HG: CPT | Performed by: HOSPITALIST

## 2025-08-27 PROCEDURE — 3074F SYST BP LT 130 MM HG: CPT | Performed by: HOSPITALIST

## 2025-08-27 PROCEDURE — G0296 VISIT TO DETERM LDCT ELIG: HCPCS | Performed by: HOSPITALIST

## 2025-08-27 PROCEDURE — 1126F AMNT PAIN NOTED NONE PRSNT: CPT | Performed by: HOSPITALIST

## 2025-08-27 PROCEDURE — 99204 OFFICE O/P NEW MOD 45 MIN: CPT | Performed by: HOSPITALIST

## 2025-08-27 ASSESSMENT — PATIENT HEALTH QUESTIONNAIRE - PHQ9
SUM OF ALL RESPONSES TO PHQ QUESTIONS 1-9: 2
1. LITTLE INTEREST OR PLEASURE IN DOING THINGS: SEVERAL DAYS
SUM OF ALL RESPONSES TO PHQ QUESTIONS 1-9: 2
2. FEELING DOWN, DEPRESSED OR HOPELESS: SEVERAL DAYS
SUM OF ALL RESPONSES TO PHQ QUESTIONS 1-9: 2
SUM OF ALL RESPONSES TO PHQ QUESTIONS 1-9: 2